# Patient Record
Sex: MALE | Race: WHITE | NOT HISPANIC OR LATINO | Employment: OTHER | URBAN - METROPOLITAN AREA
[De-identification: names, ages, dates, MRNs, and addresses within clinical notes are randomized per-mention and may not be internally consistent; named-entity substitution may affect disease eponyms.]

---

## 2017-02-10 RX ORDER — QUINAPRIL 20 MG/1
TABLET ORAL
Qty: 90 TABLET | Refills: 3 | Status: SHIPPED | OUTPATIENT
Start: 2017-02-10 | End: 2018-01-30 | Stop reason: SDUPTHER

## 2017-02-13 RX ORDER — ROSUVASTATIN CALCIUM 10 MG/1
TABLET, COATED ORAL
Qty: 90 TABLET | Refills: 3 | Status: SHIPPED | OUTPATIENT
Start: 2017-02-13 | End: 2018-01-30 | Stop reason: SDUPTHER

## 2017-02-13 RX ORDER — DILTIAZEM HYDROCHLORIDE 240 MG/1
CAPSULE, EXTENDED RELEASE ORAL
Qty: 90 CAPSULE | Refills: 3 | Status: SHIPPED | OUTPATIENT
Start: 2017-02-13 | End: 2018-01-30 | Stop reason: SDUPTHER

## 2017-04-03 ENCOUNTER — PATIENT MESSAGE (OUTPATIENT)
Dept: FAMILY MEDICINE | Facility: CLINIC | Age: 72
End: 2017-04-03

## 2017-05-11 ENCOUNTER — LAB VISIT (OUTPATIENT)
Dept: LAB | Facility: HOSPITAL | Age: 72
End: 2017-05-11
Attending: FAMILY MEDICINE
Payer: MEDICARE

## 2017-05-11 DIAGNOSIS — E78.5 HYPERLIPIDEMIA, UNSPECIFIED HYPERLIPIDEMIA TYPE: ICD-10-CM

## 2017-05-11 LAB
ALBUMIN SERPL BCP-MCNC: 3.9 G/DL
ALP SERPL-CCNC: 50 U/L
ALT SERPL W/O P-5'-P-CCNC: 24 U/L
ANION GAP SERPL CALC-SCNC: 7 MMOL/L
AST SERPL-CCNC: 26 U/L
BILIRUB SERPL-MCNC: 0.8 MG/DL
BUN SERPL-MCNC: 21 MG/DL
CALCIUM SERPL-MCNC: 9.5 MG/DL
CHLORIDE SERPL-SCNC: 106 MMOL/L
CHOLEST/HDLC SERPL: 3 {RATIO}
CO2 SERPL-SCNC: 26 MMOL/L
CREAT SERPL-MCNC: 1.2 MG/DL
EST. GFR  (AFRICAN AMERICAN): >60 ML/MIN/1.73 M^2
EST. GFR  (NON AFRICAN AMERICAN): 59.6 ML/MIN/1.73 M^2
GLUCOSE SERPL-MCNC: 110 MG/DL
HDL/CHOLESTEROL RATIO: 33.1 %
HDLC SERPL-MCNC: 142 MG/DL
HDLC SERPL-MCNC: 47 MG/DL
LDLC SERPL CALC-MCNC: 77 MG/DL
NONHDLC SERPL-MCNC: 95 MG/DL
POTASSIUM SERPL-SCNC: 4.9 MMOL/L
PROT SERPL-MCNC: 7 G/DL
SODIUM SERPL-SCNC: 139 MMOL/L
TRIGL SERPL-MCNC: 90 MG/DL

## 2017-05-11 PROCEDURE — 80053 COMPREHEN METABOLIC PANEL: CPT

## 2017-05-11 PROCEDURE — 80061 LIPID PANEL: CPT

## 2017-05-11 PROCEDURE — 36415 COLL VENOUS BLD VENIPUNCTURE: CPT | Mod: PO

## 2017-05-18 ENCOUNTER — OFFICE VISIT (OUTPATIENT)
Dept: FAMILY MEDICINE | Facility: CLINIC | Age: 72
End: 2017-05-18
Payer: MEDICARE

## 2017-05-18 VITALS
DIASTOLIC BLOOD PRESSURE: 76 MMHG | BODY MASS INDEX: 27.81 KG/M2 | SYSTOLIC BLOOD PRESSURE: 130 MMHG | HEART RATE: 76 BPM | HEIGHT: 71 IN | TEMPERATURE: 99 F | WEIGHT: 198.63 LBS

## 2017-05-18 DIAGNOSIS — C67.9 MALIGNANT NEOPLASM OF URINARY BLADDER, UNSPECIFIED SITE: ICD-10-CM

## 2017-05-18 DIAGNOSIS — E78.5 HYPERLIPIDEMIA, UNSPECIFIED HYPERLIPIDEMIA TYPE: ICD-10-CM

## 2017-05-18 DIAGNOSIS — I10 ESSENTIAL HYPERTENSION: Primary | ICD-10-CM

## 2017-05-18 PROCEDURE — 99213 OFFICE O/P EST LOW 20 MIN: CPT | Mod: PBBFAC,PO | Performed by: FAMILY MEDICINE

## 2017-05-18 PROCEDURE — 99214 OFFICE O/P EST MOD 30 MIN: CPT | Mod: S$PBB,,, | Performed by: FAMILY MEDICINE

## 2017-05-18 PROCEDURE — 99999 PR PBB SHADOW E&M-EST. PATIENT-LVL III: CPT | Mod: PBBFAC,,, | Performed by: FAMILY MEDICINE

## 2017-05-18 RX ORDER — MIRABEGRON 50 MG/1
TABLET, FILM COATED, EXTENDED RELEASE ORAL
Refills: 6 | COMMUNITY
Start: 2017-05-12 | End: 2017-12-18

## 2017-05-18 NOTE — PROGRESS NOTES
Chief Complaint   Patient presents with    Hypertension     6 month follow up with labs prior    Hyperlipidemia     PI:This is a 71 year-old male presents to f/u on HTN.    HLD - tolerating Crestor 10mg and Fish Oil  HTN - tolerating diltiazem, quinapril.   Bladder/prostate cancer - following with urology, Dr. Cr,  every 3 months. Next appt next week. Taking Myrbetriq  Glaucoma - on Combigan; followed by Dr. Emery  Lumbar DDD with right sciatica - Seeing Dr. Wooten. Taking Neurontin 600mg QHS; getting RFA or KORINA every 6 weeks; getting some intermittent right upper thigh pain with walking.    PAST MEDICAL HISTORY:  Prostate cancer - [place] 6  Bladder carcinoma - followed with Dr. Bowman every 3 months; treated in past with Thiotrpa, BCG  HLD (Dx )  HTN (Dx )  colon polyp  Lumbar DDD    PAST SURGICAL HISTORY:  multiple bladder scrappings (11 total)    FAMILY HISTORY:  Father:  at 50 of CAD  Mother:  at 78 insterstial lung fibrosis, carotid artery disease  Grandpartents unknown    SOCIAL HISTORY:  Tobacco use: quit    Alcohol use: 2 drinks of bourbon daily  Occupation:  for law firm  Marital status: (wife with schizophrenia)  Children: 2  enjoys wood working and fishing  exercises daily    REVIEW OF SYSTEMS:  GENERAL: No fever, chills, fatigability or weight changes.  SKIN/BREASTS: No rashes, sores, itching or changes in color or texture of skin.   HEAD: No headaches or recent head trauma.   EYES: Visual acuity fine. Denies blurriness, tearing, itching, photophobia, diplopia, or visual changes.   EARS: Denies ear pain, discharge, tinnitus or vertigo. Denies hearing loss.   NOSE: No loss of smell, epistaxis, postnasal drip, discharge, obstruction, or sneezing.  MOUTH & THROAT: No hoarseness, change in voice, swallowing difficulty. No excessive gum bleeding.   HEMATOLOGICAL/NODES: Denies swollen glands. No bleeding or bruising.  CHEST: No TOLEDO, cyanosis, wheezing, cough or  "sputum production.  CARDIOVASCULAR: Denies chest pain, dyspnea, orthopnea, or palpitations.  GI/ABDOMEN: Appetite fine. No weight loss. Denies nausea, vomiting, diarrhea, constipation, abdominal pain, hematemesis or blood in stool.  URINARY: No dysuria,hematuria, nocturia, incontinence, flank pain, urgency, or urinary difficulty.  PERIPHERAL VASCULAR: No claudication, cold intolerance or cyanosis.  MUSCULOSKELETAL: No joint stiffness, pain, swelling, or loss of strength. Denies back pain.  NEUROLOGIC: No history of seizures, paralysis, alteration of gait or coordination.  ENDOCRINE: Sexual maturation. Denies weight change, heat/cold intolerance, hair loss or gain, loss of libido, polyuria, polydipsia, polyphagia, pruritus, unexplained flushing, or excessive sweating.   PSYCH: No crying spells. Denies anxiety symptoms.    PHYSICAL EXAM:  /76 (BP Location: Left arm, Patient Position: Sitting, BP Method: Manual)  Pulse 76  Temp 98.5 °F (36.9 °C) (Oral)   Ht 5' 11" (1.803 m)  Wt 90.1 kg (198 lb 10.2 oz)  BMI 27.7 kg/m2  APPEARANCE: Well nourished, well developed, neatly groomed, in no acute distress.   SKIN: Warm, moist and dry. No lesions or rashes.  EYES: Conjunctivae and lids clear. PERRL. EOMI.   NOSE: Mucosa pink. Nares clear.  MOUTH & THROAT: Oral mucosa pink. No tonsillar enlargement. No pharyngeal erythema or exudate.   NECK: Supple with full range of motion. No masses. No thyromegaly. No JVD or bruits.  LYMPH: No cervical, supraclavicular, axillary or inguinal lymph node enlargement.  RESPIRATORY: Easy and unlabored respirations. Lungs clear to auscultation throughout all lung fields. No wheezes or rales.  CARDIOVASCULAR: Regular rate and rhythm. Normal S1, S2. No rubs, murmurs or gallops.   ABDOMEN: Bowel sounds normal. Nondistended and soft. No hepatosplenomegaly, masses, or tenderness.  EXTREMITIES: No edema or cyanosis. Pedal pulses 2+ bilaterally. No varicosities   NEUROLOGIC: Cranial Nerves: " II-XII grossly intact  Gait & Posture: Normal gait and fine motion.  PSYCH: Awake, alert, oriented to person, place, time, and situation. Pleasant and cooperative mood with intact judgment.    Results for orders placed or performed in visit on 05/11/17   Comprehensive metabolic panel   Result Value Ref Range    Sodium 139 136 - 145 mmol/L    Potassium 4.9 3.5 - 5.1 mmol/L    Chloride 106 95 - 110 mmol/L    CO2 26 23 - 29 mmol/L    Glucose 110 70 - 110 mg/dL    BUN, Bld 21 8 - 23 mg/dL    Creatinine 1.2 0.5 - 1.4 mg/dL    Calcium 9.5 8.7 - 10.5 mg/dL    Total Protein 7.0 6.0 - 8.4 g/dL    Albumin 3.9 3.5 - 5.2 g/dL    Total Bilirubin 0.8 0.1 - 1.0 mg/dL    Alkaline Phosphatase 50 (L) 55 - 135 U/L    AST 26 10 - 40 U/L    ALT 24 10 - 44 U/L    Anion Gap 7 (L) 8 - 16 mmol/L    eGFR if African American >60.0 >60 mL/min/1.73 m^2    eGFR if non  59.6 (A) >60 mL/min/1.73 m^2   Lipid panel   Result Value Ref Range    Cholesterol 142 120 - 199 mg/dL    Triglycerides 90 30 - 150 mg/dL    HDL 47 40 - 75 mg/dL    LDL Cholesterol 77.0 63.0 - 159.0 mg/dL    HDL/Chol Ratio 33.1 20.0 - 50.0 %    Total Cholesterol/HDL Ratio 3.0 2.0 - 5.0    Non-HDL Cholesterol 95 mg/dL       ASSESSMENT/PLAN:  Essential hypertension    Hyperlipidemia, unspecified hyperlipidemia type  -     Comprehensive metabolic panel; Future; Expected date: 11/14/17  -     Lipid panel; Future; Expected date: 11/14/17    Malignant neoplasm of urinary bladder, unspecified site  -     CBC auto differential; Future; Expected date: 11/14/17      1. HTN (controlled) - continue diltiazem and Quinapril  2. HLD (controlled) - continue Crestor 10mg daily, Fish Oil 1000mg daily  3. bladder carcinoma/prostate cancer - continue present treatment and f/u with Urology  f/u 6 months with CMP, lipid profile. Patient preference.

## 2017-05-18 NOTE — MR AVS SNAPSHOT
[facility]  [address] [facility] [address]  [facility] [address]  Phone: [phone]  Fax: [phone]                  Lloyd John Jr.   2017 7:20 AM   Office Visit    Description:  Male : 1945   Provider:  Daniel Avery MD   Department:  [facility]           Reason for Visit     Hypertension     Hyperlipidemia           Diagnoses this Visit        Comments    Essential hypertension    -  Primary     Hyperlipidemia, unspecified hyperlipidemia type         Malignant neoplasm of urinary bladder, unspecified site                To Do List           Future Appointments        Provider Department Dept Phone    2017 8:00 AM LAB, [place] [facility] [phone]    2017 8:00 AM Daniel Avery MD [facility] [phone]      Goals (5 Years of Data)     None      Follow-Up and Disposition     Return in about 6 months (around 2017).    Follow-up and Disposition History      [facility]     [facility] Nurse Care Line -  Assistance  Unless otherwise directed by your provider, please contact [facility], our nurse care line that is available for  assistance.     Registered nurses in the [facility] provide: appointment scheduling, clinical advisement, health education, and other advisory services.  Call: [phone] (toll free)               Medications           Message regarding Medications     Verify the changes and/or additions to your medication regime listed below are the same as discussed with your clinician today.  If any of these changes or additions are incorrect, please notify your healthcare provider.        STOP taking these medications     tamsulosin (FLOMAX) 0.4 mg Cp24            Verify that the below list of medications is an accurate representation of the medications you are currently taking.  If none reported, the list may be blank. If incorrect, please contact  "your healthcare provider. Carry this list with you in case of emergency.           Current Medications     ascorbic acid (VITAMIN C) 1000 MG tablet Take 1,000 mg by mouth once daily.      aspirin (ECOTRIN) 81 MG EC tablet Take 1 tablet by mouth Daily. Every day    COMBIGAN 0.2-0.5 % Drop     DILT- mg CDCR TAKE 1 CAPSULE(240 MG) BY MOUTH EVERY DAY    gabapentin (NEURONTIN) 600 MG tablet     LUMIGAN 0.01 % Drop     MULTIVITAMIN W-MINERALS/LUTEIN (CENTRUM SILVER ORAL) Take by mouth.      MYRBETRIQ 50 mg Tb24 TK 1 T PO QD    quinapril (ACCUPRIL) 20 MG tablet TAKE 1 TABLET(20 MG) BY MOUTH EVERY EVENING    rosuvastatin (CRESTOR) 10 MG tablet Take one tablet by mouth daily    fluticasone (FLONASE) 50 mcg/actuation nasal spray     NASONEX 50 mcg/actuation nasal spray            Clinical Reference Information           Your Vitals Were     BP Pulse Temp    130/76 (BP Location: Left arm, Patient Position: Sitting, BP Method: Manual) 76 98.5 °F (36.9 °C) (Oral)    Height Weight BMI    5' 11" (1.803 m) 90.1 kg (198 lb 10.2 oz) 27.7 kg/m2      Blood Pressure          Most Recent Value    BP  130/76      Allergies as of 5/18/2017     Bactrim [Sulfamethoxazole-trimethoprim]      Immunizations Administered on Date of Encounter - 5/18/2017     None      Orders Placed During Today's Visit     Future Labs/Procedures Expected by Expires    CBC auto differential  11/14/2017 7/17/2018    Comprehensive metabolic panel  11/14/2017 7/17/2018    Lipid panel  11/14/2017 5/18/2018      Language Assistance Services     ATTENTION: Language assistance services are available, free of charge. Please call [phone].      ATENCIÓN: Si habla español, tiene a gan disposición servicios gratuitos de asistencia lingüística. Llame al [phone].     CHÚ Ý: N?u b?n nói Ti?ng Vi?t, có các d?ch v? h? tr? ngôn ng? mi?n phí dành cho b?n. G?i s? [phone].         [facility] complies with applicable Federal civil rights laws " and does not discriminate on the basis of race, color, national origin, age, disability, or sex.

## 2017-11-14 ENCOUNTER — LAB VISIT (OUTPATIENT)
Dept: LAB | Facility: HOSPITAL | Age: 72
End: 2017-11-14
Attending: FAMILY MEDICINE
Payer: MEDICARE

## 2017-11-14 DIAGNOSIS — E78.5 HYPERLIPIDEMIA, UNSPECIFIED HYPERLIPIDEMIA TYPE: ICD-10-CM

## 2017-11-14 DIAGNOSIS — C67.9 MALIGNANT NEOPLASM OF URINARY BLADDER, UNSPECIFIED SITE: ICD-10-CM

## 2017-11-14 LAB
ALBUMIN SERPL BCP-MCNC: 3.9 G/DL
ALP SERPL-CCNC: 54 U/L
ALT SERPL W/O P-5'-P-CCNC: 33 U/L
ANION GAP SERPL CALC-SCNC: 6 MMOL/L
AST SERPL-CCNC: 37 U/L
BASOPHILS # BLD AUTO: 0.05 K/UL
BASOPHILS NFR BLD: 1.2 %
BILIRUB SERPL-MCNC: 1.1 MG/DL
BUN SERPL-MCNC: 18 MG/DL
CALCIUM SERPL-MCNC: 9.4 MG/DL
CHLORIDE SERPL-SCNC: 105 MMOL/L
CHOLEST SERPL-MCNC: 150 MG/DL
CHOLEST/HDLC SERPL: 2.8 {RATIO}
CO2 SERPL-SCNC: 31 MMOL/L
CREAT SERPL-MCNC: 1 MG/DL
DIFFERENTIAL METHOD: ABNORMAL
EOSINOPHIL # BLD AUTO: 0.1 K/UL
EOSINOPHIL NFR BLD: 1.5 %
ERYTHROCYTE [DISTWIDTH] IN BLOOD BY AUTOMATED COUNT: 12 %
EST. GFR  (AFRICAN AMERICAN): >60 ML/MIN/1.73 M^2
EST. GFR  (NON AFRICAN AMERICAN): >60 ML/MIN/1.73 M^2
GLUCOSE SERPL-MCNC: 95 MG/DL
HCT VFR BLD AUTO: 42.8 %
HDLC SERPL-MCNC: 54 MG/DL
HDLC SERPL: 36 %
HGB BLD-MCNC: 15.2 G/DL
IMM GRANULOCYTES # BLD AUTO: 0 K/UL
IMM GRANULOCYTES NFR BLD AUTO: 0 %
LDLC SERPL CALC-MCNC: 73.6 MG/DL
LYMPHOCYTES # BLD AUTO: 1.2 K/UL
LYMPHOCYTES NFR BLD: 29.7 %
MCH RBC QN AUTO: 34 PG
MCHC RBC AUTO-ENTMCNC: 35.5 G/DL
MCV RBC AUTO: 96 FL
MONOCYTES # BLD AUTO: 0.7 K/UL
MONOCYTES NFR BLD: 16.3 %
NEUTROPHILS # BLD AUTO: 2.1 K/UL
NEUTROPHILS NFR BLD: 51.3 %
NONHDLC SERPL-MCNC: 96 MG/DL
NRBC BLD-RTO: 0 /100 WBC
PLATELET # BLD AUTO: 232 K/UL
PMV BLD AUTO: 11.3 FL
POTASSIUM SERPL-SCNC: 4.4 MMOL/L
PROT SERPL-MCNC: 6.8 G/DL
RBC # BLD AUTO: 4.47 M/UL
SODIUM SERPL-SCNC: 142 MMOL/L
TRIGL SERPL-MCNC: 112 MG/DL
WBC # BLD AUTO: 4.11 K/UL

## 2017-11-14 PROCEDURE — 36415 COLL VENOUS BLD VENIPUNCTURE: CPT | Mod: PO

## 2017-11-14 PROCEDURE — 80061 LIPID PANEL: CPT

## 2017-11-14 PROCEDURE — 85025 COMPLETE CBC W/AUTO DIFF WBC: CPT

## 2017-11-14 PROCEDURE — 80053 COMPREHEN METABOLIC PANEL: CPT

## 2017-11-17 ENCOUNTER — OFFICE VISIT (OUTPATIENT)
Dept: FAMILY MEDICINE | Facility: CLINIC | Age: 72
End: 2017-11-17
Payer: MEDICARE

## 2017-11-17 VITALS
HEIGHT: 71 IN | OXYGEN SATURATION: 93 % | SYSTOLIC BLOOD PRESSURE: 122 MMHG | WEIGHT: 199.94 LBS | DIASTOLIC BLOOD PRESSURE: 70 MMHG | BODY MASS INDEX: 27.99 KG/M2 | HEART RATE: 75 BPM

## 2017-11-17 DIAGNOSIS — I10 ESSENTIAL HYPERTENSION: Primary | ICD-10-CM

## 2017-11-17 DIAGNOSIS — E78.5 HYPERLIPIDEMIA, UNSPECIFIED HYPERLIPIDEMIA TYPE: ICD-10-CM

## 2017-11-17 PROCEDURE — 99213 OFFICE O/P EST LOW 20 MIN: CPT | Mod: S$PBB,,, | Performed by: FAMILY MEDICINE

## 2017-11-17 PROCEDURE — 99213 OFFICE O/P EST LOW 20 MIN: CPT | Mod: PBBFAC,PO | Performed by: FAMILY MEDICINE

## 2017-11-17 PROCEDURE — 99999 PR PBB SHADOW E&M-EST. PATIENT-LVL III: CPT | Mod: PBBFAC,,, | Performed by: FAMILY MEDICINE

## 2017-11-17 NOTE — PROGRESS NOTES
Chief Complaint   Patient presents with    Hyperlipidemia     6 mos f/u    Hypertension     PI:This is a 72 year-old male presents to f/u on HTN.    HLD - tolerating Crestor 10mg and Fish Oil  HTN - tolerating diltiazem, quinapril.   Bladder/prostate cancer ([place] 7) - following with urology, Dr. Cr,  every 3 months. Taking Myrbetriq. Planning for robotic prostatectomy.  Glaucoma - on Combigan; followed by Dr. Emery  Lumbar DDD with right sciatica - Seeing Dr. Wooten. Taking Neurontin 600mg QHS; s/p RFA and KORINA; getting some intermittent right upper thigh pain with walking.    PAST MEDICAL HISTORY:  Prostate cancer - [place] 6  Bladder carcinoma - followed with Dr. Bowman every 3 months; treated in past with Thiotrpa, BCG  HLD (Dx )  HTN (Dx )  colon polyp  Lumbar DDD    PAST SURGICAL HISTORY:  multiple bladder scrappings (11 total)    FAMILY HISTORY:  Father:  at 50 of CAD  Mother:  at 78 insterstial lung fibrosis, carotid artery disease  Grandpartents unknown    SOCIAL HISTORY:  Tobacco use: quit    Alcohol use: 2 drinks of bourbon daily  Occupation:  for law firm  Marital status: (wife with schizophrenia)  Children: 2  enjoys wood working and fishing  exercises daily    REVIEW OF SYSTEMS:  GENERAL: No fever, chills, fatigability or weight changes.  SKIN/BREASTS: No rashes, sores, itching or changes in color or texture of skin.   HEAD: No headaches or recent head trauma.   EYES: Visual acuity fine. Denies blurriness, tearing, itching, photophobia, diplopia, or visual changes.   EARS: Denies ear pain, discharge, tinnitus or vertigo. Denies hearing loss.   NOSE: No loss of smell, epistaxis, postnasal drip, discharge, obstruction, or sneezing.  MOUTH & THROAT: No hoarseness, change in voice, swallowing difficulty. No excessive gum bleeding.   HEMATOLOGICAL/NODES: Denies swollen glands. No bleeding or bruising.  CHEST: No TOLEDO, cyanosis, wheezing, cough or sputum  "production.  CARDIOVASCULAR: Denies chest pain, dyspnea, orthopnea, or palpitations.  GI/ABDOMEN: Appetite fine. No weight loss. Denies nausea, vomiting, diarrhea, constipation, abdominal pain, hematemesis or blood in stool.  URINARY: No dysuria,hematuria, nocturia, incontinence, flank pain, urgency, or urinary difficulty.  PERIPHERAL VASCULAR: No claudication, cold intolerance or cyanosis.  MUSCULOSKELETAL: No joint stiffness, pain, swelling, or loss of strength. Denies back pain.  NEUROLOGIC: No history of seizures, paralysis, alteration of gait or coordination.  ENDOCRINE: Sexual maturation. Denies weight change, heat/cold intolerance, hair loss or gain, loss of libido, polyuria, polydipsia, polyphagia, pruritus, unexplained flushing, or excessive sweating.   PSYCH: No crying spells. Denies anxiety symptoms.    Answers for HPI/ROS submitted by the patient on 11/15/2017   activity change: Yes  unexpected weight change: No  neck pain: No  hearing loss: No  rhinorrhea: No  trouble swallowing: No  eye discharge: No  visual disturbance: No  chest tightness: No  wheezing: No  chest pain: No  palpitations: No  blood in stool: No  constipation: No  vomiting: No  diarrhea: No  polydipsia: No  polyuria: Yes  difficulty urinating: No  urgency: Yes  hematuria: No  joint swelling: No  arthralgias: Yes  headaches: Yes  weakness: No  confusion: No  dysphoric mood: No      PHYSICAL EXAM:  /70   Pulse 75   Ht 5' 11" (1.803 m)   Wt 90.7 kg (199 lb 15.3 oz)   SpO2 (!) 93%   BMI 27.89 kg/m²   APPEARANCE: Well nourished, well developed, neatly groomed, in no acute distress.   SKIN: Warm, moist and dry. No lesions or rashes.  EYES: Conjunctivae and lids clear. PERRL. EOMI.   NOSE: Mucosa pink. Nares clear.  MOUTH & THROAT: Oral mucosa pink. No tonsillar enlargement. No pharyngeal erythema or exudate.   NECK: Supple with full range of motion. No masses. No thyromegaly. No JVD or bruits.  LYMPH: No cervical, supraclavicular, " axillary or inguinal lymph node enlargement.  RESPIRATORY: Easy and unlabored respirations. Lungs clear to auscultation throughout all lung fields. No wheezes or rales.  CARDIOVASCULAR: Regular rate and rhythm. Normal S1, S2. No rubs, murmurs or gallops.   ABDOMEN: Bowel sounds normal. Nondistended and soft. No hepatosplenomegaly, masses, or tenderness.  EXTREMITIES: No edema or cyanosis. Pedal pulses 2+ bilaterally. No varicosities   NEUROLOGIC: Cranial Nerves: II-XII grossly intact  Gait & Posture: Normal gait and fine motion.  PSYCH: Awake, alert, oriented to person, place, time, and situation. Pleasant and cooperative mood with intact judgment.    Results for orders placed or performed in visit on 11/14/17   Comprehensive metabolic panel   Result Value Ref Range    Sodium 142 136 - 145 mmol/L    Potassium 4.4 3.5 - 5.1 mmol/L    Chloride 105 95 - 110 mmol/L    CO2 31 (H) 23 - 29 mmol/L    Glucose 95 70 - 110 mg/dL    BUN, Bld 18 8 - 23 mg/dL    Creatinine 1.0 0.5 - 1.4 mg/dL    Calcium 9.4 8.7 - 10.5 mg/dL    Total Protein 6.8 6.0 - 8.4 g/dL    Albumin 3.9 3.5 - 5.2 g/dL    Total Bilirubin 1.1 (H) 0.1 - 1.0 mg/dL    Alkaline Phosphatase 54 (L) 55 - 135 U/L    AST 37 10 - 40 U/L    ALT 33 10 - 44 U/L    Anion Gap 6 (L) 8 - 16 mmol/L    eGFR if African American >60.0 >60 mL/min/1.73 m^2    eGFR if non African American >60.0 >60 mL/min/1.73 m^2   Lipid panel   Result Value Ref Range    Cholesterol 150 120 - 199 mg/dL    Triglycerides 112 30 - 150 mg/dL    HDL 54 40 - 75 mg/dL    LDL Cholesterol 73.6 63.0 - 159.0 mg/dL    HDL/Chol Ratio 36.0 20.0 - 50.0 %    Total Cholesterol/HDL Ratio 2.8 2.0 - 5.0    Non-HDL Cholesterol 96 mg/dL   CBC auto differential   Result Value Ref Range    WBC 4.11 3.90 - 12.70 K/uL    RBC 4.47 (L) 4.60 - 6.20 M/uL    Hemoglobin 15.2 14.0 - 18.0 g/dL    Hematocrit 42.8 40.0 - 54.0 %    MCV 96 82 - 98 fL    MCH 34.0 (H) 27.0 - 31.0 pg    MCHC 35.5 32.0 - 36.0 g/dL    RDW 12.0 11.5 - 14.5 %     Platelets 232 150 - 350 K/uL    MPV 11.3 9.2 - 12.9 fL    Immature Granulocytes 0.0 0.0 - 0.5 %    Gran # 2.1 1.8 - 7.7 K/uL    Immature Grans (Abs) 0.00 0.00 - 0.04 K/uL    Lymph # 1.2 1.0 - 4.8 K/uL    Mono # 0.7 0.3 - 1.0 K/uL    Eos # 0.1 0.0 - 0.5 K/uL    Baso # 0.05 0.00 - 0.20 K/uL    nRBC 0 0 /100 WBC    Gran% 51.3 38.0 - 73.0 %    Lymph% 29.7 18.0 - 48.0 %    Mono% 16.3 (H) 4.0 - 15.0 %    Eosinophil% 1.5 0.0 - 8.0 %    Basophil% 1.2 0.0 - 1.9 %    Differential Method Automated        ASSESSMENT/PLAN:  Essential hypertension    Hyperlipidemia, unspecified hyperlipidemia type  -     Comprehensive metabolic panel; Future; Expected date: 05/16/2018  -     Lipid panel; Future; Expected date: 05/16/2018      1. HTN (controlled) - continue diltiazem and Quinapril  2. HLD (controlled) - continue Crestor 10mg daily, Fish Oil 1000mg daily  3. bladder carcinoma/prostate cancer - continue present treatment and f/u with Urology  f/u 6 months with CMP, lipid profile. Patient preference.

## 2017-12-11 ENCOUNTER — PATIENT MESSAGE (OUTPATIENT)
Dept: FAMILY MEDICINE | Facility: CLINIC | Age: 72
End: 2017-12-11

## 2017-12-18 ENCOUNTER — OFFICE VISIT (OUTPATIENT)
Dept: FAMILY MEDICINE | Facility: CLINIC | Age: 72
End: 2017-12-18
Payer: MEDICARE

## 2017-12-18 VITALS
SYSTOLIC BLOOD PRESSURE: 146 MMHG | HEIGHT: 71 IN | TEMPERATURE: 98 F | DIASTOLIC BLOOD PRESSURE: 90 MMHG | WEIGHT: 201.94 LBS | OXYGEN SATURATION: 96 % | HEART RATE: 100 BPM | BODY MASS INDEX: 28.27 KG/M2

## 2017-12-18 DIAGNOSIS — R10.9 LEFT FLANK PAIN: ICD-10-CM

## 2017-12-18 DIAGNOSIS — M16.10 HIP ARTHRITIS: Primary | ICD-10-CM

## 2017-12-18 PROCEDURE — 99999 PR PBB SHADOW E&M-EST. PATIENT-LVL IV: CPT | Mod: PBBFAC,,, | Performed by: FAMILY MEDICINE

## 2017-12-18 PROCEDURE — 99213 OFFICE O/P EST LOW 20 MIN: CPT | Mod: S$PBB,,, | Performed by: FAMILY MEDICINE

## 2017-12-18 PROCEDURE — 99214 OFFICE O/P EST MOD 30 MIN: CPT | Mod: PBBFAC,PO | Performed by: FAMILY MEDICINE

## 2017-12-18 RX ORDER — NAPROXEN 500 MG/1
500 TABLET ORAL 2 TIMES DAILY WITH MEALS
Qty: 14 TABLET | Refills: 0 | Status: SHIPPED | OUTPATIENT
Start: 2017-12-18 | End: 2019-12-11

## 2017-12-18 NOTE — PROGRESS NOTES
Subjective:       Patient ID: Lloyd John Jr. is a 72 y.o. male.    Chief Complaint: Pain (left side)    C/o recurrent left anterior hip pain for the past month.  7/10, lasts seconds, worse with lifting and pulling,   Pain is sharp.  It is worse with prolonged seating.  No pain with lying.  It seems to have started after moving a ladder.  Pain is unchanged.  Taking 2 Aleve as needed.        Past Medical History:   Diagnosis Date    Bladder cancer     Followed by Dr. Craig in [place]    Colon polyp 2006    Bunny Payton / [facility]    Glaucoma (increased eye pressure)     HLD (hyperlipidemia)     HTN (hypertension)     Prostate cancer        Past Surgical History:   Procedure Laterality Date    bladder scrapping      COLONOSCOPY  2006  Bill Payton/    Polyps    COLONOSCOPY  4/5/2012  [facility]    Diverticulosis and spasms.  No polyps.    Urolift implant  03/08/2017       Review of patient's allergies indicates:   Allergen Reactions    Bactrim [sulfamethoxazole-trimethoprim] Hives and Rash       Social History     Social History    Marital status:      Spouse name: N/A    Number of children: 2    Years of education: N/A     Occupational History     for law firm      Social History Main Topics    Smoking status: Former Smoker     Quit date: 1/1/1997    Smokeless tobacco: Never Used    Alcohol use 1.0 oz/week     2 drink(s) per week      Comment: weekends    Drug use: No    Sexual activity: Not on file     Other Topics Concern    Not on file     Social History Narrative    No narrative on file       Current Outpatient Prescriptions on File Prior to Visit   Medication Sig Dispense Refill    ascorbic acid (VITAMIN C) 1000 MG tablet Take 1,000 mg by mouth once daily.        aspirin (ECOTRIN) 81 MG EC tablet Take 1 tablet by mouth Daily. Every day      COMBIGAN 0.2-0.5 % Drop       DILT- mg CDCR TAKE 1 CAPSULE(240 MG) BY MOUTH EVERY DAY 90 capsule 3    fluticasone (FLONASE)  "50 mcg/actuation nasal spray   4    LUMIGAN 0.01 % Drop       MULTIVITAMIN W-MINERALS/LUTEIN (CENTRUM SILVER ORAL) Take by mouth.        NASONEX 50 mcg/actuation nasal spray   3    quinapril (ACCUPRIL) 20 MG tablet TAKE 1 TABLET(20 MG) BY MOUTH EVERY EVENING 90 tablet 3    rosuvastatin (CRESTOR) 10 MG tablet Take one tablet by mouth daily 90 tablet 3     No current facility-administered medications on file prior to visit.        Family History   Problem Relation Age of Onset    Coronary artery disease Father        Review of Systems   Constitutional: Negative for appetite change, chills, fever and unexpected weight change.   HENT: Negative for sore throat and trouble swallowing.    Eyes: Negative for pain and visual disturbance.   Respiratory: Negative for cough, chest tightness, shortness of breath and wheezing.    Cardiovascular: Negative for chest pain, palpitations and leg swelling.   Gastrointestinal: Negative for abdominal pain, blood in stool, constipation, diarrhea and nausea.   Genitourinary: Negative for difficulty urinating, dysuria and hematuria.   Musculoskeletal: Positive for arthralgias. Negative for gait problem and neck pain.   Skin: Negative for rash and wound.   Neurological: Negative for dizziness, weakness, light-headedness and headaches.   Hematological: Negative for adenopathy.   Psychiatric/Behavioral: Negative for dysphoric mood.       Objective:      BP (!) 146/90 (BP Location: Left arm, Patient Position: Sitting, BP Method: Medium (Manual))   Pulse 100   Temp 98.2 °F (36.8 °C) (Oral)   Ht 5' 11" (1.803 m)   Wt 91.6 kg (201 lb 15.1 oz)   SpO2 96%   BMI 28.17 kg/m²   Physical Exam   Constitutional: He is oriented to person, place, and time. He appears well-developed and well-nourished. He is active. No distress.   HENT:   Head: Normocephalic and atraumatic.   Right Ear: External ear normal.   Left Ear: External ear normal.   Mouth/Throat: Uvula is midline, oropharynx is clear and " moist and mucous membranes are normal. No oropharyngeal exudate.   Eyes: Conjunctivae, EOM and lids are normal. Pupils are equal, round, and reactive to light.   Neck: Normal range of motion, full passive range of motion without pain and phonation normal. Neck supple. No thyroid mass and no thyromegaly present.   Cardiovascular: Normal rate, regular rhythm, normal heart sounds and intact distal pulses.  Exam reveals no gallop and no friction rub.    No murmur heard.  Pulmonary/Chest: Effort normal and breath sounds normal. No respiratory distress. He has no wheezes. He has no rales.   Musculoskeletal:        Left hip: He exhibits decreased range of motion and crepitus. He exhibits no tenderness and no bony tenderness.   Lymphadenopathy:     He has no cervical adenopathy.   Neurological: He is alert and oriented to person, place, and time. No cranial nerve deficit. Coordination normal.   Skin: Skin is warm and dry.   Psychiatric: He has a normal mood and affect. His speech is normal and behavior is normal. Judgment and thought content normal.       Assessment:       1. Hip arthritis    2. Left flank pain        Plan:       Hip arthritis  -     naproxen (NAPROSYN) 500 MG tablet; Take 1 tablet (500 mg total) by mouth 2 (two) times daily with meals.  Dispense: 14 tablet; Refill: 0    Left flank pain  -     naproxen (NAPROSYN) 500 MG tablet; Take 1 tablet (500 mg total) by mouth 2 (two) times daily with meals.  Dispense: 14 tablet; Refill: 0      basic hip stretching and relative rest  F/u PRN

## 2018-01-30 RX ORDER — DILTIAZEM HYDROCHLORIDE 240 MG/1
CAPSULE, EXTENDED RELEASE ORAL
Qty: 90 CAPSULE | Refills: 3 | Status: SHIPPED | OUTPATIENT
Start: 2018-01-30 | End: 2018-11-26 | Stop reason: SDUPTHER

## 2018-01-30 RX ORDER — ROSUVASTATIN CALCIUM 10 MG/1
TABLET, COATED ORAL
Qty: 90 TABLET | Refills: 3 | Status: SHIPPED | OUTPATIENT
Start: 2018-01-30 | End: 2018-11-26 | Stop reason: SDUPTHER

## 2018-01-30 RX ORDER — QUINAPRIL 20 MG/1
TABLET ORAL
Qty: 90 TABLET | Refills: 3 | Status: SHIPPED | OUTPATIENT
Start: 2018-01-30 | End: 2018-07-03 | Stop reason: DRUGHIGH

## 2018-04-10 ENCOUNTER — PATIENT MESSAGE (OUTPATIENT)
Dept: GASTROENTEROLOGY | Facility: CLINIC | Age: 73
End: 2018-04-10

## 2018-05-11 ENCOUNTER — LAB VISIT (OUTPATIENT)
Dept: LAB | Facility: HOSPITAL | Age: 73
End: 2018-05-11
Attending: FAMILY MEDICINE
Payer: MEDICARE

## 2018-05-11 DIAGNOSIS — E78.5 HYPERLIPIDEMIA, UNSPECIFIED HYPERLIPIDEMIA TYPE: ICD-10-CM

## 2018-05-11 LAB
ALBUMIN SERPL BCP-MCNC: 4 G/DL
ALP SERPL-CCNC: 60 U/L
ALT SERPL W/O P-5'-P-CCNC: 39 U/L
ANION GAP SERPL CALC-SCNC: 6 MMOL/L
AST SERPL-CCNC: 34 U/L
BILIRUB SERPL-MCNC: 1.1 MG/DL
BUN SERPL-MCNC: 20 MG/DL
CALCIUM SERPL-MCNC: 9.8 MG/DL
CHLORIDE SERPL-SCNC: 105 MMOL/L
CHOLEST SERPL-MCNC: 138 MG/DL
CHOLEST/HDLC SERPL: 3 {RATIO}
CO2 SERPL-SCNC: 30 MMOL/L
CREAT SERPL-MCNC: 1.2 MG/DL
EST. GFR  (AFRICAN AMERICAN): >60 ML/MIN/1.73 M^2
EST. GFR  (NON AFRICAN AMERICAN): 59.2 ML/MIN/1.73 M^2
GLUCOSE SERPL-MCNC: 98 MG/DL
HDLC SERPL-MCNC: 46 MG/DL
HDLC SERPL: 33.3 %
LDLC SERPL CALC-MCNC: 70.2 MG/DL
NONHDLC SERPL-MCNC: 92 MG/DL
POTASSIUM SERPL-SCNC: 4.3 MMOL/L
PROT SERPL-MCNC: 7 G/DL
SODIUM SERPL-SCNC: 141 MMOL/L
TRIGL SERPL-MCNC: 109 MG/DL

## 2018-05-11 PROCEDURE — 80053 COMPREHEN METABOLIC PANEL: CPT

## 2018-05-11 PROCEDURE — 80061 LIPID PANEL: CPT

## 2018-05-11 PROCEDURE — 36415 COLL VENOUS BLD VENIPUNCTURE: CPT | Mod: PO

## 2018-05-17 ENCOUNTER — OFFICE VISIT (OUTPATIENT)
Dept: FAMILY MEDICINE | Facility: CLINIC | Age: 73
End: 2018-05-17
Payer: MEDICARE

## 2018-05-17 VITALS
HEIGHT: 71 IN | HEART RATE: 59 BPM | RESPIRATION RATE: 16 BRPM | OXYGEN SATURATION: 97 % | SYSTOLIC BLOOD PRESSURE: 120 MMHG | BODY MASS INDEX: 27.53 KG/M2 | DIASTOLIC BLOOD PRESSURE: 86 MMHG | WEIGHT: 196.63 LBS | TEMPERATURE: 98 F

## 2018-05-17 DIAGNOSIS — I10 ESSENTIAL HYPERTENSION: Primary | ICD-10-CM

## 2018-05-17 DIAGNOSIS — C61 PROSTATE CANCER: ICD-10-CM

## 2018-05-17 DIAGNOSIS — G47.00 INSOMNIA, UNSPECIFIED TYPE: ICD-10-CM

## 2018-05-17 DIAGNOSIS — C67.9 MALIGNANT NEOPLASM OF URINARY BLADDER, UNSPECIFIED SITE: ICD-10-CM

## 2018-05-17 DIAGNOSIS — E78.5 HYPERLIPIDEMIA, UNSPECIFIED HYPERLIPIDEMIA TYPE: ICD-10-CM

## 2018-05-17 PROCEDURE — 99214 OFFICE O/P EST MOD 30 MIN: CPT | Mod: S$PBB,,, | Performed by: FAMILY MEDICINE

## 2018-05-17 PROCEDURE — 99999 PR PBB SHADOW E&M-EST. PATIENT-LVL III: CPT | Mod: PBBFAC,,, | Performed by: FAMILY MEDICINE

## 2018-05-17 PROCEDURE — 99213 OFFICE O/P EST LOW 20 MIN: CPT | Mod: PBBFAC,PO | Performed by: FAMILY MEDICINE

## 2018-05-17 RX ORDER — ALPRAZOLAM 0.5 MG/1
0.5 TABLET ORAL NIGHTLY PRN
Qty: 30 TABLET | Refills: 0 | Status: SHIPPED | OUTPATIENT
Start: 2018-05-17 | End: 2019-12-11

## 2018-05-17 RX ORDER — MIRABEGRON 50 MG/1
TABLET, FILM COATED, EXTENDED RELEASE ORAL
Refills: 3 | COMMUNITY
Start: 2018-05-10 | End: 2019-12-11

## 2018-05-17 NOTE — PROGRESS NOTES
Chief Complaint   Patient presents with    Hypertension     follow up     Insomnia     states he is getting little sleep caring for his spouse     PI:This is a 72 year-old male presents to f/u on HTN.    HLD - tolerating Crestor 10mg and Fish Oil  HTN - tolerating diltiazem, quinapril.   Bladder/prostate cancer ([place] [facility]) - following with urology, Dr. Cr,  every 3 months. Taking Myrbetriq. Still considering for robotic prostatectomy. PSA has been trending down.  Glaucoma - on Combigan; followed by Dr. Emery  Lumbar DDD with right sciatica - Seeing Dr. Wooten. Taking Neurontin 600mg QHS; s/p RFA and KORINA; getting some intermittent right upper thigh pain with walking.  Some trouble falling to sleep for the last 3 weeks. He has tried melatonin.    PAST MEDICAL HISTORY:  Prostate cancer - [place] [facility]  Bladder carcinoma - followed with Dr. Bowman every 3 months; treated in past with Thiotrpa, BCG  HLD (Dx )  HTN (Dx )  colon polyp  Lumbar DDD    PAST SURGICAL HISTORY:  multiple bladder scrappings (11 total)    FAMILY HISTORY:  Father:  at 50 of CAD  Mother:  at 78 insterstial lung fibrosis, carotid artery disease  Grandpartents unknown    SOCIAL HISTORY:  Tobacco use: quit    Alcohol use: 2 drinks of bourbon daily  Occupation:  for [employer]  Marital status: (wife with schizophrenia)  Children: 2  enjoys wood working and fishing  exercises daily    REVIEW OF SYSTEMS:  GENERAL: No fever, chills, fatigability or weight changes.  SKIN/BREASTS: No rashes, sores, itching or changes in color or texture of skin.   HEAD: No headaches or recent head trauma.   EYES: Visual acuity fine. Denies blurriness, tearing, itching, photophobia, diplopia, or visual changes.   EARS: Denies ear pain, discharge, tinnitus or vertigo. Denies hearing loss.   NOSE: No loss of smell, epistaxis, postnasal drip, discharge, obstruction, or sneezing.  MOUTH & THROAT: No hoarseness, change in voice, swallowing  "difficulty. No excessive gum bleeding.   HEMATOLOGICAL/NODES: Denies swollen glands. No bleeding or bruising.  CHEST: No TOLEDO, cyanosis, wheezing, cough or sputum production.  CARDIOVASCULAR: Denies chest pain, dyspnea, orthopnea, or palpitations.  GI/ABDOMEN: Appetite fine. No weight loss. Denies nausea, vomiting, diarrhea, constipation, abdominal pain, hematemesis or blood in stool.  URINARY: No dysuria,hematuria, nocturia, incontinence, flank pain, urgency, or urinary difficulty.  PERIPHERAL VASCULAR: No claudication, cold intolerance or cyanosis.  MUSCULOSKELETAL: No joint stiffness, pain, swelling, or loss of strength. Denies back pain.  NEUROLOGIC: No history of seizures, paralysis, alteration of gait or coordination.  ENDOCRINE: Sexual maturation. Denies weight change, heat/cold intolerance, hair loss or gain, loss of libido, polyuria, polydipsia, polyphagia, pruritus, unexplained flushing, or excessive sweating.   PSYCH: No crying spells. Denies anxiety symptoms.      PHYSICAL EXAM:  /86 (BP Location: Left arm, Patient Position: Sitting, BP Method: Medium (Manual))   Pulse (!) 59   Temp 97.7 °F (36.5 °C) (Oral)   Resp 16   Ht 5' 11" (1.803 m)   Wt 89.2 kg (196 lb 10.4 oz)   SpO2 97%   BMI 27.43 kg/m²   APPEARANCE: Well nourished, well developed, neatly groomed, in no acute distress.   SKIN: Warm, moist and dry. No lesions or rashes.  EYES: Conjunctivae and lids clear. PERRL. EOMI.   NOSE: Mucosa pink. Nares clear.  MOUTH & THROAT: Oral mucosa pink. No tonsillar enlargement. No pharyngeal erythema or exudate.   NECK: Supple with full range of motion. No masses. No thyromegaly. No JVD or bruits.  LYMPH: No cervical, supraclavicular, axillary or inguinal lymph node enlargement.  RESPIRATORY: Easy and unlabored respirations. Lungs clear to auscultation throughout all lung fields. No wheezes or rales.  CARDIOVASCULAR: Regular rate and rhythm. Normal S1, S2. No rubs, murmurs or gallops.   ABDOMEN: " Bowel sounds normal. Nondistended and soft. No hepatosplenomegaly, masses, or tenderness.  EXTREMITIES: No edema or cyanosis. Pedal pulses 2+ bilaterally. No varicosities   NEUROLOGIC: Cranial Nerves: II-XII grossly intact  Gait & Posture: Normal gait and fine motion.  PSYCH: Awake, alert, oriented to person, place, time, and situation. Pleasant and cooperative mood with intact judgment.    Results for orders placed or performed in visit on 05/11/18   Comprehensive metabolic panel   Result Value Ref Range    Sodium 141 136 - 145 mmol/L    Potassium 4.3 3.5 - 5.1 mmol/L    Chloride 105 95 - 110 mmol/L    CO2 30 (H) 23 - 29 mmol/L    Glucose 98 70 - 110 mg/dL    BUN, Bld 20 8 - 23 mg/dL    Creatinine 1.2 0.5 - 1.4 mg/dL    Calcium 9.8 8.7 - 10.5 mg/dL    Total Protein 7.0 6.0 - 8.4 g/dL    Albumin 4.0 3.5 - 5.2 g/dL    Total Bilirubin 1.1 (H) 0.1 - 1.0 mg/dL    Alkaline Phosphatase 60 55 - 135 U/L    AST 34 10 - 40 U/L    ALT 39 10 - 44 U/L    Anion Gap 6 (L) 8 - 16 mmol/L    eGFR if African American >60.0 >60 mL/min/1.73 m^2    eGFR if non  59.2 (A) >60 mL/min/1.73 m^2   Lipid panel   Result Value Ref Range    Cholesterol 138 120 - 199 mg/dL    Triglycerides 109 30 - 150 mg/dL    HDL 46 40 - 75 mg/dL    LDL Cholesterol 70.2 63.0 - 159.0 mg/dL    HDL/Chol Ratio 33.3 20.0 - 50.0 %    Total Cholesterol/HDL Ratio 3.0 2.0 - 5.0    Non-HDL Cholesterol 92 mg/dL       ASSESSMENT/PLAN:  Essential hypertension    Hyperlipidemia, unspecified hyperlipidemia type  -     Lipid panel; Future; Expected date: 11/13/2018  -     Comprehensive metabolic panel; Future; Expected date: 11/13/2018    Malignant neoplasm of urinary bladder, unspecified site    Prostate cancer    Insomnia, unspecified type  -     ALPRAZolam (XANAX) 0.5 MG tablet; Take 1 tablet (0.5 mg total) by mouth nightly as needed for Anxiety.  Dispense: 30 tablet; Refill: 0    trial of xanax for insomnia. This worked well for him in the last.  1. HTN  (controlled) - continue diltiazem and Quinapril  2. HLD (controlled) - continue Crestor 10mg daily, Fish Oil 1000mg daily  3. bladder carcinoma/prostate cancer - continue present treatment and f/u with Urology  f/u 6 months with CMP, lipid profile. Patient preference.

## 2018-07-01 ENCOUNTER — PATIENT MESSAGE (OUTPATIENT)
Dept: FAMILY MEDICINE | Facility: CLINIC | Age: 73
End: 2018-07-01

## 2018-07-02 ENCOUNTER — TELEPHONE (OUTPATIENT)
Dept: FAMILY MEDICINE | Facility: CLINIC | Age: 73
End: 2018-07-02

## 2018-07-02 NOTE — TELEPHONE ENCOUNTER
----- Message from Jada Trejo sent at 7/2/2018  8:31 AM CDT -----  Contact: Patient  Lloyd, patient [phone] calling to speak with nurse in office before scheduling an appointment. Placed call to POD, no answer. Please advise. Thanks.

## 2018-07-03 ENCOUNTER — OFFICE VISIT (OUTPATIENT)
Dept: FAMILY MEDICINE | Facility: CLINIC | Age: 73
End: 2018-07-03
Payer: MEDICARE

## 2018-07-03 VITALS
RESPIRATION RATE: 18 BRPM | HEIGHT: 71 IN | WEIGHT: 199.75 LBS | BODY MASS INDEX: 27.97 KG/M2 | DIASTOLIC BLOOD PRESSURE: 110 MMHG | HEART RATE: 86 BPM | TEMPERATURE: 98 F | OXYGEN SATURATION: 98 % | SYSTOLIC BLOOD PRESSURE: 160 MMHG

## 2018-07-03 DIAGNOSIS — E78.5 HYPERLIPIDEMIA, UNSPECIFIED HYPERLIPIDEMIA TYPE: ICD-10-CM

## 2018-07-03 DIAGNOSIS — C67.9 MALIGNANT NEOPLASM OF URINARY BLADDER, UNSPECIFIED SITE: ICD-10-CM

## 2018-07-03 DIAGNOSIS — C61 PROSTATE CANCER: ICD-10-CM

## 2018-07-03 DIAGNOSIS — I10 HYPERTENSION, UNSPECIFIED TYPE: Primary | ICD-10-CM

## 2018-07-03 PROCEDURE — 93010 ELECTROCARDIOGRAM REPORT: CPT | Mod: S$PBB,,, | Performed by: INTERNAL MEDICINE

## 2018-07-03 PROCEDURE — 99214 OFFICE O/P EST MOD 30 MIN: CPT | Mod: PBBFAC,25,PO | Performed by: NURSE PRACTITIONER

## 2018-07-03 PROCEDURE — 99999 PR PBB SHADOW E&M-EST. PATIENT-LVL IV: CPT | Mod: PBBFAC,,, | Performed by: NURSE PRACTITIONER

## 2018-07-03 PROCEDURE — 99214 OFFICE O/P EST MOD 30 MIN: CPT | Mod: S$PBB,,, | Performed by: NURSE PRACTITIONER

## 2018-07-03 PROCEDURE — 93005 ELECTROCARDIOGRAM TRACING: CPT | Mod: PBBFAC,PO | Performed by: INTERNAL MEDICINE

## 2018-07-03 RX ORDER — QUINAPRIL 40 MG/1
40 TABLET ORAL NIGHTLY
Qty: 90 TABLET | Refills: 3 | Status: SHIPPED | OUTPATIENT
Start: 2018-07-03 | End: 2018-12-20 | Stop reason: DRUGHIGH

## 2018-07-03 NOTE — PROGRESS NOTES
Subjective:       Patient ID: Lloyd John Jr. is a 72 y.o. male.    Chief Complaint: Hypertension    Here today with increased swelling to feet recently - noticed this 1 week ago  - He has been caring for his wife - she recently fell at home and fractured her neck and is in rehab  At [facility] so he has been worried but increased BP recently - increased headache and tired but feels that this might be related to his wife being ill.   Some sinus congestion - but only taking claritin OTC that Dr Avery stated he could take   Recent BP also increased since 6/30/18 - Bp 163/101, then after meds 133/81  7/01 - BP in am 157/102, then meds - 142/89, 148/91 and in afternoon 161/95  And 146/91         Hypertension   This is a recurrent problem. The current episode started more than 1 year ago. The problem has been gradually worsening since onset. The problem is resistant. Associated symptoms include anxiety, headaches and peripheral edema. Pertinent negatives include no blurred vision, chest pain, malaise/fatigue, neck pain, orthopnea, palpitations, PND, shortness of breath or sweats. There are no associated agents to hypertension. Risk factors for coronary artery disease include dyslipidemia, family history and stress. Past treatments include ACE inhibitors and calcium channel blockers. The current treatment provides significant improvement. There are no compliance problems.      Vitals:    07/03/18 0857   BP: (!) 160/110   Pulse: 86   Resp: 18   Temp: 98.2 °F (36.8 °C)     Review of Systems   Constitutional: Negative.  Negative for fatigue, fever and malaise/fatigue.   HENT: Negative.    Eyes: Negative.  Negative for blurred vision.   Respiratory: Negative.  Negative for cough, choking, chest tightness, shortness of breath and stridor.    Cardiovascular: Negative.  Negative for chest pain, palpitations, orthopnea and PND.   Gastrointestinal: Negative.  Negative for abdominal pain, diarrhea and nausea.   Endocrine:  Negative.    Genitourinary: Negative.  Negative for dysuria and hematuria.   Musculoskeletal: Negative.  Negative for neck pain.   Skin: Negative for color change and rash.   Allergic/Immunologic: Negative.    Neurological: Positive for headaches. Negative for numbness.   Hematological: Negative.        Past Medical History:   Diagnosis Date    Bladder cancer     Followed by Dr. Craig in [place]    Colon polyp 2006    [facility]    Glaucoma (increased eye pressure)     HLD (hyperlipidemia)     HTN (hypertension)     Prostate cancer      Objective:      Physical Exam   Constitutional: He is oriented to person, place, and time. He appears well-developed and well-nourished.   HENT:   Head: Normocephalic and atraumatic.   Mouth/Throat: Oropharynx is clear and moist.   Eyes: Conjunctivae and EOM are normal. Pupils are equal, round, and reactive to light.   Neck: Normal range of motion. Neck supple.   Cardiovascular: Normal rate, regular rhythm, normal heart sounds and intact distal pulses.  Exam reveals no friction rub.    No murmur heard.  Pulmonary/Chest: Effort normal and breath sounds normal. No respiratory distress. He has no wheezes. He has no rales.   Abdominal: Soft. Bowel sounds are normal.   Musculoskeletal: Normal range of motion.   Neurological: He is alert and oriented to person, place, and time.   Skin: Skin is warm and dry.   Psychiatric: He has a normal mood and affect. His behavior is normal. Judgment and thought content normal.   Nursing note and vitals reviewed.      Assessment:       1. Hypertension, unspecified type    2. Hyperlipidemia, unspecified hyperlipidemia type    3. Prostate cancer    4. Malignant neoplasm of urinary bladder, unspecified site        Plan:       Hypertension, unspecified type  -     IN OFFICE EKG 12-LEAD (to Muse)  quinapril (ACCUPRIL) 40 MG tablet; Take 1 tablet (40 mg total) by mouth every evening.  Dispense: 90 tablet; Refill: 3      Hyperlipidemia,  unspecified hyperlipidemia type  Stable on meds     Prostate cancer  Malignant neoplasm of urinary bladder, unspecified site   stable - followed              Feel that need for increased Blood pressure medication   Reviewed previous BP chart     Fu in 2 week with BP check   Call with any changes in BP

## 2018-07-05 ENCOUNTER — TELEPHONE (OUTPATIENT)
Dept: FAMILY MEDICINE | Facility: CLINIC | Age: 73
End: 2018-07-05

## 2018-07-05 NOTE — TELEPHONE ENCOUNTER
Spoke to patient today about diarrhea - this has gotten better - he ate sushi yesterday.   He has been under care of Dr Wooten and having injection July 27th   He Has had pain lately in his shoulder and coming from neck.   He has tried gabapentin in the past - he takes gabapentin 600mg, QHS- he has not been taking this med   Instructed to take 600 mg at night and 300 mg in PM   Take xanax 0.5 mg  At night as needed   He will call back with BP readings.

## 2018-07-05 NOTE — TELEPHONE ENCOUNTER
Spoke with patient and he stated that you wanted him to let you know if he had any issues with increasing accupril to 40mg. States that he has diarrhea last night. 3 soft bowel movements and 1 this am. Not sure if it is the medicine or his nerves.

## 2018-07-05 NOTE — TELEPHONE ENCOUNTER
Call and ask what his BP is running   How many episodes of diarrhea has he had?   Is he able to eat and drink?

## 2018-07-05 NOTE — TELEPHONE ENCOUNTER
Patient states that his pressure has been 141/87 at 4 am, 173/107 on 9 am, and at noon 180/115. Started this on Tuesday. Take medication at 4/5 pm. States that he is gassy. No episode of diarrhea. Able to eat and drink.

## 2018-07-05 NOTE — TELEPHONE ENCOUNTER
----- Message from Jess Stark sent at 7/5/2018  9:49 AM CDT -----  Having a bad reaction to a double dose of ACCUPRIL  40 mgs  Started this morning ... Call [phone] to advise

## 2018-07-16 ENCOUNTER — CLINICAL SUPPORT (OUTPATIENT)
Dept: FAMILY MEDICINE | Facility: CLINIC | Age: 73
End: 2018-07-16
Payer: MEDICARE

## 2018-07-16 VITALS — SYSTOLIC BLOOD PRESSURE: 140 MMHG | DIASTOLIC BLOOD PRESSURE: 82 MMHG

## 2018-07-16 DIAGNOSIS — I10 HYPERTENSION, UNSPECIFIED TYPE: Primary | ICD-10-CM

## 2018-07-16 PROCEDURE — 99999 PR PBB SHADOW E&M-EST. PATIENT-LVL I: CPT | Mod: PBBFAC,,,

## 2018-07-16 PROCEDURE — 99211 OFF/OP EST MAY X REQ PHY/QHP: CPT | Mod: PBBFAC,PO

## 2018-07-16 NOTE — PROGRESS NOTES
Lloyd John Jr. 72 y.o. male is here today for Blood Pressure check.   History of HTN yes.    Review of patient's allergies indicates:   Allergen Reactions    Bactrim [sulfamethoxazole-trimethoprim] Hives and Rash     Creatinine   Date Value Ref Range Status   05/11/2018 1.2 0.5 - 1.4 mg/dL Final     Sodium   Date Value Ref Range Status   05/11/2018 141 136 - 145 mmol/L Final     Potassium   Date Value Ref Range Status   05/11/2018 4.3 3.5 - 5.1 mmol/L Final   ]  Patient verifies taking blood pressure medications on a regular basis at the same time of the day.     Current Outpatient Prescriptions:     ALPRAZolam (XANAX) 0.5 MG tablet, Take 1 tablet (0.5 mg total) by mouth nightly as needed for Anxiety., Disp: 30 tablet, Rfl: 0    ascorbic acid (VITAMIN C) 1000 MG tablet, Take 1,000 mg by mouth once daily.  , Disp: , Rfl:     aspirin (ECOTRIN) 81 MG EC tablet, Take 1 tablet by mouth Daily. Every day, Disp: , Rfl:     COMBIGAN 0.2-0.5 % Drop, , Disp: , Rfl:     DILT- mg CDCR, TAKE 1 CAPSULE(240 MG) BY MOUTH EVERY DAY, Disp: 90 capsule, Rfl: 3    fluticasone (FLONASE) 50 mcg/actuation nasal spray, , Disp: , Rfl: 4    LUMIGAN 0.01 % Drop, , Disp: , Rfl:     MULTIVITAMIN W-MINERALS/LUTEIN (CENTRUM SILVER ORAL), Take by mouth.  , Disp: , Rfl:     MYRBETRIQ 50 mg Tb24, , Disp: , Rfl: 3    naproxen (NAPROSYN) 500 MG tablet, Take 1 tablet (500 mg total) by mouth 2 (two) times daily with meals., Disp: 14 tablet, Rfl: 0    NASONEX 50 mcg/actuation nasal spray, , Disp: , Rfl: 3    quinapril (ACCUPRIL) 40 MG tablet, Take 1 tablet (40 mg total) by mouth every evening., Disp: 90 tablet, Rfl: 3    rosuvastatin (CRESTOR) 10 MG tablet, TAKE 1 TABLET BY MOUTH DAILY, Disp: 90 tablet, Rfl: 3  Does patient have record of home blood pressure readings yes. Readings have been averaging 140/100.   Last dose of blood pressure medication was taken at 7/16 a.m..  Patient is asymptomatic.   Complains of no complaints.       ,   .    Blood pressure reading after 15 minutes was 138/82  Pulse   Dr. Avery notified.

## 2018-08-22 ENCOUNTER — PATIENT MESSAGE (OUTPATIENT)
Dept: ADMINISTRATIVE | Facility: OTHER | Age: 73
End: 2018-08-22

## 2018-08-29 ENCOUNTER — PATIENT OUTREACH (OUTPATIENT)
Dept: OTHER | Facility: OTHER | Age: 73
End: 2018-08-29

## 2018-08-29 NOTE — LETTER
Maribel Corona PharmD  [address] [place] [address]     Dear Lloyd John Jr.,    Welcome to the [facility]!           My name is Maribel Corona PharmD and I am your dedicated Digital Medicine clinician.  As an expert in medication management, I will help ensure that the medications you are taking continue to provide you with the intended benefits.        I am Purnima Dang and I will be your health  for the duration of the program.  My  job is to help you identify lifestyle changes to improve your blood pressure control.  We will talk about nutrition, exercise, and other ways that you may be able to adjust your current habits to better your health. Together, we will work to improve your overall health and encourage you to meet your goals for a healthier lifestyle.    What we expect from YOU:    You will need to take blood pressure readings multiple times a week and no less than one reading per week.   It is important that you take your measurements at different times during the day, when possible.     What you should expect from your Digital Medicine Care Team:   We will provide you with education about high blood pressure, including lifestyle changes that could help you to control your blood pressure.   We will review your weekly readings and provide you with monthly blood pressure progress reports after you have been in the program for more than 30 days.   We will send monthly progress reports on your blood pressure control to your physician so they can follow along with your progress as well.    You will be able to reach me by phone at [phone] or through your [facility] account by clicking my name under Care Team on the right side of the home screen.    I look forward to working with you to achieve your blood pressure goals!  Sincerely,    Maribel Corona PharmD  Your personal clinician    Please visit  [email or web address] to learn more about high blood pressure and what you can do lower your blood pressure.                                                                                           Lloyd John Jr.  [address] [place] [address]

## 2018-08-29 NOTE — PROGRESS NOTES
Last 5 Patient Entered Readings                                      Current 30 Day Average: 186/103     Recent Readings 8/25/2018 8/24/2018 8/23/2018    SBP (mmHg) 242 157 159    DBP (mmHg) 112 98 98    Pulse 60 64 73          Digital Medicine: Health  Introduction Call     Left voicemail and requested call back in order to complete digital medicine health  introduction call. First attempt

## 2018-09-05 NOTE — PROGRESS NOTES
"Last 5 Patient Entered Readings                                      Current 30 Day Average: 186/103     Recent Readings 8/25/2018 8/24/2018 8/23/2018    SBP (mmHg) 242 157 159    DBP (mmHg) 112 98 98    Pulse 60 64 73        Patient states that he stopped using his digital cuff after he got an extremely elevated reading. Patient jokes that it "basically pronounced me dead". After this, patient chose to revert back to his home blood pressure cuff.     Reviewed BP measuring technique with patient, and asked him to try again with the digital cuff. Discussed that other readings on 8/24 and 8/23 seemed to be accurate compared to most recent office visit (160/110), and that something might have just gone wrong with his reading on 8/25. Patient agrees to try again later this afternoon.     WCB on Friday 9/7 to complete enrollment call. Patient unable to talk at this time- states that he is on his way out the door, and wanted to just return my call quickly.     "

## 2018-09-05 NOTE — PROGRESS NOTES
Last 5 Patient Entered Readings                                      Current 30 Day Average: 186/103     Recent Readings 8/25/2018 8/24/2018 8/23/2018    SBP (mmHg) 242 157 159    DBP (mmHg) 112 98 98    Pulse 60 64 73          Digital Medicine: Health  Introduction Call     Left voicemail and requested call back in order to complete digital medicine health  introduction call. Second attempt. [place] message sent

## 2018-09-10 NOTE — PROGRESS NOTES
"Last 5 Patient Entered Readings                                      Current 30 Day Average: 186/103     Recent Readings 8/25/2018 8/24/2018 8/23/2018    SBP (mmHg) 242 157 159    DBP (mmHg) 112 98 98    Pulse 60 64 73        Patient reports that he will visit the [facility] on Wednesday to have his cuff looked at. Patient received error about "pneumatic leak"    Will follow up in 1 week once patient is able to take readings again. Patient knows to call me if he has any questions    "

## 2018-09-17 ENCOUNTER — PATIENT MESSAGE (OUTPATIENT)
Dept: FAMILY MEDICINE | Facility: CLINIC | Age: 73
End: 2018-09-17

## 2018-09-18 NOTE — PROGRESS NOTES
Last 5 Patient Entered Readings                                      Current 30 Day Average: 154/88     Recent Readings 9/18/2018 9/17/2018 9/16/2018 9/15/2018 9/14/2018    SBP (mmHg) 145 157 109 136 142    DBP (mmHg) 83 86 69 78 85    Pulse 61 69 77 68 64        Digital Medicine: Health  Introduction    Introduced Mr. Lloyd John Jr. to Digital Medicine. Discussed health  role and recommended lifestyle modifications.    Lifestyle Assessment:  Current Dietary Habits(i.e. low sodium, food labels, dining out):    Exercise:    Alcohol/Tobacco:     Medication Adherence: has been compliant with the medicaiton regimen     Other goals: Would like to be more active, but takes care of his wife who has parkinson's. Reports that this takes up a lot of his time. Patient has an exercise bike at home, and would like to resume this, along with light weights. Discussed setting SMG- will exercise 2-3 days per week for 30 minutes.     Patient recently celebrated a birthday. Discussed that all the grandkids celebrated on Friday.     Reviewed AHA/AACE recommendations:  Limit sodium intake to <2000mg/day  Recommended CHO intake, 45-65% of daily caloric intake  Perform 150 minutes of physical activity per week    Reviewed the importance of self-monitoring, medication adherence, and that the health  can be used as a resource for lifestyle modifications to help reduce or maintain a healthy lifestyle.  Reviewed that the Digital Medicine team is not available for emergencies and instructed the patient to call 911 or [facility] ([phone] or [phone]) if one arises.

## 2018-09-20 ENCOUNTER — PATIENT OUTREACH (OUTPATIENT)
Dept: OTHER | Facility: OTHER | Age: 73
End: 2018-09-20

## 2018-09-20 DIAGNOSIS — I10 ESSENTIAL HYPERTENSION: Primary | ICD-10-CM

## 2018-09-20 RX ORDER — CHLORTHALIDONE 25 MG/1
25 TABLET ORAL DAILY
Qty: 30 TABLET | Refills: 3 | Status: SHIPPED | OUTPATIENT
Start: 2018-09-20 | End: 2018-11-26 | Stop reason: SDUPTHER

## 2018-09-20 NOTE — PROGRESS NOTES
Last 5 Patient Entered Readings                                      Current 30 Day Average: 152/88     Recent Readings 9/20/2018 9/19/2018 9/18/2018 9/17/2018 9/16/2018    SBP (mmHg) 145 147 145 157 109    DBP (mmHg) 94 85 83 86 69    Pulse 67 72 61 69 77        Mr. Lloyd John Jr. is a 73 y.o. male who is newly enrolled in the [employer] [facility].     The following information was reviewed/updated:  [facility] pharmacy   [facility] [address] [place] [address] [facility] [address] [address]  Phone: [phone] Fax: [phone]    Review of patient's allergies indicates:   Allergen Reactions    Bactrim [sulfamethoxazole-trimethoprim] Hives and Rash     Current Outpatient Medications on File Prior to Visit   Medication Sig Dispense Refill    ALPRAZolam (XANAX) 0.5 MG tablet Take 1 tablet (0.5 mg total) by mouth nightly as needed for Anxiety. 30 tablet 0    ascorbic acid (VITAMIN C) 1000 MG tablet Take 1,000 mg by mouth once daily.        COMBIGAN 0.2-0.5 % Drop       DILT- mg CDCR TAKE 1 CAPSULE(240 MG) BY MOUTH EVERY DAY 90 capsule 3    fluticasone (FLONASE) 50 mcg/actuation nasal spray   4    LUMIGAN 0.01 % Drop       MULTIVITAMIN W-MINERALS/LUTEIN (CENTRUM SILVER ORAL) Take by mouth.        MYRBETRIQ 50 mg Tb24   3    naproxen (NAPROSYN) 500 MG tablet Take 1 tablet (500 mg total) by mouth 2 (two) times daily with meals. 14 tablet 0    quinapril (ACCUPRIL) 40 MG tablet Take 1 tablet (40 mg total) by mouth every evening. 90 tablet 3    rosuvastatin (CRESTOR) 10 MG tablet TAKE 1 TABLET BY MOUTH DAILY 90 tablet 3    [DISCONTINUED] aspirin (ECOTRIN) 81 MG EC tablet Take 1 tablet by mouth Daily. Every day      [DISCONTINUED] NASONEX 50 mcg/actuation nasal spray   3     No current facility-administered medications on file prior to visit.      IF SHAHAB screen positive    SHAHAB screening results for this patient suggest a  high likelihood of sleep apnea, which can contribute to hypertension and hyperglycemia. Patient has not been previously diagnosed with sleep apnea and is not interested in referral at this time. He stated he would like to keep it on the background for now and will recommend this    HYPERTENSION    Explained that we expect patient to obtain several blood pressures per week at random times of day.   Our goal is to get  BP to consistently below 130/80mmHg and make the process convenient so patient can avoid extra trips to the office. Getting your blood pressure below 130/80mmHg (definition of control) will reduce your risk for heart attack, kidney failure, stroke and death (as well as kidney failure, eye disease, & dementia).     Patient is not meeting the goal already.   When asked what the patient thinks is causing BP to be elevated, he states: he takes care of his ailing wife who has Parkinson's and he has bladder and prostate cancer. The stress of this is attributing to his BP.    Discussed appropriate BP measuring technique:  Before taking your blood pressure  Find a quiet place. You will need to listen for your heartbeat.  Roll up the sleeve on your left arm or remove any tight-sleeved clothing, if needed. (It's best to take your blood pressure from your left arm if you are right-handed.You can use the other arm if you have been told by your health care provider to do so.)  Rest in a chair next to a table for 5 to 10 minutes. (Your left arm should rest comfortably at heart level.)  Sit up straight with your back against the chair, legs uncrossed and on the ground.  Rest your forearm on the table with the palm of your hand facing up.  You should not talk, read the newspaper, or watch television during this process.    Last 5 Patient Entered Readings                                      Current 30 Day Average: 152/88     Recent Readings 9/20/2018 9/19/2018 9/18/2018 9/17/2018 9/16/2018    SBP (mmHg) 145 147 145 157  109    DBP (mmHg) 94 85 83 86 69    Pulse 67 72 61 69 77      Patient and I agreed that he will continue to monitor blood pressure, blood glucose, and sodium intake and continue to remain adherent to medications.     Patient will begin chlorthalidone 25mg daily with a BMP in two weeks. He does not want to plan the BMP today since he is waiting on the results of his prostate biopsy before moving forward. He anticipates he will require surgery to remove his prostate. I will plan to follow-up with the patient in 2 weeks.    Emailed patient link to [email or web address] and my contact information in case he has any questions.

## 2018-10-05 ENCOUNTER — PATIENT OUTREACH (OUTPATIENT)
Dept: OTHER | Facility: OTHER | Age: 73
End: 2018-10-05

## 2018-10-05 NOTE — PROGRESS NOTES
Last 5 Patient Entered Readings                                      Current 30 Day Average: 128/78     Recent Readings 10/5/2018 10/4/2018 10/3/2018 10/2/2018 10/1/2018    SBP (mmHg) 114 113 109 114 120    DBP (mmHg) 78 71 73 73 73    Pulse 61 63 77 72 71      Patient's BP average is controlled based on 2017 ACC/AHA HTN guidelines of goal BP <130/80.      Patient denies s/s of hypotension (lightheadedness, dizziness, nausea, fatigue) associated with low readings. Instructed patient to inform me if this occurs, patient confirms understanding.      Patient denies s/s of hypertension (SOB, CP, severe headaches, changes in vision) associated with high readings. Instructed patient to go to the ED if BP > 180/110 and accompanied by hypertensive s/s, patient confirms understanding.     Patient is tolerating chlorthalidone. His prostate biopsy will no longer occur. He received encouraging news that the cancer isn't as aggressive as expected. He will undergo a BMP 10/9 therefore I will follow-up 10/10.    Patient's health , Purnima Dang, will be following up every 3-4 weeks. I will continue to monitor regularly and will follow up in 2-4 weeks, sooner if BP begins to trend upward or downward.    Patient has my contact information and knows to call with any concerns or clinical changes.     Current HTN regimen:  Hypertension Medications             chlorthalidone (HYGROTEN) 25 MG Tab Take 1 tablet (25 mg total) by mouth once daily.    DILT- mg CDCR TAKE 1 CAPSULE(240 MG) BY MOUTH EVERY DAY    quinapril (ACCUPRIL) 40 MG tablet Take 1 tablet (40 mg total) by mouth every evening.

## 2018-10-09 ENCOUNTER — LAB VISIT (OUTPATIENT)
Dept: LAB | Facility: HOSPITAL | Age: 73
End: 2018-10-09
Attending: FAMILY MEDICINE
Payer: MEDICARE

## 2018-10-09 DIAGNOSIS — I10 ESSENTIAL HYPERTENSION: ICD-10-CM

## 2018-10-09 LAB
ANION GAP SERPL CALC-SCNC: 9 MMOL/L
BUN SERPL-MCNC: 25 MG/DL
CALCIUM SERPL-MCNC: 9.8 MG/DL
CHLORIDE SERPL-SCNC: 102 MMOL/L
CO2 SERPL-SCNC: 26 MMOL/L
CREAT SERPL-MCNC: 1.2 MG/DL
EST. GFR  (AFRICAN AMERICAN): >60 ML/MIN/1.73 M^2
EST. GFR  (NON AFRICAN AMERICAN): 58.8 ML/MIN/1.73 M^2
GLUCOSE SERPL-MCNC: 116 MG/DL
POTASSIUM SERPL-SCNC: 4 MMOL/L
SODIUM SERPL-SCNC: 137 MMOL/L

## 2018-10-09 PROCEDURE — 80048 BASIC METABOLIC PNL TOTAL CA: CPT

## 2018-10-09 PROCEDURE — 36415 COLL VENOUS BLD VENIPUNCTURE: CPT | Mod: PO

## 2018-10-24 ENCOUNTER — PATIENT OUTREACH (OUTPATIENT)
Dept: OTHER | Facility: OTHER | Age: 73
End: 2018-10-24

## 2018-10-24 NOTE — PROGRESS NOTES
Last 5 Patient Entered Readings                                      Current 30 Day Average: 119/75     Recent Readings 10/23/2018 10/22/2018 10/21/2018 10/20/2018 10/19/2018    SBP (mmHg) 123 140 125 108 107    DBP (mmHg) 76 89 80 70 67    Pulse 69 75 66 75 69          Digital Medicine: Health  Follow Up    Left voicemail to follow up with Mr. Lloyd John Jr..  Current BP average 119/75 mmHg is at goal, 130/80

## 2018-10-25 NOTE — PROGRESS NOTES
Last 5 Patient Entered Readings                                      Current 30 Day Average: 120/76     Recent Readings 10/25/2018 10/23/2018 10/22/2018 10/21/2018 10/20/2018    SBP (mmHg) 138 123 140 125 108    DBP (mmHg) 89 76 89 80 70    Pulse 68 69 75 66 75      Chart reviewed and no BP medications require adjustments at this time.

## 2018-10-26 NOTE — PROGRESS NOTES
"Last 5 Patient Entered Readings                                      Current 30 Day Average: 120/75     Recent Readings 10/26/2018 10/25/2018 10/23/2018 10/22/2018 10/21/2018    SBP (mmHg) 115 138 123 140 125    DBP (mmHg) 72 89 76 89 80    Pulse 66 68 69 75 66        Digital Medicine: Health  Follow Up    Lifestyle Modifications:    1.Dietary Modifications (Sodium intake <2,000mg/day, food labels, dining out):     2.Physical Activity:    Patient states that he has not been keeping up with his PA recently. Discusses that he has trigger finger, in which his fingers "lock up" and has been unable to lift weights recently. Had surgery and is recovering now. Hopes to resume weight training once healed.     3.Medication Therapy: Patient has been compliant with the medication regimen.    4.Patient has the following medication side effects/concerns:   (Frequency/Alleviating factors/Precipitating factors, etc.)     Follow up with Mr. Lloyd John Jr. completed. No further questions or concerns. Will continue to follow up to achieve health goals.    Patient cares for his wife with parkinsons. Is not sure what is causing fluctuations from day to day. Believes that stress could be contributing.     "

## 2018-11-19 ENCOUNTER — LAB VISIT (OUTPATIENT)
Dept: LAB | Facility: HOSPITAL | Age: 73
End: 2018-11-19
Attending: FAMILY MEDICINE
Payer: MEDICARE

## 2018-11-19 DIAGNOSIS — E78.5 HYPERLIPIDEMIA, UNSPECIFIED HYPERLIPIDEMIA TYPE: ICD-10-CM

## 2018-11-19 LAB
ALBUMIN SERPL BCP-MCNC: 4.2 G/DL
ALP SERPL-CCNC: 47 U/L
ALT SERPL W/O P-5'-P-CCNC: 31 U/L
ANION GAP SERPL CALC-SCNC: 8 MMOL/L
AST SERPL-CCNC: 29 U/L
BILIRUB SERPL-MCNC: 0.9 MG/DL
BUN SERPL-MCNC: 32 MG/DL
CALCIUM SERPL-MCNC: 9.8 MG/DL
CHLORIDE SERPL-SCNC: 105 MMOL/L
CHOLEST SERPL-MCNC: 164 MG/DL
CHOLEST/HDLC SERPL: 3.3 {RATIO}
CO2 SERPL-SCNC: 30 MMOL/L
CREAT SERPL-MCNC: 1.3 MG/DL
EST. GFR  (AFRICAN AMERICAN): >60 ML/MIN/1.73 M^2
EST. GFR  (NON AFRICAN AMERICAN): 53.4 ML/MIN/1.73 M^2
GLUCOSE SERPL-MCNC: 114 MG/DL
HDLC SERPL-MCNC: 50 MG/DL
HDLC SERPL: 30.5 %
LDLC SERPL CALC-MCNC: 87.4 MG/DL
NONHDLC SERPL-MCNC: 114 MG/DL
POTASSIUM SERPL-SCNC: 4.3 MMOL/L
PROT SERPL-MCNC: 7.3 G/DL
SODIUM SERPL-SCNC: 143 MMOL/L
TRIGL SERPL-MCNC: 133 MG/DL

## 2018-11-19 PROCEDURE — 80053 COMPREHEN METABOLIC PANEL: CPT

## 2018-11-19 PROCEDURE — 36415 COLL VENOUS BLD VENIPUNCTURE: CPT | Mod: PO

## 2018-11-19 PROCEDURE — 80061 LIPID PANEL: CPT

## 2018-11-26 ENCOUNTER — OFFICE VISIT (OUTPATIENT)
Dept: FAMILY MEDICINE | Facility: CLINIC | Age: 73
End: 2018-11-26
Payer: MEDICARE

## 2018-11-26 VITALS
HEIGHT: 71 IN | OXYGEN SATURATION: 94 % | SYSTOLIC BLOOD PRESSURE: 108 MMHG | DIASTOLIC BLOOD PRESSURE: 70 MMHG | BODY MASS INDEX: 28.77 KG/M2 | RESPIRATION RATE: 18 BRPM | WEIGHT: 205.5 LBS | HEART RATE: 77 BPM

## 2018-11-26 DIAGNOSIS — E78.5 HYPERLIPIDEMIA, UNSPECIFIED HYPERLIPIDEMIA TYPE: Primary | ICD-10-CM

## 2018-11-26 DIAGNOSIS — I10 ESSENTIAL HYPERTENSION: ICD-10-CM

## 2018-11-26 DIAGNOSIS — C67.9 MALIGNANT NEOPLASM OF URINARY BLADDER, UNSPECIFIED SITE: ICD-10-CM

## 2018-11-26 DIAGNOSIS — C61 PROSTATE CANCER: ICD-10-CM

## 2018-11-26 PROCEDURE — 99999 PR PBB SHADOW E&M-EST. PATIENT-LVL III: CPT | Mod: PBBFAC,,, | Performed by: FAMILY MEDICINE

## 2018-11-26 PROCEDURE — 99214 OFFICE O/P EST MOD 30 MIN: CPT | Mod: S$PBB,,, | Performed by: FAMILY MEDICINE

## 2018-11-26 PROCEDURE — 99213 OFFICE O/P EST LOW 20 MIN: CPT | Mod: PBBFAC,PO | Performed by: FAMILY MEDICINE

## 2018-11-26 RX ORDER — ROSUVASTATIN CALCIUM 10 MG/1
10 TABLET, COATED ORAL DAILY
Qty: 90 TABLET | Refills: 3 | Status: SHIPPED | OUTPATIENT
Start: 2018-11-26 | End: 2019-12-14 | Stop reason: SDUPTHER

## 2018-11-26 RX ORDER — CHLORTHALIDONE 25 MG/1
25 TABLET ORAL DAILY
Qty: 90 TABLET | Refills: 3 | Status: SHIPPED | OUTPATIENT
Start: 2018-11-26 | End: 2019-10-16

## 2018-11-26 RX ORDER — DILTIAZEM HYDROCHLORIDE 240 MG/1
CAPSULE, EXTENDED RELEASE ORAL
Qty: 90 CAPSULE | Refills: 3 | Status: SHIPPED | OUTPATIENT
Start: 2018-11-26 | End: 2019-10-16 | Stop reason: ALTCHOICE

## 2018-11-26 NOTE — PROGRESS NOTES
Chief Complaint   Patient presents with    Follow-up     PI:This is a 73 year-old male presents to f/u on HTN.    HLD - tolerating Crestor 10mg and Fish Oil  HTN - tolerating diltiazem, quinapril chlorthalidone; BP well-controlled   Bladder/prostate cancer ([place] [facility]) - following with urology, Dr. Cr,  every 3 months. Taking Myrbetriq. Still considering for robotic prostatectomy. PSA has been trending down.  Glaucoma - on Combigan and Lumigan; followed by Dr. Emery  Lumbar DDD with right sciatica - stable; Seeing Dr. Wooten. Taking Neurontin 600mg QHS  Insomnia - using xanax only as needed    PAST MEDICAL HISTORY:  Prostate cancer - [place] 7; following with watchful waiting  Bladder carcinoma - followed with Dr. Bowman every 3 months; treated in past with Thiotrpa, BCG  HLD (Dx )  HTN (Dx )  colon polyp  Lumbar DDD    PAST SURGICAL HISTORY:  multiple bladder scrappings (11 total)    FAMILY HISTORY:  Father:  at 50 of CAD  Mother:  at 78 insterstial lung fibrosis, carotid artery disease  Grandpartents unknown    SOCIAL HISTORY:  Tobacco use: quit    Alcohol use: 2 drinks of bourbon daily  Occupation:  for [employer]  Marital status: (wife with schizophrenia)  Children: 2  enjoys wood working and fishing  exercises daily    REVIEW OF SYSTEMS:  GENERAL: No fever, chills, fatigability or weight changes.  SKIN/BREASTS: No rashes, sores, itching or changes in color or texture of skin.   HEAD: No headaches or recent head trauma.   EYES: Visual acuity fine. Denies blurriness, tearing, itching, photophobia, diplopia, or visual changes.   EARS: Denies ear pain, discharge, tinnitus or vertigo. Denies hearing loss.   NOSE: No loss of smell, epistaxis, postnasal drip, discharge, obstruction, or sneezing.  MOUTH & THROAT: No hoarseness, change in voice, swallowing difficulty. No excessive gum bleeding.   HEMATOLOGICAL/NODES: Denies swollen glands. No bleeding or bruising.  CHEST: No  "TOLEDO, cyanosis, wheezing, cough or sputum production.  CARDIOVASCULAR: Denies chest pain, dyspnea, orthopnea, or palpitations.  GI/ABDOMEN: Appetite fine. No weight loss. Denies nausea, vomiting, diarrhea, constipation, abdominal pain, hematemesis or blood in stool.  URINARY: No dysuria,hematuria, nocturia, incontinence, flank pain, urgency, or urinary difficulty.  PERIPHERAL VASCULAR: No claudication, cold intolerance or cyanosis.  MUSCULOSKELETAL: No joint stiffness, pain, swelling, or loss of strength. Denies back pain.  NEUROLOGIC: No history of seizures, paralysis, alteration of gait or coordination.  ENDOCRINE: Sexual maturation. Denies weight change, heat/cold intolerance, hair loss or gain, loss of libido, polyuria, polydipsia, polyphagia, pruritus, unexplained flushing, or excessive sweating.   PSYCH: No crying spells. Denies anxiety symptoms.      PHYSICAL EXAM:  /70   Pulse 77   Resp 18   Ht 5' 11" (1.803 m)   Wt 93.2 kg (205 lb 7.5 oz)   SpO2 (!) 94%   BMI 28.66 kg/m²   APPEARANCE: Well nourished, well developed, neatly groomed, in no acute distress.   SKIN: Warm, moist and dry. No lesions or rashes.  EYES: Conjunctivae and lids clear. PERRL. EOMI.   NOSE: Mucosa pink. Nares clear.  MOUTH & THROAT: Oral mucosa pink. No tonsillar enlargement. No pharyngeal erythema or exudate.   NECK: Supple with full range of motion. No masses. No thyromegaly. No JVD or bruits.  LYMPH: No cervical, supraclavicular, axillary or inguinal lymph node enlargement.  RESPIRATORY: Easy and unlabored respirations. Lungs clear to auscultation throughout all lung fields. No wheezes or rales.  CARDIOVASCULAR: Regular rate and rhythm. Normal S1, S2. No rubs, murmurs or gallops.   ABDOMEN: Bowel sounds normal. Nondistended and soft. No hepatosplenomegaly, masses, or tenderness.  EXTREMITIES: No edema or cyanosis. Pedal pulses 2+ bilaterally. No varicosities   NEUROLOGIC: Cranial Nerves: II-XII grossly intact  Gait & " Posture: Normal gait and fine motion.  PSYCH: Awake, alert, oriented to person, place, time, and situation. Pleasant and cooperative mood with intact judgment.    Results for orders placed or performed in visit on 11/19/18   Lipid panel   Result Value Ref Range    Cholesterol 164 120 - 199 mg/dL    Triglycerides 133 30 - 150 mg/dL    HDL 50 40 - 75 mg/dL    LDL Cholesterol 87.4 63.0 - 159.0 mg/dL    HDL/Chol Ratio 30.5 20.0 - 50.0 %    Total Cholesterol/HDL Ratio 3.3 2.0 - 5.0    Non-HDL Cholesterol 114 mg/dL   Comprehensive metabolic panel   Result Value Ref Range    Sodium 143 136 - 145 mmol/L    Potassium 4.3 3.5 - 5.1 mmol/L    Chloride 105 95 - 110 mmol/L    CO2 30 (H) 23 - 29 mmol/L    Glucose 114 (H) 70 - 110 mg/dL    BUN, Bld 32 (H) 8 - 23 mg/dL    Creatinine 1.3 0.5 - 1.4 mg/dL    Calcium 9.8 8.7 - 10.5 mg/dL    Total Protein 7.3 6.0 - 8.4 g/dL    Albumin 4.2 3.5 - 5.2 g/dL    Total Bilirubin 0.9 0.1 - 1.0 mg/dL    Alkaline Phosphatase 47 (L) 55 - 135 U/L    AST 29 10 - 40 U/L    ALT 31 10 - 44 U/L    Anion Gap 8 8 - 16 mmol/L    eGFR if African American >60.0 >60 mL/min/1.73 m^2    eGFR if non  53.4 (A) >60 mL/min/1.73 m^2       ASSESSMENT/PLAN:  Hyperlipidemia, unspecified hyperlipidemia type  -     rosuvastatin (CRESTOR) 10 MG tablet; Take 1 tablet (10 mg total) by mouth once daily.  Dispense: 90 tablet; Refill: 3  -     Comprehensive metabolic panel; Future; Expected date: 05/25/2019  -     Lipid panel; Future; Expected date: 05/25/2019    Essential hypertension  -     diltiaZEM (DILT-XR) 240 MG CDCR; TAKE 1 CAPSULE(240 MG) BY MOUTH EVERY DAY  Dispense: 90 capsule; Refill: 3  -     chlorthalidone (HYGROTEN) 25 MG Tab; Take 1 tablet (25 mg total) by mouth once daily.  Dispense: 90 tablet; Refill: 3    Prostate cancer    Malignant neoplasm of urinary bladder, unspecified site        continue xanax PRN for insomnia.  1. HTN (controlled) - continue diltiazem, chlorthalidone and Quinapril  2.  HLD (controlled) - continue Crestor 10mg daily, Fish Oil 1000mg daily  3. bladder carcinoma/prostate cancer - continue present treatment and f/u with Urology  f/u 6 months with CMP, lipid profile. Patient preference.      Answers for HPI/ROS submitted by the patient on 11/24/2018   activity change: Yes  unexpected weight change: No  neck pain: No  hearing loss: No  rhinorrhea: No  trouble swallowing: No  eye discharge: No  visual disturbance: No  chest tightness: No  wheezing: No  chest pain: No  palpitations: No  blood in stool: No  constipation: No  vomiting: No  diarrhea: No  polydipsia: No  polyuria: No  difficulty urinating: No  urgency: No  hematuria: No  joint swelling: No  arthralgias: Yes  headaches: Yes  confusion: No  dysphoric mood: No

## 2018-11-28 ENCOUNTER — PATIENT OUTREACH (OUTPATIENT)
Dept: OTHER | Facility: OTHER | Age: 73
End: 2018-11-28

## 2018-12-20 ENCOUNTER — PATIENT OUTREACH (OUTPATIENT)
Dept: OTHER | Facility: OTHER | Age: 73
End: 2018-12-20

## 2018-12-20 DIAGNOSIS — I10 ESSENTIAL HYPERTENSION: Primary | ICD-10-CM

## 2018-12-20 RX ORDER — OLMESARTAN MEDOXOMIL 40 MG/1
40 TABLET ORAL DAILY
Qty: 90 TABLET | Refills: 1 | Status: SHIPPED | OUTPATIENT
Start: 2018-12-20 | End: 2019-06-12 | Stop reason: SDUPTHER

## 2018-12-20 NOTE — PROGRESS NOTES
Last 5 Patient Entered Readings                                      Current 30 Day Average: 120/74     Recent Readings 12/19/2018 12/17/2018 12/16/2018 12/15/2018 12/14/2018    SBP (mmHg) 129 137 117 116 144    DBP (mmHg) 74 81 76 73 74    Pulse 73 68 63 61 69        Patient's BP average is controlled based on 2017 ACC/AHA HTN guidelines of goal BP <130/80.      Patient denies s/s of hypotension (lightheadedness, dizziness, nausea, fatigue) associated with low readings. Instructed patient to inform me if this occurs, patient confirms understanding.      Patient denies s/s of hypertension (SOB, CP, severe headaches, changes in vision) associated with high readings. Instructed patient to go to the ED if BP > 180/110 and accompanied by hypertensive s/s, patient confirms understanding.     Patient will change quinapril to olmesartan 40mg daily.    Patient's health , Purnima Dang, will be following up every 3-4 weeks. I will continue to monitor regularly and will follow up in 2-4 weeks, sooner if BP begins to trend upward or downward.    Patient has my contact information and knows to call with any concerns or clinical changes.     Current HTN regimen:  Hypertension Medications             chlorthalidone (HYGROTEN) 25 MG Tab Take 1 tablet (25 mg total) by mouth once daily.    diltiaZEM (DILT-XR) 240 MG CDCR TAKE 1 CAPSULE(240 MG) BY MOUTH EVERY DAY    olmesartan (BENICAR) 40 MG tablet Take 1 tablet (40 mg total) by mouth once daily.

## 2019-01-03 ENCOUNTER — PATIENT OUTREACH (OUTPATIENT)
Dept: OTHER | Facility: OTHER | Age: 74
End: 2019-01-03

## 2019-01-03 NOTE — PROGRESS NOTES
Last 5 Patient Entered Readings                                      Current 30 Day Average: 124/76     Recent Readings 1/3/2019 1/2/2019 1/1/2019 12/31/2018 12/30/2018    SBP (mmHg) 134 133 121 142 104    DBP (mmHg) 86 86 76 85 66    Pulse 70 76 63 72 72        Patient's BP average is controlled based on 2017 ACC/AHA HTN guidelines of goal BP <130/80.      Patient denies s/s of hypotension (lightheadedness, dizziness, nausea, fatigue) associated with low readings. Instructed patient to inform me if this occurs, patient confirms understanding.      Patient denies s/s of hypertension (SOB, CP, severe headaches, changes in vision) associated with high readings. Instructed patient to go to the ED if BP > 180/110 and accompanied by hypertensive s/s, patient confirms understanding.     Patient's health , Purnima Dang, will be following up every 3-4 weeks. I will continue to monitor regularly and will follow up in 3-4 months, sooner if BP begins to trend upward or downward.    Patient has my contact information and knows to call with any concerns or clinical changes.     Current HTN regimen:  Hypertension Medications             chlorthalidone (HYGROTEN) 25 MG Tab Take 1 tablet (25 mg total) by mouth once daily.    diltiaZEM (DILT-XR) 240 MG CDCR TAKE 1 CAPSULE(240 MG) BY MOUTH EVERY DAY    olmesartan (BENICAR) 40 MG tablet Take 1 tablet (40 mg total) by mouth once daily.

## 2019-01-31 NOTE — PROGRESS NOTES
Last 5 Patient Entered Readings                                      Current 30 Day Average: 122/76     Recent Readings 1/31/2019 1/29/2019 1/28/2019 1/27/2019 1/26/2019    SBP (mmHg) 113 123 117 111 114    DBP (mmHg) 77 71 75 72 74    Pulse 61 72 67 64 74      BP remains at goal.

## 2019-03-12 ENCOUNTER — PATIENT OUTREACH (OUTPATIENT)
Dept: OTHER | Facility: OTHER | Age: 74
End: 2019-03-12

## 2019-03-12 NOTE — PROGRESS NOTES
Last 5 Patient Entered Readings                                      Current 30 Day Average: 127/78     Recent Readings 3/12/2019 3/11/2019 3/8/2019 3/6/2019 3/5/2019    SBP (mmHg) 128 155 132 151 128    DBP (mmHg) 80 88 87 85 81    Pulse 67 79 69 68 76        3/12- Left voicemail for follow up

## 2019-03-12 NOTE — PROGRESS NOTES
"Last 5 Patient Entered Readings                                      Current 30 Day Average: 127/78     Recent Readings 3/12/2019 3/11/2019 3/8/2019 3/6/2019 3/5/2019    SBP (mmHg) 128 155 132 151 128    DBP (mmHg) 80 88 87 85 81    Pulse 67 79 69 68 76        Asked patient about higher readings in the 150's- patient believes these were due to stress. Patent describes that he still works part time, and cares for his wife with parkinsons/dementia. Reminded patient to only take readings at times that he is well relaxed    Digital Medicine: Health  Follow Up    Lifestyle Modifications:    1.Dietary Modifications (Sodium intake <2,000mg/day, food labels, dining out):     2.Physical Activity:    Patient has not been able to exercise recently with "everything that has been going on". Encouraged patient to do what he can when he can.     3.Medication Therapy: Patient has been compliant with the medication regimen.    4.Patient has the following medication side effects/concerns:   (Frequency/Alleviating factors/Precipitating factors, etc.)     Follow up with Mr. Lloyd John Jr. completed. No further questions or concerns. Will continue to follow up to achieve health goals.          "

## 2019-03-25 ENCOUNTER — TELEPHONE (OUTPATIENT)
Dept: FAMILY MEDICINE | Facility: CLINIC | Age: 74
End: 2019-03-25

## 2019-03-25 ENCOUNTER — TELEPHONE (OUTPATIENT)
Dept: GASTROENTEROLOGY | Facility: CLINIC | Age: 74
End: 2019-03-25

## 2019-03-25 ENCOUNTER — PATIENT MESSAGE (OUTPATIENT)
Dept: ENDOSCOPY | Facility: HOSPITAL | Age: 74
End: 2019-03-25

## 2019-03-25 NOTE — TELEPHONE ENCOUNTER
----- Message from Jess Stark sent at 3/25/2019 10:20 AM CDT -----  Pt received a reminder letter that he is due for colonoscopy in April / call pt at [phone] (home)

## 2019-03-25 NOTE — TELEPHONE ENCOUNTER
----- Message from Suhail Mcgraw sent at 3/25/2019 10:44 AM CDT -----  Type:  Sooner Apoointment Request    Caller is requesting a sooner appointment.  Caller declined first available appointment listed below.  Caller will not accept being placed on the waitlist and is requesting a message be sent to doctor.    Name of Caller:  Self   When is the first available appointment?  NA   Symptoms:  NA   Best Call Back Number:  [phone]  Additional Information:  Patient requesting to schedule colonoscopy

## 2019-04-23 ENCOUNTER — PATIENT OUTREACH (OUTPATIENT)
Dept: OTHER | Facility: OTHER | Age: 74
End: 2019-04-23

## 2019-04-23 NOTE — PROGRESS NOTES
Last 5 Patient Entered Readings                                      Current 30 Day Average: 128/80     Recent Readings 4/23/2019 4/22/2019 4/18/2019 4/16/2019 4/15/2019    SBP (mmHg) 121 138 152 125 111    DBP (mmHg) 76 88 82 79 72    Pulse 61 67 60 66 68        Left voicemail for follow up

## 2019-05-14 ENCOUNTER — TELEPHONE (OUTPATIENT)
Dept: GASTROENTEROLOGY | Facility: CLINIC | Age: 74
End: 2019-05-14

## 2019-05-14 NOTE — TELEPHONE ENCOUNTER
----- Message from Jai Don sent at 5/14/2019  9:19 AM CDT -----  Contact: Patient  Type: Reschedule Request    Patient would like to reschedule an appointment.    Who Called: Patient  Original Appointment Scheduled: 7/15  Preference for Appointment Day/Time: none  Best Call Back Number: [phone]  Additional Information: colonscopy

## 2019-05-14 NOTE — PROGRESS NOTES
Last 5 Patient Entered Readings                                      Current 30 Day Average: 128/79     Recent Readings 5/14/2019 5/13/2019 5/10/2019 5/7/2019 4/26/2019    SBP (mmHg) 148 127 138 117 122    DBP (mmHg) 96 77 76 74 75    Pulse 79 65 70 66 66        Left voicemail for follow up

## 2019-05-16 ENCOUNTER — PATIENT OUTREACH (OUTPATIENT)
Dept: ADMINISTRATIVE | Facility: HOSPITAL | Age: 74
End: 2019-05-16

## 2019-05-16 NOTE — PROGRESS NOTES
Health Maintenance Due   Topic Date Due    Shingles Vaccine (2 of 3) 04/07/2016    Colonoscopy  04/05/2019     Chart review complete/scrubbed 05/16/2019  Future Appointments   Date Time Provider Department Center   5/22/2019  8:00 AM [facility]   5/28/2019  9:00 AM Daniel Avery MD [facility]

## 2019-05-22 ENCOUNTER — LAB VISIT (OUTPATIENT)
Dept: LAB | Facility: HOSPITAL | Age: 74
End: 2019-05-22
Attending: FAMILY MEDICINE
Payer: MEDICARE

## 2019-05-22 ENCOUNTER — PATIENT MESSAGE (OUTPATIENT)
Dept: ADMINISTRATIVE | Facility: OTHER | Age: 74
End: 2019-05-22

## 2019-05-22 DIAGNOSIS — E78.5 HYPERLIPIDEMIA, UNSPECIFIED HYPERLIPIDEMIA TYPE: ICD-10-CM

## 2019-05-22 LAB
ALBUMIN SERPL BCP-MCNC: 4.2 G/DL (ref 3.5–5.2)
ALP SERPL-CCNC: 52 U/L (ref 55–135)
ALT SERPL W/O P-5'-P-CCNC: 41 U/L (ref 10–44)
ANION GAP SERPL CALC-SCNC: 7 MMOL/L (ref 8–16)
AST SERPL-CCNC: 44 U/L (ref 10–40)
BILIRUB SERPL-MCNC: 1.2 MG/DL (ref 0.1–1)
BUN SERPL-MCNC: 28 MG/DL (ref 8–23)
CALCIUM SERPL-MCNC: 10.3 MG/DL (ref 8.7–10.5)
CHLORIDE SERPL-SCNC: 102 MMOL/L (ref 95–110)
CHOLEST SERPL-MCNC: 186 MG/DL (ref 120–199)
CHOLEST/HDLC SERPL: 3.9 {RATIO} (ref 2–5)
CO2 SERPL-SCNC: 29 MMOL/L (ref 23–29)
CREAT SERPL-MCNC: 1.3 MG/DL (ref 0.5–1.4)
EST. GFR  (AFRICAN AMERICAN): >60 ML/MIN/1.73 M^2
EST. GFR  (NON AFRICAN AMERICAN): 53.4 ML/MIN/1.73 M^2
GLUCOSE SERPL-MCNC: 119 MG/DL (ref 70–110)
HDLC SERPL-MCNC: 48 MG/DL (ref 40–75)
HDLC SERPL: 25.8 % (ref 20–50)
LDLC SERPL CALC-MCNC: 101 MG/DL (ref 63–159)
NONHDLC SERPL-MCNC: 138 MG/DL
POTASSIUM SERPL-SCNC: 4.3 MMOL/L (ref 3.5–5.1)
PROT SERPL-MCNC: 7.2 G/DL (ref 6–8.4)
SODIUM SERPL-SCNC: 138 MMOL/L (ref 136–145)
TRIGL SERPL-MCNC: 185 MG/DL (ref 30–150)

## 2019-05-22 PROCEDURE — 80053 COMPREHEN METABOLIC PANEL: CPT

## 2019-05-22 PROCEDURE — 36415 COLL VENOUS BLD VENIPUNCTURE: CPT | Mod: PO

## 2019-05-22 PROCEDURE — 80061 LIPID PANEL: CPT

## 2019-05-24 ENCOUNTER — PATIENT OUTREACH (OUTPATIENT)
Dept: OTHER | Facility: OTHER | Age: 74
End: 2019-05-24

## 2019-05-24 NOTE — PROGRESS NOTES
Last 5 Patient Entered Readings                                      Current 30 Day Average: 130/80     Recent Readings 5/14/2019 5/13/2019 5/10/2019 5/7/2019 4/26/2019    SBP (mmHg) 148 127 138 117 122    DBP (mmHg) 96 77 76 74 75    Pulse 79 65 70 66 66        10:51 AM: LMFCB to increase the frequency of his BP readings.    10:56 AM:  HPI:  Called patient to follow up on his lack of readings.   Patient endorses adherence to medication regimen.   Patient denies hypotensive s/sx (lightheadedness, dizziness, nausea, fatigue); patient denies hypertensive s/sx (SOB, CP, severe headaches, changes in vision). Instructed patient to seek medical care if BP > 180/110 and is accompanied by hypertensive s/sx associated, patient confirms understanding.     Assessment:  Reviewed recent readings. Per 2017 ACC/ AHA HTN guidelines (goal of BP < 130/80), current 30-day average needs to be addressed more thoroughly today.     Plan:  Continue current medication regimen.   Patient asked to be placed on hiatus for at least three weeks to take care of his wife with dementia and Parkinson's.  I will continue to monitor regularly and will follow-up in 3-4 weeks, sooner if blood pressure begins to trend upward or downward.     Current medication regimen:  Hypertension Medications             chlorthalidone (HYGROTEN) 25 MG Tab Take 1 tablet (25 mg total) by mouth once daily.    diltiaZEM (DILT-XR) 240 MG CDCR TAKE 1 CAPSULE(240 MG) BY MOUTH EVERY DAY    olmesartan (BENICAR) 40 MG tablet Take 1 tablet (40 mg total) by mouth once daily.          Patient denies having questions or concerns. Patient has my contact information and knows to call with any concerns or clinical changes.

## 2019-05-28 ENCOUNTER — OFFICE VISIT (OUTPATIENT)
Dept: FAMILY MEDICINE | Facility: CLINIC | Age: 74
End: 2019-05-28
Payer: MEDICARE

## 2019-05-28 VITALS
DIASTOLIC BLOOD PRESSURE: 70 MMHG | HEIGHT: 71 IN | RESPIRATION RATE: 18 BRPM | WEIGHT: 200.38 LBS | OXYGEN SATURATION: 95 % | BODY MASS INDEX: 28.05 KG/M2 | HEART RATE: 70 BPM | SYSTOLIC BLOOD PRESSURE: 108 MMHG

## 2019-05-28 DIAGNOSIS — I10 ESSENTIAL HYPERTENSION: Primary | ICD-10-CM

## 2019-05-28 DIAGNOSIS — C67.9 MALIGNANT NEOPLASM OF URINARY BLADDER, UNSPECIFIED SITE: ICD-10-CM

## 2019-05-28 DIAGNOSIS — E78.5 HYPERLIPIDEMIA, UNSPECIFIED HYPERLIPIDEMIA TYPE: ICD-10-CM

## 2019-05-28 DIAGNOSIS — C61 PROSTATE CANCER: ICD-10-CM

## 2019-05-28 PROCEDURE — 99214 OFFICE O/P EST MOD 30 MIN: CPT | Mod: S$PBB,,, | Performed by: FAMILY MEDICINE

## 2019-05-28 PROCEDURE — 99214 PR OFFICE/OUTPT VISIT, EST, LEVL IV, 30-39 MIN: ICD-10-PCS | Mod: S$PBB,,, | Performed by: FAMILY MEDICINE

## 2019-05-28 PROCEDURE — 99999 PR PBB SHADOW E&M-EST. PATIENT-LVL IV: ICD-10-PCS | Mod: PBBFAC,,, | Performed by: FAMILY MEDICINE

## 2019-05-28 PROCEDURE — 99999 PR PBB SHADOW E&M-EST. PATIENT-LVL IV: CPT | Mod: PBBFAC,,, | Performed by: FAMILY MEDICINE

## 2019-05-28 PROCEDURE — 99214 OFFICE O/P EST MOD 30 MIN: CPT | Mod: PBBFAC,PO | Performed by: FAMILY MEDICINE

## 2019-05-28 NOTE — PROGRESS NOTES
Chief Complaint   Patient presents with    Hyperlipidemia    Hypertension     PI:This is a 73 year-old male presents to f/u on HTN.    HLD - tolerating Crestor 10mg and Fish Oil  HTN - tolerating diltiazem, benicar, chlorthalidone; BP well-controlled   Bladder/prostate cancer ([place] 7) - following with urology, Dr. Cr,  every 3 months. Taking Myrbetriq. Still considering for robotic prostatectomy. PSA has been trending up.  Glaucoma - on Combigan and Lumigan; followed by Dr. Emery  Lumbar DDD with right sciatica - stable; Seeing Dr. Wooten. Taking Neurontin 600mg QHS  Insomnia - using xanax only as needed  On Shingrix waiting list    PAST MEDICAL HISTORY:  Prostate cancer - [place] 7; following with watchful waiting  Bladder carcinoma - followed with Dr. Bowman every 3 months; treated in past with Thiotrpa, BCG  HLD (Dx )  HTN (Dx )  colon polyp  Lumbar DDD    PAST SURGICAL HISTORY:  multiple bladder scrappings (11 total)    FAMILY HISTORY:  Father:  at 50 of CAD  Mother:  at 78 insterstial lung fibrosis, carotid artery disease  Grandpartents unknown    SOCIAL HISTORY:  Tobacco use: quit    Alcohol use: 2 drinks of bourbon daily  Occupation:  for [employer]  Marital status: (wife with schizophrenia)  Children: 2  enjoys wood working and fishing  exercises daily    REVIEW OF SYSTEMS:  GENERAL: No fever, chills, fatigability or weight changes.  SKIN/BREASTS: No rashes, sores, itching or changes in color or texture of skin.   HEAD: No headaches or recent head trauma.   EYES: Visual acuity fine. Denies blurriness, tearing, itching, photophobia, diplopia, or visual changes.   EARS: Denies ear pain, discharge, tinnitus or vertigo. Denies hearing loss.   NOSE: No loss of smell, epistaxis, postnasal drip, discharge, obstruction, or sneezing.  MOUTH & THROAT: No hoarseness, change in voice, swallowing difficulty. No excessive gum bleeding.   HEMATOLOGICAL/NODES: Denies swollen  "glands. No bleeding or bruising.  CHEST: No TOLEDO, cyanosis, wheezing, cough or sputum production.  CARDIOVASCULAR: Denies chest pain, dyspnea, orthopnea, or palpitations.  GI/ABDOMEN: Appetite fine. No weight loss. Denies nausea, vomiting, diarrhea, constipation, abdominal pain, hematemesis or blood in stool.  URINARY: No dysuria,hematuria, nocturia, incontinence, flank pain, urgency, or urinary difficulty.  PERIPHERAL VASCULAR: No claudication, cold intolerance or cyanosis.  MUSCULOSKELETAL: No joint stiffness, pain, swelling, or loss of strength. Denies back pain.  NEUROLOGIC: No history of seizures, paralysis, alteration of gait or coordination.  ENDOCRINE: Sexual maturation. Denies weight change, heat/cold intolerance, hair loss or gain, loss of libido, polyuria, polydipsia, polyphagia, pruritus, unexplained flushing, or excessive sweating.   PSYCH: No crying spells. Denies anxiety symptoms.    Answers for HPI/ROS submitted by the patient on 5/22/2019   activity change: No  unexpected weight change: No  neck pain: No  hearing loss: No  rhinorrhea: No  trouble swallowing: No  eye discharge: No  visual disturbance: No  chest tightness: No  wheezing: No  chest pain: No  palpitations: No  blood in stool: No  constipation: No  vomiting: No  diarrhea: No  polydipsia: No  polyuria: No  difficulty urinating: No  urgency: No  hematuria: No  joint swelling: No  arthralgias: Yes  headaches: No  weakness: No  confusion: No  dysphoric mood: No      PHYSICAL EXAM:  /70   Pulse 70   Resp 18   Ht 5' 11" (1.803 m)   Wt 90.9 kg (200 lb 6.4 oz)   SpO2 95%   BMI 27.95 kg/m²   APPEARANCE: Well nourished, well developed, neatly groomed, in no acute distress.   SKIN: Warm, moist and dry. No lesions or rashes.  EYES: Conjunctivae and lids clear. PERRL. EOMI.   NOSE: Mucosa pink. Nares clear.  MOUTH & THROAT: Oral mucosa pink. No tonsillar enlargement. No pharyngeal erythema or exudate.   NECK: Supple with full range of " motion. No masses. No thyromegaly. No JVD or bruits.  LYMPH: No cervical, supraclavicular, axillary or inguinal lymph node enlargement.  RESPIRATORY: Easy and unlabored respirations. Lungs clear to auscultation throughout all lung fields. No wheezes or rales.  CARDIOVASCULAR: Regular rate and rhythm. Normal S1, S2. No rubs, murmurs or gallops.   ABDOMEN: Bowel sounds normal. Nondistended and soft. No hepatosplenomegaly, masses, or tenderness.  EXTREMITIES: No edema or cyanosis. Pedal pulses 2+ bilaterally. No varicosities   NEUROLOGIC: Cranial Nerves: II-XII grossly intact  Gait & Posture: Normal gait and fine motion.  PSYCH: Awake, alert, oriented to person, place, time, and situation. Pleasant and cooperative mood with intact judgment.    Results for orders placed or performed in visit on 05/22/19   Comprehensive metabolic panel   Result Value Ref Range    Sodium 138 136 - 145 mmol/L    Potassium 4.3 3.5 - 5.1 mmol/L    Chloride 102 95 - 110 mmol/L    CO2 29 23 - 29 mmol/L    Glucose 119 (H) 70 - 110 mg/dL    BUN, Bld 28 (H) 8 - 23 mg/dL    Creatinine 1.3 0.5 - 1.4 mg/dL    Calcium 10.3 8.7 - 10.5 mg/dL    Total Protein 7.2 6.0 - 8.4 g/dL    Albumin 4.2 3.5 - 5.2 g/dL    Total Bilirubin 1.2 (H) 0.1 - 1.0 mg/dL    Alkaline Phosphatase 52 (L) 55 - 135 U/L    AST 44 (H) 10 - 40 U/L    ALT 41 10 - 44 U/L    Anion Gap 7 (L) 8 - 16 mmol/L    eGFR if African American >60.0 >60 mL/min/1.73 m^2    eGFR if non  53.4 (A) >60 mL/min/1.73 m^2   Lipid panel   Result Value Ref Range    Cholesterol 186 120 - 199 mg/dL    Triglycerides 185 (H) 30 - 150 mg/dL    HDL 48 40 - 75 mg/dL    LDL Cholesterol 101.0 63.0 - 159.0 mg/dL    Hdl/Cholesterol Ratio 25.8 20.0 - 50.0 %    Total Cholesterol/HDL Ratio 3.9 2.0 - 5.0    Non-HDL Cholesterol 138 mg/dL       ASSESSMENT/PLAN:  Essential hypertension    Hyperlipidemia, unspecified hyperlipidemia type  -     Comprehensive metabolic panel; Future; Expected date: 11/24/2019  -      Lipid panel; Future; Expected date: 11/24/2019    Malignant neoplasm of urinary bladder, unspecified site    Prostate cancer     overall doing   continue xanax PRN for insomnia.  1. HTN (controlled) - continue diltiazem, chlorthalidone and Benicar  2. HLD (controlled) - continue Crestor 10mg daily, Fish Oil 1000mg daily  3. bladder carcinoma/prostate cancer - continue present treatment and f/u with Urology  f/u 6 months with CMP, lipid profile. Patient preference.

## 2019-06-12 DIAGNOSIS — I10 ESSENTIAL HYPERTENSION: ICD-10-CM

## 2019-06-12 RX ORDER — OLMESARTAN MEDOXOMIL 40 MG/1
TABLET ORAL
Qty: 90 TABLET | Refills: 3 | Status: SHIPPED | OUTPATIENT
Start: 2019-06-12 | End: 2020-01-07 | Stop reason: SDUPTHER

## 2019-06-17 NOTE — PROGRESS NOTES
Last 5 Patient Entered Readings                                      Current 30 Day Average:      Recent Readings 5/14/2019 5/13/2019 5/10/2019 5/7/2019 4/26/2019    SBP (mmHg) 148 127 138 117 122    DBP (mmHg) 96 77 76 74 75    Pulse 79 65 70 66 66        Left voicemail for follow up, requested more readings (previously on hiatus)

## 2019-07-03 NOTE — PROGRESS NOTES
Last 5 Patient Entered Readings                                      Current 30 Day Average:      Recent Readings 5/14/2019 5/13/2019 5/10/2019 5/7/2019 4/26/2019    SBP (mmHg) 148 127 138 117 122    DBP (mmHg) 96 77 76 74 75    Pulse 79 65 70 66 66        Left voicemail for follow up, requested more readings

## 2019-07-03 NOTE — PROGRESS NOTES
"Last 5 Patient Entered Readings                                      Current 30 Day Average:      Recent Readings 5/14/2019 5/13/2019 5/10/2019 5/7/2019 4/26/2019    SBP (mmHg) 148 127 138 117 122    DBP (mmHg) 96 77 76 74 75    Pulse 79 65 70 66 66        Patient reports that he just had an injection in his back yesterday. He was helping his wife in the bath, and felt "a twinge" in his back, and then reports that the following day he was unable to stand or walk from the pain. Patient is currently bedridden, and has help from his daughter to care for both him and his wife, who has parkinsons and dementia.     Patient requests hiatus for 6 weeks while he recovers, and takes care of his wife.     "

## 2019-07-12 ENCOUNTER — TELEPHONE (OUTPATIENT)
Dept: GASTROENTEROLOGY | Facility: CLINIC | Age: 74
End: 2019-07-12

## 2019-07-12 NOTE — TELEPHONE ENCOUNTER
----- Message from Ruchi Roque sent at 7/12/2019 10:21 AM CDT -----  Type: Needs to cancel colonoscopy    Who Called:  Patient  Best Call Back Number: [phone]  Additional Information: Patient requesting to cancel colonoscopy on August 2nd due to having surgery/will call back to reschedule.

## 2019-08-15 ENCOUNTER — PATIENT OUTREACH (OUTPATIENT)
Dept: OTHER | Facility: OTHER | Age: 74
End: 2019-08-15

## 2019-08-15 NOTE — PROGRESS NOTES
Last 5 Patient Entered Readings                                      Current 30 Day Average:      Recent Readings 5/14/2019 5/13/2019 5/10/2019 5/7/2019 4/26/2019    SBP (mmHg) 148 127 138 117 122    DBP (mmHg) 96 77 76 74 75    Pulse 79 65 70 66 66        Digital Medicine: Health  Follow Up    Patient had back surgery last week, and states that he still has about 2 more weeks of recovery. He states that he had parts of a disc removed that was compressing a nerve.     Patient agrees to resume with BP readings. He describes that he has been very bored around the house, and is unaccustomed to down time. Patient is able to walk, however, in small amounts, but cannot life anything that weighs more than 3 lbs.     WCB in 3 weeks to review BP readings

## 2019-08-27 ENCOUNTER — PATIENT OUTREACH (OUTPATIENT)
Dept: OTHER | Facility: OTHER | Age: 74
End: 2019-08-27

## 2019-08-27 NOTE — PROGRESS NOTES
Last 5 Patient Entered Readings                                      Current 30 Day Average:      Recent Readings 5/14/2019 5/13/2019 5/10/2019 5/7/2019 4/26/2019    SBP (mmHg) 148 127 138 117 122    DBP (mmHg) 96 77 76 74 75    Pulse 79 65 70 66 66        Patient calls to inform me that he is unable to locate his BP monitor. Patient agrees to continue to look for it, and knows to replace it at the [facility] if he can't find it. WCB in 2-3 weeks to review readings

## 2019-09-10 ENCOUNTER — PATIENT MESSAGE (OUTPATIENT)
Dept: ADMINISTRATIVE | Facility: OTHER | Age: 74
End: 2019-09-10

## 2019-09-17 ENCOUNTER — PATIENT OUTREACH (OUTPATIENT)
Dept: OTHER | Facility: OTHER | Age: 74
End: 2019-09-17

## 2019-10-01 NOTE — PROGRESS NOTES
Digital Medicine: Health  Follow-Up        Follow Up  Follow-up reason(s): reading review and goal follow-up      Readings are trending down Patient occasionally feels fatigue, and is unsure if this is due to BP or the demands of caring for his wife with parkinsons. Patient agrees to continue to monitor. Routed.           Physical Activity:   When asked if exercising, patient responded: no    He identified the following barriers to physical activity: pain/injury/recent surgery    Patient has been inactive due to recent back surgery, but intends to begin walking 3 miles. Patient hopes to eventually get up to daily walking.       [facility]    INTERVENTION(S)  recommend physical activity, encouragement/support and goal setting    PLAN  patient amenable to changes      There are no preventive care reminders to display for this patient.    Last 5 Patient Entered Readings                                      Current 30 Day Average: 111/71     Recent Readings 10/1/2019 9/30/2019 9/29/2019 9/28/2019 9/27/2019    SBP (mmHg) 100 103 92 120 123    DBP (mmHg) 65 67 57 75 81    Pulse 59 59 59 59 60

## 2019-10-02 ENCOUNTER — PATIENT OUTREACH (OUTPATIENT)
Dept: OTHER | Facility: OTHER | Age: 74
End: 2019-10-02

## 2019-10-02 NOTE — PROGRESS NOTES
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    The history is provided by the patient.     Follow Up  Follow-up reason(s): reading review and medication change      Readings are trending down Patient reports fatigue but denies other hypotension symptoms.     Patient has lost 25 pounds in the last 4-5 weeks.                  Medication Adherence:   He misses doses: never        INTERVENTION(S)  reviewed appropriate dose schedule and recommended med change    PLAN  patient verbalizes understanding and patient amenable to changes    Patient will stop diltiazem over fatigue. He will continue chlorthalidone and olmesartan at this time.      There are no preventive care reminders to display for this patient.    Last 5 Patient Entered Readings                                      Current 30 Day Average: 109/70     Recent Readings 10/2/2019 10/1/2019 9/30/2019 9/29/2019 9/28/2019    SBP (mmHg) 98 100 103 92 120    DBP (mmHg) 62 65 67 57 75    Pulse 63 59 59 59 59             Hypertension Medications             chlorthalidone (HYGROTEN) 25 MG Tab Take 1 tablet (25 mg total) by mouth once daily.    diltiaZEM (DILT-XR) 240 MG CDCR TAKE 1 CAPSULE(240 MG) BY MOUTH EVERY DAY    olmesartan (BENICAR) 40 MG tablet TAKE 1 TABLET BY MOUTH ONCE DAILY

## 2019-10-16 ENCOUNTER — PATIENT OUTREACH (OUTPATIENT)
Dept: OTHER | Facility: OTHER | Age: 74
End: 2019-10-16

## 2019-10-16 DIAGNOSIS — I10 ESSENTIAL HYPERTENSION: ICD-10-CM

## 2019-10-16 RX ORDER — CHLORTHALIDONE 25 MG/1
TABLET ORAL
Qty: 90 TABLET | Refills: 3
Start: 2019-10-16 | End: 2019-11-01 | Stop reason: SDUPTHER

## 2019-10-16 NOTE — PROGRESS NOTES
Digital Medicine: Clinician Follow-Up    The history is provided by the patient.     Follow Up  Follow-up reason(s): reading review, medication change and medication change follow-up      Readings are trending down   Medication Change: stop therapy    Is patient tolerating med change?:  YesPatient's BP is dropping despite stopping diltiazem.     He denies hypotension symptoms.                  Medication Adherence:   He misses doses: never        INTERVENTION(S)  reviewed appropriate dose schedule and recommended med change    PLAN  patient verbalizes understanding and patient amenable to changes    Patient will reduce chlorthalidone from 25mg daily to 12.5mg daily. He will maintain his current olmesartan dose.    He will contact me with any hypotension symptoms prior to my next outreach.      There are no preventive care reminders to display for this patient.    Last 5 Patient Entered Readings                                      Current 30 Day Average: 110/71     Recent Readings 10/16/2019 10/15/2019 10/14/2019 10/13/2019 10/12/2019    SBP (mmHg) 99 97 113 116 102    DBP (mmHg) 68 70 72 78 67    Pulse 73 66 76 72 70             Hypertension Medications             chlorthalidone (HYGROTEN) 25 MG Tab Take 1 tablet (25 mg total) by mouth once daily.    olmesartan (BENICAR) 40 MG tablet TAKE 1 TABLET BY MOUTH ONCE DAILY

## 2019-10-29 DIAGNOSIS — I10 ESSENTIAL HYPERTENSION: ICD-10-CM

## 2019-10-29 NOTE — PROGRESS NOTES
Refill Authorization Note     is requesting a refill authorization.    Brief assessment and rationale for refill: DEFER: recent dose adjustment  Name and strength of medication: chlorthalidone 25 mg tab  Medication-related problems identified: Dose adjustment    Medication Therapy Plan: HTN LCO by Loki Cooper (Digital [facility]) - chlorthalidone reduced to 12.5 mg and diltiazem discontinued; BP low at digital med 10/16/19; FLOS(11/19); FOVS(12/19); Recent dose adjustment - will pend last commented instructions; defer to you                    How patient will take medication: t1t po qd          Comments:   Blood Pressure Readings: <139/89mmHg  BP Readings from Last 3 Encounters:   05/28/19 108/70   11/26/18 108/70   07/16/18 (!) 140/82        Last Creatinine I Potassium I Sodium   Lab Results   Component Value Date    CREATININE 1.3 05/22/2019    CREATININE 1.3 11/19/2018    CREATININE 1.2 10/09/2018       Lab Results   Component Value Date    K 4.3 05/22/2019    K 4.3 11/19/2018    K 4.0 10/09/2018    Lab Results   Component Value Date     05/22/2019     11/19/2018     10/09/2018        Digital Hypertension Data: values will display if enrolled  Last 5 Patient Entered Readings                                      Current 30 Day Average: 112/72     Recent Readings 10/29/2019 10/28/2019 10/27/2019 10/26/2019 10/25/2019    SBP (mmHg) 122 130 130 117 121    DBP (mmHg) 78 78 85 71 77    Pulse 75 64 77 68 75        Appointments past 12m or future 3m    Date Provider   Last Visit   5/28/2019 Daniel Avery MD   Next Visit   12/11/2019 Daniel Avery MD

## 2019-11-01 ENCOUNTER — PATIENT OUTREACH (OUTPATIENT)
Dept: OTHER | Facility: OTHER | Age: 74
End: 2019-11-01

## 2019-11-01 RX ORDER — CHLORTHALIDONE 25 MG/1
TABLET ORAL
Qty: 45 TABLET | Refills: 0 | Status: SHIPPED | OUTPATIENT
Start: 2019-11-01 | End: 2020-01-07 | Stop reason: SDUPTHER

## 2019-11-01 NOTE — PROGRESS NOTES
Digital Medicine: Clinician Follow-Up    Called patient since reducing chlorthalidone to 12.5mg daily.    The history is provided by the patient.     Follow Up  Follow-up reason(s): medication change follow-up      Medication Change: dose decrease    Is patient tolerating med change?:  YesPatient is tolerating this change.      INTERVENTION(S)  reviewed appropriate dose schedule    PLAN  patient verbalizes understanding    Patient will continue his current HTN medications at this time.      There are no preventive care reminders to display for this patient.    Last 5 Patient Entered Readings                                      Current 30 Day Average: 114/74     Recent Readings 11/1/2019 10/31/2019 10/30/2019 10/29/2019 10/28/2019    SBP (mmHg) 116 118 112 122 130    DBP (mmHg) 79 84 65 78 78    Pulse 67 78 83 75 64             Hypertension Medications             chlorthalidone (HYGROTEN) 25 MG Tab Take 12.5mg daily    chlorthalidone (HYGROTEN) 25 MG Tab Take one half-tablet (12.5 mg) by mouth every day.    olmesartan (BENICAR) 40 MG tablet TAKE 1 TABLET BY MOUTH ONCE DAILY                             Medication Adherence Screening   He misses doses: never

## 2019-11-01 NOTE — TELEPHONE ENCOUNTER
Please see the following assessment. This refill request still requires some action on your part. Chlortalidone dose adjusted to 12.5 mg daily by Loki PharmD on 10/16/19. Last RX ordered as no print. Pended 90 day supply. Defer to you. Thank you.

## 2019-11-12 ENCOUNTER — PATIENT OUTREACH (OUTPATIENT)
Dept: OTHER | Facility: OTHER | Age: 74
End: 2019-11-12

## 2019-11-12 NOTE — PROGRESS NOTES
Digital Medicine: Health  Follow-Up        Follow Up  Follow-up reason(s): reading review and goal follow-up      Readings are trending up Patient has follow up appt on Thursday regarding his prostate cancer. He is unsure what will be the best next step given his wife's parkinsons, which is very progressed. Encouragement provided.       INTERVENTION(S)  encouragement/support    PLAN  continue monitoring      There are no preventive care reminders to display for this patient.    Last 5 Patient Entered Readings                                      Current 30 Day Average: 119/76     Recent Readings 11/12/2019 11/11/2019 11/10/2019 11/9/2019 11/8/2019    SBP (mmHg) 123 125 137 129 131    DBP (mmHg) 81 81 88 85 80    Pulse 70 76 79 74 70                      Diet Screening   No change to diet.      Physical Activity Screening   When asked if exercising, patient responded: yes5 day(s) a week.      Patient participates in the following activities: walking and biking    Patient has resumed with walking almost every day since his back surgery this fall, and further notes that he recently bought a bike.       SDOH

## 2019-11-22 ENCOUNTER — LAB VISIT (OUTPATIENT)
Dept: LAB | Facility: HOSPITAL | Age: 74
End: 2019-11-22
Attending: FAMILY MEDICINE
Payer: MEDICARE

## 2019-11-22 DIAGNOSIS — E78.5 HYPERLIPIDEMIA, UNSPECIFIED HYPERLIPIDEMIA TYPE: ICD-10-CM

## 2019-11-22 LAB
ALBUMIN SERPL BCP-MCNC: 3.8 G/DL (ref 3.5–5.2)
ALP SERPL-CCNC: 47 U/L (ref 55–135)
ALT SERPL W/O P-5'-P-CCNC: 25 U/L (ref 10–44)
ANION GAP SERPL CALC-SCNC: 7 MMOL/L (ref 8–16)
AST SERPL-CCNC: 32 U/L (ref 10–40)
BILIRUB SERPL-MCNC: 1.4 MG/DL (ref 0.1–1)
BUN SERPL-MCNC: 20 MG/DL (ref 8–23)
CALCIUM SERPL-MCNC: 9.5 MG/DL (ref 8.7–10.5)
CHLORIDE SERPL-SCNC: 104 MMOL/L (ref 95–110)
CHOLEST SERPL-MCNC: 158 MG/DL (ref 120–199)
CHOLEST/HDLC SERPL: 3.7 {RATIO} (ref 2–5)
CO2 SERPL-SCNC: 28 MMOL/L (ref 23–29)
CREAT SERPL-MCNC: 1.2 MG/DL (ref 0.5–1.4)
EST. GFR  (AFRICAN AMERICAN): >60 ML/MIN/1.73 M^2
EST. GFR  (NON AFRICAN AMERICAN): 58.4 ML/MIN/1.73 M^2
GLUCOSE SERPL-MCNC: 102 MG/DL (ref 70–110)
HDLC SERPL-MCNC: 43 MG/DL (ref 40–75)
HDLC SERPL: 27.2 % (ref 20–50)
LDLC SERPL CALC-MCNC: 75 MG/DL (ref 63–159)
NONHDLC SERPL-MCNC: 115 MG/DL
POTASSIUM SERPL-SCNC: 4 MMOL/L (ref 3.5–5.1)
PROT SERPL-MCNC: 6.5 G/DL (ref 6–8.4)
SODIUM SERPL-SCNC: 139 MMOL/L (ref 136–145)
TRIGL SERPL-MCNC: 200 MG/DL (ref 30–150)

## 2019-11-22 PROCEDURE — 36415 COLL VENOUS BLD VENIPUNCTURE: CPT | Mod: PO

## 2019-11-22 PROCEDURE — 80061 LIPID PANEL: CPT

## 2019-11-22 PROCEDURE — 80053 COMPREHEN METABOLIC PANEL: CPT

## 2019-12-05 ENCOUNTER — PATIENT OUTREACH (OUTPATIENT)
Dept: OTHER | Facility: OTHER | Age: 74
End: 2019-12-05

## 2019-12-11 ENCOUNTER — OFFICE VISIT (OUTPATIENT)
Dept: FAMILY MEDICINE | Facility: CLINIC | Age: 74
End: 2019-12-11
Payer: MEDICARE

## 2019-12-11 VITALS
TEMPERATURE: 98 F | WEIGHT: 200.38 LBS | BODY MASS INDEX: 28.05 KG/M2 | SYSTOLIC BLOOD PRESSURE: 120 MMHG | HEIGHT: 71 IN | HEART RATE: 94 BPM | DIASTOLIC BLOOD PRESSURE: 82 MMHG | OXYGEN SATURATION: 96 %

## 2019-12-11 DIAGNOSIS — C61 PROSTATE CANCER: ICD-10-CM

## 2019-12-11 DIAGNOSIS — E78.5 HYPERLIPIDEMIA, UNSPECIFIED HYPERLIPIDEMIA TYPE: ICD-10-CM

## 2019-12-11 DIAGNOSIS — C67.9 MALIGNANT NEOPLASM OF URINARY BLADDER, UNSPECIFIED SITE: ICD-10-CM

## 2019-12-11 DIAGNOSIS — I10 ESSENTIAL HYPERTENSION: Primary | ICD-10-CM

## 2019-12-11 PROCEDURE — 99214 PR OFFICE/OUTPT VISIT, EST, LEVL IV, 30-39 MIN: ICD-10-PCS | Mod: S$PBB,,, | Performed by: FAMILY MEDICINE

## 2019-12-11 PROCEDURE — 99214 OFFICE O/P EST MOD 30 MIN: CPT | Mod: S$PBB,,, | Performed by: FAMILY MEDICINE

## 2019-12-11 PROCEDURE — 99213 OFFICE O/P EST LOW 20 MIN: CPT | Mod: PBBFAC,PO | Performed by: FAMILY MEDICINE

## 2019-12-11 PROCEDURE — 1126F PR PAIN SEVERITY QUANTIFIED, NO PAIN PRESENT: ICD-10-PCS | Mod: ,,, | Performed by: FAMILY MEDICINE

## 2019-12-11 PROCEDURE — 99999 PR PBB SHADOW E&M-EST. PATIENT-LVL III: CPT | Mod: PBBFAC,,, | Performed by: FAMILY MEDICINE

## 2019-12-11 PROCEDURE — 99999 PR PBB SHADOW E&M-EST. PATIENT-LVL III: ICD-10-PCS | Mod: PBBFAC,,, | Performed by: FAMILY MEDICINE

## 2019-12-11 PROCEDURE — 1159F MED LIST DOCD IN RCRD: CPT | Mod: ,,, | Performed by: FAMILY MEDICINE

## 2019-12-11 PROCEDURE — 1126F AMNT PAIN NOTED NONE PRSNT: CPT | Mod: ,,, | Performed by: FAMILY MEDICINE

## 2019-12-11 PROCEDURE — 1159F PR MEDICATION LIST DOCUMENTED IN MEDICAL RECORD: ICD-10-PCS | Mod: ,,, | Performed by: FAMILY MEDICINE

## 2019-12-11 RX ORDER — IBUPROFEN 100 MG/5ML
1000 SUSPENSION, ORAL (FINAL DOSE FORM) ORAL
COMMUNITY
Start: 2013-08-06 | End: 2019-12-11

## 2019-12-11 RX ORDER — GABAPENTIN 600 MG/1
TABLET ORAL
Refills: 3 | COMMUNITY
Start: 2019-11-21 | End: 2021-12-20

## 2019-12-11 NOTE — PROGRESS NOTES
Chief Complaint   Patient presents with    Follow-up     6 month     PI:This is a 74 year-old male presents to f/u on HTN.    HLD - tolerating Crestor 10mg and Fish Oil  HTN - tolerating diltiazem, benicar, chlorthalidone; BP well-controlled   Bladder/prostate cancer ([place] 7) - following with urology, Dr. Cr,  every 6 months. Considering robotic prostatectomy, but now just doing watchful waiting. PSA has been stable around 13.  Glaucoma - on Combigan and Lumigan; followed by Dr. Emery  Lumbar DDD with right sciatica - resolved with back surgery in 2019  Insomnia - using xanax only as needed  On Shingrix waiting list    PAST MEDICAL HISTORY:  Prostate cancer - [place] 7; following with watchful waiting  Bladder carcinoma - followed with Dr. Bowman every 3 months; treated in past with Thiotrpa, BCG  HLD (Dx )  HTN (Dx )  colon polyp  Lumbar DDD    PAST SURGICAL HISTORY:  multiple bladder scrappings (11 total)    FAMILY HISTORY:  Father:  at 50 of CAD  Mother:  at 78 insterstial lung fibrosis, carotid artery disease  Grandpartents unknown    SOCIAL HISTORY:  Tobacco use: quit    Alcohol use: 2 drinks of bourbon daily  Occupation:  for [employer]  Marital status: (wife with schizophrenia)  Children: 2  enjoys wood working and fishing  exercises daily    REVIEW OF SYSTEMS:  GENERAL: No fever, chills, fatigability or weight changes.  SKIN/BREASTS: No rashes, sores, itching or changes in color or texture of skin.   HEAD: No headaches or recent head trauma.   EYES: Visual acuity fine. Denies blurriness, tearing, itching, photophobia, diplopia, or visual changes.   EARS: Denies ear pain, discharge, tinnitus or vertigo. Denies hearing loss.   NOSE: No loss of smell, epistaxis, postnasal drip, discharge, obstruction, or sneezing.  MOUTH & THROAT: No hoarseness, change in voice, swallowing difficulty. No excessive gum bleeding.   HEMATOLOGICAL/NODES: Denies swollen glands. No  "bleeding or bruising.  CHEST: No TOLEDO, cyanosis, wheezing, cough or sputum production.  CARDIOVASCULAR: Denies chest pain, dyspnea, orthopnea, or palpitations.  GI/ABDOMEN: Appetite fine. No weight loss. Denies nausea, vomiting, diarrhea, constipation, abdominal pain, hematemesis or blood in stool.  URINARY: No dysuria,hematuria, nocturia, incontinence, flank pain, urgency, or urinary difficulty.  PERIPHERAL VASCULAR: No claudication, cold intolerance or cyanosis.  MUSCULOSKELETAL: No joint stiffness, pain, swelling, or loss of strength. Denies back pain.  NEUROLOGIC: No history of seizures, paralysis, alteration of gait or coordination.  ENDOCRINE: Sexual maturation. Denies weight change, heat/cold intolerance, hair loss or gain, loss of libido, polyuria, polydipsia, polyphagia, pruritus, unexplained flushing, or excessive sweating.   PSYCH: No crying spells. Denies anxiety symptoms.    Answers for HPI/ROS submitted by the patient on 12/4/2019   activity change: No  unexpected weight change: No  neck pain: No  hearing loss: No  rhinorrhea: No  trouble swallowing: No  eye discharge: No  visual disturbance: No  chest tightness: No  wheezing: No  chest pain: No  palpitations: No  blood in stool: No  constipation: No  vomiting: No  diarrhea: No  polydipsia: No  polyuria: No  difficulty urinating: No  urgency: No  hematuria: No  joint swelling: No  arthralgias: No  headaches: No  weakness: No  confusion: No  dysphoric mood: No          PHYSICAL EXAM:  /82 (BP Location: Right arm, Patient Position: Sitting)   Pulse 94   Temp 98 °F (36.7 °C) (Oral)   Ht 5' 11" (1.803 m)   Wt 90.9 kg (200 lb 6.4 oz)   SpO2 96%   BMI 27.95 kg/m²   APPEARANCE: Well nourished, well developed, neatly groomed, in no acute distress.   SKIN: Warm, moist and dry. No lesions or rashes.  EYES: Conjunctivae and lids clear. PERRL. EOMI.   NOSE: Mucosa pink. Nares clear.  MOUTH & THROAT: Oral mucosa pink. No tonsillar enlargement. No " pharyngeal erythema or exudate.   NECK: Supple with full range of motion. No masses. No thyromegaly. No JVD or bruits.  LYMPH: No cervical, supraclavicular, axillary or inguinal lymph node enlargement.  RESPIRATORY: Easy and unlabored respirations. Lungs clear to auscultation throughout all lung fields. No wheezes or rales.  CARDIOVASCULAR: Regular rate and rhythm. Normal S1, S2. No rubs, murmurs or gallops.   ABDOMEN: Bowel sounds normal. Nondistended and soft. No hepatosplenomegaly, masses, or tenderness.  EXTREMITIES: No edema or cyanosis. Pedal pulses 2+ bilaterally. No varicosities   NEUROLOGIC: Cranial Nerves: II-XII grossly intact  Gait & Posture: Normal gait and fine motion.  PSYCH: Awake, alert, oriented to person, place, time, and situation. Pleasant and cooperative mood with intact judgment.    Results for orders placed or performed in visit on 11/22/19   Comprehensive metabolic panel   Result Value Ref Range    Sodium 139 136 - 145 mmol/L    Potassium 4.0 3.5 - 5.1 mmol/L    Chloride 104 95 - 110 mmol/L    CO2 28 23 - 29 mmol/L    Glucose 102 70 - 110 mg/dL    BUN, Bld 20 8 - 23 mg/dL    Creatinine 1.2 0.5 - 1.4 mg/dL    Calcium 9.5 8.7 - 10.5 mg/dL    Total Protein 6.5 6.0 - 8.4 g/dL    Albumin 3.8 3.5 - 5.2 g/dL    Total Bilirubin 1.4 (H) 0.1 - 1.0 mg/dL    Alkaline Phosphatase 47 (L) 55 - 135 U/L    AST 32 10 - 40 U/L    ALT 25 10 - 44 U/L    Anion Gap 7 (L) 8 - 16 mmol/L    eGFR if African American >60.0 >60 mL/min/1.73 m^2    eGFR if non  58.4 (A) >60 mL/min/1.73 m^2   Lipid panel   Result Value Ref Range    Cholesterol 158 120 - 199 mg/dL    Triglycerides 200 (H) 30 - 150 mg/dL    HDL 43 40 - 75 mg/dL    LDL Cholesterol 75.0 63.0 - 159.0 mg/dL    Hdl/Cholesterol Ratio 27.2 20.0 - 50.0 %    Total Cholesterol/HDL Ratio 3.7 2.0 - 5.0    Non-HDL Cholesterol 115 mg/dL       ASSESSMENT/PLAN:  Essential hypertension    Hyperlipidemia, unspecified hyperlipidemia type  -     Comprehensive  metabolic panel; Future; Expected date: 06/08/2020  -     Lipid panel; Future; Expected date: 06/08/2020    Malignant neoplasm of urinary bladder, unspecified site    Prostate cancer     overall doing   continue xanax PRN for insomnia.  1. HTN (controlled) - continue diltiazem, chlorthalidone and Benicar  2. HLD (controlled) - continue Crestor 10mg daily, Fish Oil 1000mg daily  3. bladder carcinoma/prostate cancer - continue present treatment and f/u with Urology  Consider shingrix at pharmacy  f/u 6 months with CMP, lipid profile. Patient preference.

## 2019-12-12 NOTE — PROGRESS NOTES
Digital Medicine: Clinician Follow-Up    Called patient to discuss his upward trend in blood pressure.     The history is provided by the patient.     Follow Up  Follow-up reason(s): reading review      Readings are trending up due to inaccurate technique.  Patient takes blood pressure readings in the morning, in which he describes is pretty hectic. Patient states that he does not sit and rest for 5 minutes prior to the reading and drinks caffeine.     Patient has charged his cuff and denies any missed doses.    Patient had a primary care appointment yesterday 12/11 and his blood pressure was at goal at that time.       INTERVENTION(S)  reviewed appropriate dose schedule, reviewed monitoring technique and encouragement/support    PLAN  patient verbalizes understanding    Patient will work on proper blood pressure technique and relaxation technique to get in a calm and rested state prior to blood pressure readings.     Patient will continue his current hypertension medications.       There are no preventive care reminders to display for this patient.    Last 5 Patient Entered Readings                                      Current 30 Day Average: 130/87     Recent Readings 12/12/2019 12/10/2019 12/10/2019 12/9/2019 12/9/2019    SBP (mmHg) 133 141 148 140 147    DBP (mmHg) 87 85 98 96 99    Pulse 76 68 84 104 110             Hypertension Medications             chlorthalidone (HYGROTEN) 25 MG Tab Take one half-tablet (12.5 mg) by mouth every day.    olmesartan (BENICAR) 40 MG tablet TAKE 1 TABLET BY MOUTH ONCE DAILY                             Medication Adherence Screening   He misses doses: never

## 2019-12-14 DIAGNOSIS — E78.5 HYPERLIPIDEMIA, UNSPECIFIED HYPERLIPIDEMIA TYPE: ICD-10-CM

## 2019-12-15 RX ORDER — ROSUVASTATIN CALCIUM 10 MG/1
TABLET, COATED ORAL
Qty: 90 TABLET | Refills: 3 | Status: SHIPPED | OUTPATIENT
Start: 2019-12-15 | End: 2020-01-09 | Stop reason: SDUPTHER

## 2019-12-30 DIAGNOSIS — I10 ESSENTIAL HYPERTENSION: ICD-10-CM

## 2019-12-30 RX ORDER — DILTIAZEM HYDROCHLORIDE 240 MG/1
CAPSULE, EXTENDED RELEASE ORAL
Qty: 90 CAPSULE | Refills: 3 | Status: SHIPPED | OUTPATIENT
Start: 2019-12-30 | End: 2020-01-02 | Stop reason: ALTCHOICE

## 2020-01-02 ENCOUNTER — PATIENT OUTREACH (OUTPATIENT)
Dept: OTHER | Facility: OTHER | Age: 75
End: 2020-01-02

## 2020-01-02 DIAGNOSIS — I10 ESSENTIAL HYPERTENSION: Primary | ICD-10-CM

## 2020-01-02 RX ORDER — AMLODIPINE BESYLATE 5 MG/1
5 TABLET ORAL DAILY
Qty: 30 TABLET | Refills: 5 | Status: SHIPPED | OUTPATIENT
Start: 2020-01-02 | End: 2020-01-09 | Stop reason: SDUPTHER

## 2020-01-02 NOTE — PROGRESS NOTES
Digital Medicine: Clinician Follow-Up    Called patient to discuss his high blood pressure and assess his blood pressure technique.    The history is provided by the patient.     Follow Up  Follow-up reason(s): reading review and medication change      Readings are trending up Patient's blood pressure is increasing despite adjusting his blood pressure technique.     Patient is doing well with his blood pressure technique.       INTERVENTION(S)  reviewed appropriate dose schedule, reviewed monitoring technique and recommended med change    PLAN  patient verbalizes understanding and patient amenable to changes    Patient will start amlodipine 5mg daily. Patient will maintain his current dose of chlorthalidone and olmesartan.    Patient will continue to focus on the proper blood pressure technique when taking his blood pressure.      There are no preventive care reminders to display for this patient.    Last 5 Patient Entered Readings                                      Current 30 Day Average: 143/94     Recent Readings 12/27/2019 12/27/2019 12/26/2019 12/26/2019 12/24/2019    SBP (mmHg) 153 156 144 153 147    DBP (mmHg) 97 103 91 100 94    Pulse 90 101 75 90 79             Hypertension Medications             chlorthalidone (HYGROTEN) 25 MG Tab Take one half-tablet (12.5 mg) by mouth every day.    olmesartan (BENICAR) 40 MG tablet TAKE 1 TABLET BY MOUTH ONCE DAILY                             Medication Adherence Screening   He misses doses: never

## 2020-01-07 ENCOUNTER — PATIENT OUTREACH (OUTPATIENT)
Dept: OTHER | Facility: OTHER | Age: 75
End: 2020-01-07

## 2020-01-07 DIAGNOSIS — I10 ESSENTIAL HYPERTENSION: ICD-10-CM

## 2020-01-08 RX ORDER — OLMESARTAN MEDOXOMIL 40 MG/1
40 TABLET ORAL DAILY
Qty: 90 TABLET | Refills: 3 | Status: SHIPPED | OUTPATIENT
Start: 2020-01-08 | End: 2020-07-27

## 2020-01-08 RX ORDER — CHLORTHALIDONE 25 MG/1
TABLET ORAL
Qty: 45 TABLET | Refills: 1 | Status: SHIPPED | OUTPATIENT
Start: 2020-01-08 | End: 2020-06-11

## 2020-01-08 NOTE — PROGRESS NOTES
Refill Authorization Note     is requesting a refill authorization.    Brief assessment and rationale for refill: APPROVE: prr                Medication reconciliation completed: No                         Comments:   Requested Prescriptions   Pending Prescriptions Disp Refills    chlorthalidone (HYGROTEN) 25 MG Tab 45 tablet 1     Sig: Take one half-tablet (12.5 mg) by mouth every day.       Cardiovascular: Diuretics - Thiazide Passed - 1/7/2020  3:46 PM        Passed - Patient is at least 18 years old        Passed - Last BP in normal range within 360 days     BP Readings from Last 3 Encounters:   12/11/19 120/82   05/28/19 108/70   11/26/18 108/70              Passed - Office visit in past 12 months or future 90 days     Recent Outpatient Visits            4 weeks ago Essential hypertension    [facility] Daniel Avery MD    7 months ago Essential hypertension    [facility] Daniel Avery MD    1 year ago Hyperlipidemia, unspecified hyperlipidemia type    [facility] Daniel Avery MD    1 year ago Hypertension, unspecified type    [facility] Binta Russo, NP    1 year ago Essential hypertension    [facility] Daniel Avery MD          Future Appointments              In 4 months LAB, [place] [facility] [place]    In 5 months Daniel Avery MD [facility]                Passed - Ca in normal range and within 360 days     Calcium   Date Value Ref Range Status   11/22/2019 9.5 8.7 - 10.5 mg/dL Final   05/22/2019 10.3 8.7 - 10.5 mg/dL Final   11/19/2018 9.8 8.7 - 10.5 mg/dL Final              Passed - Cr is 1.4 or below and within 180 days     Creatinine   Date Value Ref Range Status   11/22/2019 1.2 0.5 - 1.4 mg/dL Final   05/22/2019 1.3 0.5 - 1.4 mg/dL Final   11/19/2018 1.3 0.5 - 1.4 mg/dL Final              Passed - K in normal range  and within 180 days     Potassium   Date Value Ref Range Status   11/22/2019 4.0 3.5 - 5.1 mmol/L Final   05/22/2019 4.3 3.5 - 5.1 mmol/L Final   11/19/2018 4.3 3.5 - 5.1 mmol/L Final              Passed - Na in normal range and within 180 days     Sodium   Date Value Ref Range Status   11/22/2019 139 136 - 145 mmol/L Final   05/22/2019 138 136 - 145 mmol/L Final   11/19/2018 143 136 - 145 mmol/L Final              Passed - eGFR within 180 days     eGFR if non    Date Value Ref Range Status   11/22/2019 58.4 (A) >60 mL/min/1.73 m^2 Final     Comment:     Calculation used to obtain the estimated glomerular filtration  rate (eGFR) is the CKD-EPI equation.      05/22/2019 53.4 (A) >60 mL/min/1.73 m^2 Final     Comment:     Calculation used to obtain the estimated glomerular filtration  rate (eGFR) is the CKD-EPI equation.      11/19/2018 53.4 (A) >60 mL/min/1.73 m^2 Final     Comment:     Calculation used to obtain the estimated glomerular filtration  rate (eGFR) is the CKD-EPI equation.        eGFR if    Date Value Ref Range Status   11/22/2019 >60.0 >60 mL/min/1.73 m^2 Final   05/22/2019 >60.0 >60 mL/min/1.73 m^2 Final   11/19/2018 >60.0 >60 mL/min/1.73 m^2 Final             olmesartan (BENICAR) 40 MG tablet 90 tablet 3     Sig: Take 1 tablet (40 mg total) by mouth once daily.       Cardiovascular:  Angiotensin Receptor Blockers Passed - 1/7/2020  3:46 PM        Passed - Patient is at least 18 years old        Passed - Last BP in normal range within 360 days     BP Readings from Last 3 Encounters:   12/11/19 120/82   05/28/19 108/70   11/26/18 108/70              Passed - Office visit in past 12 months or future 90 days     Recent Outpatient Visits            4 weeks ago Essential hypertension    [facility] Daniel Avery MD    7 months ago Essential hypertension    [facility] Daniel Avery MD    1 year ago Hyperlipidemia, unspecified  hyperlipidemia type    [facility] Daniel Avery MD    1 year ago Hypertension, unspecified type    [facility] Binta Russo NP    1 year ago Essential hypertension    [facility] Daniel Avery MD          Future Appointments              In 4 months [facility], [place] [facility], [place]    In 5 months Daniel Avery MD [facility]                Passed - Cr is 1.4 or below and within 360 days     Creatinine   Date Value Ref Range Status   11/22/2019 1.2 0.5 - 1.4 mg/dL Final   05/22/2019 1.3 0.5 - 1.4 mg/dL Final   11/19/2018 1.3 0.5 - 1.4 mg/dL Final              Passed - K in normal range and within 360 days     Potassium   Date Value Ref Range Status   11/22/2019 4.0 3.5 - 5.1 mmol/L Final   05/22/2019 4.3 3.5 - 5.1 mmol/L Final   11/19/2018 4.3 3.5 - 5.1 mmol/L Final              Passed - eGFR within 360 days     eGFR if non    Date Value Ref Range Status   11/22/2019 58.4 (A) >60 mL/min/1.73 m^2 Final     Comment:     Calculation used to obtain the estimated glomerular filtration  rate (eGFR) is the CKD-EPI equation.      05/22/2019 53.4 (A) >60 mL/min/1.73 m^2 Final     Comment:     Calculation used to obtain the estimated glomerular filtration  rate (eGFR) is the CKD-EPI equation.      11/19/2018 53.4 (A) >60 mL/min/1.73 m^2 Final     Comment:     Calculation used to obtain the estimated glomerular filtration  rate (eGFR) is the CKD-EPI equation.        eGFR if    Date Value Ref Range Status   11/22/2019 >60.0 >60 mL/min/1.73 m^2 Final   05/22/2019 >60.0 >60 mL/min/1.73 m^2 Final   11/19/2018 >60.0 >60 mL/min/1.73 m^2 Final

## 2020-01-09 DIAGNOSIS — I10 ESSENTIAL HYPERTENSION: ICD-10-CM

## 2020-01-09 DIAGNOSIS — E78.5 HYPERLIPIDEMIA, UNSPECIFIED HYPERLIPIDEMIA TYPE: ICD-10-CM

## 2020-01-09 NOTE — PROGRESS NOTES
Digital Medicine: Health  Follow-Up    Patient reports that his wife's health is declining. He describes his stress related to switching insurances to [employer] for himself and his wife.         Follow Up  Follow-up reason(s): reading review          INTERVENTION(S)  encouragement/support    PLAN  continue monitoring      There are no preventive care reminders to display for this patient.    Last 5 Patient Entered Readings                                      Current 30 Day Average: 144/95     Recent Readings 1/6/2020 1/3/2020 1/3/2020 12/27/2019 12/27/2019    SBP (mmHg) 129 157 165 153 156    DBP (mmHg) 85 95 101 97 103    Pulse 95 79 87 90 101               Medication Adherence Screening       Patient feels that the medication changes are helping. Will continue to monitor. Patient describes disappointment that he had to add more medication, but I encouraged that it can be temporary with lifestyle modifications. Patient believes his stress has been increase related to caregiver burden.         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  They live together.     
no

## 2020-01-10 RX ORDER — ROSUVASTATIN CALCIUM 10 MG/1
10 TABLET, COATED ORAL DAILY
Qty: 90 TABLET | Refills: 3 | Status: SHIPPED | OUTPATIENT
Start: 2020-01-10 | End: 2021-03-18

## 2020-01-10 RX ORDER — AMLODIPINE BESYLATE 5 MG/1
5 TABLET ORAL DAILY
Qty: 90 TABLET | Refills: 3 | Status: SHIPPED | OUTPATIENT
Start: 2020-01-10 | End: 2021-02-11

## 2020-01-10 NOTE — PROGRESS NOTES
Refill Authorization Note     is requesting a refill authorization.    Brief assessment and rationale for refill: APPROVE: Mail order requesting          Medication Therapy Plan: approve 12 more on each    Medication reconciliation completed: No                         Comments:   Requested Prescriptions   Pending Prescriptions Disp Refills    amLODIPine (NORVASC) 5 MG tablet 90 tablet 3     Sig: Take 1 tablet (5 mg total) by mouth once daily.       Cardiovascular:  Calcium Channel Blockers Passed - 1/9/2020  1:39 PM        Passed - Patient is at least 18 years old        Passed - Last BP in normal range within 360 days     BP Readings from Last 3 Encounters:   12/11/19 120/82   05/28/19 108/70   11/26/18 108/70              Passed - Office visit in past 12 months or future 90 days     Recent Outpatient Visits            1 month ago Essential hypertension    [facility] Daniel Avery MD    7 months ago Essential hypertension    [facility] Daniel Avery MD    1 year ago Hyperlipidemia, unspecified hyperlipidemia type    [facility] Daniel Avery MD    1 year ago Hypertension, unspecified type    [facility] Binta Russo NP    1 year ago Essential hypertension    [facility] Daniel Avery MD          Future Appointments              In 4 months LAB, [place] [facility], [place]    In 5 months Daniel Avery MD [facility]               rosuvastatin (CRESTOR) 10 MG tablet 90 tablet 3     Sig: Take 1 tablet (10 mg total) by mouth once daily.       Cardiovascular:  Antilipid - Statins Passed - 1/9/2020  1:39 PM        Passed - Patient is at least 18 years old        Passed - Office visit in past 12 months or future 90 days     Recent Outpatient Visits            1 month ago Essential hypertension    [facility] Daniel VELA  MD Bennie    7 months ago Essential hypertension    [facility] Daneil Avery MD    1 year ago Hyperlipidemia, unspecified hyperlipidemia type    [facility] Daniel Avery MD    1 year ago Hypertension, unspecified type    [facility] Binta Russo, NP    1 year ago Essential hypertension    [facility] Daniel Avery MD          Future Appointments              In 4 months LAB, [place] [facility], [place]    In 5 months Daniel Avery MD [facility]                Passed - Lipid Panel completed in last 360 days     Lab Results   Component Value Date    CHOL 158 11/22/2019    HDL 43 11/22/2019    LDLCALC 75.0 11/22/2019    TRIG 200 (H) 11/22/2019             Passed - ALT is 94 or below and within 360 days     ALT   Date Value Ref Range Status   11/22/2019 25 10 - 44 U/L Final   05/22/2019 41 10 - 44 U/L Final   11/19/2018 31 10 - 44 U/L Final              Passed - AST is 54 or below and within 360 days     AST   Date Value Ref Range Status   11/22/2019 32 10 - 40 U/L Final   05/22/2019 44 (H) 10 - 40 U/L Final   11/19/2018 29 10 - 40 U/L Final              Appointments  past 12m or future 3m with PCP    Date Provider   Last Visit   12/11/2019 Daniel Avery MD   Next Visit   6/11/2020 Daniel Avery MD

## 2020-01-16 ENCOUNTER — PATIENT OUTREACH (OUTPATIENT)
Dept: OTHER | Facility: OTHER | Age: 75
End: 2020-01-16

## 2020-01-22 NOTE — PROGRESS NOTES
Digital Medicine: Clinician Follow-Up    Called patient to discuss how he is tolerating amlodipine.     The history is provided by the patient.     Follow Up  Follow-up reason(s): reading review and medication change follow-up      Readings are trending down   Medication Change: new med    Patient started new medication.    Is patient tolerating med change?:  YesPatient is tolerating amlodipine without edema or complications.    Patient's blood pressure is trending down nicely.      INTERVENTION(S)  reviewed appropriate dose schedule and encouragement/support    PLAN  patient verbalizes understanding and continue monitoring    Patient will maintain his current hypertension medication regimen.     Patient aware to contact us with any hypotension symptoms prior to my next outreach.      There are no preventive care reminders to display for this patient.    Last 5 Patient Entered Readings                                      Current 30 Day Average: 136/90     Recent Readings 1/22/2020 1/19/2020 1/17/2020 1/16/2020 1/13/2020    SBP (mmHg) 118 127 121 136 144    DBP (mmHg) 79 75 77 86 87    Pulse 87 75 68 85 83             Hypertension Medications             amLODIPine (NORVASC) 5 MG tablet Take 1 tablet (5 mg total) by mouth once daily.    chlorthalidone (HYGROTEN) 25 MG Tab Take one half-tablet (12.5 mg) by mouth every day.    olmesartan (BENICAR) 40 MG tablet Take 1 tablet (40 mg total) by mouth once daily.                 Screenings

## 2020-01-27 DIAGNOSIS — I10 ESSENTIAL HYPERTENSION: ICD-10-CM

## 2020-01-28 RX ORDER — CHLORTHALIDONE 25 MG/1
TABLET ORAL
Qty: 45 TABLET | Refills: 1 | OUTPATIENT
Start: 2020-01-28

## 2020-01-28 NOTE — PROGRESS NOTES
Called patient to discuss his voicemail and his concerns over his recent insurance coverage change.

## 2020-01-28 NOTE — PROGRESS NOTES
Refill Authorization Note     is requesting a refill authorization.    Brief assessment and rationale for refill: QUICK DC: duplicate          Medication Therapy Plan: patient fills through [facility]                              Comments:   Last Prescribed Info:    Ordering Provider: Daniel Avery MD SAIGE #:  [id number] NPI:  [id number]    Authorizing Provider: Daniel Avery MD SAIGE #:  [id number] NPI:  [id number]    Ordering User:  Chinedu Wiseman, PharmD            Diagnosis Association: Essential hypertension (I10)      Original Order:  chlorthalidone (HYGROTEN) 25 MG Tab [[id number]]      Pharmacy:  [facility] - [address] SAIGE #:  --     Pharmacy Comments:  --          Fill quantity remaining:  -- Fill quantity used:  -- Next fill due: --       Outpatient Medication Detail      Disp Refills Start End    chlorthalidone (HYGROTEN) 25 MG Tab 45 tablet 1 1/8/2020     Sig: Take one half-tablet (12.5 mg) by mouth every day.    Sent to pharmacy as: chlorthalidone (HYGROTEN) 25 MG Tab    Notes to Pharmacy: Please delete all prior scripts with same name and strength including on holds.    E-Prescribing Status: Receipt confirmed by pharmacy (1/8/2020 10:18 AM CST)         Appointments  past 12m or future 3m with PCP    Date Provider   Last Visit   12/11/2019 Daniel Avery MD   Next Visit   6/11/2020 Daniel Avery MD

## 2020-02-19 ENCOUNTER — PATIENT OUTREACH (OUTPATIENT)
Dept: OTHER | Facility: OTHER | Age: 75
End: 2020-02-19

## 2020-02-19 NOTE — PROGRESS NOTES
Digital Medicine: Clinician Follow-Up    Called patient to discuss his higher blood pressure.     The history is provided by the patient.     Follow Up  Follow-up reason(s): reading review      Readings are trending up due to lifestyle change.  Patient hasn't exercised in 2-3 weeks. He was riding his bike daily then stopped over the change in weather. He hasn't gained weight but lost a few pounds.       INTERVENTION(S)  reviewed appropriate dose schedule, recommended physical activity, reviewed monitoring technique and encouragement/support    PLAN  patient verbalizes understanding, patient amenable to changes and continue monitoring    Patient will maintain his current hypertension medication regimen.    Patient will charge his blood pressure cuff to confirm accuracy of his higher readings. If he remains elevated, I will consider increasing amlodipine.       There are no preventive care reminders to display for this patient.    Last 5 Patient Entered Readings                                      Current 30 Day Average: 130/81     Recent Readings 2/18/2020 2/13/2020 2/10/2020 2/6/2020 2/3/2020    SBP (mmHg) 144 119 145 140 147    DBP (mmHg) 90 76 85 85 91    Pulse 87 80 87 80 82             Hypertension Medications             amLODIPine (NORVASC) 5 MG tablet Take 1 tablet (5 mg total) by mouth once daily.    chlorthalidone (HYGROTEN) 25 MG Tab Take one half-tablet (12.5 mg) by mouth every day.    olmesartan (BENICAR) 40 MG tablet Take 1 tablet (40 mg total) by mouth once daily.                 Screenings

## 2020-03-03 ENCOUNTER — PATIENT OUTREACH (OUTPATIENT)
Dept: OTHER | Facility: OTHER | Age: 75
End: 2020-03-03

## 2020-03-11 ENCOUNTER — PATIENT OUTREACH (OUTPATIENT)
Dept: OTHER | Facility: OTHER | Age: 75
End: 2020-03-11

## 2020-03-11 NOTE — PROGRESS NOTES
Digital Medicine: Clinician Follow-Up    Patient returned my call to discuss his varying blood pressure.    The history is provided by the patient.     Follow Up  Follow-up reason(s): reading review    Patient's blood pressure remains elevated despite charging his blood pressure cuff. He denies any reason to cause his higher readings.     Patient feels well and denies hypertension symptoms.     Patient found out his wife's life expectancy is short and he started drinking alcohol again. He is intaking 2-3 highballs daily.      INTERVENTION(S)  reviewed appropriate dose schedule and encouragement/support    PLAN  patient verbalizes understanding and continue monitoring    Patient will maintain his current hypertension medication regimen.     Patient will focus on alcohol reduction to improve his blood pressure over medication therapy.     Patient will contact us with any changes or problems prior to our next outreach.       There are no preventive care reminders to display for this patient.    Last 5 Patient Entered Readings                                      Current 30 Day Average: 134/83     Recent Readings 3/10/2020 3/9/2020 3/8/2020 3/7/2020 3/5/2020    SBP (mmHg) 144 139 122 131 136    DBP (mmHg) 88 87 76 83 87    Pulse 75 90 79 84 88             Hypertension Medications             amLODIPine (NORVASC) 5 MG tablet Take 1 tablet (5 mg total) by mouth once daily.    chlorthalidone (HYGROTEN) 25 MG Tab Take one half-tablet (12.5 mg) by mouth every day.    olmesartan (BENICAR) 40 MG tablet Take 1 tablet (40 mg total) by mouth once daily.                             Medication Adherence Screening   He did not miss a dose this month.

## 2020-04-15 NOTE — PROGRESS NOTES
Digital Medicine: Health  Follow-Up    Discussed patient's concerns about the covid-19 outbreak, and encouraged prevention strategies. Reminded patient that digital medicine team is available for questions or concerns.           Follow Up  Follow-up reason(s): reading review    Patient describes that they just had their first great grandbaby, and he has been unable to hold and meet the baby. He expresses frustration that he can only see pictures.     Patient continues to care for his wife with parkinsons, and describes that she continues to decline.       INTERVENTION(S)  encouragement/support    PLAN  continue monitoring      There are no preventive care reminders to display for this patient.    Last 5 Patient Entered Readings                                      Current 30 Day Average: 126/80     Recent Readings 4/15/2020 4/11/2020 4/8/2020 4/3/2020 4/2/2020    SBP (mmHg) 123 123 123 136 137    DBP (mmHg) 74 72 84 89 90    Pulse 93 88 91 93 99                      Physical Activity Screening   When asked if exercising, patient responded: yes    Patient participates in the following activities: biking and weights      SDOH

## 2020-05-05 ENCOUNTER — PATIENT MESSAGE (OUTPATIENT)
Dept: ADMINISTRATIVE | Facility: HOSPITAL | Age: 75
End: 2020-05-05

## 2020-05-21 ENCOUNTER — PATIENT MESSAGE (OUTPATIENT)
Dept: ADMINISTRATIVE | Facility: OTHER | Age: 75
End: 2020-05-21

## 2020-05-25 ENCOUNTER — PATIENT MESSAGE (OUTPATIENT)
Dept: FAMILY MEDICINE | Facility: CLINIC | Age: 75
End: 2020-05-25

## 2020-05-28 ENCOUNTER — HOSPITAL ENCOUNTER (OUTPATIENT)
Dept: RADIOLOGY | Facility: HOSPITAL | Age: 75
Discharge: HOME | End: 2020-05-28
Attending: PAIN MEDICINE
Payer: MEDICARE

## 2020-05-28 DIAGNOSIS — M54.12 CERVICAL RADICULITIS: ICD-10-CM

## 2020-05-28 PROCEDURE — 72141 MRI NECK SPINE W/O DYE: CPT | Mod: TC,PO

## 2020-05-28 PROCEDURE — 72141 MRI CERVICAL SPINE WITHOUT CONTRAST: ICD-10-PCS | Mod: 26,,, | Performed by: RADIOLOGY

## 2020-05-28 PROCEDURE — 72141 MRI NECK SPINE W/O DYE: CPT | Mod: 26,,, | Performed by: RADIOLOGY

## 2020-06-04 ENCOUNTER — LAB VISIT (OUTPATIENT)
Dept: LAB | Facility: HOSPITAL | Age: 75
End: 2020-06-04
Attending: FAMILY MEDICINE
Payer: MEDICARE

## 2020-06-04 DIAGNOSIS — E78.5 HYPERLIPIDEMIA, UNSPECIFIED HYPERLIPIDEMIA TYPE: ICD-10-CM

## 2020-06-04 LAB
ALBUMIN SERPL BCP-MCNC: 4.2 G/DL (ref 3.5–5.2)
ALP SERPL-CCNC: 51 U/L (ref 55–135)
ALT SERPL W/O P-5'-P-CCNC: 29 U/L (ref 10–44)
ANION GAP SERPL CALC-SCNC: 9 MMOL/L (ref 8–16)
AST SERPL-CCNC: 28 U/L (ref 10–40)
BILIRUB SERPL-MCNC: 0.5 MG/DL (ref 0.1–1)
BUN SERPL-MCNC: 26 MG/DL (ref 8–23)
CALCIUM SERPL-MCNC: 9.9 MG/DL (ref 8.7–10.5)
CHLORIDE SERPL-SCNC: 106 MMOL/L (ref 95–110)
CHOLEST SERPL-MCNC: 138 MG/DL (ref 120–199)
CHOLEST/HDLC SERPL: 3.1 {RATIO} (ref 2–5)
CO2 SERPL-SCNC: 26 MMOL/L (ref 23–29)
CREAT SERPL-MCNC: 1.5 MG/DL (ref 0.5–1.4)
EST. GFR  (AFRICAN AMERICAN): 51.5 ML/MIN/1.73 M^2
EST. GFR  (NON AFRICAN AMERICAN): 44.6 ML/MIN/1.73 M^2
GLUCOSE SERPL-MCNC: 104 MG/DL (ref 70–110)
HDLC SERPL-MCNC: 45 MG/DL (ref 40–75)
HDLC SERPL: 32.6 % (ref 20–50)
LDLC SERPL CALC-MCNC: 72 MG/DL (ref 63–159)
NONHDLC SERPL-MCNC: 93 MG/DL
POTASSIUM SERPL-SCNC: 4.5 MMOL/L (ref 3.5–5.1)
PROT SERPL-MCNC: 7.1 G/DL (ref 6–8.4)
SODIUM SERPL-SCNC: 141 MMOL/L (ref 136–145)
TRIGL SERPL-MCNC: 105 MG/DL (ref 30–150)

## 2020-06-04 PROCEDURE — 80053 COMPREHEN METABOLIC PANEL: CPT

## 2020-06-04 PROCEDURE — 80061 LIPID PANEL: CPT

## 2020-06-04 PROCEDURE — 36415 COLL VENOUS BLD VENIPUNCTURE: CPT | Mod: PO

## 2020-06-10 ENCOUNTER — PATIENT OUTREACH (OUTPATIENT)
Dept: OTHER | Facility: OTHER | Age: 75
End: 2020-06-10

## 2020-06-10 NOTE — PROGRESS NOTES
Digital Medicine: Health  Follow-Up      Follow Up  Follow-up reason(s): reading review      Readings are trending down Patient describes that he developed a neck ache and headache 2 weeks ago. He had a neck injection, which did not help, and he discovered that he has a broken bone in his neck, which is pinching the nerves. He was referred to a neurosurgeon, but went to his own neurosurgeon at [facility]. He is unsure how this injury occurred, and is waiting for further advice on next steps, but he believes he will need surgery. Patient expresses concerns that he has to take care of his wife    Wished patient best of luck, and a speedy recovery.       INTERVENTION(S)  encouragement/support    PLAN  continue monitoring      There are no preventive care reminders to display for this patient.    Last 5 Patient Entered Readings                                      Current 30 Day Average: 117/75     Recent Readings 6/9/2020 5/30/2020 5/26/2020 5/26/2020 5/22/2020    SBP (mmHg) 112 123 96 90 118    DBP (mmHg) 75 80 66 64 77    Pulse 94 77 90 88 71                      Physical Activity Screening       He identified the following barriers to physical activity: pain/injury/recent surgery      SDOH

## 2020-06-11 ENCOUNTER — OFFICE VISIT (OUTPATIENT)
Dept: FAMILY MEDICINE | Facility: CLINIC | Age: 75
End: 2020-06-11
Payer: MEDICARE

## 2020-06-11 DIAGNOSIS — E78.5 HYPERLIPIDEMIA, UNSPECIFIED HYPERLIPIDEMIA TYPE: ICD-10-CM

## 2020-06-11 DIAGNOSIS — I10 ESSENTIAL HYPERTENSION: Primary | ICD-10-CM

## 2020-06-11 DIAGNOSIS — G47.00 INSOMNIA, UNSPECIFIED TYPE: ICD-10-CM

## 2020-06-11 PROCEDURE — 99499 RISK ADDL DX/OHS AUDIT: ICD-10-PCS | Mod: 95,,, | Performed by: FAMILY MEDICINE

## 2020-06-11 PROCEDURE — 99213 OFFICE O/P EST LOW 20 MIN: CPT | Mod: 95,,, | Performed by: FAMILY MEDICINE

## 2020-06-11 PROCEDURE — 99213 PR OFFICE/OUTPT VISIT, EST, LEVL III, 20-29 MIN: ICD-10-PCS | Mod: 95,,, | Performed by: FAMILY MEDICINE

## 2020-06-11 PROCEDURE — 99499 UNLISTED E&M SERVICE: CPT | Mod: 95,,, | Performed by: FAMILY MEDICINE

## 2020-06-11 RX ORDER — TRAZODONE HYDROCHLORIDE 50 MG/1
50 TABLET ORAL NIGHTLY
Qty: 90 TABLET | Refills: 3 | Status: SHIPPED | OUTPATIENT
Start: 2020-06-11 | End: 2021-06-24

## 2020-06-11 NOTE — PROGRESS NOTES
Patient, Lloyd John Jr. (MRN #[id number]), presented with a recent Estimated Glumerular Filtration Rate (EGFR) between 30 and 45 consistent with the definition of chronic kidney disease stage 3 - moderate (ICD10 - N18.3).    eGFR if non    Date Value Ref Range Status   06/04/2020 44.6 (A) >60 mL/min/1.73 m^2 Final     Comment:     Calculation used to obtain the estimated glomerular filtration  rate (eGFR) is the CKD-EPI equation.          The patient's chronic kidney disease stage 3 was monitored, evaluated, addressed and/or treated. This addendum to the medical record is made on 06/11/2020.

## 2020-06-11 NOTE — PROGRESS NOTES
PI:This is a 74 year-old male presents to f/u on HTN.    The patient location is: home  The chief complaint leading to consultation is: HTN    Visit type: audiovisual    Face to Face time with patient: 15 minutes of total time spent on the encounter, which includes face to face time and non-face to face time preparing to see the patient (eg, review of tests), Obtaining and/or reviewing separately obtained history, Documenting clinical information in the electronic or other health record, Independently interpreting results (not separately reported) and communicating results to the patient/family/caregiver, or Care coordination (not separately reported).     Each patient to whom he or she provides medical services by telemedicine is:  (1) informed of the relationship between the physician and patient and the respective role of any other health care provider with respect to management of the patient; and (2) notified that he or she may decline to receive medical services by telemedicine and may withdraw from such care at any time.    HLD - tolerating Crestor 10mg and Fish Oil  HTN - tolerating diltiazem, benicar, chlorthalidone; BP well-controlled at 112/75  Bladder/prostate cancer ([place] 7) - following with urology, Dr. Cr,  every 6 months. Considering robotic prostatectomy, but now just doing watchful waiting. PSA has been stable around 13.  Glaucoma - on Combigan and Lumigan; followed by Dr. Emery  Lumbar DDD with right sciatica - resolved with back surgery in 2019  Insomnia - still with some broken sleep      PAST MEDICAL HISTORY:  Prostate cancer - [place] 7; following with watchful waiting  Bladder carcinoma - followed with Dr. Bowman every 3 months; treated in past with Thiotrpa, BCG  HLD (Dx )  HTN (Dx )  colon polyp  Lumbar DDD    PAST SURGICAL HISTORY:  multiple bladder scrappings (11 total)    FAMILY HISTORY:  Father:  at 50 of CAD  Mother:  at 78 insterstial lung fibrosis,  carotid artery disease  Grandpartents unknown    SOCIAL HISTORY:  Tobacco use: quit 1997   Alcohol use: 2 drinks of bourbon daily  Occupation:  for [employer]  Marital status: (wife with schizophrenia)  Children: 2  enjoys wood working and fishing  exercises daily    REVIEW OF SYSTEMS:  GENERAL: No fever, chills, fatigability or weight changes.  SKIN/BREASTS: No rashes, sores, itching or changes in color or texture of skin.   HEAD: No headaches or recent head trauma.   EYES: Visual acuity fine. Denies blurriness, tearing, itching, photophobia, diplopia, or visual changes.   EARS: Denies ear pain, discharge, tinnitus or vertigo. Denies hearing loss.   NOSE: No loss of smell, epistaxis, postnasal drip, discharge, obstruction, or sneezing.  MOUTH & THROAT: No hoarseness, change in voice, swallowing difficulty. No excessive gum bleeding.   HEMATOLOGICAL/NODES: Denies swollen glands. No bleeding or bruising.  CHEST: No TOLEDO, cyanosis, wheezing, cough or sputum production.  CARDIOVASCULAR: Denies chest pain, dyspnea, orthopnea, or palpitations.  GI/ABDOMEN: Appetite fine. No weight loss. Denies nausea, vomiting, diarrhea, constipation, abdominal pain, hematemesis or blood in stool.  URINARY: No dysuria,hematuria, nocturia, incontinence, flank pain, urgency, or urinary difficulty.  PERIPHERAL VASCULAR: No claudication, cold intolerance or cyanosis.  NEUROLOGIC: No history of seizures, paralysis, alteration of gait or coordination.  ENDOCRINE: Sexual maturation. Denies weight change, heat/cold intolerance, hair loss or gain, loss of libido, polyuria, polydipsia, polyphagia, pruritus, unexplained flushing, or excessive sweating.   PSYCH: No crying spells. Denies anxiety symptoms.    Answers for HPI/ROS submitted by the patient on 6/9/2020   activity change: Yes  unexpected weight change: No  neck pain: Yes  hearing loss: No  rhinorrhea: No  trouble swallowing: No  eye discharge: No  visual disturbance:  No  chest tightness: No  wheezing: No  chest pain: No  palpitations: No  blood in stool: No  constipation: No  vomiting: No  diarrhea: No  polydipsia: No  polyuria: No  difficulty urinating: No  urgency: No  hematuria: No  joint swelling: No  arthralgias: No  headaches: No  weakness: No  confusion: No  dysphoric mood: No      PHYSICAL EXAM:    APPEARANCE: Well nourished, well developed, neatly groomed, in no acute distress.   SKIN: Warm, moist and dry. No lesions or rashes.  EYES: Conjunctivae and lids clear. PERRL. EOMI.   NOSE: Mucosa pink. Nares clear.  MOUTH & THROAT: Oral mucosa pink. No tonsillar enlargement. No pharyngeal erythema or exudate.   NECK: Supple with full range of motion. No masses. No thyromegaly. No JVD or bruits.  LYMPH: No cervical, supraclavicular, axillary or inguinal lymph node enlargement.  RESPIRATORY: Easy and unlabored respirations. Lungs clear to auscultation throughout all lung fields. No wheezes or rales.  CARDIOVASCULAR: Regular rate and rhythm. Normal S1, S2. No rubs, murmurs or gallops.   ABDOMEN: Bowel sounds normal. Nondistended and soft. No hepatosplenomegaly, masses, or tenderness.  EXTREMITIES: No edema or cyanosis. Pedal pulses 2+ bilaterally. No varicosities   NEUROLOGIC: Cranial Nerves: II-XII grossly intact  Gait & Posture: Normal gait and fine motion.  PSYCH: Awake, alert, oriented to person, place, time, and situation. Pleasant and cooperative mood with intact judgment.    Results for orders placed or performed in visit on 06/04/20   Comprehensive metabolic panel   Result Value Ref Range    Sodium 141 136 - 145 mmol/L    Potassium 4.5 3.5 - 5.1 mmol/L    Chloride 106 95 - 110 mmol/L    CO2 26 23 - 29 mmol/L    Glucose 104 70 - 110 mg/dL    BUN, Bld 26 (H) 8 - 23 mg/dL    Creatinine 1.5 (H) 0.5 - 1.4 mg/dL    Calcium 9.9 8.7 - 10.5 mg/dL    Total Protein 7.1 6.0 - 8.4 g/dL    Albumin 4.2 3.5 - 5.2 g/dL    Total Bilirubin 0.5 0.1 - 1.0 mg/dL    Alkaline Phosphatase 51 (L)  55 - 135 U/L    AST 28 10 - 40 U/L    ALT 29 10 - 44 U/L    Anion Gap 9 8 - 16 mmol/L    eGFR if African American 51.5 (A) >60 mL/min/1.73 m^2    eGFR if non  44.6 (A) >60 mL/min/1.73 m^2   Lipid panel   Result Value Ref Range    Cholesterol 138 120 - 199 mg/dL    Triglycerides 105 30 - 150 mg/dL    HDL 45 40 - 75 mg/dL    LDL Cholesterol 72.0 63.0 - 159.0 mg/dL    Hdl/Cholesterol Ratio 32.6 20.0 - 50.0 %    Total Cholesterol/HDL Ratio 3.1 2.0 - 5.0    Non-HDL Cholesterol 93 mg/dL       ASSESSMENT/PLAN:  Essential hypertension    Insomnia, unspecified type  -     traZODone (DESYREL) 50 MG tablet; Take 1 tablet (50 mg total) by mouth every evening.  Dispense: 90 tablet; Refill: 3    Hyperlipidemia, unspecified hyperlipidemia type  -     Comprehensive metabolic panel; Future; Expected date: 12/08/2020  -     Lipid Panel; Future; Expected date: 12/08/2020       overall doing well  Trial of trazadone for sleep  1. HTN (controlled) - continue diltiazem and Benicar; stop chlorthalidone  2. HLD (controlled) - continue Crestor 10mg daily, Fish Oil 1000mg daily  3. bladder carcinoma/prostate cancer - continue present treatment and f/u with Urology    f/u 6 months with CMP, lipid profile. Patient preference.

## 2020-06-19 NOTE — PROGRESS NOTES
Patient seen by Dr. Avery 6/11 who informed him to stop chlorthalidone. I will follow-up two weeks after this change.

## 2020-06-25 NOTE — PROGRESS NOTES
Not enough readings to determine how he is doing with this change. Will follow-up in another 1-2 weeks.

## 2020-07-10 ENCOUNTER — PATIENT OUTREACH (OUTPATIENT)
Dept: OTHER | Facility: OTHER | Age: 75
End: 2020-07-10

## 2020-07-10 NOTE — PROGRESS NOTES
"LMFCB to discuss his upward trend in BP. He was seen by his PCP in June who made the following adjustments "HTN (controlled) - continue diltiazem and Benicar; stop chlorthalidone" His last CMP showed a decline in CKD but dehydration is also noted. Will repeat labs and consider resuming a milder thiazide.     "
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
## 2020-07-25 DIAGNOSIS — I10 ESSENTIAL HYPERTENSION: ICD-10-CM

## 2020-07-25 NOTE — TELEPHONE ENCOUNTER
No new care gaps identified.  Powered by [employer]. Reference number: [id number]. 7/25/2020 5:40:38 AM MARTHAT

## 2020-07-27 NOTE — PROGRESS NOTES
Refill Routing Note   Medication(s) are not appropriate for processing by [facility]:       - Required laboratory values are abnormal        Medication Therapy Plan: SCr elevated; defer to you  Medication reconciliation completed: No      Automatic Epic Generated Protocol Data:        Requested Prescriptions   Pending Prescriptions Disp Refills    olmesartan (BENICAR) 40 MG tablet [Pharmacy Med Name: OLMESARTAN MEDOXOMIL 40MG TABLETS] 90 tablet 3     Sig: TAKE 1 TABLET BY MOUTH ONCE DAILY       Cardiovascular:  Angiotensin Receptor Blockers Failed - 7/25/2020  5:40 AM        Failed - Cr is 1.3 or below and within 360 days     Creatinine   Date Value Ref Range Status   06/04/2020 1.5 (H) 0.5 - 1.4 mg/dL Final   11/22/2019 1.2 0.5 - 1.4 mg/dL Final   05/22/2019 1.3 0.5 - 1.4 mg/dL Final              Passed - Patient is at least 18 years old        Passed - Last BP in normal range within 360 days.     BP Readings from Last 3 Encounters:   12/11/19 120/82   05/28/19 108/70   11/26/18 108/70              Passed - Office visit in past 12 months or future 90 days.     Recent Outpatient Visits            1 month ago Essential hypertension    [facility] Daniel Avery MD    7 months ago Essential hypertension    [facility] Daniel Avery MD    1 year ago Essential hypertension    [facility] Daniel Avery MD    1 year ago Hyperlipidemia, unspecified hyperlipidemia type    [facility] Daniel Avery MD    2 years ago Hypertension, unspecified type    [facility] Binta Russo, NP          Future Appointments              [facility]                Passed - K in normal range and within 360 days     Potassium   Date Value Ref Range Status   06/04/2020 4.5 3.5 - 5.1 mmol/L Final   11/22/2019 4.0 3.5 - 5.1 mmol/L Final   05/22/2019 4.3 3.5 - 5.1  mmol/L Final              Passed - eGFR within 360 days     eGFR if non    Date Value Ref Range Status   06/04/2020 44.6 (A) >60 mL/min/1.73 m^2 Final     Comment:     Calculation used to obtain the estimated glomerular filtration  rate (eGFR) is the CKD-EPI equation.      11/22/2019 58.4 (A) >60 mL/min/1.73 m^2 Final     Comment:     Calculation used to obtain the estimated glomerular filtration  rate (eGFR) is the CKD-EPI equation.      05/22/2019 53.4 (A) >60 mL/min/1.73 m^2 Final     Comment:     Calculation used to obtain the estimated glomerular filtration  rate (eGFR) is the CKD-EPI equation.        eGFR if    Date Value Ref Range Status   06/04/2020 51.5 (A) >60 mL/min/1.73 m^2 Final   11/22/2019 >60.0 >60 mL/min/1.73 m^2 Final   05/22/2019 >60.0 >60 mL/min/1.73 m^2 Final                    Appointments  past 12m or future 3m with PCP    Date Provider   Last Visit   6/11/2020 Dainel Avery MD   Next Visit   12/15/2020 Daniel Avery MD   ED visits in past 90 days: 0     Note composed:1:22 PM 07/27/2020

## 2020-07-27 NOTE — TELEPHONE ENCOUNTER
Please see the Refill Note   and consider in Daniel Avery MD 's absence.      Thank you!  [facility]   Note composed:1:22 PM 07/27/2020

## 2020-07-28 RX ORDER — OLMESARTAN MEDOXOMIL 40 MG/1
TABLET ORAL
Qty: 90 TABLET | Refills: 0 | Status: SHIPPED | OUTPATIENT
Start: 2020-07-28 | End: 2021-04-12

## 2020-09-11 ENCOUNTER — OFFICE VISIT (OUTPATIENT)
Dept: URGENT CARE | Facility: CLINIC | Age: 75
End: 2020-09-11
Payer: MEDICARE

## 2020-09-11 VITALS
SYSTOLIC BLOOD PRESSURE: 132 MMHG | HEART RATE: 103 BPM | WEIGHT: 200 LBS | OXYGEN SATURATION: 96 % | BODY MASS INDEX: 28 KG/M2 | DIASTOLIC BLOOD PRESSURE: 89 MMHG | HEIGHT: 71 IN | TEMPERATURE: 98 F

## 2020-09-11 DIAGNOSIS — U07.1 COVID-19 VIRUS DETECTED: ICD-10-CM

## 2020-09-11 DIAGNOSIS — U07.1 COVID-19 VIRUS DETECTED: Primary | ICD-10-CM

## 2020-09-11 DIAGNOSIS — R19.7 DIARRHEA, UNSPECIFIED TYPE: ICD-10-CM

## 2020-09-11 LAB
CTP QC/QA: YES
SARS-COV-2 RDRP RESP QL NAA+PROBE: POSITIVE

## 2020-09-11 PROCEDURE — 99214 PR OFFICE/OUTPT VISIT, EST, LEVL IV, 30-39 MIN: ICD-10-PCS | Mod: 25,S$GLB,, | Performed by: PHYSICIAN ASSISTANT

## 2020-09-11 PROCEDURE — U0002: ICD-10-PCS | Mod: S$GLB,,, | Performed by: PHYSICIAN ASSISTANT

## 2020-09-11 PROCEDURE — U0002 COVID-19 LAB TEST NON-CDC: HCPCS | Mod: S$GLB,,, | Performed by: PHYSICIAN ASSISTANT

## 2020-09-11 PROCEDURE — 99214 OFFICE O/P EST MOD 30 MIN: CPT | Mod: 25,S$GLB,, | Performed by: PHYSICIAN ASSISTANT

## 2020-09-11 NOTE — PROGRESS NOTES
"Subjective:       Patient ID: Lloyd John Jr. is a 74 y.o. male.    Vitals:  height is 5' 11" (1.803 m) and weight is 90.7 kg (200 lb). His temperature is 97.5 °F (36.4 °C). His blood pressure is 132/89 and his pulse is 103. His oxygen saturation is 96%.     Chief Complaint: Diarrhea    Patient presents to  with c/o one episode of diarrhea that occurred yesterday at noon. Patient states that he cooked eggs that were 3 weeks old and is unsure if this made him sick. Afebrile. Denies nausea, vomiting, hematochezia or melena. Denies URI symptoms, cough, chest pain, or SOB. His daughter and grandson tested positive for covid.     Diarrhea   This is a new problem. The current episode started yesterday. The problem occurs less than 2 times per day. The problem has been resolved. The patient states that diarrhea does not awaken him from sleep. Associated symptoms include headaches. Pertinent negatives include no abdominal pain, coughing, fever, sweats, URI, vomiting or weight loss. Nothing aggravates the symptoms. He has tried nothing for the symptoms. The treatment provided no relief.       Constitution: Negative for fever.   Respiratory: Negative for cough.    Gastrointestinal: Positive for diarrhea. Negative for abdominal pain and vomiting.   Neurological: Positive for headaches.       Objective:      Physical Exam   Constitutional: He is oriented to person, place, and time. He appears well-developed. He is cooperative.  Non-toxic appearance. He does not appear ill. No distress.   HENT:   Head: Normocephalic and atraumatic.   Ears:   Right Ear: Hearing, tympanic membrane, external ear and ear canal normal. Tympanic membrane is not erythematous and not bulging. impacted cerumen  Left Ear: Hearing, tympanic membrane, external ear and ear canal normal. Tympanic membrane is not erythematous and not bulging. impacted cerumen  Nose: Nose normal. No mucosal edema, rhinorrhea, nasal deformity or congestion. No epistaxis. " Right sinus exhibits no maxillary sinus tenderness and no frontal sinus tenderness. Left sinus exhibits no maxillary sinus tenderness and no frontal sinus tenderness.   Mouth/Throat: Uvula is midline, oropharynx is clear and moist and mucous membranes are normal. No trismus in the jaw. Normal dentition. No uvula swelling. No oropharyngeal exudate, posterior oropharyngeal edema, posterior oropharyngeal erythema, tonsillar abscesses or cobblestoning.   Eyes: Conjunctivae and lids are normal. Right eye exhibits no discharge. Left eye exhibits no discharge. No scleral icterus.   Neck: Trachea normal, full passive range of motion without pain and phonation normal. Neck supple. No neck rigidity. No edema and no erythema present.   Cardiovascular: Normal rate, regular rhythm, normal heart sounds and normal pulses. Exam reveals no gallop and no friction rub.   No murmur heard.  Pulmonary/Chest: Effort normal and breath sounds normal. No stridor. No respiratory distress. He has no decreased breath sounds. He has no wheezes. He has no rhonchi. He has no rales.   Abdominal: Soft. Normal appearance and bowel sounds are normal. He exhibits no distension, no abdominal bruit, no pulsatile midline mass and no mass. There is no abdominal tenderness. There is no rebound and no guarding.      Comments: Abdomen is scaphoid without any ecchymosis, erythema or lesions. Abdomen is soft to palpation without any distention or crepitus.  No organomegaly noted. No tenderness to palpation in all 4 quadrants.  No guarding or acute abdomen.  No CVA tenderness bilaterally.     Musculoskeletal: Normal range of motion.         General: No deformity.   Lymphadenopathy:     He has no cervical adenopathy.   Neurological: He is alert and oriented to person, place, and time. He has normal strength. He exhibits normal muscle tone. Coordination normal.   Skin: Skin is warm, dry, intact, not diaphoretic and not pale. Psychiatric: His speech is normal and  behavior is normal. Judgment and thought content normal.   Nursing note and vitals reviewed.        Results for orders placed or performed in visit on 09/11/20   POCT COVID-19 Rapid Screening   Result Value Ref Range    POC Rapid COVID Positive (A) Negative     Acceptable Yes        Assessment:       1. COVID-19 virus detected    2. Diarrhea, unspecified type        Plan:     Discussed to eat a mild diet without fried foods or spicy foods. Drink plenty of fluids to maintain good hydration.    Covid-19 molecular testing done today and is positive at this time. Reviewed results with patient. This is a viral infection and will require no antibiotics at this time for treatment. Discussed to quarantine for 10 days from symptom onset and the last 3 days must be fever free without fever reducing medicines before resuming normal daily activities. Discussed ER precautions with patient and should they develop any chest pain or shortness of breath that does not resolve with rest, they should go to the emergency room.     Rest and fluids are important.  Please take tylenol for help with fever, pain.  Mucinex can help with chest congestion.  Tessalon perrles can be used as directed as needed to help with cough.  Albuterol inhaler can be used as directed needed for wheezing as we have discussed.    If you develop worsening symptoms, especially shortness of breath or difficulty breathing, please go to the ED for further evaluation.    COVID-19 virus detected    Diarrhea, unspecified type  -     POCT COVID-19 Rapid Screening      Patient Instructions   Instructions for Patients with Confirmed or Suspected COVID-19    If you are awaiting your test result, you will either be called or it will be released to the patient portal.  If you have any questions about your test, please visit [email or web address] or call our COVID-19 information line at [phone].      Instructions for non-hospitalized or discharged  patients with confirmed or suspected COVID-19:       Stay home except to get medical care.    Separate yourself from other people and animals in your home.    Call ahead before visiting your doctor.    Wear a face mask.    Cover your coughs and sneezes.    Clean your hands often.    Avoid sharing personal household items.    Clean all high-touch surfaces every day.    Monitor your symptoms. Seek prompt medical attention if your illness is worsening (e.g., difficulty breathing). Before seeking care, call your healthcare provider.    If you have a medical emergency and must call 911, notify the dispatcher that you have or are being evaluated for COVID-19. If possible, put on a face mask before emergency medical services arrive.    Use the following symptom-based strategy to return to normal activity following a suspected or confirmed case of COVID-19. Continue isolation until:   o At least 3 days (72 hours) have passed since recovery defined as resolution of fever without the use of fever-reducing medications and improvement in respiratory symptoms (e.g. cough, shortness of breath), and   o At least 10 days have passed since the first positive test.       As one of the next steps, you will receive a call or text from the [place] Department of Health ([facility]) COVID-19 Tracing Team. See the contact information below so you know not to ignore the health departments call. It is important that you contact them back immediately so they can help.     Contact Tracer Number:  [phone]  Caller ID for most carriers: [place] Dept Health    What is contact tracing?   Contact tracing is a process that helps identify everyone who has been in close contact with an infected person. Contact tracers let those people know they may have been exposed and guide them on next steps. Confidentiality is important for everyone; no one will be told who may have exposed them to the virus.   Your involvement is important. The more  we know about where and how this virus is spreading, the better chance we have at stopping it from spreading further.  What does exposure mean?   Exposure means you have been within 6 feet for more than 15 minutes with a person who has or had COVID-19.  What kind of questions do the contact tracers ask?   A contact tracer will confirm your basic contact information including name, address, phone number, and next of kin, as well as asking about any symptoms you may have had. Theyll also ask you how you think you may have gotten sick, such as places where you may have been exposed to the virus, and people you were with. Those names will never be shared with anyone outside of that call, and will only be used to help trace and stop the spread of the virus.   I have privacy concerns. How will the state use my information?   Your privacy about your health is important. All calls are completed using call centers that use the appropriate health privacy protection measures (HIPAA compliance), meaning that your patient information is safe. No one will ever ask you any questions related to immigration status. Your health comes first.   Do I have to participate?   You do not have to participate, but we strongly encourage you to. Contact tracing can help us catch and control new outbreaks as theyre developing to keep your friends and family safe.   What if I dont hear from anyone?   If you dont receive a call within 24 hours, you can call the number above right away to inquire about your status. That line is open from 8:00 am - 8:00 p.m., 7 days a week.  Contact tracing saves lives! Together, we have the power to beat this virus and keep our loved ones and neighbors safe.       Instructions for household members, intimate partners and caregivers in a non-healthcare setting of a patient with confirmed or suspected COVID-19:         Close contacts should monitor their health and call their healthcare provider right away  if they develop symptoms suggestive of COVID-19 (e.g., fever, cough, shortness of breath).    Stay home except to get medical care. Separate yourself from other people and animals in the home.   Monitor the patients symptoms. If the patient is getting sicker, call his or her healthcare provider. If the patient has a medical emergency and you need to call 911, notify the dispatch personnel that the patient has or is being evaluated for COVID-19.    Wear a facemask when around other people such as sharing a room or vehicle and before entering a healthcare provider's office.   Cover coughs and sneezes with a tissue. Throw used tissues in a lined trash can immediately and wash hands.   Clean hands often with soap and water for at least 20 seconds or with an alcohol-based hand , rubbing hands together until they feel dry. Avoid touching your eyes, nose, and mouth with unwashed hands.   Clean all high-touch; surfaces every day, including counters, tabletops, doorknobs, bathroom fixtures, toilets, phones, keyboards, tablets, bedside tables, etc. Use a household cleaning spray or wipe according to label instructions.   Avoid sharing personal household items such as dishes, drinking glasses, cups, towels, bedding, etc. After these items are used, they should be washed thoroughly with soap and water.   Continue isolation until:   At least 3 days (72 hours) have passed since recovery defined as resolution of fever without the use of fever-reducing medications and improvement in respiratory symptoms (e.g. cough, shortness of breath), and    At least 10 days have passed since the patients first positive test.    https://www.cdc.gov/coronavirus/2019-ncov/your-health/index.htm      Rest and fluids are important.  Please take tylenol for help with fever, pain.  Mucinex can help with chest congestion.  Tessalon perrles can be used as directed as needed to help with cough.  Albuterol inhaler can be used as  directed needed for wheezing as we have discussed.    If you develop worsening symptoms, especially shortness of breath or difficulty breathing, please go to the ED for further evaluation.    Please follow up with your Primary care provider within 2-5 days if your signs and symptoms have not resolved or worsen.     If your condition worsens or fails to improve we recommend that you receive another evaluation at the emergency room immediately or contact your primary medical clinic to discuss your concerns.   You must understand that you have received an Urgent Care treatment only and that you may be released before all of your medical problems are known or treated. You, the patient, will arrange for follow up care as instructed.     RED FLAGS/WARNING SYMPTOMS DISCUSSED WITH PATIENT THAT WOULD WARRANT EMERGENT MEDICAL ATTENTION. PATIENT VERBALIZED UNDERSTANDING.

## 2020-09-11 NOTE — PATIENT INSTRUCTIONS
Instructions for Patients with Confirmed or Suspected COVID-19    If you are awaiting your test result, you will either be called or it will be released to the patient portal.  If you have any questions about your test, please visit [email or web address] or call our COVID-19 information line at [phone].      Instructions for non-hospitalized or discharged patients with confirmed or suspected COVID-19:       Stay home except to get medical care.    Separate yourself from other people and animals in your home.    Call ahead before visiting your doctor.    Wear a face mask.    Cover your coughs and sneezes.    Clean your hands often.    Avoid sharing personal household items.    Clean all high-touch surfaces every day.    Monitor your symptoms. Seek prompt medical attention if your illness is worsening (e.g., difficulty breathing). Before seeking care, call your healthcare provider.    If you have a medical emergency and must call 911, notify the dispatcher that you have or are being evaluated for COVID-19. If possible, put on a face mask before emergency medical services arrive.    Use the following symptom-based strategy to return to normal activity following a suspected or confirmed case of COVID-19. Continue isolation until:   o At least 3 days (72 hours) have passed since recovery defined as resolution of fever without the use of fever-reducing medications and improvement in respiratory symptoms (e.g. cough, shortness of breath), and   o At least 10 days have passed since the first positive test.       As one of the next steps, you will receive a call or text from the [place] Department [facility] Health ([facility]) COVID-19 Tracing Team. See the contact information below so you know not to ignore the health departments call. It is important that you contact them back immediately so they can help.     Contact Tracer Number:  [phone]  Caller ID for most carriers: [place] [facility]    What is  contact tracing?   Contact tracing is a process that helps identify everyone who has been in close contact with an infected person. Contact tracers let those people know they may have been exposed and guide them on next steps. Confidentiality is important for everyone; no one will be told who may have exposed them to the virus.   Your involvement is important. The more we know about where and how this virus is spreading, the better chance we have at stopping it from spreading further.  What does exposure mean?   Exposure means you have been within 6 feet for more than 15 minutes with a person who has or had COVID-19.  What kind of questions do the contact tracers ask?   A contact tracer will confirm your basic contact information including name, address, phone number, and next of kin, as well as asking about any symptoms you may have had. Theyll also ask you how you think you may have gotten sick, such as places where you may have been exposed to the virus, and people you were with. Those names will never be shared with anyone outside of that call, and will only be used to help trace and stop the spread of the virus.   I have privacy concerns. How will the state use my information?   Your privacy about your health is important. All calls are completed using call centers that use the appropriate health privacy protection measures (HIPAA compliance), meaning that your patient information is safe. No one will ever ask you any questions related to immigration status. Your health comes first.   Do I have to participate?   You do not have to participate, but we strongly encourage you to. Contact tracing can help us catch and control new outbreaks as theyre developing to keep your friends and family safe.   What if I dont hear from anyone?   If you dont receive a call within 24 hours, you can call the number above right away to inquire about your status. That line is open from 8:00 am - 8:00 p.m., 7 days a  week.  Contact tracing saves lives! Together, we have the power to beat this virus and keep our loved ones and neighbors safe.       Instructions for household members, intimate partners and caregivers in a non-healthcare setting of a patient with confirmed or suspected COVID-19:         Close contacts should monitor their health and call their healthcare provider right away if they develop symptoms suggestive of COVID-19 (e.g., fever, cough, shortness of breath).    Stay home except to get medical care. Separate yourself from other people and animals in the home.   Monitor the patients symptoms. If the patient is getting sicker, call his or her healthcare provider. If the patient has a medical emergency and you need to call 911, notify the dispatch personnel that the patient has or is being evaluated for COVID-19.    Wear a facemask when around other people such as sharing a room or vehicle and before entering a healthcare provider's office.   Cover coughs and sneezes with a tissue. Throw used tissues in a lined trash can immediately and wash hands.   Clean hands often with soap and water for at least 20 seconds or with an alcohol-based hand , rubbing hands together until they feel dry. Avoid touching your eyes, nose, and mouth with unwashed hands.   Clean all high-touch; surfaces every day, including counters, tabletops, doorknobs, bathroom fixtures, toilets, phones, keyboards, tablets, bedside tables, etc. Use a household cleaning spray or wipe according to label instructions.   Avoid sharing personal household items such as dishes, drinking glasses, cups, towels, bedding, etc. After these items are used, they should be washed thoroughly with soap and water.   Continue isolation until:   At least 3 days (72 hours) have passed since recovery defined as resolution of fever without the use of fever-reducing medications and improvement in respiratory symptoms (e.g. cough, shortness of breath),  and    At least 10 days have passed since the patients first positive test.    https://www.cdc.gov/coronavirus/2019-ncov/your-health/index.htm      Rest and fluids are important.  Please take tylenol for help with fever, pain.  Mucinex can help with chest congestion.  Tessalon perrles can be used as directed as needed to help with cough.  Albuterol inhaler can be used as directed needed for wheezing as we have discussed.    If you develop worsening symptoms, especially shortness of breath or difficulty breathing, please go to the ED for further evaluation.    Please follow up with your Primary care provider within 2-5 days if your signs and symptoms have not resolved or worsen.     If your condition worsens or fails to improve we recommend that you receive another evaluation at the emergency room immediately or contact your primary medical clinic to discuss your concerns.   You must understand that you have received an Urgent Care treatment only and that you may be released before all of your medical problems are known or treated. You, the patient, will arrange for follow up care as instructed.     RED FLAGS/WARNING SYMPTOMS DISCUSSED WITH PATIENT THAT WOULD WARRANT EMERGENT MEDICAL ATTENTION. PATIENT VERBALIZED UNDERSTANDING.

## 2020-09-17 ENCOUNTER — TELEPHONE (OUTPATIENT)
Dept: FAMILY MEDICINE | Facility: CLINIC | Age: 75
End: 2020-09-17

## 2020-09-17 NOTE — TELEPHONE ENCOUNTER
Declined virtual AWV at this time. Pt and his family currently have covid-19 and he prefers to come in for his appt after he is covid-19 cleared. Reminder put into the medicare database to f/u.

## 2020-09-18 ENCOUNTER — PATIENT OUTREACH (OUTPATIENT)
Dept: OTHER | Facility: OTHER | Age: 75
End: 2020-09-18

## 2020-09-18 ENCOUNTER — PATIENT MESSAGE (OUTPATIENT)
Dept: FAMILY MEDICINE | Facility: CLINIC | Age: 75
End: 2020-09-18

## 2020-09-22 ENCOUNTER — PATIENT MESSAGE (OUTPATIENT)
Dept: FAMILY MEDICINE | Facility: CLINIC | Age: 75
End: 2020-09-22

## 2020-11-03 NOTE — PROGRESS NOTES
Patient describes that his machine has not been working well, and this has been giving him anxiety. He is also dealing with stress having moved his wife with parkinsons into an assisted living facility and having limited visits due to covid. Patient prefers to drop out at this time. Encouraged him to save my number if he would like to re-enroll. Notified pharmD and PCP

## 2020-11-04 ENCOUNTER — TELEPHONE (OUTPATIENT)
Dept: FAMILY MEDICINE | Facility: CLINIC | Age: 75
End: 2020-11-04

## 2020-11-04 NOTE — TELEPHONE ENCOUNTER
----- Message from Purnima Dang sent at 11/3/2020 11:02 AM CST -----  Regarding: Digital medicine patient dischrage  Hi Dr. Avery,    Unfortunately, Mr. John no longer wishes to be enrolled in the [facility] Program and has opted out. He will be removed from the program immediately.     Thank you for your support of Digital Medicine! Please contact me if you have any questions or concerns.    Sincerely,  Purnima Dang, MPH  [facility]   [phone]

## 2020-12-02 ENCOUNTER — PATIENT MESSAGE (OUTPATIENT)
Dept: ADMINISTRATIVE | Facility: OTHER | Age: 75
End: 2020-12-02

## 2020-12-04 ENCOUNTER — PATIENT MESSAGE (OUTPATIENT)
Dept: FAMILY MEDICINE | Facility: CLINIC | Age: 75
End: 2020-12-04

## 2020-12-04 DIAGNOSIS — Z01.84 ENCOUNTER FOR ANTIBODY RESPONSE EXAMINATION: ICD-10-CM

## 2020-12-04 DIAGNOSIS — U07.1 COVID-19: Primary | ICD-10-CM

## 2020-12-11 ENCOUNTER — PATIENT MESSAGE (OUTPATIENT)
Dept: OTHER | Facility: OTHER | Age: 75
End: 2020-12-11

## 2020-12-12 ENCOUNTER — LAB VISIT (OUTPATIENT)
Dept: LAB | Facility: HOSPITAL | Age: 75
End: 2020-12-12
Attending: FAMILY MEDICINE
Payer: MEDICARE

## 2020-12-12 DIAGNOSIS — Z01.84 ENCOUNTER FOR ANTIBODY RESPONSE EXAMINATION: ICD-10-CM

## 2020-12-12 DIAGNOSIS — E78.5 HYPERLIPIDEMIA, UNSPECIFIED HYPERLIPIDEMIA TYPE: ICD-10-CM

## 2020-12-12 DIAGNOSIS — U07.1 COVID-19: ICD-10-CM

## 2020-12-12 LAB
ALBUMIN SERPL BCP-MCNC: 4.4 G/DL (ref 3.5–5.2)
ALP SERPL-CCNC: 65 U/L (ref 55–135)
ALT SERPL W/O P-5'-P-CCNC: 20 U/L (ref 10–44)
ANION GAP SERPL CALC-SCNC: 14 MMOL/L (ref 8–16)
AST SERPL-CCNC: 26 U/L (ref 10–40)
BILIRUB SERPL-MCNC: 1.3 MG/DL (ref 0.1–1)
BUN SERPL-MCNC: 15 MG/DL (ref 8–23)
CALCIUM SERPL-MCNC: 9.9 MG/DL (ref 8.7–10.5)
CHLORIDE SERPL-SCNC: 105 MMOL/L (ref 95–110)
CHOLEST SERPL-MCNC: 186 MG/DL (ref 120–199)
CHOLEST/HDLC SERPL: 2.9 {RATIO} (ref 2–5)
CO2 SERPL-SCNC: 25 MMOL/L (ref 23–29)
CREAT SERPL-MCNC: 1.1 MG/DL (ref 0.5–1.4)
EST. GFR  (AFRICAN AMERICAN): >60 ML/MIN/1.73 M^2
EST. GFR  (NON AFRICAN AMERICAN): >60 ML/MIN/1.73 M^2
GLUCOSE SERPL-MCNC: 93 MG/DL (ref 70–110)
HDLC SERPL-MCNC: 65 MG/DL (ref 40–75)
HDLC SERPL: 34.9 % (ref 20–50)
LDLC SERPL CALC-MCNC: 79.8 MG/DL (ref 63–159)
NONHDLC SERPL-MCNC: 121 MG/DL
POTASSIUM SERPL-SCNC: 4.1 MMOL/L (ref 3.5–5.1)
PROT SERPL-MCNC: 7.5 G/DL (ref 6–8.4)
SARS-COV-2 IGG SERPLBLD QL IA.RAPID: POSITIVE
SODIUM SERPL-SCNC: 144 MMOL/L (ref 136–145)
TRIGL SERPL-MCNC: 206 MG/DL (ref 30–150)

## 2020-12-12 PROCEDURE — 80053 COMPREHEN METABOLIC PANEL: CPT | Mod: HCNC

## 2020-12-12 PROCEDURE — 80061 LIPID PANEL: CPT | Mod: HCNC

## 2020-12-12 PROCEDURE — 86769 SARS-COV-2 COVID-19 ANTIBODY: CPT | Mod: HCNC

## 2020-12-12 PROCEDURE — 36415 COLL VENOUS BLD VENIPUNCTURE: CPT | Mod: HCNC,PO

## 2020-12-15 ENCOUNTER — OFFICE VISIT (OUTPATIENT)
Dept: FAMILY MEDICINE | Facility: CLINIC | Age: 75
End: 2020-12-15
Payer: MEDICARE

## 2020-12-15 VITALS
BODY MASS INDEX: 27.06 KG/M2 | HEIGHT: 71 IN | TEMPERATURE: 97 F | DIASTOLIC BLOOD PRESSURE: 88 MMHG | WEIGHT: 193.31 LBS | OXYGEN SATURATION: 98 % | SYSTOLIC BLOOD PRESSURE: 128 MMHG | HEART RATE: 80 BPM

## 2020-12-15 DIAGNOSIS — E78.5 HYPERLIPIDEMIA, UNSPECIFIED HYPERLIPIDEMIA TYPE: ICD-10-CM

## 2020-12-15 DIAGNOSIS — I10 ESSENTIAL HYPERTENSION: Primary | ICD-10-CM

## 2020-12-15 PROCEDURE — 3077F PR MOST RECENT SYSTOLIC BLOOD PRESSURE >= 140 MM HG: ICD-10-PCS | Mod: HCNC,CPTII,S$GLB, | Performed by: FAMILY MEDICINE

## 2020-12-15 PROCEDURE — 1126F AMNT PAIN NOTED NONE PRSNT: CPT | Mod: HCNC,S$GLB,, | Performed by: FAMILY MEDICINE

## 2020-12-15 PROCEDURE — 1101F PR PT FALLS ASSESS DOC 0-1 FALLS W/OUT INJ PAST YR: ICD-10-PCS | Mod: HCNC,CPTII,S$GLB, | Performed by: FAMILY MEDICINE

## 2020-12-15 PROCEDURE — 3077F SYST BP >= 140 MM HG: CPT | Mod: HCNC,CPTII,S$GLB, | Performed by: FAMILY MEDICINE

## 2020-12-15 PROCEDURE — 99213 PR OFFICE/OUTPT VISIT, EST, LEVL III, 20-29 MIN: ICD-10-PCS | Mod: HCNC,S$GLB,, | Performed by: FAMILY MEDICINE

## 2020-12-15 PROCEDURE — 3288F FALL RISK ASSESSMENT DOCD: CPT | Mod: HCNC,CPTII,S$GLB, | Performed by: FAMILY MEDICINE

## 2020-12-15 PROCEDURE — 1159F PR MEDICATION LIST DOCUMENTED IN MEDICAL RECORD: ICD-10-PCS | Mod: HCNC,S$GLB,, | Performed by: FAMILY MEDICINE

## 2020-12-15 PROCEDURE — 99499 RISK ADDL DX/OHS AUDIT: ICD-10-PCS | Mod: S$GLB,,, | Performed by: FAMILY MEDICINE

## 2020-12-15 PROCEDURE — 1159F MED LIST DOCD IN RCRD: CPT | Mod: HCNC,S$GLB,, | Performed by: FAMILY MEDICINE

## 2020-12-15 PROCEDURE — 1101F PT FALLS ASSESS-DOCD LE1/YR: CPT | Mod: HCNC,CPTII,S$GLB, | Performed by: FAMILY MEDICINE

## 2020-12-15 PROCEDURE — 99499 UNLISTED E&M SERVICE: CPT | Mod: S$GLB,,, | Performed by: FAMILY MEDICINE

## 2020-12-15 PROCEDURE — 99999 PR PBB SHADOW E&M-EST. PATIENT-LVL III: CPT | Mod: PBBFAC,HCNC,, | Performed by: FAMILY MEDICINE

## 2020-12-15 PROCEDURE — 99999 PR PBB SHADOW E&M-EST. PATIENT-LVL III: ICD-10-PCS | Mod: PBBFAC,HCNC,, | Performed by: FAMILY MEDICINE

## 2020-12-15 PROCEDURE — 99213 OFFICE O/P EST LOW 20 MIN: CPT | Mod: HCNC,S$GLB,, | Performed by: FAMILY MEDICINE

## 2020-12-15 PROCEDURE — 3288F PR FALLS RISK ASSESSMENT DOCUMENTED: ICD-10-PCS | Mod: HCNC,CPTII,S$GLB, | Performed by: FAMILY MEDICINE

## 2020-12-15 PROCEDURE — 3079F PR MOST RECENT DIASTOLIC BLOOD PRESSURE 80-89 MM HG: ICD-10-PCS | Mod: HCNC,CPTII,S$GLB, | Performed by: FAMILY MEDICINE

## 2020-12-15 PROCEDURE — 3079F DIAST BP 80-89 MM HG: CPT | Mod: HCNC,CPTII,S$GLB, | Performed by: FAMILY MEDICINE

## 2020-12-15 PROCEDURE — 1126F PR PAIN SEVERITY QUANTIFIED, NO PAIN PRESENT: ICD-10-PCS | Mod: HCNC,S$GLB,, | Performed by: FAMILY MEDICINE

## 2020-12-15 NOTE — PROGRESS NOTES
PI:This is a 75 year-old male presents to f/u on HTN.    HLD - tolerating Crestor 10mg and Fish Oil  HTN - tolerating amlodipine, Benicar 40mg; BP well-controlled at 112/75  Bladder/prostate cancer ([facility] 7) - following with urology, Dr. Cr,  every 6 months.   Considering robotic prostatectomy, but now just doing watchful waiting. PSA has been stable around 13.  Glaucoma - on Combigan and Lumigan; followed by Dr. Emery  Lumbar DDD with right sciatica - resolved with back surgery in 2019; taking gabapentin 600mg  Insomnia - taking trazodone 50mg    Recovered from Covid    PAST MEDICAL HISTORY:  Prostate cancer - [facility] 7; following with watchful waiting  Bladder carcinoma - followed with Dr. Bowman every 3 months; treated in past with Thiotrpa, BCG  HLD (Dx )  HTN (Dx )  colon polyp  Lumbar DDD    PAST SURGICAL HISTORY:  multiple bladder scrappings (11 total)    FAMILY HISTORY:  Father:  at 50 of CAD  Mother:  at 78 insterstial lung fibrosis, carotid artery disease  Grandpartents unknown    SOCIAL HISTORY:  Tobacco use: quit    Alcohol use: 2 drinks of bourbon daily  Occupation:  for law firm  Marital status: (wife with schizophrenia)  Children: 2  enjoys wood working and fishing  exercises daily    REVIEW OF SYSTEMS:  GENERAL: No fever, chills, fatigability or weight changes.  SKIN/BREASTS: No rashes, sores, itching or changes in color or texture of skin.   HEAD: No headaches or recent head trauma.   EYES: Visual acuity fine. Denies blurriness, tearing, itching, photophobia, diplopia, or visual changes.   EARS: Denies ear pain, discharge, tinnitus or vertigo. Denies hearing loss.   NOSE: No loss of smell, epistaxis, postnasal drip, discharge, obstruction, or sneezing.  MOUTH & THROAT: No hoarseness, change in voice, swallowing difficulty. No excessive gum bleeding.   HEMATOLOGICAL/NODES: Denies swollen glands. No bleeding or bruising.  CHEST: No TOLEDO, cyanosis,  "wheezing, cough or sputum production.  CARDIOVASCULAR: Denies chest pain, dyspnea, orthopnea, or palpitations.  GI/ABDOMEN: Appetite fine. No weight loss. Denies nausea, vomiting, diarrhea, constipation, abdominal pain, hematemesis or blood in stool.  URINARY: No dysuria,hematuria, nocturia, incontinence, flank pain, urgency, or urinary difficulty.  PERIPHERAL VASCULAR: No claudication, cold intolerance or cyanosis.  NEUROLOGIC: No history of seizures, paralysis, alteration of gait or coordination.  ENDOCRINE: Sexual maturation. Denies weight change, heat/cold intolerance, hair loss or gain, loss of libido, polyuria, polydipsia, polyphagia, pruritus, unexplained flushing, or excessive sweating.   PSYCH: No crying spells. Denies anxiety symptoms.    Answers for HPI/ROS submitted by the patient on 12/8/2020   activity change: No  unexpected weight change: No  neck pain: Yes  hearing loss: No  rhinorrhea: No  trouble swallowing: No  eye discharge: No  visual disturbance: No  chest tightness: No  wheezing: No  chest pain: No  palpitations: No  blood in stool: No  constipation: No  vomiting: No  diarrhea: No  polydipsia: No  polyuria: No  difficulty urinating: No  urgency: No  hematuria: No  joint swelling: No  arthralgias: Yes  headaches: No  weakness: No  confusion: No  dysphoric mood: No        PHYSICAL EXAM:  BP (!) 156/80 (BP Location: Right arm, Patient Position: Sitting)   Pulse 80   Temp 97.4 °F (36.3 °C) (Oral)   Ht 5' 11" (1.803 m)   Wt 87.7 kg (193 lb 5.5 oz)   SpO2 98%   BMI 26.97 kg/m²     APPEARANCE: Well nourished, well developed, neatly groomed, in no acute distress.   SKIN: Warm, moist and dry. No lesions or rashes.  EYES: Conjunctivae and lids clear. PERRL. EOMI.   NOSE: Mucosa pink. Nares clear.  MOUTH & THROAT: Oral mucosa pink. No tonsillar enlargement. No pharyngeal erythema or exudate.   NECK: Supple with full range of motion. No masses. No thyromegaly. No JVD or bruits.  LYMPH: No cervical, " supraclavicular, axillary or inguinal lymph node enlargement.  RESPIRATORY: Easy and unlabored respirations. Lungs clear to auscultation throughout all lung fields. No wheezes or rales.  CARDIOVASCULAR: Regular rate and rhythm. Normal S1, S2. No rubs, murmurs or gallops.   ABDOMEN: Bowel sounds normal. Nondistended and soft. No hepatosplenomegaly, masses, or tenderness.  EXTREMITIES: No edema or cyanosis. Pedal pulses 2+ bilaterally. No varicosities   NEUROLOGIC: Cranial Nerves: II-XII grossly intact  Gait & Posture: Normal gait and fine motion.  PSYCH: Awake, alert, oriented to person, place, time, and situation. Pleasant and cooperative mood with intact judgment.    Results for orders placed or performed in visit on 12/12/20   Comprehensive metabolic panel   Result Value Ref Range    Sodium 144 136 - 145 mmol/L    Potassium 4.1 3.5 - 5.1 mmol/L    Chloride 105 95 - 110 mmol/L    CO2 25 23 - 29 mmol/L    Glucose 93 70 - 110 mg/dL    BUN 15 8 - 23 mg/dL    Creatinine 1.1 0.5 - 1.4 mg/dL    Calcium 9.9 8.7 - 10.5 mg/dL    Total Protein 7.5 6.0 - 8.4 g/dL    Albumin 4.4 3.5 - 5.2 g/dL    Total Bilirubin 1.3 (H) 0.1 - 1.0 mg/dL    Alkaline Phosphatase 65 55 - 135 U/L    AST 26 10 - 40 U/L    ALT 20 10 - 44 U/L    Anion Gap 14 8 - 16 mmol/L    eGFR if African American >60.0 >60 mL/min/1.73 m^2    eGFR if non African American >60.0 >60 mL/min/1.73 m^2   Lipid Panel   Result Value Ref Range    Cholesterol 186 120 - 199 mg/dL    Triglycerides 206 (H) 30 - 150 mg/dL    HDL 65 40 - 75 mg/dL    LDL Cholesterol 79.8 63.0 - 159.0 mg/dL    HDL/Cholesterol Ratio 34.9 20.0 - 50.0 %    Total Cholesterol/HDL Ratio 2.9 2.0 - 5.0    Non-HDL Cholesterol 121 mg/dL   COVID-19 (SARS CoV-2) IgG Antibody   Result Value Ref Range    Antibody SARS CoV-2 Positive (A) Negative       ASSESSMENT/PLAN:  Essential hypertension  -     CBC Auto Differential; Future; Expected date: 06/13/2021    Hyperlipidemia, unspecified hyperlipidemia type  -      Comprehensive Metabolic Panel; Future; Expected date: 06/13/2021  -     Lipid Panel; Future; Expected date: 06/13/2021       overall doing well  1. HTN (controlled) - continue amlodipine 5mg and Benicar 40mg  2. HLD (controlled) - continue Crestor 10mg daily, Fish Oil 1000mg daily  3. bladder carcinoma/prostate cancer - continue present treatment and f/u with Urology    f/u 6 months with CMP, lipid profile, CBC

## 2021-01-10 ENCOUNTER — IMMUNIZATION (OUTPATIENT)
Dept: FAMILY MEDICINE | Facility: CLINIC | Age: 76
End: 2021-01-10
Payer: MEDICARE

## 2021-01-10 DIAGNOSIS — Z23 NEED FOR VACCINATION: ICD-10-CM

## 2021-01-10 PROCEDURE — 91300 COVID-19, MRNA, LNP-S, PF, 30 MCG/0.3 ML DOSE VACCINE: CPT | Mod: PBBFAC | Performed by: FAMILY MEDICINE

## 2021-01-31 ENCOUNTER — IMMUNIZATION (OUTPATIENT)
Dept: FAMILY MEDICINE | Facility: CLINIC | Age: 76
End: 2021-01-31
Payer: MEDICARE

## 2021-01-31 DIAGNOSIS — Z23 NEED FOR VACCINATION: Primary | ICD-10-CM

## 2021-01-31 PROCEDURE — 0002A COVID-19, MRNA, LNP-S, PF, 30 MCG/0.3 ML DOSE VACCINE: CPT | Mod: PBBFAC | Performed by: INTERNAL MEDICINE

## 2021-01-31 PROCEDURE — 91300 COVID-19, MRNA, LNP-S, PF, 30 MCG/0.3 ML DOSE VACCINE: CPT | Mod: PBBFAC | Performed by: INTERNAL MEDICINE

## 2021-02-10 DIAGNOSIS — I10 ESSENTIAL HYPERTENSION: ICD-10-CM

## 2021-02-11 RX ORDER — AMLODIPINE BESYLATE 5 MG/1
TABLET ORAL
Qty: 90 TABLET | Refills: 3 | Status: SHIPPED | OUTPATIENT
Start: 2021-02-11 | End: 2022-01-30 | Stop reason: SDUPTHER

## 2021-02-18 ENCOUNTER — PES CALL (OUTPATIENT)
Dept: ADMINISTRATIVE | Facility: CLINIC | Age: 76
End: 2021-02-18

## 2021-03-16 DIAGNOSIS — E78.5 HYPERLIPIDEMIA, UNSPECIFIED HYPERLIPIDEMIA TYPE: ICD-10-CM

## 2021-03-18 RX ORDER — ROSUVASTATIN CALCIUM 10 MG/1
TABLET, COATED ORAL
Qty: 90 TABLET | Refills: 2 | Status: SHIPPED | OUTPATIENT
Start: 2021-03-18 | End: 2022-01-30 | Stop reason: SDUPTHER

## 2021-04-10 DIAGNOSIS — I10 ESSENTIAL HYPERTENSION: ICD-10-CM

## 2021-04-12 RX ORDER — OLMESARTAN MEDOXOMIL 40 MG/1
TABLET ORAL
Qty: 90 TABLET | Refills: 2 | Status: SHIPPED | OUTPATIENT
Start: 2021-04-12 | End: 2021-12-09 | Stop reason: SDUPTHER

## 2021-04-23 ENCOUNTER — PATIENT MESSAGE (OUTPATIENT)
Dept: FAMILY MEDICINE | Facility: CLINIC | Age: 76
End: 2021-04-23

## 2021-04-26 ENCOUNTER — PATIENT MESSAGE (OUTPATIENT)
Dept: FAMILY MEDICINE | Facility: CLINIC | Age: 76
End: 2021-04-26

## 2021-06-03 ENCOUNTER — PATIENT MESSAGE (OUTPATIENT)
Dept: ADMINISTRATIVE | Facility: OTHER | Age: 76
End: 2021-06-03

## 2021-06-08 ENCOUNTER — LAB VISIT (OUTPATIENT)
Dept: LAB | Facility: HOSPITAL | Age: 76
End: 2021-06-08
Attending: FAMILY MEDICINE
Payer: MEDICARE

## 2021-06-08 DIAGNOSIS — E78.5 HYPERLIPIDEMIA, UNSPECIFIED HYPERLIPIDEMIA TYPE: ICD-10-CM

## 2021-06-08 DIAGNOSIS — I10 ESSENTIAL HYPERTENSION: ICD-10-CM

## 2021-06-08 LAB
ALBUMIN SERPL BCP-MCNC: 4.1 G/DL (ref 3.5–5.2)
ALP SERPL-CCNC: 61 U/L (ref 55–135)
ALT SERPL W/O P-5'-P-CCNC: 24 U/L (ref 10–44)
ANION GAP SERPL CALC-SCNC: 10 MMOL/L (ref 8–16)
AST SERPL-CCNC: 30 U/L (ref 10–40)
BASOPHILS # BLD AUTO: 0.04 K/UL (ref 0–0.2)
BASOPHILS NFR BLD: 0.7 % (ref 0–1.9)
BILIRUB SERPL-MCNC: 1.4 MG/DL (ref 0.1–1)
BUN SERPL-MCNC: 14 MG/DL (ref 8–23)
CALCIUM SERPL-MCNC: 9.6 MG/DL (ref 8.7–10.5)
CHLORIDE SERPL-SCNC: 103 MMOL/L (ref 95–110)
CHOLEST SERPL-MCNC: 168 MG/DL (ref 120–199)
CHOLEST/HDLC SERPL: 3.4 {RATIO} (ref 2–5)
CO2 SERPL-SCNC: 26 MMOL/L (ref 23–29)
CREAT SERPL-MCNC: 1 MG/DL (ref 0.5–1.4)
DIFFERENTIAL METHOD: ABNORMAL
EOSINOPHIL # BLD AUTO: 0.1 K/UL (ref 0–0.5)
EOSINOPHIL NFR BLD: 1.7 % (ref 0–8)
ERYTHROCYTE [DISTWIDTH] IN BLOOD BY AUTOMATED COUNT: 12.9 % (ref 11.5–14.5)
EST. GFR  (AFRICAN AMERICAN): >60 ML/MIN/1.73 M^2
EST. GFR  (NON AFRICAN AMERICAN): >60 ML/MIN/1.73 M^2
GLUCOSE SERPL-MCNC: 107 MG/DL (ref 70–110)
HCT VFR BLD AUTO: 43.5 % (ref 40–54)
HDLC SERPL-MCNC: 49 MG/DL (ref 40–75)
HDLC SERPL: 29.2 % (ref 20–50)
HGB BLD-MCNC: 15.1 G/DL (ref 14–18)
IMM GRANULOCYTES # BLD AUTO: 0.01 K/UL (ref 0–0.04)
IMM GRANULOCYTES NFR BLD AUTO: 0.2 % (ref 0–0.5)
LDLC SERPL CALC-MCNC: 58.4 MG/DL (ref 63–159)
LYMPHOCYTES # BLD AUTO: 1.8 K/UL (ref 1–4.8)
LYMPHOCYTES NFR BLD: 33 % (ref 18–48)
MCH RBC QN AUTO: 33.5 PG (ref 27–31)
MCHC RBC AUTO-ENTMCNC: 34.7 G/DL (ref 32–36)
MCV RBC AUTO: 97 FL (ref 82–98)
MONOCYTES # BLD AUTO: 0.9 K/UL (ref 0.3–1)
MONOCYTES NFR BLD: 16 % (ref 4–15)
NEUTROPHILS # BLD AUTO: 2.6 K/UL (ref 1.8–7.7)
NEUTROPHILS NFR BLD: 48.4 % (ref 38–73)
NONHDLC SERPL-MCNC: 119 MG/DL
NRBC BLD-RTO: 0 /100 WBC
PLATELET # BLD AUTO: 232 K/UL (ref 150–450)
PMV BLD AUTO: 10.8 FL (ref 9.2–12.9)
POTASSIUM SERPL-SCNC: 4.2 MMOL/L (ref 3.5–5.1)
PROT SERPL-MCNC: 7 G/DL (ref 6–8.4)
RBC # BLD AUTO: 4.51 M/UL (ref 4.6–6.2)
SODIUM SERPL-SCNC: 139 MMOL/L (ref 136–145)
TRIGL SERPL-MCNC: 303 MG/DL (ref 30–150)
WBC # BLD AUTO: 5.36 K/UL (ref 3.9–12.7)

## 2021-06-08 PROCEDURE — 36415 COLL VENOUS BLD VENIPUNCTURE: CPT | Mod: PO | Performed by: FAMILY MEDICINE

## 2021-06-08 PROCEDURE — 85025 COMPLETE CBC W/AUTO DIFF WBC: CPT | Performed by: FAMILY MEDICINE

## 2021-06-08 PROCEDURE — 80061 LIPID PANEL: CPT | Performed by: FAMILY MEDICINE

## 2021-06-08 PROCEDURE — 80053 COMPREHEN METABOLIC PANEL: CPT | Performed by: FAMILY MEDICINE

## 2021-06-24 ENCOUNTER — OFFICE VISIT (OUTPATIENT)
Dept: FAMILY MEDICINE | Facility: CLINIC | Age: 76
End: 2021-06-24
Payer: MEDICARE

## 2021-06-24 VITALS
DIASTOLIC BLOOD PRESSURE: 70 MMHG | HEART RATE: 75 BPM | RESPIRATION RATE: 18 BRPM | SYSTOLIC BLOOD PRESSURE: 136 MMHG | HEIGHT: 71 IN | WEIGHT: 199.5 LBS | BODY MASS INDEX: 27.93 KG/M2 | TEMPERATURE: 98 F | OXYGEN SATURATION: 97 %

## 2021-06-24 DIAGNOSIS — C67.9 MALIGNANT NEOPLASM OF URINARY BLADDER, UNSPECIFIED SITE: ICD-10-CM

## 2021-06-24 DIAGNOSIS — C61 PROSTATE CANCER: ICD-10-CM

## 2021-06-24 DIAGNOSIS — E78.5 HYPERLIPIDEMIA, UNSPECIFIED HYPERLIPIDEMIA TYPE: ICD-10-CM

## 2021-06-24 DIAGNOSIS — I10 ESSENTIAL HYPERTENSION: Primary | ICD-10-CM

## 2021-06-24 DIAGNOSIS — M50.30 DDD (DEGENERATIVE DISC DISEASE), CERVICAL: ICD-10-CM

## 2021-06-24 PROCEDURE — 99999 PR PBB SHADOW E&M-EST. PATIENT-LVL III: ICD-10-PCS | Mod: PBBFAC,,, | Performed by: FAMILY MEDICINE

## 2021-06-24 PROCEDURE — 1125F PR PAIN SEVERITY QUANTIFIED, PAIN PRESENT: ICD-10-PCS | Mod: S$GLB,,, | Performed by: FAMILY MEDICINE

## 2021-06-24 PROCEDURE — 3075F SYST BP GE 130 - 139MM HG: CPT | Mod: CPTII,S$GLB,, | Performed by: FAMILY MEDICINE

## 2021-06-24 PROCEDURE — 1125F AMNT PAIN NOTED PAIN PRSNT: CPT | Mod: S$GLB,,, | Performed by: FAMILY MEDICINE

## 2021-06-24 PROCEDURE — 1159F PR MEDICATION LIST DOCUMENTED IN MEDICAL RECORD: ICD-10-PCS | Mod: S$GLB,,, | Performed by: FAMILY MEDICINE

## 2021-06-24 PROCEDURE — 3078F PR MOST RECENT DIASTOLIC BLOOD PRESSURE < 80 MM HG: ICD-10-PCS | Mod: CPTII,S$GLB,, | Performed by: FAMILY MEDICINE

## 2021-06-24 PROCEDURE — 3078F DIAST BP <80 MM HG: CPT | Mod: CPTII,S$GLB,, | Performed by: FAMILY MEDICINE

## 2021-06-24 PROCEDURE — 99499 RISK ADDL DX/OHS AUDIT: ICD-10-PCS | Mod: S$GLB,,, | Performed by: FAMILY MEDICINE

## 2021-06-24 PROCEDURE — 3288F PR FALLS RISK ASSESSMENT DOCUMENTED: ICD-10-PCS | Mod: CPTII,S$GLB,, | Performed by: FAMILY MEDICINE

## 2021-06-24 PROCEDURE — 1101F PR PT FALLS ASSESS DOC 0-1 FALLS W/OUT INJ PAST YR: ICD-10-PCS | Mod: CPTII,S$GLB,, | Performed by: FAMILY MEDICINE

## 2021-06-24 PROCEDURE — 99214 PR OFFICE/OUTPT VISIT, EST, LEVL IV, 30-39 MIN: ICD-10-PCS | Mod: S$GLB,,, | Performed by: FAMILY MEDICINE

## 2021-06-24 PROCEDURE — 99499 UNLISTED E&M SERVICE: CPT | Mod: S$GLB,,, | Performed by: FAMILY MEDICINE

## 2021-06-24 PROCEDURE — 3288F FALL RISK ASSESSMENT DOCD: CPT | Mod: CPTII,S$GLB,, | Performed by: FAMILY MEDICINE

## 2021-06-24 PROCEDURE — 1159F MED LIST DOCD IN RCRD: CPT | Mod: S$GLB,,, | Performed by: FAMILY MEDICINE

## 2021-06-24 PROCEDURE — 1101F PT FALLS ASSESS-DOCD LE1/YR: CPT | Mod: CPTII,S$GLB,, | Performed by: FAMILY MEDICINE

## 2021-06-24 PROCEDURE — 3075F PR MOST RECENT SYSTOLIC BLOOD PRESS GE 130-139MM HG: ICD-10-PCS | Mod: CPTII,S$GLB,, | Performed by: FAMILY MEDICINE

## 2021-06-24 PROCEDURE — 99214 OFFICE O/P EST MOD 30 MIN: CPT | Mod: S$GLB,,, | Performed by: FAMILY MEDICINE

## 2021-06-24 PROCEDURE — 99999 PR PBB SHADOW E&M-EST. PATIENT-LVL III: CPT | Mod: PBBFAC,,, | Performed by: FAMILY MEDICINE

## 2021-06-24 RX ORDER — TETANUS TOXOID, REDUCED DIPHTHERIA TOXOID AND ACELLULAR PERTUSSIS VACCINE, ADSORBED 5; 2.5; 8; 8; 2.5 [IU]/.5ML; [IU]/.5ML; UG/.5ML; UG/.5ML; UG/.5ML
SUSPENSION INTRAMUSCULAR
COMMUNITY
Start: 2021-01-03 | End: 2023-04-28 | Stop reason: CLARIF

## 2021-07-06 ENCOUNTER — PATIENT MESSAGE (OUTPATIENT)
Dept: PAIN MEDICINE | Facility: CLINIC | Age: 76
End: 2021-07-06

## 2021-08-03 ENCOUNTER — PATIENT OUTREACH (OUTPATIENT)
Dept: ADMINISTRATIVE | Facility: OTHER | Age: 76
End: 2021-08-03

## 2021-08-05 ENCOUNTER — OFFICE VISIT (OUTPATIENT)
Dept: PAIN MEDICINE | Facility: CLINIC | Age: 76
End: 2021-08-05
Payer: MEDICARE

## 2021-08-05 ENCOUNTER — HOSPITAL ENCOUNTER (OUTPATIENT)
Dept: RADIOLOGY | Facility: HOSPITAL | Age: 76
Discharge: HOME | End: 2021-08-05
Attending: ANESTHESIOLOGY
Payer: MEDICARE

## 2021-08-05 VITALS
HEART RATE: 72 BPM | BODY MASS INDEX: 28.41 KG/M2 | OXYGEN SATURATION: 96 % | WEIGHT: 202.94 LBS | DIASTOLIC BLOOD PRESSURE: 79 MMHG | HEIGHT: 71 IN | SYSTOLIC BLOOD PRESSURE: 149 MMHG

## 2021-08-05 DIAGNOSIS — S12.100A CLOSED ODONTOID FRACTURE, INITIAL ENCOUNTER: ICD-10-CM

## 2021-08-05 DIAGNOSIS — M50.30 DDD (DEGENERATIVE DISC DISEASE), CERVICAL: ICD-10-CM

## 2021-08-05 DIAGNOSIS — S12.100A CLOSED ODONTOID FRACTURE, INITIAL ENCOUNTER: Primary | ICD-10-CM

## 2021-08-05 DIAGNOSIS — M54.12 CERVICAL RADICULOPATHY: ICD-10-CM

## 2021-08-05 PROCEDURE — 3078F PR MOST RECENT DIASTOLIC BLOOD PRESSURE < 80 MM HG: ICD-10-PCS | Mod: CPTII,S$GLB,, | Performed by: ANESTHESIOLOGY

## 2021-08-05 PROCEDURE — 3077F SYST BP >= 140 MM HG: CPT | Mod: CPTII,S$GLB,, | Performed by: ANESTHESIOLOGY

## 2021-08-05 PROCEDURE — 1101F PT FALLS ASSESS-DOCD LE1/YR: CPT | Mod: CPTII,S$GLB,, | Performed by: ANESTHESIOLOGY

## 2021-08-05 PROCEDURE — 72052 XR CERVICAL SPINE 5 VIEW WITH FLEX AND EXT: ICD-10-PCS | Mod: 26,,, | Performed by: RADIOLOGY

## 2021-08-05 PROCEDURE — 3078F DIAST BP <80 MM HG: CPT | Mod: CPTII,S$GLB,, | Performed by: ANESTHESIOLOGY

## 2021-08-05 PROCEDURE — 72052 X-RAY EXAM NECK SPINE 6/>VWS: CPT | Mod: 26,,, | Performed by: RADIOLOGY

## 2021-08-05 PROCEDURE — 1160F PR REVIEW ALL MEDS BY PRESCRIBER/CLIN PHARMACIST DOCUMENTED: ICD-10-PCS | Mod: CPTII,S$GLB,, | Performed by: ANESTHESIOLOGY

## 2021-08-05 PROCEDURE — 99204 OFFICE O/P NEW MOD 45 MIN: CPT | Mod: S$GLB,,, | Performed by: ANESTHESIOLOGY

## 2021-08-05 PROCEDURE — 1159F MED LIST DOCD IN RCRD: CPT | Mod: CPTII,S$GLB,, | Performed by: ANESTHESIOLOGY

## 2021-08-05 PROCEDURE — 99204 PR OFFICE/OUTPT VISIT, NEW, LEVL IV, 45-59 MIN: ICD-10-PCS | Mod: S$GLB,,, | Performed by: ANESTHESIOLOGY

## 2021-08-05 PROCEDURE — 1159F PR MEDICATION LIST DOCUMENTED IN MEDICAL RECORD: ICD-10-PCS | Mod: CPTII,S$GLB,, | Performed by: ANESTHESIOLOGY

## 2021-08-05 PROCEDURE — 3288F PR FALLS RISK ASSESSMENT DOCUMENTED: ICD-10-PCS | Mod: CPTII,S$GLB,, | Performed by: ANESTHESIOLOGY

## 2021-08-05 PROCEDURE — 1160F RVW MEDS BY RX/DR IN RCRD: CPT | Mod: CPTII,S$GLB,, | Performed by: ANESTHESIOLOGY

## 2021-08-05 PROCEDURE — 3288F FALL RISK ASSESSMENT DOCD: CPT | Mod: CPTII,S$GLB,, | Performed by: ANESTHESIOLOGY

## 2021-08-05 PROCEDURE — 99999 PR PBB SHADOW E&M-EST. PATIENT-LVL III: CPT | Mod: PBBFAC,,, | Performed by: ANESTHESIOLOGY

## 2021-08-05 PROCEDURE — 99999 PR PBB SHADOW E&M-EST. PATIENT-LVL III: ICD-10-PCS | Mod: PBBFAC,,, | Performed by: ANESTHESIOLOGY

## 2021-08-05 PROCEDURE — 72052 X-RAY EXAM NECK SPINE 6/>VWS: CPT | Mod: TC,PO

## 2021-08-05 PROCEDURE — 3077F PR MOST RECENT SYSTOLIC BLOOD PRESSURE >= 140 MM HG: ICD-10-PCS | Mod: CPTII,S$GLB,, | Performed by: ANESTHESIOLOGY

## 2021-08-05 PROCEDURE — 1101F PR PT FALLS ASSESS DOC 0-1 FALLS W/OUT INJ PAST YR: ICD-10-PCS | Mod: CPTII,S$GLB,, | Performed by: ANESTHESIOLOGY

## 2021-08-05 PROCEDURE — 1125F AMNT PAIN NOTED PAIN PRSNT: CPT | Mod: CPTII,S$GLB,, | Performed by: ANESTHESIOLOGY

## 2021-08-05 PROCEDURE — 1125F PR PAIN SEVERITY QUANTIFIED, PAIN PRESENT: ICD-10-PCS | Mod: CPTII,S$GLB,, | Performed by: ANESTHESIOLOGY

## 2021-08-18 ENCOUNTER — TELEPHONE (OUTPATIENT)
Dept: PAIN MEDICINE | Facility: CLINIC | Age: 76
End: 2021-08-18

## 2021-08-18 DIAGNOSIS — Z01.818 PRE-OP TESTING: ICD-10-CM

## 2021-08-25 ENCOUNTER — OFFICE VISIT (OUTPATIENT)
Dept: PHYSICAL MEDICINE AND REHAB | Facility: CLINIC | Age: 76
End: 2021-08-25
Payer: MEDICARE

## 2021-08-25 VITALS — WEIGHT: 202.25 LBS | HEIGHT: 71 IN | RESPIRATION RATE: 18 BRPM | BODY MASS INDEX: 28.31 KG/M2

## 2021-08-25 DIAGNOSIS — G56.03 CARPAL TUNNEL SYNDROME ON BOTH SIDES: ICD-10-CM

## 2021-08-25 DIAGNOSIS — M67.911 TENDINOPATHY OF RIGHT ROTATOR CUFF: Primary | ICD-10-CM

## 2021-08-25 PROCEDURE — 99214 OFFICE O/P EST MOD 30 MIN: CPT | Mod: 25,GC,S$GLB, | Performed by: PHYSICAL MEDICINE & REHABILITATION

## 2021-08-25 PROCEDURE — 1159F MED LIST DOCD IN RCRD: CPT | Mod: CPTII,S$GLB,, | Performed by: PHYSICAL MEDICINE & REHABILITATION

## 2021-08-25 PROCEDURE — 1125F AMNT PAIN NOTED PAIN PRSNT: CPT | Mod: CPTII,S$GLB,, | Performed by: PHYSICAL MEDICINE & REHABILITATION

## 2021-08-25 PROCEDURE — 1160F PR REVIEW ALL MEDS BY PRESCRIBER/CLIN PHARMACIST DOCUMENTED: ICD-10-PCS | Mod: CPTII,S$GLB,, | Performed by: PHYSICAL MEDICINE & REHABILITATION

## 2021-08-25 PROCEDURE — 99999 PR PBB SHADOW E&M-EST. PATIENT-LVL III: CPT | Mod: PBBFAC,,, | Performed by: PHYSICAL MEDICINE & REHABILITATION

## 2021-08-25 PROCEDURE — 1160F RVW MEDS BY RX/DR IN RCRD: CPT | Mod: CPTII,S$GLB,, | Performed by: PHYSICAL MEDICINE & REHABILITATION

## 2021-08-25 PROCEDURE — 20611 DRAIN/INJ JOINT/BURSA W/US: CPT | Mod: RT,S$GLB,, | Performed by: PHYSICAL MEDICINE & REHABILITATION

## 2021-08-25 PROCEDURE — 20611 LARGE JOINT ASPIRATION/INJECTION: R SUBACROMIAL BURSA: ICD-10-PCS | Mod: RT,S$GLB,, | Performed by: PHYSICAL MEDICINE & REHABILITATION

## 2021-08-25 PROCEDURE — 1159F PR MEDICATION LIST DOCUMENTED IN MEDICAL RECORD: ICD-10-PCS | Mod: CPTII,S$GLB,, | Performed by: PHYSICAL MEDICINE & REHABILITATION

## 2021-08-25 PROCEDURE — 1125F PR PAIN SEVERITY QUANTIFIED, PAIN PRESENT: ICD-10-PCS | Mod: CPTII,S$GLB,, | Performed by: PHYSICAL MEDICINE & REHABILITATION

## 2021-08-25 PROCEDURE — 99214 PR OFFICE/OUTPT VISIT, EST, LEVL IV, 30-39 MIN: ICD-10-PCS | Mod: 25,GC,S$GLB, | Performed by: PHYSICAL MEDICINE & REHABILITATION

## 2021-08-25 PROCEDURE — 99999 PR PBB SHADOW E&M-EST. PATIENT-LVL III: ICD-10-PCS | Mod: PBBFAC,,, | Performed by: PHYSICAL MEDICINE & REHABILITATION

## 2021-08-25 RX ORDER — BETAMETHASONE SODIUM PHOSPHATE AND BETAMETHASONE ACETATE 3; 3 MG/ML; MG/ML
6 INJECTION, SUSPENSION INTRA-ARTICULAR; INTRALESIONAL; INTRAMUSCULAR; SOFT TISSUE
Status: DISCONTINUED | OUTPATIENT
Start: 2021-08-25 | End: 2021-08-25 | Stop reason: HOSPADM

## 2021-08-25 RX ADMIN — BETAMETHASONE SODIUM PHOSPHATE AND BETAMETHASONE ACETATE 6 MG: 3; 3 INJECTION, SUSPENSION INTRA-ARTICULAR; INTRALESIONAL; INTRAMUSCULAR; SOFT TISSUE at 10:08

## 2021-09-29 ENCOUNTER — IMMUNIZATION (OUTPATIENT)
Dept: FAMILY MEDICINE | Facility: CLINIC | Age: 76
End: 2021-09-29
Payer: MEDICARE

## 2021-09-29 DIAGNOSIS — Z23 NEED FOR VACCINATION: Primary | ICD-10-CM

## 2021-09-29 PROCEDURE — 91300 COVID-19, MRNA, LNP-S, PF, 30 MCG/0.3 ML DOSE VACCINE: CPT | Mod: HCNC,PBBFAC | Performed by: FAMILY MEDICINE

## 2021-09-29 PROCEDURE — 0003A COVID-19, MRNA, LNP-S, PF, 30 MCG/0.3 ML DOSE VACCINE: CPT | Mod: HCNC,PBBFAC | Performed by: FAMILY MEDICINE

## 2021-10-19 ENCOUNTER — IMMUNIZATION (OUTPATIENT)
Dept: PHARMACY | Facility: CLINIC | Age: 76
End: 2021-10-19
Payer: MEDICARE

## 2021-12-09 ENCOUNTER — PATIENT MESSAGE (OUTPATIENT)
Dept: FAMILY MEDICINE | Facility: CLINIC | Age: 76
End: 2021-12-09
Payer: MEDICARE

## 2021-12-09 DIAGNOSIS — I10 ESSENTIAL HYPERTENSION: ICD-10-CM

## 2021-12-09 NOTE — TELEPHONE ENCOUNTER
This Rx Request does not qualify for assessment with the OR.  Please review protocol details (hover to discover) and the Care Due Message for extra clinical information

## 2021-12-09 NOTE — TELEPHONE ENCOUNTER
No new care gaps identified.  Powered by [employer] by [employer]. Reference number: [id number].   12/09/2021 9:47:16 AM CST

## 2021-12-10 RX ORDER — OLMESARTAN MEDOXOMIL 40 MG/1
40 TABLET ORAL DAILY
Qty: 90 TABLET | Refills: 3 | Status: SHIPPED | OUTPATIENT
Start: 2021-12-10 | End: 2022-12-21 | Stop reason: SDUPTHER

## 2021-12-13 ENCOUNTER — LAB VISIT (OUTPATIENT)
Dept: LAB | Facility: HOSPITAL | Age: 76
End: 2021-12-13
Attending: FAMILY MEDICINE
Payer: MEDICARE

## 2021-12-13 DIAGNOSIS — E78.5 HYPERLIPIDEMIA, UNSPECIFIED HYPERLIPIDEMIA TYPE: ICD-10-CM

## 2021-12-13 DIAGNOSIS — I10 ESSENTIAL HYPERTENSION: ICD-10-CM

## 2021-12-13 LAB
ALBUMIN SERPL BCP-MCNC: 3.9 G/DL (ref 3.5–5.2)
ALP SERPL-CCNC: 53 U/L (ref 55–135)
ALT SERPL W/O P-5'-P-CCNC: 23 U/L (ref 10–44)
ANION GAP SERPL CALC-SCNC: 9 MMOL/L (ref 8–16)
AST SERPL-CCNC: 25 U/L (ref 10–40)
BASOPHILS # BLD AUTO: 0.06 K/UL (ref 0–0.2)
BASOPHILS NFR BLD: 1.2 % (ref 0–1.9)
BILIRUB SERPL-MCNC: 1 MG/DL (ref 0.1–1)
BUN SERPL-MCNC: 15 MG/DL (ref 8–23)
CALCIUM SERPL-MCNC: 9.4 MG/DL (ref 8.7–10.5)
CHLORIDE SERPL-SCNC: 104 MMOL/L (ref 95–110)
CHOLEST SERPL-MCNC: 163 MG/DL (ref 120–199)
CHOLEST/HDLC SERPL: 3.5 {RATIO} (ref 2–5)
CO2 SERPL-SCNC: 25 MMOL/L (ref 23–29)
CREAT SERPL-MCNC: 1 MG/DL (ref 0.5–1.4)
DIFFERENTIAL METHOD: ABNORMAL
EOSINOPHIL # BLD AUTO: 0.1 K/UL (ref 0–0.5)
EOSINOPHIL NFR BLD: 1.9 % (ref 0–8)
ERYTHROCYTE [DISTWIDTH] IN BLOOD BY AUTOMATED COUNT: 12.7 % (ref 11.5–14.5)
EST. GFR  (AFRICAN AMERICAN): >60 ML/MIN/1.73 M^2
EST. GFR  (NON AFRICAN AMERICAN): >60 ML/MIN/1.73 M^2
GLUCOSE SERPL-MCNC: 106 MG/DL (ref 70–110)
HCT VFR BLD AUTO: 44.6 % (ref 40–54)
HDLC SERPL-MCNC: 47 MG/DL (ref 40–75)
HDLC SERPL: 28.8 % (ref 20–50)
HGB BLD-MCNC: 15.1 G/DL (ref 14–18)
IMM GRANULOCYTES # BLD AUTO: 0.01 K/UL (ref 0–0.04)
IMM GRANULOCYTES NFR BLD AUTO: 0.2 % (ref 0–0.5)
LDLC SERPL CALC-MCNC: 77.2 MG/DL (ref 63–159)
LYMPHOCYTES # BLD AUTO: 1.7 K/UL (ref 1–4.8)
LYMPHOCYTES NFR BLD: 35.1 % (ref 18–48)
MCH RBC QN AUTO: 32.9 PG (ref 27–31)
MCHC RBC AUTO-ENTMCNC: 33.9 G/DL (ref 32–36)
MCV RBC AUTO: 97 FL (ref 82–98)
MONOCYTES # BLD AUTO: 0.8 K/UL (ref 0.3–1)
MONOCYTES NFR BLD: 17.5 % (ref 4–15)
NEUTROPHILS # BLD AUTO: 2.1 K/UL (ref 1.8–7.7)
NEUTROPHILS NFR BLD: 44.1 % (ref 38–73)
NONHDLC SERPL-MCNC: 116 MG/DL
NRBC BLD-RTO: 0 /100 WBC
PLATELET # BLD AUTO: 212 K/UL (ref 150–450)
PMV BLD AUTO: 11 FL (ref 9.2–12.9)
POTASSIUM SERPL-SCNC: 4 MMOL/L (ref 3.5–5.1)
PROT SERPL-MCNC: 6.7 G/DL (ref 6–8.4)
RBC # BLD AUTO: 4.59 M/UL (ref 4.6–6.2)
SODIUM SERPL-SCNC: 138 MMOL/L (ref 136–145)
TRIGL SERPL-MCNC: 194 MG/DL (ref 30–150)
WBC # BLD AUTO: 4.81 K/UL (ref 3.9–12.7)

## 2021-12-13 PROCEDURE — 80061 LIPID PANEL: CPT | Mod: HCNC | Performed by: FAMILY MEDICINE

## 2021-12-13 PROCEDURE — 80053 COMPREHEN METABOLIC PANEL: CPT | Mod: HCNC | Performed by: FAMILY MEDICINE

## 2021-12-13 PROCEDURE — 85025 COMPLETE CBC W/AUTO DIFF WBC: CPT | Mod: HCNC | Performed by: FAMILY MEDICINE

## 2021-12-13 PROCEDURE — 36415 COLL VENOUS BLD VENIPUNCTURE: CPT | Mod: HCNC,PO | Performed by: FAMILY MEDICINE

## 2021-12-20 ENCOUNTER — OFFICE VISIT (OUTPATIENT)
Dept: FAMILY MEDICINE | Facility: CLINIC | Age: 76
End: 2021-12-20
Payer: MEDICARE

## 2021-12-20 VITALS
TEMPERATURE: 98 F | HEART RATE: 77 BPM | OXYGEN SATURATION: 95 % | BODY MASS INDEX: 29.02 KG/M2 | WEIGHT: 207.25 LBS | SYSTOLIC BLOOD PRESSURE: 126 MMHG | RESPIRATION RATE: 18 BRPM | DIASTOLIC BLOOD PRESSURE: 70 MMHG | HEIGHT: 71 IN

## 2021-12-20 DIAGNOSIS — M50.30 DDD (DEGENERATIVE DISC DISEASE), CERVICAL: ICD-10-CM

## 2021-12-20 DIAGNOSIS — Z00.00 PREVENTATIVE HEALTH CARE: ICD-10-CM

## 2021-12-20 DIAGNOSIS — C67.9 MALIGNANT NEOPLASM OF URINARY BLADDER, UNSPECIFIED SITE: ICD-10-CM

## 2021-12-20 DIAGNOSIS — E78.5 HYPERLIPIDEMIA, UNSPECIFIED HYPERLIPIDEMIA TYPE: ICD-10-CM

## 2021-12-20 DIAGNOSIS — I10 ESSENTIAL HYPERTENSION: Primary | ICD-10-CM

## 2021-12-20 PROCEDURE — 90732 PPSV23 VACC 2 YRS+ SUBQ/IM: CPT | Mod: HCNC,S$GLB,, | Performed by: FAMILY MEDICINE

## 2021-12-20 PROCEDURE — 90732 PNEUMOCOCCAL POLYSACCHARIDE VACCINE 23-VALENT =>2YO SQ IM: ICD-10-PCS | Mod: HCNC,S$GLB,, | Performed by: FAMILY MEDICINE

## 2021-12-20 PROCEDURE — G0009 PNEUMOCOCCAL POLYSACCHARIDE VACCINE 23-VALENT =>2YO SQ IM: ICD-10-PCS | Mod: HCNC,S$GLB,, | Performed by: FAMILY MEDICINE

## 2021-12-20 PROCEDURE — 99999 PR PBB SHADOW E&M-EST. PATIENT-LVL III: ICD-10-PCS | Mod: PBBFAC,HCNC,, | Performed by: FAMILY MEDICINE

## 2021-12-20 PROCEDURE — 99499 RISK ADDL DX/OHS AUDIT: ICD-10-PCS | Mod: HCNC,S$GLB,, | Performed by: FAMILY MEDICINE

## 2021-12-20 PROCEDURE — 99214 OFFICE O/P EST MOD 30 MIN: CPT | Mod: HCNC,25,S$GLB, | Performed by: FAMILY MEDICINE

## 2021-12-20 PROCEDURE — 99499 UNLISTED E&M SERVICE: CPT | Mod: HCNC,S$GLB,, | Performed by: FAMILY MEDICINE

## 2021-12-20 PROCEDURE — 99214 PR OFFICE/OUTPT VISIT, EST, LEVL IV, 30-39 MIN: ICD-10-PCS | Mod: HCNC,25,S$GLB, | Performed by: FAMILY MEDICINE

## 2021-12-20 PROCEDURE — G0009 ADMIN PNEUMOCOCCAL VACCINE: HCPCS | Mod: HCNC,S$GLB,, | Performed by: FAMILY MEDICINE

## 2021-12-20 PROCEDURE — 99999 PR PBB SHADOW E&M-EST. PATIENT-LVL III: CPT | Mod: PBBFAC,HCNC,, | Performed by: FAMILY MEDICINE

## 2021-12-20 RX ORDER — TIMOLOL MALEATE 5 MG/ML
1 SOLUTION/ DROPS OPHTHALMIC 2 TIMES DAILY
COMMUNITY
Start: 2021-10-18

## 2021-12-20 RX ORDER — BRIMONIDINE TARTRATE 2 MG/ML
1 SOLUTION/ DROPS OPHTHALMIC 2 TIMES DAILY
COMMUNITY
Start: 2021-10-18

## 2022-01-26 DIAGNOSIS — I10 ESSENTIAL HYPERTENSION: ICD-10-CM

## 2022-01-26 DIAGNOSIS — E78.5 HYPERLIPIDEMIA, UNSPECIFIED HYPERLIPIDEMIA TYPE: ICD-10-CM

## 2022-01-26 NOTE — TELEPHONE ENCOUNTER
No new care gaps identified.  Powered by [employer] by [employer]. Reference number: [id number].   1/26/2022 3:14:10 PM CST

## 2022-01-29 ENCOUNTER — PATIENT MESSAGE (OUTPATIENT)
Dept: FAMILY MEDICINE | Facility: CLINIC | Age: 77
End: 2022-01-29
Payer: MEDICARE

## 2022-01-29 DIAGNOSIS — I10 ESSENTIAL HYPERTENSION: ICD-10-CM

## 2022-01-29 DIAGNOSIS — E78.5 HYPERLIPIDEMIA, UNSPECIFIED HYPERLIPIDEMIA TYPE: ICD-10-CM

## 2022-01-30 NOTE — TELEPHONE ENCOUNTER
No new care gaps identified.  Powered by [employer]. Reference number: [id number].   1/30/2022 10:22:38 AM CST

## 2022-01-31 RX ORDER — AMLODIPINE BESYLATE 5 MG/1
5 TABLET ORAL DAILY
Qty: 90 TABLET | Refills: 3 | Status: SHIPPED | OUTPATIENT
Start: 2022-01-31 | End: 2022-07-25 | Stop reason: ALTCHOICE

## 2022-01-31 RX ORDER — ROSUVASTATIN CALCIUM 10 MG/1
10 TABLET, COATED ORAL DAILY
Qty: 90 TABLET | Refills: 3 | Status: SHIPPED | OUTPATIENT
Start: 2022-01-31 | End: 2023-01-04 | Stop reason: SDUPTHER

## 2022-02-09 RX ORDER — AMLODIPINE BESYLATE 5 MG/1
TABLET ORAL
Qty: 90 TABLET | Refills: 3 | OUTPATIENT
Start: 2022-02-09

## 2022-02-09 RX ORDER — ROSUVASTATIN CALCIUM 10 MG/1
TABLET, COATED ORAL
Qty: 90 TABLET | Refills: 2 | OUTPATIENT
Start: 2022-02-09

## 2022-02-21 ENCOUNTER — LAB VISIT (OUTPATIENT)
Dept: LAB | Facility: HOSPITAL | Age: 77
End: 2022-02-21
Attending: NURSE PRACTITIONER
Payer: MEDICARE

## 2022-02-21 ENCOUNTER — PATIENT MESSAGE (OUTPATIENT)
Dept: FAMILY MEDICINE | Facility: CLINIC | Age: 77
End: 2022-02-21

## 2022-02-21 ENCOUNTER — TELEPHONE (OUTPATIENT)
Dept: FAMILY MEDICINE | Facility: CLINIC | Age: 77
End: 2022-02-21
Payer: MEDICARE

## 2022-02-21 ENCOUNTER — OFFICE VISIT (OUTPATIENT)
Dept: FAMILY MEDICINE | Facility: CLINIC | Age: 77
End: 2022-02-21
Payer: MEDICARE

## 2022-02-21 VITALS
HEIGHT: 71 IN | HEART RATE: 82 BPM | WEIGHT: 211 LBS | SYSTOLIC BLOOD PRESSURE: 138 MMHG | TEMPERATURE: 98 F | DIASTOLIC BLOOD PRESSURE: 82 MMHG | BODY MASS INDEX: 29.54 KG/M2 | OXYGEN SATURATION: 96 %

## 2022-02-21 DIAGNOSIS — R60.0 BILATERAL LEG EDEMA: ICD-10-CM

## 2022-02-21 DIAGNOSIS — R60.0 BILATERAL LEG EDEMA: Primary | ICD-10-CM

## 2022-02-21 LAB
ALBUMIN SERPL BCP-MCNC: 4 G/DL (ref 3.5–5.2)
ALP SERPL-CCNC: 55 U/L (ref 55–135)
ALT SERPL W/O P-5'-P-CCNC: 27 U/L (ref 10–44)
ANION GAP SERPL CALC-SCNC: 6 MMOL/L (ref 8–16)
AST SERPL-CCNC: 33 U/L (ref 10–40)
BASOPHILS # BLD AUTO: 0.04 K/UL (ref 0–0.2)
BASOPHILS NFR BLD: 0.5 % (ref 0–1.9)
BILIRUB SERPL-MCNC: 1.2 MG/DL (ref 0.1–1)
BNP SERPL-MCNC: 11 PG/ML (ref 0–99)
BUN SERPL-MCNC: 19 MG/DL (ref 8–23)
CALCIUM SERPL-MCNC: 9.5 MG/DL (ref 8.7–10.5)
CHLORIDE SERPL-SCNC: 104 MMOL/L (ref 95–110)
CO2 SERPL-SCNC: 27 MMOL/L (ref 23–29)
CREAT SERPL-MCNC: 1 MG/DL (ref 0.5–1.4)
DIFFERENTIAL METHOD: ABNORMAL
EOSINOPHIL # BLD AUTO: 0.1 K/UL (ref 0–0.5)
EOSINOPHIL NFR BLD: 1.1 % (ref 0–8)
ERYTHROCYTE [DISTWIDTH] IN BLOOD BY AUTOMATED COUNT: 12.3 % (ref 11.5–14.5)
EST. GFR  (AFRICAN AMERICAN): >60 ML/MIN/1.73 M^2
EST. GFR  (NON AFRICAN AMERICAN): >60 ML/MIN/1.73 M^2
GLUCOSE SERPL-MCNC: 100 MG/DL (ref 70–110)
HCT VFR BLD AUTO: 43 % (ref 40–54)
HGB BLD-MCNC: 14.9 G/DL (ref 14–18)
IMM GRANULOCYTES # BLD AUTO: 0.02 K/UL (ref 0–0.04)
IMM GRANULOCYTES NFR BLD AUTO: 0.3 % (ref 0–0.5)
LYMPHOCYTES # BLD AUTO: 1.7 K/UL (ref 1–4.8)
LYMPHOCYTES NFR BLD: 23.8 % (ref 18–48)
MCH RBC QN AUTO: 33.3 PG (ref 27–31)
MCHC RBC AUTO-ENTMCNC: 34.7 G/DL (ref 32–36)
MCV RBC AUTO: 96 FL (ref 82–98)
MONOCYTES # BLD AUTO: 1.3 K/UL (ref 0.3–1)
MONOCYTES NFR BLD: 18.1 % (ref 4–15)
NEUTROPHILS # BLD AUTO: 4.1 K/UL (ref 1.8–7.7)
NEUTROPHILS NFR BLD: 56.2 % (ref 38–73)
NRBC BLD-RTO: 0 /100 WBC
PLATELET # BLD AUTO: 207 K/UL (ref 150–450)
PMV BLD AUTO: 11.3 FL (ref 9.2–12.9)
POTASSIUM SERPL-SCNC: 3.9 MMOL/L (ref 3.5–5.1)
PROT SERPL-MCNC: 7 G/DL (ref 6–8.4)
RBC # BLD AUTO: 4.48 M/UL (ref 4.6–6.2)
SODIUM SERPL-SCNC: 137 MMOL/L (ref 136–145)
TSH SERPL DL<=0.005 MIU/L-ACNC: 2.09 UIU/ML (ref 0.4–4)
WBC # BLD AUTO: 7.3 K/UL (ref 3.9–12.7)

## 2022-02-21 PROCEDURE — 99214 PR OFFICE/OUTPT VISIT, EST, LEVL IV, 30-39 MIN: ICD-10-PCS | Mod: HCNC,S$GLB,, | Performed by: NURSE PRACTITIONER

## 2022-02-21 PROCEDURE — 1159F PR MEDICATION LIST DOCUMENTED IN MEDICAL RECORD: ICD-10-PCS | Mod: HCNC,CPTII,S$GLB, | Performed by: NURSE PRACTITIONER

## 2022-02-21 PROCEDURE — 3079F DIAST BP 80-89 MM HG: CPT | Mod: HCNC,CPTII,S$GLB, | Performed by: NURSE PRACTITIONER

## 2022-02-21 PROCEDURE — 36415 COLL VENOUS BLD VENIPUNCTURE: CPT | Mod: HCNC,PO | Performed by: NURSE PRACTITIONER

## 2022-02-21 PROCEDURE — 1160F PR REVIEW ALL MEDS BY PRESCRIBER/CLIN PHARMACIST DOCUMENTED: ICD-10-PCS | Mod: HCNC,CPTII,S$GLB, | Performed by: NURSE PRACTITIONER

## 2022-02-21 PROCEDURE — 3075F SYST BP GE 130 - 139MM HG: CPT | Mod: HCNC,CPTII,S$GLB, | Performed by: NURSE PRACTITIONER

## 2022-02-21 PROCEDURE — 1159F MED LIST DOCD IN RCRD: CPT | Mod: HCNC,CPTII,S$GLB, | Performed by: NURSE PRACTITIONER

## 2022-02-21 PROCEDURE — 1126F AMNT PAIN NOTED NONE PRSNT: CPT | Mod: HCNC,CPTII,S$GLB, | Performed by: NURSE PRACTITIONER

## 2022-02-21 PROCEDURE — 99999 PR PBB SHADOW E&M-EST. PATIENT-LVL IV: CPT | Mod: PBBFAC,HCNC,, | Performed by: NURSE PRACTITIONER

## 2022-02-21 PROCEDURE — 1160F RVW MEDS BY RX/DR IN RCRD: CPT | Mod: HCNC,CPTII,S$GLB, | Performed by: NURSE PRACTITIONER

## 2022-02-21 PROCEDURE — 84443 ASSAY THYROID STIM HORMONE: CPT | Mod: HCNC | Performed by: NURSE PRACTITIONER

## 2022-02-21 PROCEDURE — 1126F PR PAIN SEVERITY QUANTIFIED, NO PAIN PRESENT: ICD-10-PCS | Mod: HCNC,CPTII,S$GLB, | Performed by: NURSE PRACTITIONER

## 2022-02-21 PROCEDURE — 99214 OFFICE O/P EST MOD 30 MIN: CPT | Mod: HCNC,S$GLB,, | Performed by: NURSE PRACTITIONER

## 2022-02-21 PROCEDURE — 80053 COMPREHEN METABOLIC PANEL: CPT | Mod: HCNC | Performed by: NURSE PRACTITIONER

## 2022-02-21 PROCEDURE — 3075F PR MOST RECENT SYSTOLIC BLOOD PRESS GE 130-139MM HG: ICD-10-PCS | Mod: HCNC,CPTII,S$GLB, | Performed by: NURSE PRACTITIONER

## 2022-02-21 PROCEDURE — 99999 PR PBB SHADOW E&M-EST. PATIENT-LVL IV: ICD-10-PCS | Mod: PBBFAC,HCNC,, | Performed by: NURSE PRACTITIONER

## 2022-02-21 PROCEDURE — 85025 COMPLETE CBC W/AUTO DIFF WBC: CPT | Mod: HCNC | Performed by: NURSE PRACTITIONER

## 2022-02-21 PROCEDURE — 3079F PR MOST RECENT DIASTOLIC BLOOD PRESSURE 80-89 MM HG: ICD-10-PCS | Mod: HCNC,CPTII,S$GLB, | Performed by: NURSE PRACTITIONER

## 2022-02-21 PROCEDURE — 83880 ASSAY OF NATRIURETIC PEPTIDE: CPT | Mod: HCNC | Performed by: NURSE PRACTITIONER

## 2022-02-21 RX ORDER — FUROSEMIDE 20 MG/1
20 TABLET ORAL DAILY
Qty: 3 TABLET | Refills: 0 | Status: SHIPPED | OUTPATIENT
Start: 2022-02-21 | End: 2022-04-11

## 2022-02-21 NOTE — TELEPHONE ENCOUNTER
----- Message from Clare Thomas sent at 2/21/2022 10:32 AM CST -----  Regarding: Same Day Appointment  Type:  Same Day Appointment Request      Name of Caller:Patient need to speak with nurse.   Patient states both ankles swollen need appointment for today with Dr Avery     When is the first available appointment?  Symptoms:  Best Call Back Number:[phone]  Additional Information:

## 2022-02-21 NOTE — PATIENT INSTRUCTIONS
Lasix 20 mg daily x 3 days, labs pending to evaluate underlying cause. Elevate legs when sitting, avoid prolong standing for 2-3 days. Follow up if not resolving, immediately for new or worsening symptoms.

## 2022-02-21 NOTE — PROGRESS NOTES
Subjective:       Patient ID: Lloyd John Jr. is a 76 y.o. male.    Chief Complaint: Edema (Raheem ankles)    Patient says that Friday night after being at a wedding and on his feet all day he noticed swelling of both ankles.Mild soreness, no redness. No other abdominal symptoms.    Past Medical History:  No date: Bladder cancer      Comment:  Followed by Dr. Craig in [place]  2006: Colon polyp      Comment:  Bunny Payton / [facility]  No date: Glaucoma (increased eye pressure)  No date: HLD (hyperlipidemia)  No date: HTN (hypertension)  No date: Prostate cancer    Past Surgical History:  2019: BACK SURGERY  No date: bladder scrapping    Bunny Payton/LETY: COLONOSCOPY      Comment:  Polyps  2012  [facility]: COLONOSCOPY      Comment:  Diverticulosis and spasms.  No polyps.  2017: Urolift implant    Review of patient's family history indicates:  Problem: Coronary artery disease      Relation: Father          Age of Onset: (Not Specified)      Social History    Socioeconomic History      Marital status:       Number of children: 2    Occupational History      Occupation:  for law firm    Tobacco Use      Smoking status: Former Smoker        Quit date: 1997        Years since quittin.1      Smokeless tobacco: Never Used    Substance and Sexual Activity      Alcohol use: Yes        Alcohol/week: 1.7 standard drinks        Types: 2 drink(s) per week        Comment: weekends      Drug use: No    Social Determinants of Health  Financial Resource Strain: Low Risk       Difficulty of Paying Living Expenses: Not hard at all  Food Insecurity: No Food Insecurity      Worried About Running Out of Food in the Last Year: Never true      Ran Out of Food in the Last Year: Never true  Transportation Needs: No Transportation Needs      Lack of Transportation (Medical): No      Lack of Transportation (Non-Medical): No  Physical Activity: Insufficiently Active      Days of Exercise per Week: 3 days       Minutes of Exercise per Session: 20 min  Stress: No Stress Concern Present      Feeling of Stress : Only a little  Social Connections: Unknown      Frequency of Communication with Friends and Family: More than three times a week      Frequency of Social Gatherings with Friends and Family: Twice a week      Active Member of Clubs or Organizations: Yes      Attends Club or Organization Meetings: More than 4 times per year      Marital Status:   Housing Stability: Low Risk       Unable to Pay for Housing in the Last Year: No      Number of Places Lived in the Last Year: 1      Unstable Housing in the Last Year: No    Current Outpatient Medications:  amLODIPine (NORVASC) 5 MG tablet, Take 1 tablet (5 mg total) by mouth once daily., Disp: 90 tablet, Rfl: 3  ascorbic acid, vitamin C, (VITAMIN C) 1000 MG tablet, Take 1,000 mg by mouth once daily., Disp: , Rfl:   BOOSTRIX TDAP 2.5-8-5 Lf-mcg-Lf/0.5mL Syrg injection, , Disp: , Rfl:   brimonidine 0.2% (ALPHAGAN) 0.2 % Drop, , Disp: , Rfl:   COMBIGAN 0.2-0.5 % Drop, , Disp: , Rfl:   LUMIGAN 0.01 % Drop, , Disp: , Rfl:   MULTIVITAMIN W-MINERALS/LUTEIN (CENTRUM SILVER ORAL), Take by mouth., Disp: , Rfl:   olmesartan (BENICAR) 40 MG tablet, Take 1 tablet (40 mg total) by mouth once daily., Disp: 90 tablet, Rfl: 3  rosuvastatin (CRESTOR) 10 MG tablet, Take 1 tablet (10 mg total) by mouth once daily., Disp: 90 tablet, Rfl: 3  timolol maleate 0.5% (TIMOPTIC) 0.5 % Drop, , Disp: , Rfl:     No current facility-administered medications for this visit.      Review of patient's allergies indicates:   -- Bactrim (sulfamethoxazole-trimethoprim) -- Hives and Rash   -- Povidone-iodine -- Other (See Comments)    Review of Systems      Objective:      Physical Exam  Constitutional:       General: He is not in acute distress.     Appearance: Normal appearance. He is not toxic-appearing.   HENT:      Head: Normocephalic and atraumatic.   Eyes:      Pupils: Pupils are equal, round, and  reactive to light.   Cardiovascular:      Rate and Rhythm: Normal rate and regular rhythm.      Heart sounds: No murmur heard.  Pulmonary:      Effort: Pulmonary effort is normal. No respiratory distress.      Breath sounds: Normal breath sounds.   Musculoskeletal:         General: No tenderness.      Right lower leg: Edema (puls one, ankles) present.      Left lower leg: Edema (plus one ankles) present.   Skin:     Findings: No erythema or rash.   Neurological:      General: No focal deficit present.      Mental Status: He is alert and oriented to person, place, and time.   Psychiatric:         Mood and Affect: Mood normal.         Behavior: Behavior normal.         Assessment:       Problem List Items Addressed This Visit    None     Visit Diagnoses     Bilateral leg edema    -  Primary    Relevant Medications    furosemide (LASIX) 20 MG tablet    Other Relevant Orders    Comprehensive Metabolic Panel    BNP    CBC Auto Differential    TSH          Plan:       1. Bilateral leg edema  Lasix 20 mg daily x 3 days, labs pending to evaluate underlying cause. Elevate legs when sitting, avoid prolong standing for 2-3 days. Follow up if not resolving, immediately for new or worsening symptoms.   - Comprehensive Metabolic Panel; Future  - BNP; Future  - CBC Auto Differential; Future  - TSH; Future  - furosemide (LASIX) 20 MG tablet; Take 1 tablet (20 mg total) by mouth once daily. for 3 days  Dispense: 3 tablet; Refill: 0

## 2022-02-24 ENCOUNTER — PATIENT MESSAGE (OUTPATIENT)
Dept: FAMILY MEDICINE | Facility: CLINIC | Age: 77
End: 2022-02-24
Payer: MEDICARE

## 2022-04-07 ENCOUNTER — PATIENT MESSAGE (OUTPATIENT)
Dept: FAMILY MEDICINE | Facility: CLINIC | Age: 77
End: 2022-04-07
Payer: MEDICARE

## 2022-04-11 ENCOUNTER — OFFICE VISIT (OUTPATIENT)
Dept: FAMILY MEDICINE | Facility: CLINIC | Age: 77
End: 2022-04-11
Payer: MEDICARE

## 2022-04-11 VITALS
SYSTOLIC BLOOD PRESSURE: 116 MMHG | OXYGEN SATURATION: 98 % | HEIGHT: 71 IN | HEART RATE: 74 BPM | BODY MASS INDEX: 29.6 KG/M2 | WEIGHT: 211.44 LBS | DIASTOLIC BLOOD PRESSURE: 82 MMHG | TEMPERATURE: 98 F

## 2022-04-11 DIAGNOSIS — I10 PRIMARY HYPERTENSION: ICD-10-CM

## 2022-04-11 DIAGNOSIS — R60.9 EDEMA, UNSPECIFIED TYPE: Primary | ICD-10-CM

## 2022-04-11 DIAGNOSIS — E78.5 HYPERLIPIDEMIA, UNSPECIFIED HYPERLIPIDEMIA TYPE: ICD-10-CM

## 2022-04-11 PROCEDURE — 99999 PR PBB SHADOW E&M-EST. PATIENT-LVL IV: CPT | Mod: PBBFAC,,, | Performed by: NURSE PRACTITIONER

## 2022-04-11 PROCEDURE — 1160F RVW MEDS BY RX/DR IN RCRD: CPT | Mod: CPTII,S$GLB,, | Performed by: NURSE PRACTITIONER

## 2022-04-11 PROCEDURE — 1126F PR PAIN SEVERITY QUANTIFIED, NO PAIN PRESENT: ICD-10-PCS | Mod: CPTII,S$GLB,, | Performed by: NURSE PRACTITIONER

## 2022-04-11 PROCEDURE — 3079F DIAST BP 80-89 MM HG: CPT | Mod: CPTII,S$GLB,, | Performed by: NURSE PRACTITIONER

## 2022-04-11 PROCEDURE — 1159F MED LIST DOCD IN RCRD: CPT | Mod: CPTII,S$GLB,, | Performed by: NURSE PRACTITIONER

## 2022-04-11 PROCEDURE — 1126F AMNT PAIN NOTED NONE PRSNT: CPT | Mod: CPTII,S$GLB,, | Performed by: NURSE PRACTITIONER

## 2022-04-11 PROCEDURE — 3074F SYST BP LT 130 MM HG: CPT | Mod: CPTII,S$GLB,, | Performed by: NURSE PRACTITIONER

## 2022-04-11 PROCEDURE — 1101F PT FALLS ASSESS-DOCD LE1/YR: CPT | Mod: CPTII,S$GLB,, | Performed by: NURSE PRACTITIONER

## 2022-04-11 PROCEDURE — 1160F PR REVIEW ALL MEDS BY PRESCRIBER/CLIN PHARMACIST DOCUMENTED: ICD-10-PCS | Mod: CPTII,S$GLB,, | Performed by: NURSE PRACTITIONER

## 2022-04-11 PROCEDURE — 3079F PR MOST RECENT DIASTOLIC BLOOD PRESSURE 80-89 MM HG: ICD-10-PCS | Mod: CPTII,S$GLB,, | Performed by: NURSE PRACTITIONER

## 2022-04-11 PROCEDURE — 1159F PR MEDICATION LIST DOCUMENTED IN MEDICAL RECORD: ICD-10-PCS | Mod: CPTII,S$GLB,, | Performed by: NURSE PRACTITIONER

## 2022-04-11 PROCEDURE — 99214 PR OFFICE/OUTPT VISIT, EST, LEVL IV, 30-39 MIN: ICD-10-PCS | Mod: S$GLB,,, | Performed by: NURSE PRACTITIONER

## 2022-04-11 PROCEDURE — 3074F PR MOST RECENT SYSTOLIC BLOOD PRESSURE < 130 MM HG: ICD-10-PCS | Mod: CPTII,S$GLB,, | Performed by: NURSE PRACTITIONER

## 2022-04-11 PROCEDURE — 99999 PR PBB SHADOW E&M-EST. PATIENT-LVL IV: ICD-10-PCS | Mod: PBBFAC,,, | Performed by: NURSE PRACTITIONER

## 2022-04-11 PROCEDURE — 3288F PR FALLS RISK ASSESSMENT DOCUMENTED: ICD-10-PCS | Mod: CPTII,S$GLB,, | Performed by: NURSE PRACTITIONER

## 2022-04-11 PROCEDURE — 99214 OFFICE O/P EST MOD 30 MIN: CPT | Mod: S$GLB,,, | Performed by: NURSE PRACTITIONER

## 2022-04-11 PROCEDURE — 3288F FALL RISK ASSESSMENT DOCD: CPT | Mod: CPTII,S$GLB,, | Performed by: NURSE PRACTITIONER

## 2022-04-11 PROCEDURE — 1101F PR PT FALLS ASSESS DOC 0-1 FALLS W/OUT INJ PAST YR: ICD-10-PCS | Mod: CPTII,S$GLB,, | Performed by: NURSE PRACTITIONER

## 2022-04-11 RX ORDER — FUROSEMIDE 20 MG/1
20 TABLET ORAL DAILY PRN
Qty: 30 TABLET | Refills: 11 | Status: SHIPPED | OUTPATIENT
Start: 2022-04-11 | End: 2022-06-21 | Stop reason: ALTCHOICE

## 2022-04-11 NOTE — PROGRESS NOTES
Subjective:       Patient ID: Lloyd John Jr. is a 76 y.o. male.    Chief Complaint: Edema    Patient says he ate crawfish at a family gathering last week and has had mild bilateral leg and feet swelling since then. He had a similar episode in mid February, normal bnp and bmp and cbc at that time, resolved with 3 days of lasix, no issues since then until now. No shortness of breath, no chest pain, no other abnormal symptoms.    Past Medical History:  No date: Bladder cancer      Comment:  Followed by Dr. Craig in [place]  2006: Colon polyp      Comment:  Bunny Payton / [facility]  No date: Glaucoma (increased eye pressure)  No date: HLD (hyperlipidemia)  No date: HTN (hypertension)  No date: Prostate cancer    Past Surgical History:  2019: BACK SURGERY  No date: bladder scrapping    Bunny Payton/LETY: COLONOSCOPY      Comment:  Polyps  2012  [facility]: COLONOSCOPY      Comment:  Diverticulosis and spasms.  No polyps.  2017: Urolift implant    Review of patient's family history indicates:  Problem: Coronary artery disease      Relation: Father          Age of Onset: (Not Specified)      Social History    Socioeconomic History      Marital status:       Number of children: 2    Occupational History      Occupation:  for law firm    Tobacco Use      Smoking status: Former Smoker        Quit date: 1997        Years since quittin.2      Smokeless tobacco: Never Used    Substance and Sexual Activity      Alcohol use: Yes        Alcohol/week: 1.7 standard drinks        Types: 2 drink(s) per week        Comment: weekends      Drug use: No    Social Determinants of Health  Financial Resource Strain: Low Risk       Difficulty of Paying Living Expenses: Not hard at all  Food Insecurity: No Food Insecurity      Worried About Running Out of Food in the Last Year: Never true      Ran Out of Food in the Last Year: Never true  Transportation Needs: No Transportation Needs      Lack of  Transportation (Medical): No      Lack of Transportation (Non-Medical): No  Physical Activity: Insufficiently Active      Days of Exercise per Week: 3 days      Minutes of Exercise per Session: 20 min  Stress: No Stress Concern Present      Feeling of Stress : Not at all  Social Connections: Unknown      Frequency of Communication with Friends and Family: More than three times a week      Frequency of Social Gatherings with Friends and Family: Once a week      Active Member of Clubs or Organizations: Yes      Attends Club or Organization Meetings: More than 4 times per year      Marital Status:   Housing Stability: Low Risk       Unable to Pay for Housing in the Last Year: No      Number of Places Lived in the Last Year: 1      Unstable Housing in the Last Year: No    Current Outpatient Medications:  amLODIPine (NORVASC) 5 MG tablet, Take 1 tablet (5 mg total) by mouth once daily., Disp: 90 tablet, Rfl: 3  ascorbic acid, vitamin C, (VITAMIN C) 1000 MG tablet, Take 1,000 mg by mouth once daily., Disp: , Rfl:   BOOSTRIX TDAP 2.5-8-5 Lf-mcg-Lf/0.5mL Syrg injection, , Disp: , Rfl:   brimonidine 0.2% (ALPHAGAN) 0.2 % Drop, , Disp: , Rfl:   COMBIGAN 0.2-0.5 % Drop, , Disp: , Rfl:   LUMIGAN 0.01 % Drop, , Disp: , Rfl:   MULTIVITAMIN W-MINERALS/LUTEIN (CENTRUM SILVER ORAL), Take by mouth., Disp: , Rfl:   olmesartan (BENICAR) 40 MG tablet, Take 1 tablet (40 mg total) by mouth once daily., Disp: 90 tablet, Rfl: 3  rosuvastatin (CRESTOR) 10 MG tablet, Take 1 tablet (10 mg total) by mouth once daily., Disp: 90 tablet, Rfl: 3  timolol maleate 0.5% (TIMOPTIC) 0.5 % Drop, , Disp: , Rfl:   furosemide (LASIX) 20 MG tablet, Take 1 tablet (20 mg total) by mouth daily as needed (take daily x 2-3 days as needed for leg swelling)., Disp: 30 tablet, Rfl: 11    No current facility-administered medications for this visit.      Review of patient's allergies indicates:   -- Bactrim (sulfamethoxazole-trimethoprim) -- Hives and Rash   --  Povidone-iodine -- Other (See Comments)    Review of Systems   Constitutional: Negative.  Negative for chills, diaphoresis, fatigue and fever.   HENT: Negative.    Respiratory: Negative.  Negative for cough, shortness of breath and wheezing.    Cardiovascular: Positive for leg swelling. Negative for chest pain, palpitations and claudication.   Gastrointestinal: Negative.    Genitourinary: Negative.    Musculoskeletal: Negative.    Integumentary:  Negative.   Neurological: Negative.          Objective:      Physical Exam  Constitutional:       General: He is not in acute distress.     Appearance: Normal appearance.   HENT:      Head: Normocephalic and atraumatic.   Cardiovascular:      Rate and Rhythm: Normal rate.      Heart sounds: No murmur heard.  Pulmonary:      Effort: Pulmonary effort is normal. No respiratory distress.   Musculoskeletal:      Right lower leg: Edema (+1) present.      Left lower leg: Edema (+1) present.   Skin:     Findings: No erythema or rash.   Neurological:      General: No focal deficit present.      Mental Status: He is alert and oriented to person, place, and time.   Psychiatric:         Mood and Affect: Mood normal.         Behavior: Behavior normal.         Thought Content: Thought content normal.         Judgment: Judgment normal.         Assessment:       Problem List Items Addressed This Visit        Unprioritized    HLD (hyperlipidemia)    Relevant Medications    furosemide (LASIX) 20 MG tablet    Other Relevant Orders    Echo    HTN (hypertension)    Relevant Medications    furosemide (LASIX) 20 MG tablet    Other Relevant Orders    Echo      Other Visit Diagnoses     Edema, unspecified type    -  Primary    Relevant Medications    furosemide (LASIX) 20 MG tablet    Other Relevant Orders    Echo          Plan:       1. Edema, unspecified type  Likely just dependent edema related to salt intake, lasix x 2-3 days to resolve. Follow up if does not resolve or if reoccurs without  precipitating factor. Patient states understanding, says family is concerned regarding his heart, and that his father had CAD. ECHO to evaluate heart function ordered.   - furosemide (LASIX) 20 MG tablet; Take 1 tablet (20 mg total) by mouth daily as needed (take daily x 2-3 days as needed for leg swelling).  Dispense: 30 tablet; Refill: 11  - Echo; Future    2. Hyperlipidemia, unspecified hyperlipidemia type  Stable, lipids done 12/31/21, compliant with crestor.  - furosemide (LASIX) 20 MG tablet; Take 1 tablet (20 mg total) by mouth daily as needed (take daily x 2-3 days as needed for leg swelling).  Dispense: 30 tablet; Refill: 11  - Echo; Future    3. Primary hypertension  Stable, compliant with medication, BMP sable in February.  - furosemide (LASIX) 20 MG tablet; Take 1 tablet (20 mg total) by mouth daily as needed (take daily x 2-3 days as needed for leg swelling).  Dispense: 30 tablet; Refill: 11  - Echo; Future

## 2022-04-14 ENCOUNTER — PATIENT MESSAGE (OUTPATIENT)
Dept: FAMILY MEDICINE | Facility: CLINIC | Age: 77
End: 2022-04-14
Payer: MEDICARE

## 2022-04-28 ENCOUNTER — PATIENT MESSAGE (OUTPATIENT)
Dept: FAMILY MEDICINE | Facility: CLINIC | Age: 77
End: 2022-04-28
Payer: MEDICARE

## 2022-04-28 NOTE — TELEPHONE ENCOUNTER
Second part of his message. Please advise    I said it incorrectly.  My right foot is more swollen than my left foot.  I need more coffee.     Gayry,     Jose Elias John

## 2022-05-09 ENCOUNTER — PATIENT MESSAGE (OUTPATIENT)
Dept: SMOKING CESSATION | Facility: CLINIC | Age: 77
End: 2022-05-09
Payer: MEDICARE

## 2022-05-18 ENCOUNTER — CLINICAL SUPPORT (OUTPATIENT)
Dept: CARDIOLOGY | Facility: HOSPITAL | Age: 77
End: 2022-05-18
Attending: NURSE PRACTITIONER
Payer: MEDICARE

## 2022-05-18 VITALS — WEIGHT: 211 LBS | BODY MASS INDEX: 29.54 KG/M2 | HEIGHT: 71 IN | HEART RATE: 68 BPM

## 2022-05-18 DIAGNOSIS — E78.5 HYPERLIPIDEMIA, UNSPECIFIED HYPERLIPIDEMIA TYPE: ICD-10-CM

## 2022-05-18 DIAGNOSIS — I10 PRIMARY HYPERTENSION: ICD-10-CM

## 2022-05-18 DIAGNOSIS — R60.9 EDEMA, UNSPECIFIED TYPE: ICD-10-CM

## 2022-05-18 LAB
ASCENDING AORTA: 2.94 CM
AV INDEX (PROSTH): 0.91
AV MEAN GRADIENT: 4 MMHG
AV PEAK GRADIENT: 6 MMHG
AV VALVE AREA: 2.88 CM2
AV VELOCITY RATIO: 0.84
BSA FOR ECHO PROCEDURE: 2.19 M2
CV ECHO LV RWT: 0.25 CM
DOP CALC AO PEAK VEL: 1.21 M/S
DOP CALC AO VTI: 30.28 CM
DOP CALC LVOT AREA: 3.2 CM2
DOP CALC LVOT DIAMETER: 2.01 CM
DOP CALC LVOT PEAK VEL: 1.02 M/S
DOP CALC LVOT STROKE VOLUME: 87.09 CM3
DOP CALCLVOT PEAK VEL VTI: 27.46 CM
E WAVE DECELERATION TIME: 156.07 MSEC
E/A RATIO: 1.03
E/E' RATIO: 16.17 M/S
ECHO LV POSTERIOR WALL: 0.71 CM (ref 0.6–1.1)
EJECTION FRACTION: 65 %
FRACTIONAL SHORTENING: 38 % (ref 28–44)
INTERVENTRICULAR SEPTUM: 0.72 CM (ref 0.6–1.1)
IVRT: 51.38 MSEC
LA MAJOR: 4.71 CM
LA MINOR: 5.33 CM
LA WIDTH: 3.43 CM
LEFT ATRIUM SIZE: 3.54 CM
LEFT ATRIUM VOLUME INDEX: 23.9 ML/M2
LEFT ATRIUM VOLUME: 51.61 CM3
LEFT INTERNAL DIMENSION IN SYSTOLE: 3.57 CM (ref 2.1–4)
LEFT VENTRICLE DIASTOLIC VOLUME INDEX: 76.63 ML/M2
LEFT VENTRICLE DIASTOLIC VOLUME: 165.53 ML
LEFT VENTRICLE MASS INDEX: 70 G/M2
LEFT VENTRICLE SYSTOLIC VOLUME INDEX: 24.7 ML/M2
LEFT VENTRICLE SYSTOLIC VOLUME: 53.34 ML
LEFT VENTRICULAR INTERNAL DIMENSION IN DIASTOLE: 5.78 CM (ref 3.5–6)
LEFT VENTRICULAR MASS: 151.78 G
LV LATERAL E/E' RATIO: 16.17 M/S
LV SEPTAL E/E' RATIO: 16.17 M/S
MV A" WAVE DURATION": 13.42 MSEC
MV PEAK A VEL: 0.94 M/S
MV PEAK E VEL: 0.97 M/S
MV STENOSIS PRESSURE HALF TIME: 45.26 MS
MV VALVE AREA P 1/2 METHOD: 4.86 CM2
PISA MRMAX VEL: 0.04 M/S
PISA TR MAX VEL: 2.79 M/S
PULM VEIN S/D RATIO: 1.31
PV PEAK D VEL: 0.39 M/S
PV PEAK S VEL: 0.51 M/S
RA MAJOR: 4.39 CM
RA PRESSURE: 3 MMHG
RA WIDTH: 3.07 CM
RIGHT VENTRICULAR END-DIASTOLIC DIMENSION: 4.71 CM
RV TISSUE DOPPLER FREE WALL SYSTOLIC VELOCITY 1 (APICAL 4 CHAMBER VIEW): 16.71 CM/S
SINUS: 3.31 CM
STJ: 2.42 CM
TDI LATERAL: 0.06 M/S
TDI SEPTAL: 0.06 M/S
TDI: 0.06 M/S
TR MAX PG: 31 MMHG
TRICUSPID ANNULAR PLANE SYSTOLIC EXCURSION: 1.95 CM
TV REST PULMONARY ARTERY PRESSURE: 34 MMHG

## 2022-05-18 PROCEDURE — 93306 TTE W/DOPPLER COMPLETE: CPT | Mod: 26,,, | Performed by: INTERNAL MEDICINE

## 2022-05-18 PROCEDURE — 93306 TTE W/DOPPLER COMPLETE: CPT | Mod: PO

## 2022-05-18 PROCEDURE — 93306 ECHO (CUPID ONLY): ICD-10-PCS | Mod: 26,,, | Performed by: INTERNAL MEDICINE

## 2022-06-14 ENCOUNTER — LAB VISIT (OUTPATIENT)
Dept: LAB | Facility: HOSPITAL | Age: 77
End: 2022-06-14
Attending: FAMILY MEDICINE
Payer: MEDICARE

## 2022-06-14 DIAGNOSIS — I10 ESSENTIAL HYPERTENSION: ICD-10-CM

## 2022-06-14 LAB
ALBUMIN SERPL BCP-MCNC: 4.2 G/DL (ref 3.5–5.2)
ALP SERPL-CCNC: 52 U/L (ref 55–135)
ALT SERPL W/O P-5'-P-CCNC: 29 U/L (ref 10–44)
ANION GAP SERPL CALC-SCNC: 8 MMOL/L (ref 8–16)
AST SERPL-CCNC: 31 U/L (ref 10–40)
BASOPHILS # BLD AUTO: 0.04 K/UL (ref 0–0.2)
BASOPHILS NFR BLD: 0.9 % (ref 0–1.9)
BILIRUB SERPL-MCNC: 1.3 MG/DL (ref 0.1–1)
BUN SERPL-MCNC: 18 MG/DL (ref 8–23)
CALCIUM SERPL-MCNC: 10 MG/DL (ref 8.7–10.5)
CHLORIDE SERPL-SCNC: 103 MMOL/L (ref 95–110)
CHOLEST SERPL-MCNC: 153 MG/DL (ref 120–199)
CHOLEST/HDLC SERPL: 3.4 {RATIO} (ref 2–5)
CO2 SERPL-SCNC: 26 MMOL/L (ref 23–29)
CREAT SERPL-MCNC: 1.1 MG/DL (ref 0.5–1.4)
DIFFERENTIAL METHOD: ABNORMAL
EOSINOPHIL # BLD AUTO: 0.1 K/UL (ref 0–0.5)
EOSINOPHIL NFR BLD: 2.4 % (ref 0–8)
ERYTHROCYTE [DISTWIDTH] IN BLOOD BY AUTOMATED COUNT: 12.5 % (ref 11.5–14.5)
EST. GFR  (AFRICAN AMERICAN): >60 ML/MIN/1.73 M^2
EST. GFR  (NON AFRICAN AMERICAN): >60 ML/MIN/1.73 M^2
GLUCOSE SERPL-MCNC: 99 MG/DL (ref 70–110)
HCT VFR BLD AUTO: 43.9 % (ref 40–54)
HDLC SERPL-MCNC: 45 MG/DL (ref 40–75)
HDLC SERPL: 29.4 % (ref 20–50)
HGB BLD-MCNC: 15.1 G/DL (ref 14–18)
IMM GRANULOCYTES # BLD AUTO: 0.01 K/UL (ref 0–0.04)
IMM GRANULOCYTES NFR BLD AUTO: 0.2 % (ref 0–0.5)
LDLC SERPL CALC-MCNC: 77.2 MG/DL (ref 63–159)
LYMPHOCYTES # BLD AUTO: 1.6 K/UL (ref 1–4.8)
LYMPHOCYTES NFR BLD: 35.5 % (ref 18–48)
MCH RBC QN AUTO: 33.6 PG (ref 27–31)
MCHC RBC AUTO-ENTMCNC: 34.4 G/DL (ref 32–36)
MCV RBC AUTO: 98 FL (ref 82–98)
MONOCYTES # BLD AUTO: 0.8 K/UL (ref 0.3–1)
MONOCYTES NFR BLD: 18 % (ref 4–15)
NEUTROPHILS # BLD AUTO: 2 K/UL (ref 1.8–7.7)
NEUTROPHILS NFR BLD: 43 % (ref 38–73)
NONHDLC SERPL-MCNC: 108 MG/DL
NRBC BLD-RTO: 0 /100 WBC
PLATELET # BLD AUTO: 209 K/UL (ref 150–450)
PMV BLD AUTO: 11.4 FL (ref 9.2–12.9)
POTASSIUM SERPL-SCNC: 4.4 MMOL/L (ref 3.5–5.1)
PROT SERPL-MCNC: 6.8 G/DL (ref 6–8.4)
RBC # BLD AUTO: 4.49 M/UL (ref 4.6–6.2)
SODIUM SERPL-SCNC: 137 MMOL/L (ref 136–145)
TRIGL SERPL-MCNC: 154 MG/DL (ref 30–150)
WBC # BLD AUTO: 4.56 K/UL (ref 3.9–12.7)

## 2022-06-14 PROCEDURE — 85025 COMPLETE CBC W/AUTO DIFF WBC: CPT | Performed by: FAMILY MEDICINE

## 2022-06-14 PROCEDURE — 36415 COLL VENOUS BLD VENIPUNCTURE: CPT | Mod: PO | Performed by: FAMILY MEDICINE

## 2022-06-14 PROCEDURE — 80061 LIPID PANEL: CPT | Performed by: FAMILY MEDICINE

## 2022-06-14 PROCEDURE — 80053 COMPREHEN METABOLIC PANEL: CPT | Performed by: FAMILY MEDICINE

## 2022-06-16 ENCOUNTER — HOSPITAL ENCOUNTER (OUTPATIENT)
Dept: RADIOLOGY | Facility: HOSPITAL | Age: 77
Discharge: HOME | End: 2022-06-16
Attending: INTERNAL MEDICINE
Payer: MEDICARE

## 2022-06-16 ENCOUNTER — OFFICE VISIT (OUTPATIENT)
Dept: CARDIOLOGY | Facility: CLINIC | Age: 77
End: 2022-06-16
Payer: MEDICARE

## 2022-06-16 VITALS
HEIGHT: 71 IN | SYSTOLIC BLOOD PRESSURE: 132 MMHG | WEIGHT: 209.19 LBS | HEART RATE: 84 BPM | DIASTOLIC BLOOD PRESSURE: 68 MMHG | BODY MASS INDEX: 29.29 KG/M2

## 2022-06-16 DIAGNOSIS — R60.0 BILATERAL LOWER EXTREMITY EDEMA: ICD-10-CM

## 2022-06-16 DIAGNOSIS — R60.0 LOWER EXTREMITY EDEMA: Primary | ICD-10-CM

## 2022-06-16 DIAGNOSIS — E78.2 MIXED HYPERLIPIDEMIA: ICD-10-CM

## 2022-06-16 DIAGNOSIS — I10 PRIMARY HYPERTENSION: ICD-10-CM

## 2022-06-16 PROCEDURE — 1160F PR REVIEW ALL MEDS BY PRESCRIBER/CLIN PHARMACIST DOCUMENTED: ICD-10-PCS | Mod: CPTII,S$GLB,, | Performed by: INTERNAL MEDICINE

## 2022-06-16 PROCEDURE — 93010 EKG 12-LEAD: ICD-10-PCS | Mod: S$GLB,,, | Performed by: INTERNAL MEDICINE

## 2022-06-16 PROCEDURE — 3075F PR MOST RECENT SYSTOLIC BLOOD PRESS GE 130-139MM HG: ICD-10-PCS | Mod: CPTII,S$GLB,, | Performed by: INTERNAL MEDICINE

## 2022-06-16 PROCEDURE — 3078F PR MOST RECENT DIASTOLIC BLOOD PRESSURE < 80 MM HG: ICD-10-PCS | Mod: CPTII,S$GLB,, | Performed by: INTERNAL MEDICINE

## 2022-06-16 PROCEDURE — 3078F DIAST BP <80 MM HG: CPT | Mod: CPTII,S$GLB,, | Performed by: INTERNAL MEDICINE

## 2022-06-16 PROCEDURE — 3075F SYST BP GE 130 - 139MM HG: CPT | Mod: CPTII,S$GLB,, | Performed by: INTERNAL MEDICINE

## 2022-06-16 PROCEDURE — 93005 ELECTROCARDIOGRAM TRACING: CPT | Mod: PO

## 2022-06-16 PROCEDURE — 1101F PT FALLS ASSESS-DOCD LE1/YR: CPT | Mod: CPTII,S$GLB,, | Performed by: INTERNAL MEDICINE

## 2022-06-16 PROCEDURE — 1159F MED LIST DOCD IN RCRD: CPT | Mod: CPTII,S$GLB,, | Performed by: INTERNAL MEDICINE

## 2022-06-16 PROCEDURE — 1159F PR MEDICATION LIST DOCUMENTED IN MEDICAL RECORD: ICD-10-PCS | Mod: CPTII,S$GLB,, | Performed by: INTERNAL MEDICINE

## 2022-06-16 PROCEDURE — 3288F FALL RISK ASSESSMENT DOCD: CPT | Mod: CPTII,S$GLB,, | Performed by: INTERNAL MEDICINE

## 2022-06-16 PROCEDURE — 3288F PR FALLS RISK ASSESSMENT DOCUMENTED: ICD-10-PCS | Mod: CPTII,S$GLB,, | Performed by: INTERNAL MEDICINE

## 2022-06-16 PROCEDURE — 99999 PR PBB SHADOW E&M-EST. PATIENT-LVL III: CPT | Mod: PBBFAC,,, | Performed by: INTERNAL MEDICINE

## 2022-06-16 PROCEDURE — 99204 OFFICE O/P NEW MOD 45 MIN: CPT | Mod: S$GLB,,, | Performed by: INTERNAL MEDICINE

## 2022-06-16 PROCEDURE — 1126F PR PAIN SEVERITY QUANTIFIED, NO PAIN PRESENT: ICD-10-PCS | Mod: CPTII,S$GLB,, | Performed by: INTERNAL MEDICINE

## 2022-06-16 PROCEDURE — 1101F PR PT FALLS ASSESS DOC 0-1 FALLS W/OUT INJ PAST YR: ICD-10-PCS | Mod: CPTII,S$GLB,, | Performed by: INTERNAL MEDICINE

## 2022-06-16 PROCEDURE — 99204 PR OFFICE/OUTPT VISIT, NEW, LEVL IV, 45-59 MIN: ICD-10-PCS | Mod: S$GLB,,, | Performed by: INTERNAL MEDICINE

## 2022-06-16 PROCEDURE — 71046 XR CHEST PA AND LATERAL: ICD-10-PCS | Mod: 26,,, | Performed by: RADIOLOGY

## 2022-06-16 PROCEDURE — 71046 X-RAY EXAM CHEST 2 VIEWS: CPT | Mod: TC,FY,PO

## 2022-06-16 PROCEDURE — 93010 ELECTROCARDIOGRAM REPORT: CPT | Mod: S$GLB,,, | Performed by: INTERNAL MEDICINE

## 2022-06-16 PROCEDURE — 1160F RVW MEDS BY RX/DR IN RCRD: CPT | Mod: CPTII,S$GLB,, | Performed by: INTERNAL MEDICINE

## 2022-06-16 PROCEDURE — 99999 PR PBB SHADOW E&M-EST. PATIENT-LVL III: ICD-10-PCS | Mod: PBBFAC,,, | Performed by: INTERNAL MEDICINE

## 2022-06-16 PROCEDURE — 71046 X-RAY EXAM CHEST 2 VIEWS: CPT | Mod: 26,,, | Performed by: RADIOLOGY

## 2022-06-16 PROCEDURE — 1126F AMNT PAIN NOTED NONE PRSNT: CPT | Mod: CPTII,S$GLB,, | Performed by: INTERNAL MEDICINE

## 2022-06-16 NOTE — PROGRESS NOTES
Subjective:    Patient ID:  Lloyd John Jr. is a 76 y.o. male who presents for evaluation of No chief complaint on file.      Problem List Items Addressed This Visit        Cardiac/Vascular    Mixed hyperlipidemia    Primary hypertension      Other Visit Diagnoses     Lower extremity edema    -  Primary          HPI    Here to establish care  Had recent echocardiogram showed preserved ejection fraction without acute abnormalities    The patient states that he feels OK.    No chest pain.  No shortness of breath.    Having some leg swelling for 2 months    Heart failure symptoms - No  Frequency of Lasix use - daily, but without benefit    Personal history of heart attack or stroke - None that he is aware of  Family history of heart disease - Dad with MI at 50, and uncles 55 with MI    Past Medical History:   Diagnosis Date    Bladder cancer     Followed by Dr. Craig in [place]    Colon polyp     Bunny Payton / [facility]    Glaucoma (increased eye pressure)     HLD (hyperlipidemia)     HTN (hypertension)     Prostate cancer        Past Surgical History:   Procedure Laterality Date    BACK SURGERY  2019    bladder scrapping      COLONOSCOPY    [facility] Payton/[facility]    Polyps    COLONOSCOPY  2012  [facility]    Diverticulosis and spasms.  No polyps.    Urolift implant  2017       Family History   Problem Relation Age of Onset    Coronary artery disease Father        Social History     Socioeconomic History    Marital status:     Number of children: 2   Occupational History    Occupation:  for law firm   Tobacco Use    Smoking status: Former Smoker     Quit date: 1997     Years since quittin.4    Smokeless tobacco: Never Used   Substance and Sexual Activity    Alcohol use: Yes     Alcohol/week: 1.7 standard drinks     Types: 2 drink(s) per week     Comment: weekends    Drug use: No     Social Determinants of Health     Financial Resource Strain: Low Risk      Difficulty of Paying Living Expenses: Not hard at all   Food Insecurity: No Food Insecurity    Worried About Running Out of Food in the Last Year: Never true    Ran Out of Food in the Last Year: Never true   Transportation Needs: No Transportation Needs    Lack of Transportation (Medical): No    Lack of Transportation (Non-Medical): No   Physical Activity: Insufficiently Active    Days of Exercise per Week: 2 days    Minutes of Exercise per Session: 20 min   Stress: No Stress Concern Present    Feeling of Stress : Only a little   Social Connections: Unknown    Frequency of Communication with Friends and Family: More than three times a week    Frequency of Social Gatherings with Friends and Family: Twice a week    Active Member of Clubs or Organizations: Yes    Attends Club or Organization Meetings: More than 4 times per year    Marital Status:    Housing Stability: Low Risk     Unable to Pay for Housing in the Last Year: No    Number of Places Lived in the Last Year: 1    Unstable Housing in the Last Year: No       Review of patient's allergies indicates:   Allergen Reactions    Bactrim [sulfamethoxazole-trimethoprim] Hives and Rash    Povidone-iodine Other (See Comments)       Review of Systems   Constitutional: Negative for decreased appetite, fever and malaise/fatigue.   Eyes: Negative for blurred vision.   Cardiovascular: Negative for chest pain, dyspnea on exertion, irregular heartbeat and leg swelling.   Respiratory: Negative for cough, hemoptysis, shortness of breath and wheezing.    Endocrine: Negative for cold intolerance and heat intolerance.   Hematologic/Lymphatic: Negative for bleeding problem.   Musculoskeletal: Negative for muscle weakness and myalgias.   Gastrointestinal: Negative for abdominal pain, constipation and diarrhea.   Genitourinary: Negative for bladder incontinence.   Neurological: Negative for dizziness and weakness.   Psychiatric/Behavioral: Negative for  "depression.        Objective:     Vitals:    06/16/22 1426   BP: 132/68   BP Location: Right arm   Patient Position: Sitting   BP Method: Medium (Automatic)   Pulse: 84   Weight: 94.9 kg (209 lb 3.5 oz)   Height: 5' 11" (1.803 m)        Physical Exam  Constitutional:       Appearance: He is well-developed.   HENT:      Head: Normocephalic and atraumatic.   Neck:      Vascular: No JVD.   Cardiovascular:      Rate and Rhythm: Normal rate and regular rhythm.      Heart sounds: Normal heart sounds. No murmur heard.    No friction rub. No gallop.   Pulmonary:      Effort: Pulmonary effort is normal. No respiratory distress.      Breath sounds: Normal breath sounds. No wheezing or rales.   Abdominal:      General: Bowel sounds are normal.      Palpations: Abdomen is soft.      Tenderness: There is no abdominal tenderness. There is no guarding or rebound.   Musculoskeletal:      Cervical back: Normal range of motion and neck supple.   Skin:     General: Skin is warm and dry.   Neurological:      Mental Status: He is alert and oriented to person, place, and time.   Psychiatric:         Behavior: Behavior normal.             Current Outpatient Medications on File Prior to Visit   Medication Sig    amLODIPine (NORVASC) 5 MG tablet Take 1 tablet (5 mg total) by mouth once daily.    ascorbic acid, vitamin C, (VITAMIN C) 1000 MG tablet Take 1,000 mg by mouth once daily.    BOOSTRIX TDAP 2.5-8-5 Lf-mcg-Lf/0.5mL Syrg injection     brimonidine 0.2% (ALPHAGAN) 0.2 % Drop     COMBIGAN 0.2-0.5 % Drop     furosemide (LASIX) 20 MG tablet Take 1 tablet (20 mg total) by mouth daily as needed (take daily x 2-3 days as needed for leg swelling).    LUMIGAN 0.01 % Drop     MULTIVITAMIN W-MINERALS/LUTEIN (CENTRUM SILVER ORAL) Take by mouth.    olmesartan (BENICAR) 40 MG tablet Take 1 tablet (40 mg total) by mouth once daily.    rosuvastatin (CRESTOR) 10 MG tablet Take 1 tablet (10 mg total) by mouth once daily.    timolol maleate " 0.5% (TIMOPTIC) 0.5 % Drop      No current facility-administered medications on file prior to visit.       Lipid Panel:   Lab Results   Component Value Date    CHOL 153 06/14/2022    HDL 45 06/14/2022    LDLCALC 77.2 06/14/2022    TRIG 154 (H) 06/14/2022    CHOLHDL 29.4 06/14/2022         The 10-year ASCVD risk score (Zina HIGHTOWER Jr., et al., 2013) is: 34.1%    Values used to calculate the score:      Age: 76 years      Sex: Male      Is Non- : No      Diabetic: No      Tobacco smoker: No      Systolic Blood Pressure: 132 mmHg      Is BP treated: Yes      HDL Cholesterol: 45 mg/dL      Total Cholesterol: 153 mg/dL    All pertinent labs, imaging, and EKGs reviewed.  Patient's most recent EKG tracing was personally interpreted by this provider.    Assessment:       1. Lower extremity edema    2. Primary hypertension    3. Mixed hyperlipidemia         Plan:     Symptoms OK  BP/Pulse OK today  Most recent echocardiogram reviewed personally     Chest x-ray/BNP   Bilateral lower extremity venous Doppler   Continue amlodipine 5 mg PO Daily for now  If workup completely negative, consider changing amlodipine to alternative to see if leg swelling improves.  Continue Lasix 20 mg PO Daily PRN volume   Continue Benicar 40 mg PO Daily  Continue Crestor 10 mg PO Daily    Continue other cardiac medications  Mediterranean Diet/Cardiovascular Exercise Program    Patient queried and all questions were answered.    F/u in 6 months to reassess      Signed:    Rafita Hernandez MD  6/16/2022 7:58 AM

## 2022-06-20 ENCOUNTER — DOCUMENTATION ONLY (OUTPATIENT)
Dept: FAMILY MEDICINE | Facility: CLINIC | Age: 77
End: 2022-06-20
Payer: MEDICARE

## 2022-06-21 ENCOUNTER — OFFICE VISIT (OUTPATIENT)
Dept: FAMILY MEDICINE | Facility: CLINIC | Age: 77
End: 2022-06-21
Payer: MEDICARE

## 2022-06-21 VITALS
WEIGHT: 210.31 LBS | HEIGHT: 71 IN | DIASTOLIC BLOOD PRESSURE: 70 MMHG | OXYGEN SATURATION: 97 % | TEMPERATURE: 99 F | SYSTOLIC BLOOD PRESSURE: 124 MMHG | BODY MASS INDEX: 29.44 KG/M2 | HEART RATE: 70 BPM | RESPIRATION RATE: 18 BRPM

## 2022-06-21 DIAGNOSIS — I10 PRIMARY HYPERTENSION: Primary | ICD-10-CM

## 2022-06-21 DIAGNOSIS — Z12.11 COLON CANCER SCREENING: ICD-10-CM

## 2022-06-21 DIAGNOSIS — C61 PROSTATE CANCER: ICD-10-CM

## 2022-06-21 DIAGNOSIS — E78.5 HYPERLIPIDEMIA, UNSPECIFIED HYPERLIPIDEMIA TYPE: ICD-10-CM

## 2022-06-21 DIAGNOSIS — C67.9 MALIGNANT NEOPLASM OF URINARY BLADDER, UNSPECIFIED SITE: ICD-10-CM

## 2022-06-21 PROCEDURE — 3288F FALL RISK ASSESSMENT DOCD: CPT | Mod: CPTII,S$GLB,, | Performed by: FAMILY MEDICINE

## 2022-06-21 PROCEDURE — 99999 PR PBB SHADOW E&M-EST. PATIENT-LVL III: CPT | Mod: PBBFAC,,, | Performed by: FAMILY MEDICINE

## 2022-06-21 PROCEDURE — 3078F DIAST BP <80 MM HG: CPT | Mod: CPTII,S$GLB,, | Performed by: FAMILY MEDICINE

## 2022-06-21 PROCEDURE — 1126F PR PAIN SEVERITY QUANTIFIED, NO PAIN PRESENT: ICD-10-PCS | Mod: CPTII,S$GLB,, | Performed by: FAMILY MEDICINE

## 2022-06-21 PROCEDURE — 99214 OFFICE O/P EST MOD 30 MIN: CPT | Mod: S$GLB,,, | Performed by: FAMILY MEDICINE

## 2022-06-21 PROCEDURE — 3288F PR FALLS RISK ASSESSMENT DOCUMENTED: ICD-10-PCS | Mod: CPTII,S$GLB,, | Performed by: FAMILY MEDICINE

## 2022-06-21 PROCEDURE — 3074F PR MOST RECENT SYSTOLIC BLOOD PRESSURE < 130 MM HG: ICD-10-PCS | Mod: CPTII,S$GLB,, | Performed by: FAMILY MEDICINE

## 2022-06-21 PROCEDURE — 1101F PR PT FALLS ASSESS DOC 0-1 FALLS W/OUT INJ PAST YR: ICD-10-PCS | Mod: CPTII,S$GLB,, | Performed by: FAMILY MEDICINE

## 2022-06-21 PROCEDURE — 1159F MED LIST DOCD IN RCRD: CPT | Mod: CPTII,S$GLB,, | Performed by: FAMILY MEDICINE

## 2022-06-21 PROCEDURE — 3074F SYST BP LT 130 MM HG: CPT | Mod: CPTII,S$GLB,, | Performed by: FAMILY MEDICINE

## 2022-06-21 PROCEDURE — 99214 PR OFFICE/OUTPT VISIT, EST, LEVL IV, 30-39 MIN: ICD-10-PCS | Mod: S$GLB,,, | Performed by: FAMILY MEDICINE

## 2022-06-21 PROCEDURE — 3078F PR MOST RECENT DIASTOLIC BLOOD PRESSURE < 80 MM HG: ICD-10-PCS | Mod: CPTII,S$GLB,, | Performed by: FAMILY MEDICINE

## 2022-06-21 PROCEDURE — 99999 PR PBB SHADOW E&M-EST. PATIENT-LVL III: ICD-10-PCS | Mod: PBBFAC,,, | Performed by: FAMILY MEDICINE

## 2022-06-21 PROCEDURE — 1101F PT FALLS ASSESS-DOCD LE1/YR: CPT | Mod: CPTII,S$GLB,, | Performed by: FAMILY MEDICINE

## 2022-06-21 PROCEDURE — 1159F PR MEDICATION LIST DOCUMENTED IN MEDICAL RECORD: ICD-10-PCS | Mod: CPTII,S$GLB,, | Performed by: FAMILY MEDICINE

## 2022-06-21 PROCEDURE — 1126F AMNT PAIN NOTED NONE PRSNT: CPT | Mod: CPTII,S$GLB,, | Performed by: FAMILY MEDICINE

## 2022-06-21 RX ORDER — GABAPENTIN 600 MG/1
600 TABLET ORAL NIGHTLY PRN
COMMUNITY
Start: 2022-03-14 | End: 2023-06-22

## 2022-06-21 NOTE — PROGRESS NOTES
Chief Complaint   Patient presents with    Hypertension     6 month follow up with labs     HPI:This is a 76 year-old male presents for 6 month f/u on chronic jose m issues    HLD - tolerating Crestor 10mg and Fish Oil  HTN - tolerating amlodipine 5mg, Benicar 40mg; BP well-controlled at 112/75  Bladder/prostate cancer ([place] [facility]) - following with urology, Dr. Bowman,  every 6 months.   Considering robotic prostatectomy, but now just doing watchful waiting. PSA has been stable around 10  Glaucoma - on Combigan and Lumigan; followed by Dr. Emery  Lumbar DDD with right sciatica, cervical radiculopathy - resolved with back surgery in 2019; taking gabapentin 600mg PRN      PAST MEDICAL HISTORY:  Prostate cancer - [place] [facility]; following with watchful waiting  Bladder carcinoma - followed with Dr. Bowman every 3 months; treated in past with Thiotrpa, BCG  HLD (Dx )  HTN (Dx )  colon polyp  Lumbar DDD    PAST SURGICAL HISTORY:  multiple bladder scrappings (11 total)    FAMILY HISTORY:  Father:  at 50 of CAD  Mother:  at 78 insterstial lung fibrosis, carotid artery disease  Grandpartents unknown    SOCIAL HISTORY:  Tobacco use: quit    Alcohol use: 2 drinks of bourbon daily  Occupation:  for law firm  Marital status: (wife with schizophrenia)  Children: 2  enjoys wood working and fishing  exercises daily    REVIEW OF SYSTEMS:  GENERAL: No fever, chills, fatigability or weight changes.  SKIN/BREASTS: No rashes, sores, itching or changes in color or texture of skin.   HEAD: No headaches or recent head trauma.   EYES: Visual acuity fine. Denies blurriness, tearing, itching, photophobia, diplopia, or visual changes.   EARS: Denies ear pain, discharge, tinnitus or vertigo. Denies hearing loss.   NOSE: No loss of smell, epistaxis, postnasal drip, discharge, obstruction, or sneezing.  MOUTH & THROAT: No hoarseness, change in voice, swallowing difficulty. No excessive gum bleeding.  "  HEMATOLOGICAL/NODES: Denies swollen glands. No bleeding or bruising.  CHEST: No TOLEDO, cyanosis, wheezing, cough or sputum production.  CARDIOVASCULAR: Denies chest pain, dyspnea, orthopnea, or palpitations.  GI/ABDOMEN: Appetite fine. No weight loss. Denies nausea, vomiting, diarrhea, constipation, abdominal pain, hematemesis or blood in stool.  URINARY: No dysuria,hematuria, nocturia, incontinence, flank pain, urgency, or urinary difficulty.  PERIPHERAL VASCULAR: No claudication, cold intolerance or cyanosis.  NEUROLOGIC: No history of seizures, paralysis, alteration of gait or coordination.  ENDOCRINE: Sexual maturation. Denies weight change, heat/cold intolerance, hair loss or gain, loss of libido, polyuria, polydipsia, polyphagia, pruritus, unexplained flushing, or excessive sweating.   PSYCH: No crying spells. Denies anxiety symptoms.      PHYSICAL EXAM:  /70   Pulse 70   Temp 98.5 °F (36.9 °C) (Oral)   Resp 18   Ht 5' 11" (1.803 m)   Wt 95.4 kg (210 lb 5.1 oz)   SpO2 97%   BMI 29.33 kg/m²     APPEARANCE: Well nourished, well developed, neatly groomed, in no acute distress.   SKIN: Warm, moist and dry. No lesions or rashes.  EYES: Conjunctivae and lids clear. PERRL. EOMI.   NOSE: Mucosa pink. Nares clear.  MOUTH & THROAT: Oral mucosa pink. No tonsillar enlargement. No pharyngeal erythema or exudate.   NECK: Supple with full range of motion. No masses. No thyromegaly. No JVD or bruits.  LYMPH: No cervical, supraclavicular, axillary or inguinal lymph node enlargement.  RESPIRATORY: Easy and unlabored respirations. Lungs clear to auscultation throughout all lung fields. No wheezes or rales.  CARDIOVASCULAR: Regular rate and rhythm. Normal S1, S2. No rubs, murmurs or gallops.   ABDOMEN: Bowel sounds normal. Nondistended and soft. No hepatosplenomegaly, masses, or tenderness.  EXTREMITIES: No edema or cyanosis. Pedal pulses 2+ bilaterally. No varicosities   NEUROLOGIC: Cranial Nerves: II-XII grossly " intact  Gait & Posture: Normal gait and fine motion.  PSYCH: Awake, alert, oriented to person, place, time, and situation. Pleasant and cooperative mood with intact judgment.    Results for orders placed or performed in visit on 06/16/22   BNP   Result Value Ref Range    BNP <10 0 - 99 pg/mL       ASSESSMENT/PLAN:  Primary hypertension  -     CBC Auto Differential; Future; Expected date: 12/18/2022    Hyperlipidemia, unspecified hyperlipidemia type  -     Comprehensive Metabolic Panel; Future; Expected date: 12/18/2022  -     Lipid Panel; Future; Expected date: 12/18/2022    Malignant neoplasm of urinary bladder, unspecified site    Prostate cancer    Colon cancer screening  -     Fecal Immunochemical Test (iFOBT); Future; Expected date: 06/21/2022       overall doing well  Fit kit  1. HTN (controlled) - continue amlodipine 5mg and Benicar 40mg  2. HLD (controlled) - continue Crestor 10mg daily, Fish Oil 1000mg daily  3. bladder carcinoma/prostate cancer - continue present treatment and f/u with Urology    f/u 6 months with CMP, lipid profile, CBC

## 2022-07-20 ENCOUNTER — CLINICAL SUPPORT (OUTPATIENT)
Dept: CARDIOLOGY | Facility: HOSPITAL | Age: 77
End: 2022-07-20
Attending: INTERNAL MEDICINE
Payer: MEDICARE

## 2022-07-20 DIAGNOSIS — R60.0 BILATERAL LOWER EXTREMITY EDEMA: ICD-10-CM

## 2022-07-20 PROCEDURE — 93970 CV US LOWER VENOUS INSUFFICIENCY BILATERAL (CUPID ONLY): ICD-10-PCS | Mod: 26,,, | Performed by: INTERNAL MEDICINE

## 2022-07-20 PROCEDURE — 93970 EXTREMITY STUDY: CPT | Mod: 26,,, | Performed by: INTERNAL MEDICINE

## 2022-07-20 PROCEDURE — 93970 EXTREMITY STUDY: CPT | Mod: TC,PO

## 2022-07-21 LAB
LEFT GREAT SAPHENOUS DISTAL THIGH DIA: 0.4 CM
LEFT GREAT SAPHENOUS JUNCTION DIA: 0.9 CM
LEFT GREAT SAPHENOUS KNEE DIA: 0.3 CM
LEFT GREAT SAPHENOUS MIDDLE THIGH DIA: 0.8 CM
LEFT GREAT SAPHENOUS PROXIMAL CALF DIA: 0.2 CM
LEFT SMALL SAPHENOUS KNEE DIA: 0.6 CM
LEFT SMALL SAPHENOUS SPJ DIA: 0.5 CM
RIGHT GREAT SAPHENOUS DISTAL THIGH DIA: 0.5 CM
RIGHT GREAT SAPHENOUS JUNCTION DIA: 0.7 CM
RIGHT GREAT SAPHENOUS KNEE DIA: 0.4 CM
RIGHT GREAT SAPHENOUS MIDDLE THIGH DIA: 0.5 CM
RIGHT GREAT SAPHENOUS PROXIMAL CALF DIA: 0.04 CM
RIGHT SMALL SAPHENOUS KNEE DIA: 0.8 CM
RIGHT SMALL SAPHENOUS SPJ DIA: 0.4 CM

## 2022-07-22 ENCOUNTER — PATIENT MESSAGE (OUTPATIENT)
Dept: CARDIOLOGY | Facility: CLINIC | Age: 77
End: 2022-07-22
Payer: MEDICARE

## 2022-07-25 RX ORDER — NIFEDIPINE 30 MG/1
30 TABLET, FILM COATED, EXTENDED RELEASE ORAL DAILY
Qty: 30 TABLET | Refills: 11 | Status: SHIPPED | OUTPATIENT
Start: 2022-07-25 | End: 2022-09-08 | Stop reason: ALTCHOICE

## 2022-08-17 ENCOUNTER — TELEPHONE (OUTPATIENT)
Dept: UROLOGY | Facility: CLINIC | Age: 77
End: 2022-08-17
Payer: MEDICARE

## 2022-08-22 ENCOUNTER — PATIENT MESSAGE (OUTPATIENT)
Dept: CARDIOLOGY | Facility: CLINIC | Age: 77
End: 2022-08-22
Payer: MEDICARE

## 2022-08-24 ENCOUNTER — TELEPHONE (OUTPATIENT)
Dept: UROLOGY | Facility: CLINIC | Age: 77
End: 2022-08-24

## 2022-08-24 ENCOUNTER — OFFICE VISIT (OUTPATIENT)
Dept: UROLOGY | Facility: CLINIC | Age: 77
End: 2022-08-24
Payer: MEDICARE

## 2022-08-24 ENCOUNTER — LAB VISIT (OUTPATIENT)
Dept: LAB | Facility: HOSPITAL | Age: 77
End: 2022-08-24
Attending: STUDENT IN AN ORGANIZED HEALTH CARE EDUCATION/TRAINING PROGRAM
Payer: MEDICARE

## 2022-08-24 VITALS — WEIGHT: 214 LBS | BODY MASS INDEX: 29.96 KG/M2 | HEIGHT: 71 IN

## 2022-08-24 DIAGNOSIS — N40.1 BPH WITH URINARY OBSTRUCTION: ICD-10-CM

## 2022-08-24 DIAGNOSIS — N13.8 BPH WITH URINARY OBSTRUCTION: ICD-10-CM

## 2022-08-24 DIAGNOSIS — C61 PROSTATE CANCER: ICD-10-CM

## 2022-08-24 DIAGNOSIS — C61 PROSTATE CANCER: Primary | ICD-10-CM

## 2022-08-24 DIAGNOSIS — Z85.51 HISTORY OF BLADDER CANCER: ICD-10-CM

## 2022-08-24 LAB
BACTERIA #/AREA URNS HPF: ABNORMAL /HPF
COMPLEXED PSA SERPL-MCNC: 11.2 NG/ML (ref 0–4)
MICROSCOPIC COMMENT: ABNORMAL
WBC #/AREA URNS HPF: 5 /HPF (ref 0–5)

## 2022-08-24 PROCEDURE — 99499 RISK ADDL DX/OHS AUDIT: ICD-10-PCS | Mod: S$GLB,,, | Performed by: STUDENT IN AN ORGANIZED HEALTH CARE EDUCATION/TRAINING PROGRAM

## 2022-08-24 PROCEDURE — 36415 COLL VENOUS BLD VENIPUNCTURE: CPT | Mod: PO | Performed by: STUDENT IN AN ORGANIZED HEALTH CARE EDUCATION/TRAINING PROGRAM

## 2022-08-24 PROCEDURE — 1101F PT FALLS ASSESS-DOCD LE1/YR: CPT | Mod: CPTII,S$GLB,, | Performed by: STUDENT IN AN ORGANIZED HEALTH CARE EDUCATION/TRAINING PROGRAM

## 2022-08-24 PROCEDURE — 99999 PR PBB SHADOW E&M-EST. PATIENT-LVL III: ICD-10-PCS | Mod: PBBFAC,,, | Performed by: STUDENT IN AN ORGANIZED HEALTH CARE EDUCATION/TRAINING PROGRAM

## 2022-08-24 PROCEDURE — 99499 UNLISTED E&M SERVICE: CPT | Mod: S$GLB,,, | Performed by: STUDENT IN AN ORGANIZED HEALTH CARE EDUCATION/TRAINING PROGRAM

## 2022-08-24 PROCEDURE — 3288F FALL RISK ASSESSMENT DOCD: CPT | Mod: CPTII,S$GLB,, | Performed by: STUDENT IN AN ORGANIZED HEALTH CARE EDUCATION/TRAINING PROGRAM

## 2022-08-24 PROCEDURE — 99999 PR PBB SHADOW E&M-EST. PATIENT-LVL III: CPT | Mod: PBBFAC,,, | Performed by: STUDENT IN AN ORGANIZED HEALTH CARE EDUCATION/TRAINING PROGRAM

## 2022-08-24 PROCEDURE — 99205 OFFICE O/P NEW HI 60 MIN: CPT | Mod: S$GLB,,, | Performed by: STUDENT IN AN ORGANIZED HEALTH CARE EDUCATION/TRAINING PROGRAM

## 2022-08-24 PROCEDURE — 84153 ASSAY OF PSA TOTAL: CPT | Performed by: STUDENT IN AN ORGANIZED HEALTH CARE EDUCATION/TRAINING PROGRAM

## 2022-08-24 PROCEDURE — 99205 PR OFFICE/OUTPT VISIT, NEW, LEVL V, 60-74 MIN: ICD-10-PCS | Mod: S$GLB,,, | Performed by: STUDENT IN AN ORGANIZED HEALTH CARE EDUCATION/TRAINING PROGRAM

## 2022-08-24 PROCEDURE — 1159F PR MEDICATION LIST DOCUMENTED IN MEDICAL RECORD: ICD-10-PCS | Mod: CPTII,S$GLB,, | Performed by: STUDENT IN AN ORGANIZED HEALTH CARE EDUCATION/TRAINING PROGRAM

## 2022-08-24 PROCEDURE — 1126F AMNT PAIN NOTED NONE PRSNT: CPT | Mod: CPTII,S$GLB,, | Performed by: STUDENT IN AN ORGANIZED HEALTH CARE EDUCATION/TRAINING PROGRAM

## 2022-08-24 PROCEDURE — 1126F PR PAIN SEVERITY QUANTIFIED, NO PAIN PRESENT: ICD-10-PCS | Mod: CPTII,S$GLB,, | Performed by: STUDENT IN AN ORGANIZED HEALTH CARE EDUCATION/TRAINING PROGRAM

## 2022-08-24 PROCEDURE — 81000 URINALYSIS NONAUTO W/SCOPE: CPT | Mod: PO | Performed by: STUDENT IN AN ORGANIZED HEALTH CARE EDUCATION/TRAINING PROGRAM

## 2022-08-24 PROCEDURE — 3288F PR FALLS RISK ASSESSMENT DOCUMENTED: ICD-10-PCS | Mod: CPTII,S$GLB,, | Performed by: STUDENT IN AN ORGANIZED HEALTH CARE EDUCATION/TRAINING PROGRAM

## 2022-08-24 PROCEDURE — 1159F MED LIST DOCD IN RCRD: CPT | Mod: CPTII,S$GLB,, | Performed by: STUDENT IN AN ORGANIZED HEALTH CARE EDUCATION/TRAINING PROGRAM

## 2022-08-24 PROCEDURE — 1101F PR PT FALLS ASSESS DOC 0-1 FALLS W/OUT INJ PAST YR: ICD-10-PCS | Mod: CPTII,S$GLB,, | Performed by: STUDENT IN AN ORGANIZED HEALTH CARE EDUCATION/TRAINING PROGRAM

## 2022-08-24 RX ORDER — FUROSEMIDE 20 MG/1
TABLET ORAL
COMMUNITY
Start: 2022-06-25 | End: 2022-09-07 | Stop reason: CLARIF

## 2022-08-24 NOTE — PROGRESS NOTES
[facility] - [facility]  Clinic Note    SUBJECTIVE:     Chief Complaint: prostate cancer and bladder cancer    History of Present Illness:  Lloyd John Jr. is a 76 y.o. male who presents to clinic for evaluation and management of prostate cancer and history of bladder cancer. He is new to our clinic to our clinic. He has a complex urologic history and has been seen by many different providers, most recently with Dr. Barbosa. He wishes to establish urologic care here.     I personally reviewed his outside records including the following labs, pathology, and operative notes.     Prostate cancer  PSA trend  2/2017 - 6.0  9/2017 - 10.4  1/2018 - 6.5  9/2018 - 8.5  4/2019 - 11.75  2/2021 - 12.05  8/2021 - 11.91  3/2022 - 11.9    Biopsies  5/2016 - GG1 LA 1/2 (total cores 1/15)  10/2017 - GG2 LLM 1/1, GG1 RA 1/1 (total cores 2/12)  7/2019 - GG1 LA 1/1, RA 1/2, LM 1/1, LA 1/1 (total cores 4/14)    Family History - no family history of prostate cancer  Volume - 68 cc (MRI 2022)  PSA density - 0.175  Percentage of cores positive - 28%  Stage - T1c  MRI - P4 L PZ B, P4 R PZ A. No EPE, SVI, NVB, or nodes.    NCCN - favorable intermediate  Prolaris score (10/2017) - 4.9, 8.7% 10 year mortality (with conservative mgmt), 5.4% 10 year metastasis risk (with definitive treatment).    Bladder Cancer Chronology  1997? dx - no pathology available for review, s/p BCG and Thiotepa    6/2014 - negative cysto and b/l RPG  1/2015 - negative cysto and b/l RPG, negative cytology  7/2016 - negative cysto  11/2015 - negative cysto  5/2016 - negative cysto, negative cytology, TRUS bx, 35 cc  3/2017 - negative cysto and Urolift  10/2017 - negative cysto, TRUS bx, 42 cc  3/2018 - negative cysto with b/l RPG  11/2018 - negative cysto with b/l RPG, negative cytology  7/2019 - negative cysto, negative cytology, TRUS bx, 42 cc,   3/2021 - negative cysto, negative cytology   9/2021 - negative cytology    No family history of bladder  "cancer    BPH  Urolift in 3/2017 with Dr. Barbosa with 4 implants. AUASS today 2/1.  He reports excellent results since the urolift. Not currently requiring any BPH medications  A post void residual bladder scan was performed immediately after voiding. His PVR was noted to be 57 cc.     He reports no previous abdominal surgeries    Anticoagulation:  No    OBJECTIVE:     Estimated body mass index is 29.85 kg/m² as calculated from the following:    Height as of this encounter: 5' 11" (1.803 m).    Weight as of this encounter: 97.1 kg (214 lb).    Vital Signs (Most Recent)       Physical Exam  Vitals and nursing note reviewed.   Constitutional:       General: He is not in acute distress.     Appearance: Normal appearance. He is well-developed. He is not ill-appearing or toxic-appearing.   Pulmonary:      Effort: Pulmonary effort is normal. No accessory muscle usage or respiratory distress.   Abdominal:      General: There is no distension.      Palpations: Abdomen is soft.      Tenderness: There is no abdominal tenderness. There is no right CVA tenderness or left CVA tenderness.   Neurological:      General: No focal deficit present.      Mental Status: He is alert and oriented to person, place, and time. Mental status is at baseline.   Psychiatric:         Thought Content: Thought content normal.         Judgment: Judgment normal.         Lab Results   Component Value Date    BUN 18 06/14/2022    CREATININE 1.1 06/14/2022    WBC 4.56 06/14/2022    HGB 15.1 06/14/2022    HCT 43.9 06/14/2022     06/14/2022    AST 31 06/14/2022    ALT 29 06/14/2022    ALKPHOS 52 (L) 06/14/2022    ALBUMIN 4.2 06/14/2022        Lab Results   Component Value Date    PSAFREE 1.19 11/02/2015    PSAFREEPCT 16.53 11/02/2015     ASSESSMENT     1. Prostate cancer    2. BPH with urinary obstruction    3. History of bladder cancer      PLAN:   Lloyd was seen today for other.    Diagnoses and all orders for this visit:    Prostate " cancer    BPH with urinary obstruction    History of bladder cancer       [id number] Aubrey 3+4=7 prostate adenocarcinoma.    We discussed his prostate cancer, the Aubrey grading, cancer staging and the NCCN risk categories. He has favorable intermediate risk disease. We discussed options for this risk category including active surveillance versus treatment including prostatectomy or radiation therapy. He has been on active surveillance, and I currently agree to continue this plan. Despite his relatively good health, I think that given his age radiation would be the best course of action if treatment were planned.     Per the NCCN, the protocol consists of a confirmatory biopsy with 1-2 years of the initial biopsy. An MRI may be ordered to evaluate for higher stage disease, and may be used to guide future biopsies. PSA tests will be drawn at 6 months or greater intervals and NA at 12 months or greater intervals.    I independently reviewed and interpreted his MRI from January of 2022.  There were 2 PI-RADS 4 lesions 1 at the left base in the peripheral zone and 1 at the right apex.  Images are somewhat limited due to the uro lift device.  The PSA density is not overly concerning, however given his PSA above 10 I would like to biopsy he has a PI-RADS 4 lesions before making a definitive decision on further treatment.    - Plan for PSA today as his last one was in March  - will plan for [facility] prostate biopsy and cystoscopy at the time   - if the biopsy remains consistent with previous biopsies, I feel that we can continue to monitor with just PSA, if there is more aggressive disease, I feel that radiation therapy may be warranted in which case we could seek a rad Onc consultation    - I do not have the original bladder cancer pathologies, however given the relative stability and consistently normal findings as far back as 2014, I feel that there is unlikely a significant risk of bladder cancer recurrence at  this point.  If cystoscopy is normal I feel that continued surveillance with cystoscopy is unlikely to be necessary    - he is doing excellent from a BPH standpoint    Justo Luciano II, MD     I spent a total of 60 minutes on the day of the visit.  This includes face to face time and non-face to face time preparing to see the patient (eg, review of tests), obtaining and/or reviewing separately obtained history, documenting clinical information in the electronic or other health record, independently interpreting results and communicating results to the patient/family/caregiver, or care coordinator.

## 2022-09-02 ENCOUNTER — TELEPHONE (OUTPATIENT)
Dept: UROLOGY | Facility: CLINIC | Age: 77
End: 2022-09-02
Payer: MEDICARE

## 2022-09-07 ENCOUNTER — TELEPHONE (OUTPATIENT)
Dept: CARDIOLOGY | Facility: CLINIC | Age: 77
End: 2022-09-07
Payer: MEDICARE

## 2022-09-07 ENCOUNTER — PATIENT MESSAGE (OUTPATIENT)
Dept: CARDIOLOGY | Facility: CLINIC | Age: 77
End: 2022-09-07
Payer: MEDICARE

## 2022-09-07 NOTE — TELEPHONE ENCOUNTER
----- Message from Cat Cervantes RN sent at 9/7/2022  8:17 AM CDT -----  At preop vs this am, pt's initial bp was 189/120; retake was 173/90.  He felt fine.  He said he is holding his bp meds with today being last day.  Just wanted to let  you know.

## 2022-09-08 DIAGNOSIS — I10 PRIMARY HYPERTENSION: Primary | ICD-10-CM

## 2022-09-08 RX ORDER — CARVEDILOL 6.25 MG/1
6.25 TABLET ORAL 2 TIMES DAILY WITH MEALS
Qty: 60 TABLET | Refills: 9 | Status: SHIPPED | OUTPATIENT
Start: 2022-09-08 | End: 2023-01-10 | Stop reason: DRUGHIGH

## 2022-09-09 ENCOUNTER — TELEPHONE (OUTPATIENT)
Dept: UROLOGY | Facility: CLINIC | Age: 77
End: 2022-09-09

## 2022-09-19 ENCOUNTER — PATIENT MESSAGE (OUTPATIENT)
Dept: CARDIOLOGY | Facility: CLINIC | Age: 77
End: 2022-09-19
Payer: MEDICARE

## 2022-09-21 ENCOUNTER — OFFICE VISIT (OUTPATIENT)
Dept: URGENT CARE | Facility: CLINIC | Age: 77
End: 2022-09-21
Payer: MEDICARE

## 2022-09-21 VITALS
DIASTOLIC BLOOD PRESSURE: 84 MMHG | OXYGEN SATURATION: 96 % | HEART RATE: 78 BPM | WEIGHT: 208 LBS | TEMPERATURE: 98 F | SYSTOLIC BLOOD PRESSURE: 145 MMHG | HEIGHT: 71 IN | RESPIRATION RATE: 18 BRPM | BODY MASS INDEX: 29.12 KG/M2

## 2022-09-21 DIAGNOSIS — R05.9 COUGH: ICD-10-CM

## 2022-09-21 DIAGNOSIS — U07.1 COVID-19 VIRUS INFECTION: Primary | ICD-10-CM

## 2022-09-21 DIAGNOSIS — U07.1 COVID-19 VIRUS DETECTED: ICD-10-CM

## 2022-09-21 LAB
CTP QC/QA: YES
SARS-COV-2 RDRP RESP QL NAA+PROBE: POSITIVE

## 2022-09-21 PROCEDURE — U0002 COVID-19 LAB TEST NON-CDC: HCPCS | Mod: QW,S$GLB,, | Performed by: PHYSICIAN ASSISTANT

## 2022-09-21 PROCEDURE — 1126F AMNT PAIN NOTED NONE PRSNT: CPT | Mod: CPTII,S$GLB,, | Performed by: PHYSICIAN ASSISTANT

## 2022-09-21 PROCEDURE — 1159F PR MEDICATION LIST DOCUMENTED IN MEDICAL RECORD: ICD-10-PCS | Mod: CPTII,S$GLB,, | Performed by: PHYSICIAN ASSISTANT

## 2022-09-21 PROCEDURE — 3079F PR MOST RECENT DIASTOLIC BLOOD PRESSURE 80-89 MM HG: ICD-10-PCS | Mod: CPTII,S$GLB,, | Performed by: PHYSICIAN ASSISTANT

## 2022-09-21 PROCEDURE — U0002: ICD-10-PCS | Mod: QW,S$GLB,, | Performed by: PHYSICIAN ASSISTANT

## 2022-09-21 PROCEDURE — 1160F PR REVIEW ALL MEDS BY PRESCRIBER/CLIN PHARMACIST DOCUMENTED: ICD-10-PCS | Mod: CPTII,S$GLB,, | Performed by: PHYSICIAN ASSISTANT

## 2022-09-21 PROCEDURE — 3077F PR MOST RECENT SYSTOLIC BLOOD PRESSURE >= 140 MM HG: ICD-10-PCS | Mod: CPTII,S$GLB,, | Performed by: PHYSICIAN ASSISTANT

## 2022-09-21 PROCEDURE — 3079F DIAST BP 80-89 MM HG: CPT | Mod: CPTII,S$GLB,, | Performed by: PHYSICIAN ASSISTANT

## 2022-09-21 PROCEDURE — 1126F PR PAIN SEVERITY QUANTIFIED, NO PAIN PRESENT: ICD-10-PCS | Mod: CPTII,S$GLB,, | Performed by: PHYSICIAN ASSISTANT

## 2022-09-21 PROCEDURE — 99213 PR OFFICE/OUTPT VISIT, EST, LEVL III, 20-29 MIN: ICD-10-PCS | Mod: S$GLB,,, | Performed by: PHYSICIAN ASSISTANT

## 2022-09-21 PROCEDURE — 99213 OFFICE O/P EST LOW 20 MIN: CPT | Mod: S$GLB,,, | Performed by: PHYSICIAN ASSISTANT

## 2022-09-21 PROCEDURE — 3077F SYST BP >= 140 MM HG: CPT | Mod: CPTII,S$GLB,, | Performed by: PHYSICIAN ASSISTANT

## 2022-09-21 PROCEDURE — 1159F MED LIST DOCD IN RCRD: CPT | Mod: CPTII,S$GLB,, | Performed by: PHYSICIAN ASSISTANT

## 2022-09-21 PROCEDURE — 1160F RVW MEDS BY RX/DR IN RCRD: CPT | Mod: CPTII,S$GLB,, | Performed by: PHYSICIAN ASSISTANT

## 2022-09-21 RX ORDER — FLUTICASONE PROPIONATE 50 MCG
1 SPRAY, SUSPENSION (ML) NASAL DAILY
COMMUNITY
End: 2023-06-13

## 2022-09-21 NOTE — PATIENT INSTRUCTIONS
You must understand that you've received an Urgent Care treatment only and that you may be released before all your medical problems are known or treated. You, the patient, will arrange for follow up care as instructed.  Follow up with your PCP or specialty clinic as directed if not improved or as needed. You can call [phone] to schedule an appointment with the appropriate provider.  If your condition worsens we recommend that you receive another evaluation at the Emergency Department for any concerns or worsening of condition.  Patient aware and verbalized understanding.    Reviewed COVID-19 results with patient.  Counseled patient and answered questions in regards to COVID-19 testing.  Advised patient to go home, treat symptoms with over-the-counter (OTC) medications and avoid contact with others at this time.  Increase fluids and rest is important.  Humidifier use at home.  OTC Claritin or Zyrtec or Allegra daily as needed for nasal congestion/postnasal drip/allergies.  OTC Flonase Nasal Spray daily as needed for nasal congestion/postnasal drip/allergies.  Advised patient to take OTC VITAMIN C and VITAMIN D and ZINC unless contraindicated as discussed.  Info given for virtual visit, covid 19 information line, state info line.   Advised patient to follow-up with PCP and/or Specialist for further evaluation as needed.   Strict ER precautions given to patient.  Follow local/state guidelines per covid emergency.   Patient aware, verbalized understanding and agreed with plan of care.    IF NOT IMPROVING, FOLLOW UP WITH VIRTUAL ONLINE VISIT WITH A PROVIDER 24/7 - FOR MORE INFORMATION OR TO DOWNLOAD THE DAVID, VISIT [facility] ANYWHERE CARE AT [email or web address]  FOR 24/7 NURSE ADVICE, CALL [phone]  FOR COVID 19 RELATED QUESTIONS, CALL the [facility] covid hotline: [phone]  [place] FOR UP TO DATE INFORMATION: Text or dial 211, test keyword [id number] [id number] OR DIAL 211    HELPFUL EXTERNAL  RESOURCES:  OFFICE OF PUBLIC HEALTH: [place] - http://ldh.[place].gov/ and 1-550.967.9292  CENTER FOR DISEASE CONTROL - https://www.cdc.gov/   WORLD HEALTH ORGANIZATION (WHO) - https://www.who.int/   CDC WHEN TO QUARANTINE - https://www.cdc.gov/coronavirus/2019-ncov/if-you-are-sick/quarantine.html     INFO ABOUT [facility] COVID-19 RAPID TESTING:  This test utilizes isothermal nucleic acid amplification technology to detect the SARS-CoV-2 RdRp nucleic acid segment.   The analytical sensitivity (limit of detection) is 125 genome equivalents/mL.   A POSITIVE result implies infection with the SARS-CoV-2 virus; the patient is presumed to be contagious.     A NEGATIVE result means that SARS-CoV-2 nucleic acids are not present above the limit of detection.   A NEGATIVE result should be treated as presumptive. It does not rule out the possibility of COVID-19 and should not be the sole basis for treatment decisions.   This test is only for use under the Food and Drug Administration s Emergency Use Authorization (EUA).   Commercial kits are provided by [employer]. Performance characteristics of the EUA have been independently verified by [facility] Department of Pathology and Laboratory Medicine.   _________________________________________________________________   The authorized Fact Sheet for Healthcare Providers and the authorized Fact Sheet for Patients of the ID NOW COVID-19 are available on the FDA website:   https://www.fda.gov/media/904942/download  https://www.fda.gov/media/393769/download

## 2022-09-21 NOTE — PROGRESS NOTES
"Subjective:       Patient ID: Lloyd John Jr. is a 77 y.o. male.    Vitals:  height is 5' 11" (1.803 m) and weight is 94.3 kg (208 lb). His oral temperature is 98.4 °F (36.9 °C). His blood pressure is 145/84 (abnormal) and his pulse is 78. His respiration is 18 and oxygen saturation is 96%.     Chief Complaint: Cough    Cough  This is a new problem. The current episode started in the past 7 days. The problem has been unchanged. The problem occurs every few hours. The cough is Non-productive. Associated symptoms include nasal congestion, postnasal drip, rhinorrhea and a sore throat. Pertinent negatives include no chest pain, chills, ear congestion, ear pain, eye redness, fever, headaches, heartburn, hemoptysis, myalgias, rash, shortness of breath, sweats, weight loss or wheezing. Nothing aggravates the symptoms. Treatments tried: Advil and Tylenol. The treatment provided mild relief.     Constitution: Negative for chills, sweating, fatigue and fever.   HENT:  Positive for congestion, postnasal drip, sinus pressure and sore throat. Negative for ear pain, drooling, nosebleeds, foreign body in nose, sinus pain, trouble swallowing and voice change.    Neck: Negative for neck pain, neck stiffness, painful lymph nodes and neck swelling.   Cardiovascular:  Negative for chest pain, leg swelling, palpitations, sob on exertion and passing out.   Eyes:  Negative for eye discharge, eye itching, eye pain, eye redness and eyelid swelling.   Respiratory:  Positive for cough. Negative for chest tightness, sputum production, bloody sputum, shortness of breath, stridor and wheezing.    Gastrointestinal:  Negative for abdominal pain, abdominal bloating, nausea, vomiting, constipation, diarrhea and heartburn.   Genitourinary:  Negative for urine decreased.   Musculoskeletal:  Negative for joint pain, joint swelling, abnormal ROM of joint, pain with walking, muscle cramps and muscle ache.   Skin:  Negative for rash and hives. "   Allergic/Immunologic: Negative for hives, itching and sneezing.   Neurological:  Negative for dizziness, light-headedness, passing out, loss of balance, headaches, altered mental status, loss of consciousness, numbness and seizures.   Hematologic/Lymphatic: Negative for swollen lymph nodes.   Psychiatric/Behavioral:  Negative for altered mental status and nervous/anxious. The patient is not nervous/anxious.      Objective:      Physical Exam   Constitutional: He is oriented to person, place, and time. He appears well-developed. He is cooperative.  Non-toxic appearance. He does not appear ill. No distress.   HENT:   Head: Normocephalic and atraumatic.   Ears:   Right Ear: Hearing, tympanic membrane, external ear and ear canal normal.   Left Ear: Hearing, tympanic membrane, external ear and ear canal normal.   Nose: Mucosal edema and rhinorrhea present. No nasal deformity. No epistaxis. Right sinus exhibits no maxillary sinus tenderness and no frontal sinus tenderness. Left sinus exhibits no maxillary sinus tenderness and no frontal sinus tenderness.   Mouth/Throat: Uvula is midline and mucous membranes are normal. No trismus in the jaw. Normal dentition. No uvula swelling. Posterior oropharyngeal erythema and cobblestoning present. No oropharyngeal exudate, posterior oropharyngeal edema or tonsillar abscesses. No tonsillar exudate.   Eyes: Conjunctivae and lids are normal. No scleral icterus.   Neck: Trachea normal and phonation normal. Neck supple. No edema present. No erythema present. No neck rigidity present.   Cardiovascular: Normal rate, regular rhythm, normal heart sounds and normal pulses.   Pulmonary/Chest: Effort normal and breath sounds normal. No accessory muscle usage or stridor. No respiratory distress. He has no decreased breath sounds. He has no wheezes. He has no rhonchi. He has no rales.   Abdominal: Normal appearance.   Musculoskeletal: Normal range of motion.         General: No deformity.  Normal range of motion.   Lymphadenopathy:     He has no cervical adenopathy.   Neurological: He is alert and oriented to person, place, and time. He has normal sensation. He exhibits normal muscle tone. Gait normal. Coordination normal.   Skin: Skin is warm, dry, intact, not diaphoretic, not pale and no rash. Capillary refill takes less than 2 seconds.   Psychiatric: His speech is normal and behavior is normal. Judgment and thought content normal.   Nursing note and vitals reviewed.      Results for orders placed or performed in visit on 09/21/22   POCT COVID-19 Rapid Screening   Result Value Ref Range    POC Rapid COVID Positive (A) Negative     Acceptable Yes        Assessment:       1. COVID-19 virus infection    2. Cough          Plan:     Discussed COVID-19 results with patient. CDC precautions given to patient. Discussed treatment options and patient does not want to try Antiviral RX at this time. Advised close follow-up with PCP and/or Specialist for further evaluation as needed. Strict ER precautions given to patient as well. Patient aware, verbalized understanding and agreed with plan of care.    COVID-19 virus infection    Cough  -     POCT COVID-19 Rapid Screening       Patient Instructions   You must understand that you've received an Urgent Care treatment only and that you may be released before all your medical problems are known or treated. You, the patient, will arrange for follow up care as instructed.  Follow up with your PCP or specialty clinic as directed if not improved or as needed. You can call [phone] to schedule an appointment with the appropriate provider.  If your condition worsens we recommend that you receive another evaluation at the Emergency Department for any concerns or worsening of condition.  Patient aware and verbalized understanding.    Reviewed COVID-19 results with patient.  Counseled patient and answered questions in regards to COVID-19 testing.  Advised  patient to go home, treat symptoms with over-the-counter (OTC) medications and avoid contact with others at this time.  Increase fluids and rest is important.  Humidifier use at home.  OTC Claritin or Zyrtec or Allegra daily as needed for nasal congestion/postnasal drip/allergies.  OTC Flonase Nasal Spray daily as needed for nasal congestion/postnasal drip/allergies.  Advised patient to take OTC VITAMIN C and VITAMIN D and ZINC unless contraindicated as discussed.  Info given for virtual visit, covid 19 information line, state info line.   Advised patient to follow-up with PCP and/or Specialist for further evaluation as needed.   Strict ER precautions given to patient.  Follow local/state guidelines per covid emergency.   Patient aware, verbalized understanding and agreed with plan of care.    IF NOT IMPROVING, FOLLOW UP WITH VIRTUAL ONLINE VISIT WITH A PROVIDER 24/7 - FOR MORE INFORMATION OR TO DOWNLOAD THE DAVID, VISIT [facility] ANYWHERE CARE AT [email or web address]  FOR 24/7 NURSE ADVICE, CALL [phone]  FOR COVID 19 RELATED QUESTIONS, CALL the [facility] covid hotline: [phone]  [place] FOR UP TO DATE INFORMATION: Text or dial 211, test keyword [id number] [id number] OR DIAL 211    HELPFUL EXTERNAL RESOURCES:  OFFICE OF PUBLIC HEALTH: [place] - http://ldh.la.gov/ and 1-797.250.3395  [facility] FOR DIS[facility]ASE CONTROL - https://www.cdc.gov/   WORLD HEALTH ORGANIZATION (WHO) - https://www.who.int/   CDC WHEN TO QUARANTINE - https://www.cdc.gov/coronavirus/2019-ncov/if-you-are-sick/quarantine.html     INFO ABOUT [facility] COVID-19 RAPID TESTING:  This test utilizes isothermal nucleic acid amplification technology to detect the SARS-CoV-2 RdRp nucleic acid segment.   The analytical sensitivity (limit of detection) is 125 genome equivalents/mL.   A POSITIVE result implies infection with the SARS-CoV-2 virus; the patient is presumed to be contagious.     A NEGATIVE result means that SARS-CoV-2 nucleic acids are not present  above the limit of detection.   A NEGATIVE result should be treated as presumptive. It does not rule out the possibility of COVID-19 and should not be the sole basis for treatment decisions.   This test is only for use under the Food and Drug Administration s Emergency Use Authorization (EUA).   Commercial kits are provided by [employer] Performance characteristics of the EUA have been independently verified by [facility] Department of Pathology and Laboratory Medicine.   _________________________________________________________________   The authorized Fact Sheet for Healthcare Providers and the authorized Fact Sheet for Patients of the ID NOW COVID-19 are available on the FDA website:   https://www.fda.gov/media/129394/download  https://www.fda.gov/media/577311/download

## 2022-09-27 DIAGNOSIS — C61 PROSTATE CANCER: Primary | ICD-10-CM

## 2022-09-29 NOTE — PROGRESS NOTES
Called patient to schedule 6 month PSA f/u with Dr. Luciano.  No Answer/voicemail message was left.      Per Dr. Luciano:  Please arrange for a PSA in 6 months and a follow up with me following the labs.

## 2022-12-14 ENCOUNTER — LAB VISIT (OUTPATIENT)
Dept: LAB | Facility: HOSPITAL | Age: 77
End: 2022-12-14
Attending: FAMILY MEDICINE
Payer: MEDICARE

## 2022-12-14 DIAGNOSIS — I10 PRIMARY HYPERTENSION: ICD-10-CM

## 2022-12-14 DIAGNOSIS — E78.5 HYPERLIPIDEMIA, UNSPECIFIED HYPERLIPIDEMIA TYPE: ICD-10-CM

## 2022-12-14 LAB
ALBUMIN SERPL BCP-MCNC: 4.3 G/DL (ref 3.5–5.2)
ALP SERPL-CCNC: 60 U/L (ref 55–135)
ALT SERPL W/O P-5'-P-CCNC: 34 U/L (ref 10–44)
ANION GAP SERPL CALC-SCNC: 9 MMOL/L (ref 8–16)
AST SERPL-CCNC: 55 U/L (ref 10–40)
BASOPHILS # BLD AUTO: 0.05 K/UL (ref 0–0.2)
BASOPHILS NFR BLD: 1 % (ref 0–1.9)
BILIRUB SERPL-MCNC: 1.9 MG/DL (ref 0.1–1)
BUN SERPL-MCNC: 22 MG/DL (ref 8–23)
CALCIUM SERPL-MCNC: 9.9 MG/DL (ref 8.7–10.5)
CHLORIDE SERPL-SCNC: 101 MMOL/L (ref 95–110)
CHOLEST SERPL-MCNC: 160 MG/DL (ref 120–199)
CHOLEST/HDLC SERPL: 3.3 {RATIO} (ref 2–5)
CO2 SERPL-SCNC: 26 MMOL/L (ref 23–29)
CREAT SERPL-MCNC: 1.1 MG/DL (ref 0.5–1.4)
DIFFERENTIAL METHOD: ABNORMAL
EOSINOPHIL # BLD AUTO: 0.1 K/UL (ref 0–0.5)
EOSINOPHIL NFR BLD: 2.2 % (ref 0–8)
ERYTHROCYTE [DISTWIDTH] IN BLOOD BY AUTOMATED COUNT: 13 % (ref 11.5–14.5)
EST. GFR  (NO RACE VARIABLE): >60 ML/MIN/1.73 M^2
GLUCOSE SERPL-MCNC: 104 MG/DL (ref 70–110)
HCT VFR BLD AUTO: 44.6 % (ref 40–54)
HDLC SERPL-MCNC: 49 MG/DL (ref 40–75)
HDLC SERPL: 30.6 % (ref 20–50)
HGB BLD-MCNC: 15.5 G/DL (ref 14–18)
IMM GRANULOCYTES # BLD AUTO: 0.01 K/UL (ref 0–0.04)
IMM GRANULOCYTES NFR BLD AUTO: 0.2 % (ref 0–0.5)
LDLC SERPL CALC-MCNC: 78 MG/DL (ref 63–159)
LYMPHOCYTES # BLD AUTO: 1.7 K/UL (ref 1–4.8)
LYMPHOCYTES NFR BLD: 33.9 % (ref 18–48)
MCH RBC QN AUTO: 33.7 PG (ref 27–31)
MCHC RBC AUTO-ENTMCNC: 34.8 G/DL (ref 32–36)
MCV RBC AUTO: 97 FL (ref 82–98)
MONOCYTES # BLD AUTO: 1 K/UL (ref 0.3–1)
MONOCYTES NFR BLD: 20 % (ref 4–15)
NEUTROPHILS # BLD AUTO: 2.1 K/UL (ref 1.8–7.7)
NEUTROPHILS NFR BLD: 42.7 % (ref 38–73)
NONHDLC SERPL-MCNC: 111 MG/DL
NRBC BLD-RTO: 0 /100 WBC
PLATELET # BLD AUTO: 201 K/UL (ref 150–450)
PMV BLD AUTO: 11.2 FL (ref 9.2–12.9)
POTASSIUM SERPL-SCNC: 4 MMOL/L (ref 3.5–5.1)
PROT SERPL-MCNC: 7.1 G/DL (ref 6–8.4)
RBC # BLD AUTO: 4.6 M/UL (ref 4.6–6.2)
SODIUM SERPL-SCNC: 136 MMOL/L (ref 136–145)
TRIGL SERPL-MCNC: 165 MG/DL (ref 30–150)
WBC # BLD AUTO: 4.9 K/UL (ref 3.9–12.7)

## 2022-12-14 PROCEDURE — 85025 COMPLETE CBC W/AUTO DIFF WBC: CPT | Performed by: FAMILY MEDICINE

## 2022-12-14 PROCEDURE — 80061 LIPID PANEL: CPT | Performed by: FAMILY MEDICINE

## 2022-12-14 PROCEDURE — 80053 COMPREHEN METABOLIC PANEL: CPT | Performed by: FAMILY MEDICINE

## 2022-12-14 PROCEDURE — 36415 COLL VENOUS BLD VENIPUNCTURE: CPT | Mod: PO | Performed by: FAMILY MEDICINE

## 2022-12-16 ENCOUNTER — TELEPHONE (OUTPATIENT)
Dept: UROLOGY | Facility: CLINIC | Age: 77
End: 2022-12-16
Payer: MEDICARE

## 2022-12-21 ENCOUNTER — OFFICE VISIT (OUTPATIENT)
Dept: FAMILY MEDICINE | Facility: CLINIC | Age: 77
End: 2022-12-21
Payer: MEDICARE

## 2022-12-21 VITALS
RESPIRATION RATE: 18 BRPM | TEMPERATURE: 98 F | DIASTOLIC BLOOD PRESSURE: 70 MMHG | HEART RATE: 68 BPM | OXYGEN SATURATION: 98 % | BODY MASS INDEX: 29.44 KG/M2 | HEIGHT: 71 IN | SYSTOLIC BLOOD PRESSURE: 134 MMHG | WEIGHT: 210.31 LBS

## 2022-12-21 DIAGNOSIS — I10 PRIMARY HYPERTENSION: Primary | ICD-10-CM

## 2022-12-21 DIAGNOSIS — R79.89 ELEVATED LIVER FUNCTION TESTS: ICD-10-CM

## 2022-12-21 DIAGNOSIS — C61 PROSTATE CANCER: ICD-10-CM

## 2022-12-21 DIAGNOSIS — I10 ESSENTIAL HYPERTENSION: ICD-10-CM

## 2022-12-21 DIAGNOSIS — C67.9 MALIGNANT NEOPLASM OF URINARY BLADDER, UNSPECIFIED SITE: ICD-10-CM

## 2022-12-21 DIAGNOSIS — E78.5 HYPERLIPIDEMIA, UNSPECIFIED HYPERLIPIDEMIA TYPE: ICD-10-CM

## 2022-12-21 PROCEDURE — 99214 PR OFFICE/OUTPT VISIT, EST, LEVL IV, 30-39 MIN: ICD-10-PCS | Mod: S$GLB,,, | Performed by: FAMILY MEDICINE

## 2022-12-21 PROCEDURE — 1125F PR PAIN SEVERITY QUANTIFIED, PAIN PRESENT: ICD-10-PCS | Mod: CPTII,S$GLB,, | Performed by: FAMILY MEDICINE

## 2022-12-21 PROCEDURE — 99999 PR PBB SHADOW E&M-EST. PATIENT-LVL III: CPT | Mod: PBBFAC,,, | Performed by: FAMILY MEDICINE

## 2022-12-21 PROCEDURE — 3075F SYST BP GE 130 - 139MM HG: CPT | Mod: CPTII,S$GLB,, | Performed by: FAMILY MEDICINE

## 2022-12-21 PROCEDURE — 1160F PR REVIEW ALL MEDS BY PRESCRIBER/CLIN PHARMACIST DOCUMENTED: ICD-10-PCS | Mod: CPTII,S$GLB,, | Performed by: FAMILY MEDICINE

## 2022-12-21 PROCEDURE — 99214 OFFICE O/P EST MOD 30 MIN: CPT | Mod: S$GLB,,, | Performed by: FAMILY MEDICINE

## 2022-12-21 PROCEDURE — 3288F PR FALLS RISK ASSESSMENT DOCUMENTED: ICD-10-PCS | Mod: CPTII,S$GLB,, | Performed by: FAMILY MEDICINE

## 2022-12-21 PROCEDURE — 1125F AMNT PAIN NOTED PAIN PRSNT: CPT | Mod: CPTII,S$GLB,, | Performed by: FAMILY MEDICINE

## 2022-12-21 PROCEDURE — 1101F PR PT FALLS ASSESS DOC 0-1 FALLS W/OUT INJ PAST YR: ICD-10-PCS | Mod: CPTII,S$GLB,, | Performed by: FAMILY MEDICINE

## 2022-12-21 PROCEDURE — 1159F MED LIST DOCD IN RCRD: CPT | Mod: CPTII,S$GLB,, | Performed by: FAMILY MEDICINE

## 2022-12-21 PROCEDURE — 1159F PR MEDICATION LIST DOCUMENTED IN MEDICAL RECORD: ICD-10-PCS | Mod: CPTII,S$GLB,, | Performed by: FAMILY MEDICINE

## 2022-12-21 PROCEDURE — 1160F RVW MEDS BY RX/DR IN RCRD: CPT | Mod: CPTII,S$GLB,, | Performed by: FAMILY MEDICINE

## 2022-12-21 PROCEDURE — 3078F PR MOST RECENT DIASTOLIC BLOOD PRESSURE < 80 MM HG: ICD-10-PCS | Mod: CPTII,S$GLB,, | Performed by: FAMILY MEDICINE

## 2022-12-21 PROCEDURE — 1101F PT FALLS ASSESS-DOCD LE1/YR: CPT | Mod: CPTII,S$GLB,, | Performed by: FAMILY MEDICINE

## 2022-12-21 PROCEDURE — 3075F PR MOST RECENT SYSTOLIC BLOOD PRESS GE 130-139MM HG: ICD-10-PCS | Mod: CPTII,S$GLB,, | Performed by: FAMILY MEDICINE

## 2022-12-21 PROCEDURE — 3078F DIAST BP <80 MM HG: CPT | Mod: CPTII,S$GLB,, | Performed by: FAMILY MEDICINE

## 2022-12-21 PROCEDURE — 3288F FALL RISK ASSESSMENT DOCD: CPT | Mod: CPTII,S$GLB,, | Performed by: FAMILY MEDICINE

## 2022-12-21 PROCEDURE — 99999 PR PBB SHADOW E&M-EST. PATIENT-LVL III: ICD-10-PCS | Mod: PBBFAC,,, | Performed by: FAMILY MEDICINE

## 2022-12-21 RX ORDER — OLMESARTAN MEDOXOMIL 40 MG/1
40 TABLET ORAL DAILY
Qty: 90 TABLET | Refills: 3 | Status: SHIPPED | OUTPATIENT
Start: 2022-12-21 | End: 2023-04-28 | Stop reason: CLARIF

## 2022-12-21 RX ORDER — INFLUENZA A VIRUS A/VICTORIA/2570/2019 IVR-215 (H1N1) ANTIGEN (FORMALDEHYDE INACTIVATED), INFLUENZA A VIRUS A/DARWIN/9/2021 SAN-010 (H3N2) ANTIGEN (FORMALDEHYDE INACTIVATED), INFLUENZA B VIRUS B/PHUKET/3073/2013 ANTIGEN (FORMALDEHYDE INACTIVATED), AND INFLUENZA B VIRUS B/MICHIGAN/01/2021 ANTIGEN (FORMALDEHYDE INACTIVATED) 60; 60; 60; 60 UG/.7ML; UG/.7ML; UG/.7ML; UG/.7ML
INJECTION, SUSPENSION INTRAMUSCULAR
COMMUNITY
Start: 2022-09-14 | End: 2023-04-28 | Stop reason: CLARIF

## 2022-12-21 NOTE — PROGRESS NOTES
Chief Complaint   Patient presents with    Hypertension     6 month follow up with labs     HPI:This is a 77 year-old male presents for 6 month f/u on chronic jose m issues    HLD - tolerating Crestor 10mg and Fish Oil  HTN - tolerating coreg 6.25 BID, Benicar 40mg; BP well-controlled at 112/75  Bladder/prostate cancer ([place] 7) - following with urology, Dr. Bowman,  every 6 months.   Considering robotic prostatectomy, but now just doing watchful waiting. PSA has been stable around 10  Glaucoma - on Combigan and Lumigan; followed by Dr. Emery  Lumbar DDD with right sciatica, cervical radiculopathy - resolved with back surgery in 2019; taking gabapentin 600mg PRN    C/o some right lateral upper arm pains in certain motions.    Recently recovered from Covid      PAST MEDICAL HISTORY:  Prostate cancer - [place] 7; following with watchful waiting  Bladder carcinoma - followed with Dr. Bowman every 3 months; treated in past with Thiotrpa, BCG  HLD (Dx )  HTN (Dx )  colon polyp  Lumbar DDD    PAST SURGICAL HISTORY:  multiple bladder scrappings (11 total)    FAMILY HISTORY:  Father:  at 50 of CAD  Mother:  at 78 insterstial lung fibrosis, carotid artery disease  Grandpartents unknown    SOCIAL HISTORY:  Tobacco use: quit    Alcohol use: 2 drinks of bourbon daily  Occupation:  for [employer]  Marital status: (wife with schizophrenia)  Children: 2  enjoys wood working and fishing  exercises daily    REVIEW OF SYSTEMS:  GENERAL: No fever, chills, fatigability or weight changes.  SKIN/BREASTS: No rashes, sores, itching or changes in color or texture of skin.   HEAD: No headaches or recent head trauma.   EYES: Visual acuity fine. Denies blurriness, tearing, itching, photophobia, diplopia, or visual changes.   EARS: Denies ear pain, discharge, tinnitus or vertigo. Denies hearing loss.   NOSE: No loss of smell, epistaxis, postnasal drip, discharge, obstruction, or sneezing.  MOUTH &  "THROAT: No hoarseness, change in voice, swallowing difficulty. No excessive gum bleeding.   HEMATOLOGICAL/NODES: Denies swollen glands. No bleeding or bruising.  CHEST: No TOLEDO, cyanosis, wheezing, cough or sputum production.  CARDIOVASCULAR: Denies chest pain, dyspnea, orthopnea, or palpitations.  GI/ABDOMEN: Appetite fine. No weight loss. Denies nausea, vomiting, diarrhea, constipation, abdominal pain, hematemesis or blood in stool.  URINARY: No dysuria,hematuria, nocturia, incontinence, flank pain, urgency, or urinary difficulty.  PERIPHERAL VASCULAR: No claudication, cold intolerance or cyanosis.  NEUROLOGIC: No history of seizures, paralysis, alteration of gait or coordination.  ENDOCRINE: Sexual maturation. Denies weight change, heat/cold intolerance, hair loss or gain, loss of libido, polyuria, polydipsia, polyphagia, pruritus, unexplained flushing, or excessive sweating.   PSYCH: No crying spells. Denies anxiety symptoms.      PHYSICAL EXAM:  /70   Pulse 68   Temp 97.6 °F (36.4 °C) (Oral)   Resp 18   Ht 5' 11" (1.803 m)   Wt 95.4 kg (210 lb 5.1 oz)   SpO2 98%   BMI 29.33 kg/m²     APPEARANCE: Well nourished, well developed, neatly groomed, in no acute distress.   SKIN: Warm, moist and dry. No lesions or rashes.  EYES: Conjunctivae and lids clear. PERRL. EOMI.   NOSE: Mucosa pink. Nares clear.  MOUTH & THROAT: Oral mucosa pink. No tonsillar enlargement. No pharyngeal erythema or exudate.   NECK: Supple with full range of motion. No masses. No thyromegaly. No JVD or bruits.  LYMPH: No cervical, supraclavicular, axillary or inguinal lymph node enlargement.  RESPIRATORY: Easy and unlabored respirations. Lungs clear to auscultation throughout all lung fields. No wheezes or rales.  CARDIOVASCULAR: Regular rate and rhythm. Normal S1, S2. No rubs, murmurs or gallops.   ABDOMEN: Bowel sounds normal. Nondistended and soft. No hepatosplenomegaly, masses, or tenderness.  EXTREMITIES: No edema or cyanosis. " Pedal pulses 2+ bilaterally. No varicosities   NEUROLOGIC: Cranial Nerves: II-XII grossly intact  Gait & Posture: Normal gait and fine motion.  PSYCH: Awake, alert, oriented to person, place, time, and situation. Pleasant and cooperative mood with intact judgment.    Results for orders placed or performed in visit on 12/14/22   Comprehensive Metabolic Panel   Result Value Ref Range    Sodium 136 136 - 145 mmol/L    Potassium 4.0 3.5 - 5.1 mmol/L    Chloride 101 95 - 110 mmol/L    CO2 26 23 - 29 mmol/L    Glucose 104 70 - 110 mg/dL    BUN 22 8 - 23 mg/dL    Creatinine 1.1 0.5 - 1.4 mg/dL    Calcium 9.9 8.7 - 10.5 mg/dL    Total Protein 7.1 6.0 - 8.4 g/dL    Albumin 4.3 3.5 - 5.2 g/dL    Total Bilirubin 1.9 (H) 0.1 - 1.0 mg/dL    Alkaline Phosphatase 60 55 - 135 U/L    AST 55 (H) 10 - 40 U/L    ALT 34 10 - 44 U/L    Anion Gap 9 8 - 16 mmol/L    eGFR >60.0 >60 mL/min/1.73 m^2   Lipid Panel   Result Value Ref Range    Cholesterol 160 120 - 199 mg/dL    Triglycerides 165 (H) 30 - 150 mg/dL    HDL 49 40 - 75 mg/dL    LDL Cholesterol 78.0 63.0 - 159.0 mg/dL    HDL/Cholesterol Ratio 30.6 20.0 - 50.0 %    Total Cholesterol/HDL Ratio 3.3 2.0 - 5.0    Non-HDL Cholesterol 111 mg/dL   CBC Auto Differential   Result Value Ref Range    WBC 4.90 3.90 - 12.70 K/uL    RBC 4.60 4.60 - 6.20 M/uL    Hemoglobin 15.5 14.0 - 18.0 g/dL    Hematocrit 44.6 40.0 - 54.0 %    MCV 97 82 - 98 fL    MCH 33.7 (H) 27.0 - 31.0 pg    MCHC 34.8 32.0 - 36.0 g/dL    RDW 13.0 11.5 - 14.5 %    Platelets 201 150 - 450 K/uL    MPV 11.2 9.2 - 12.9 fL    Immature Granulocytes 0.2 0.0 - 0.5 %    Gran # (ANC) 2.1 1.8 - 7.7 K/uL    Immature Grans (Abs) 0.01 0.00 - 0.04 K/uL    Lymph # 1.7 1.0 - 4.8 K/uL    Mono # 1.0 0.3 - 1.0 K/uL    Eos # 0.1 0.0 - 0.5 K/uL    Baso # 0.05 0.00 - 0.20 K/uL    nRBC 0 0 /100 WBC    Gran % 42.7 38.0 - 73.0 %    Lymph % 33.9 18.0 - 48.0 %    Mono % 20.0 (H) 4.0 - 15.0 %    Eosinophil % 2.2 0.0 - 8.0 %    Basophil % 1.0 0.0 - 1.9 %     Differential Method Automated        ASSESSMENT/PLAN:  Primary hypertension  -     CBC Auto Differential; Future; Expected date: 06/19/2023    Hyperlipidemia, unspecified hyperlipidemia type  -     Comprehensive Metabolic Panel; Future; Expected date: 06/19/2023  -     Lipid Panel; Future; Expected date: 06/19/2023    Malignant neoplasm of urinary bladder, unspecified site    Prostate cancer    Essential hypertension  -     olmesartan (BENICAR) 40 MG tablet; Take 1 tablet (40 mg total) by mouth once daily.  Dispense: 90 tablet; Refill: 3    Elevated liver function tests      overall doing well  Reassurance regarding resolving LFT from covid  1. HTN (controlled) - continue Coreg 6.25 mg BID and Benicar 40mg  2. HLD (controlled) - continue Crestor 10mg daily, Fish Oil 1000mg daily  3. bladder carcinoma/prostate cancer - continue present treatment and f/u with Urology    f/u 6 months with CMP, lipid profile, CBC

## 2023-01-03 ENCOUNTER — OFFICE VISIT (OUTPATIENT)
Dept: CARDIOLOGY | Facility: CLINIC | Age: 78
End: 2023-01-03
Payer: MEDICARE

## 2023-01-03 VITALS
DIASTOLIC BLOOD PRESSURE: 80 MMHG | WEIGHT: 215.63 LBS | HEIGHT: 71 IN | HEART RATE: 83 BPM | SYSTOLIC BLOOD PRESSURE: 138 MMHG | BODY MASS INDEX: 30.19 KG/M2

## 2023-01-03 DIAGNOSIS — I10 PRIMARY HYPERTENSION: ICD-10-CM

## 2023-01-03 DIAGNOSIS — E78.2 MIXED HYPERLIPIDEMIA: Primary | ICD-10-CM

## 2023-01-03 PROCEDURE — 99214 PR OFFICE/OUTPT VISIT, EST, LEVL IV, 30-39 MIN: ICD-10-PCS | Mod: S$GLB,,, | Performed by: INTERNAL MEDICINE

## 2023-01-03 PROCEDURE — 1160F PR REVIEW ALL MEDS BY PRESCRIBER/CLIN PHARMACIST DOCUMENTED: ICD-10-PCS | Mod: CPTII,S$GLB,, | Performed by: INTERNAL MEDICINE

## 2023-01-03 PROCEDURE — 1125F AMNT PAIN NOTED PAIN PRSNT: CPT | Mod: CPTII,S$GLB,, | Performed by: INTERNAL MEDICINE

## 2023-01-03 PROCEDURE — 3075F PR MOST RECENT SYSTOLIC BLOOD PRESS GE 130-139MM HG: ICD-10-PCS | Mod: CPTII,S$GLB,, | Performed by: INTERNAL MEDICINE

## 2023-01-03 PROCEDURE — 1159F PR MEDICATION LIST DOCUMENTED IN MEDICAL RECORD: ICD-10-PCS | Mod: CPTII,S$GLB,, | Performed by: INTERNAL MEDICINE

## 2023-01-03 PROCEDURE — 3075F SYST BP GE 130 - 139MM HG: CPT | Mod: CPTII,S$GLB,, | Performed by: INTERNAL MEDICINE

## 2023-01-03 PROCEDURE — 99999 PR PBB SHADOW E&M-EST. PATIENT-LVL III: ICD-10-PCS | Mod: PBBFAC,,, | Performed by: INTERNAL MEDICINE

## 2023-01-03 PROCEDURE — 99999 PR PBB SHADOW E&M-EST. PATIENT-LVL III: CPT | Mod: PBBFAC,,, | Performed by: INTERNAL MEDICINE

## 2023-01-03 PROCEDURE — 3079F DIAST BP 80-89 MM HG: CPT | Mod: CPTII,S$GLB,, | Performed by: INTERNAL MEDICINE

## 2023-01-03 PROCEDURE — 3288F PR FALLS RISK ASSESSMENT DOCUMENTED: ICD-10-PCS | Mod: CPTII,S$GLB,, | Performed by: INTERNAL MEDICINE

## 2023-01-03 PROCEDURE — 3079F PR MOST RECENT DIASTOLIC BLOOD PRESSURE 80-89 MM HG: ICD-10-PCS | Mod: CPTII,S$GLB,, | Performed by: INTERNAL MEDICINE

## 2023-01-03 PROCEDURE — 99214 OFFICE O/P EST MOD 30 MIN: CPT | Mod: S$GLB,,, | Performed by: INTERNAL MEDICINE

## 2023-01-03 PROCEDURE — 99499 UNLISTED E&M SERVICE: CPT | Mod: HCNC,S$GLB,, | Performed by: INTERNAL MEDICINE

## 2023-01-03 PROCEDURE — 3288F FALL RISK ASSESSMENT DOCD: CPT | Mod: CPTII,S$GLB,, | Performed by: INTERNAL MEDICINE

## 2023-01-03 PROCEDURE — 1160F RVW MEDS BY RX/DR IN RCRD: CPT | Mod: CPTII,S$GLB,, | Performed by: INTERNAL MEDICINE

## 2023-01-03 PROCEDURE — 1101F PT FALLS ASSESS-DOCD LE1/YR: CPT | Mod: CPTII,S$GLB,, | Performed by: INTERNAL MEDICINE

## 2023-01-03 PROCEDURE — 1101F PR PT FALLS ASSESS DOC 0-1 FALLS W/OUT INJ PAST YR: ICD-10-PCS | Mod: CPTII,S$GLB,, | Performed by: INTERNAL MEDICINE

## 2023-01-03 PROCEDURE — 99499 RISK ADDL DX/OHS AUDIT: ICD-10-PCS | Mod: HCNC,S$GLB,, | Performed by: INTERNAL MEDICINE

## 2023-01-03 PROCEDURE — 1125F PR PAIN SEVERITY QUANTIFIED, PAIN PRESENT: ICD-10-PCS | Mod: CPTII,S$GLB,, | Performed by: INTERNAL MEDICINE

## 2023-01-03 PROCEDURE — 1159F MED LIST DOCD IN RCRD: CPT | Mod: CPTII,S$GLB,, | Performed by: INTERNAL MEDICINE

## 2023-01-03 NOTE — PROGRESS NOTES
"Subjective:    Patient ID:  Lloyd John Jr. is a 77 y.o. male who presents for follow-up of Hyperlipidemia (6mth f/u)      Problem List Items Addressed This Visit          Cardiac/Vascular    Mixed hyperlipidemia - Primary    Primary hypertension       HPI    Patient was last seen on 06/16/2022 at which time he was doing okay from cardiac standpoint other than leg swelling.  CXR and BNP ordered in addition to bilateral lower extremity venous Doppler for evaluation.  All tests were negative.    Since that time, amlodipine was switched to carvedilol with improvement in leg swelling.    On assessment today, the patient states that he feels OK.     No chest pain.  No shortness of breath.  Back to riding bike and playing golf    How is leg swelling - Much better       Objective:     Vitals:    01/03/23 1019   BP: 138/80   BP Location: Left arm   Patient Position: Sitting   BP Method: Large (Automatic)   Pulse: 83   Weight: 97.8 kg (215 lb 9.8 oz)   Height: 5' 11" (1.803 m)        Physical Exam  Constitutional:       Appearance: He is well-developed.   HENT:      Head: Normocephalic and atraumatic.   Neck:      Vascular: No JVD.   Cardiovascular:      Rate and Rhythm: Normal rate and regular rhythm.      Heart sounds: Normal heart sounds. No murmur heard.    No friction rub. No gallop.   Pulmonary:      Effort: Pulmonary effort is normal. No respiratory distress.      Breath sounds: Normal breath sounds. No wheezing or rales.   Abdominal:      General: Bowel sounds are normal.      Palpations: Abdomen is soft.      Tenderness: There is no abdominal tenderness. There is no guarding or rebound.   Musculoskeletal:      Cervical back: Normal range of motion and neck supple.   Skin:     General: Skin is warm and dry.   Neurological:      Mental Status: He is alert and oriented to person, place, and time.   Psychiatric:         Behavior: Behavior normal.           Current Outpatient Medications   Medication Instructions "    BOOSTRIX TDAP 2.5-8-5 Lf-mcg-Lf/0.5mL Syrg injection No dose, route, or frequency recorded.    brimonidine 0.2% (ALPHAGAN) 0.2 % Drop 1 drop, Both Eyes, 2 times daily    carvediloL (COREG) 6.25 mg, Oral, 2 times daily with meals    fluticasone propionate (FLONASE) 50 mcg/actuation nasal spray 1 spray, Each Nostril, Daily    FLUZONE HIGHDOSE QUAD 22-23  mcg/0.7 mL Syrg No dose, route, or frequency recorded.    gabapentin (NEURONTIN) 600 mg, Oral, 3 times daily PRN    LUMIGAN 0.01 % Drop 1 drop, Both Eyes, Every Mon, Wed, Fri    MULTIVITAMIN W-MINERALS/LUTEIN (CENTRUM SILVER ORAL) 1 capsule, Oral, Nightly,      olmesartan (BENICAR) 40 mg, Oral, Daily    rosuvastatin (CRESTOR) 10 mg, Oral, Daily    timolol maleate 0.5% (TIMOPTIC) 0.5 % Drop 1 drop, Both Eyes, 2 times daily       Lipid Panel:   Lab Results   Component Value Date    CHOL 160 12/14/2022    HDL 49 12/14/2022    LDLCALC 78.0 12/14/2022    TRIG 165 (H) 12/14/2022    CHOLHDL 30.6 12/14/2022       The 10-year ASCVD risk score (Billy HOLLIS, et al., 2019) is: 38%    Values used to calculate the score:      Age: 77 years      Sex: Male      Is Non- : No      Diabetic: No      Tobacco smoker: No      Systolic Blood Pressure: 138 mmHg      Is BP treated: Yes      HDL Cholesterol: 49 mg/dL      Total Cholesterol: 160 mg/dL    All pertinent labs, imaging, and EKGs reviewed.  Patient's most recent EKG tracing was personally interpreted by this provider.    Most Recent Echocardiogram Results  Results for orders placed in visit on 05/18/22    Echo    Interpretation Summary  · The left ventricle is normal in size with normal systolic function.  · The estimated ejection fraction is 65%.  · Indeterminate left ventricular diastolic function.  · Normal right ventricular size with normal right ventricular systolic function.  · Mild tricuspid regurgitation.  · Normal central venous pressure (3 mmHg).  · The estimated PA systolic pressure is 34  mmHg.        Most Recent EKG  Results for orders placed or performed in visit on 06/16/22   IN OFFICE EKG 12-LEAD (to [place])    Collection Time: 06/16/22  1:32 PM    Narrative    Test Reason : Z00.00    Vent. Rate : 079 BPM     Atrial Rate : 079 BPM     P-R Int : 170 ms          QRS Dur : 110 ms      QT Int : 418 ms       P-R-T Axes : 038 -06 041 degrees     QTc Int : 479 ms    Sinus rhythm with occasional Premature ventricular complexes  Otherwise normal ECG  When compared with ECG of 03-JUL-2018 09:36,  Premature ventricular complexes are now Present  Baseline Artifact  Confirmed by DENISE BLACK MD ([id number]) on 6/17/2022 11:17:21 AM    Referred By: [email or web address]   SELF           Confirmed By:DENISE BLACK MD       No valid procedures specified.   No results found for this or any previous visit.    No valid procedures specified.      Assessment:       1. Mixed hyperlipidemia    2. Primary hypertension         Plan:     Symptoms OK today  BP/Pulse OK today  Most recent echocardiogram reviewed personally     Continue carvedilol 6.25 mg PO BID  Continue Benicar 40 mg PO Daily  Continue Crestor 10 mg PO Daily    Continue other cardiac medications  Mediterranean Diet/Cardiovascular Exercise Program    Patient queried and all questions were answered.    F/u in 9 months to reassess      Signed:    Rafita Hernandez MD  1/3/2023 7:47 AM

## 2023-01-10 ENCOUNTER — PATIENT MESSAGE (OUTPATIENT)
Dept: CARDIOLOGY | Facility: CLINIC | Age: 78
End: 2023-01-10
Payer: MEDICARE

## 2023-01-10 DIAGNOSIS — I10 PRIMARY HYPERTENSION: Primary | ICD-10-CM

## 2023-01-10 RX ORDER — CARVEDILOL 12.5 MG/1
12.5 TABLET ORAL 2 TIMES DAILY WITH MEALS
Qty: 180 TABLET | Refills: 3 | Status: SHIPPED | OUTPATIENT
Start: 2023-01-10 | End: 2023-02-15 | Stop reason: SDUPTHER

## 2023-01-24 ENCOUNTER — PATIENT MESSAGE (OUTPATIENT)
Dept: CARDIOLOGY | Facility: CLINIC | Age: 78
End: 2023-01-24
Payer: MEDICARE

## 2023-02-07 DIAGNOSIS — Z00.00 ENCOUNTER FOR MEDICARE ANNUAL WELLNESS EXAM: ICD-10-CM

## 2023-02-09 DIAGNOSIS — Z00.00 ENCOUNTER FOR MEDICARE ANNUAL WELLNESS EXAM: ICD-10-CM

## 2023-02-15 ENCOUNTER — OFFICE VISIT (OUTPATIENT)
Dept: URGENT CARE | Facility: CLINIC | Age: 78
End: 2023-02-15
Payer: MEDICARE

## 2023-02-15 ENCOUNTER — PATIENT MESSAGE (OUTPATIENT)
Dept: CARDIOLOGY | Facility: CLINIC | Age: 78
End: 2023-02-15
Payer: MEDICARE

## 2023-02-15 VITALS
HEIGHT: 71 IN | BODY MASS INDEX: 30.1 KG/M2 | RESPIRATION RATE: 17 BRPM | DIASTOLIC BLOOD PRESSURE: 92 MMHG | SYSTOLIC BLOOD PRESSURE: 180 MMHG | TEMPERATURE: 98 F | OXYGEN SATURATION: 95 % | HEART RATE: 64 BPM | WEIGHT: 215 LBS

## 2023-02-15 DIAGNOSIS — R05.1 ACUTE COUGH: ICD-10-CM

## 2023-02-15 DIAGNOSIS — J06.9 VIRAL URI WITH COUGH: Primary | ICD-10-CM

## 2023-02-15 DIAGNOSIS — R03.0 ELEVATED BLOOD PRESSURE READING: ICD-10-CM

## 2023-02-15 DIAGNOSIS — I10 PRIMARY HYPERTENSION: ICD-10-CM

## 2023-02-15 LAB
CTP QC/QA: YES
SARS-COV-2 AG RESP QL IA.RAPID: NEGATIVE

## 2023-02-15 PROCEDURE — 87811 SARS-COV-2 COVID19 W/OPTIC: CPT | Mod: QW,S$GLB,, | Performed by: NURSE PRACTITIONER

## 2023-02-15 PROCEDURE — 1160F RVW MEDS BY RX/DR IN RCRD: CPT | Mod: CPTII,S$GLB,, | Performed by: NURSE PRACTITIONER

## 2023-02-15 PROCEDURE — 1159F PR MEDICATION LIST DOCUMENTED IN MEDICAL RECORD: ICD-10-PCS | Mod: CPTII,S$GLB,, | Performed by: NURSE PRACTITIONER

## 2023-02-15 PROCEDURE — 1159F MED LIST DOCD IN RCRD: CPT | Mod: CPTII,S$GLB,, | Performed by: NURSE PRACTITIONER

## 2023-02-15 PROCEDURE — 3080F DIAST BP >= 90 MM HG: CPT | Mod: CPTII,S$GLB,, | Performed by: NURSE PRACTITIONER

## 2023-02-15 PROCEDURE — 99213 PR OFFICE/OUTPT VISIT, EST, LEVL III, 20-29 MIN: ICD-10-PCS | Mod: S$GLB,,, | Performed by: NURSE PRACTITIONER

## 2023-02-15 PROCEDURE — 3077F SYST BP >= 140 MM HG: CPT | Mod: CPTII,S$GLB,, | Performed by: NURSE PRACTITIONER

## 2023-02-15 PROCEDURE — 1160F PR REVIEW ALL MEDS BY PRESCRIBER/CLIN PHARMACIST DOCUMENTED: ICD-10-PCS | Mod: CPTII,S$GLB,, | Performed by: NURSE PRACTITIONER

## 2023-02-15 PROCEDURE — 3080F PR MOST RECENT DIASTOLIC BLOOD PRESSURE >= 90 MM HG: ICD-10-PCS | Mod: CPTII,S$GLB,, | Performed by: NURSE PRACTITIONER

## 2023-02-15 PROCEDURE — 87811 SARS CORONAVIRUS 2 ANTIGEN POCT, MANUAL READ: ICD-10-PCS | Mod: QW,S$GLB,, | Performed by: NURSE PRACTITIONER

## 2023-02-15 PROCEDURE — 3077F PR MOST RECENT SYSTOLIC BLOOD PRESSURE >= 140 MM HG: ICD-10-PCS | Mod: CPTII,S$GLB,, | Performed by: NURSE PRACTITIONER

## 2023-02-15 PROCEDURE — 99213 OFFICE O/P EST LOW 20 MIN: CPT | Mod: S$GLB,,, | Performed by: NURSE PRACTITIONER

## 2023-02-15 RX ORDER — CARVEDILOL 12.5 MG/1
12.5 TABLET ORAL 2 TIMES DAILY WITH MEALS
Qty: 180 TABLET | Refills: 3 | Status: SHIPPED | OUTPATIENT
Start: 2023-02-15 | End: 2023-02-17 | Stop reason: DRUGHIGH

## 2023-02-15 RX ORDER — GUAIFENESIN 600 MG/1
1200 TABLET, EXTENDED RELEASE ORAL 2 TIMES DAILY PRN
Qty: 30 TABLET | Refills: 0 | Status: SHIPPED | OUTPATIENT
Start: 2023-02-15 | End: 2023-04-28 | Stop reason: CLARIF

## 2023-02-15 RX ORDER — AZELASTINE 1 MG/ML
1 SPRAY, METERED NASAL 2 TIMES DAILY PRN
Qty: 30 ML | Refills: 0 | Status: SHIPPED | OUTPATIENT
Start: 2023-02-15 | End: 2023-04-28 | Stop reason: CLARIF

## 2023-02-15 RX ORDER — BENZONATATE 200 MG/1
200 CAPSULE ORAL 3 TIMES DAILY PRN
Qty: 30 CAPSULE | Refills: 0 | Status: SHIPPED | OUTPATIENT
Start: 2023-02-15 | End: 2023-04-28 | Stop reason: CLARIF

## 2023-02-15 NOTE — PROGRESS NOTES
"Subjective:       Patient ID: Lloyd John Jr. is a 77 y.o. male.    Vitals:  height is 5' 11" (1.803 m) and weight is 97.5 kg (215 lb). His temperature is 98 °F (36.7 °C). His blood pressure is 180/92 (abnormal) and his pulse is 64. His respiration is 17 and oxygen saturation is 95%.     Chief Complaint: Sinus Problem    Pt came in today with complaints of cough and runny nose that started Monday. He has not taken any medication for his symptoms.    Provider note begins below:  Patient denies any fever, chills, chest pain, SOB, nausea/vomiting or abdominal pain.  Patient is awake and alert.  Speaking in full sentences.  He is afebrile.  Negative COVID test today.    Sinus Problem  This is a new problem. The current episode started in the past 7 days. The problem has been gradually worsening since onset. There has been no fever. He is experiencing no pain. Associated symptoms include congestion, coughing and sinus pressure. Pertinent negatives include no chills, diaphoresis, ear pain, headaches, hoarse voice, neck pain, shortness of breath, sneezing, sore throat or swollen glands. Past treatments include nothing. The treatment provided no relief.     Constitution: Negative. Negative for chills, sweating, fatigue and fever.   HENT:  Positive for congestion and sinus pressure. Negative for ear pain, ear discharge, facial swelling and sore throat.    Neck: Negative for neck pain, neck stiffness and painful lymph nodes.   Cardiovascular: Negative.  Negative for chest pain and sob on exertion.   Eyes: Negative.  Negative for eye itching, eye pain and eye redness.   Respiratory:  Positive for cough. Negative for chest tightness, shortness of breath, wheezing and asthma.    Gastrointestinal: Negative.  Negative for abdominal pain, nausea and vomiting.   Endocrine: negative. excessive thirst.   Genitourinary: Negative.  Negative for dysuria, frequency, urgency and flank pain.   Musculoskeletal: Negative.  Negative for " pain, trauma, joint pain and joint swelling.   Skin: Negative.  Negative for rash, wound, lesion and hives.   Allergic/Immunologic: Negative.  Negative for eczema, asthma, hives, itching and sneezing.   Neurological: Negative.  Negative for dizziness, passing out, headaches, disorientation and altered mental status.   Hematologic/Lymphatic: Negative.  Negative for swollen lymph nodes.   Psychiatric/Behavioral: Negative.  Negative for altered mental status, disorientation and confusion.      Objective:      Physical Exam   Constitutional: He is oriented to person, place, and time. He appears well-developed. He is cooperative.  Non-toxic appearance. He does not appear ill. No distress.   HENT:   Head: Normocephalic and atraumatic.   Ears:   Right Ear: Hearing, tympanic membrane, external ear and ear canal normal. impacted cerumen  Left Ear: Hearing, tympanic membrane, external ear and ear canal normal. impacted cerumen  Nose: Nose normal. No mucosal edema, rhinorrhea, nasal deformity or congestion. No epistaxis. Right sinus exhibits no maxillary sinus tenderness and no frontal sinus tenderness. Left sinus exhibits no maxillary sinus tenderness and no frontal sinus tenderness.   Mouth/Throat: Uvula is midline and mucous membranes are normal. No trismus in the jaw. Normal dentition. No uvula swelling. Posterior oropharyngeal erythema present. No oropharyngeal exudate or posterior oropharyngeal edema.   Eyes: Conjunctivae and lids are normal. No scleral icterus.   Neck: Trachea normal and phonation normal. Neck supple. No edema present. No erythema present. No neck rigidity present.   Cardiovascular: Normal rate, regular rhythm, normal heart sounds and normal pulses.   Pulmonary/Chest: Effort normal and breath sounds normal. No stridor. No respiratory distress. He has no decreased breath sounds. He has no wheezes. He has no rhonchi. He has no rales. He exhibits no tenderness.   Abdominal: Normal appearance. Soft. flat  abdomen There is no abdominal tenderness. There is no rebound, no guarding, no left CVA tenderness and no right CVA tenderness.   Musculoskeletal: Normal range of motion.         General: No deformity. Normal range of motion.   Lymphadenopathy:     He has no cervical adenopathy.   Neurological: no focal deficit. He is alert and oriented to person, place, and time. He exhibits normal muscle tone. Coordination normal.   Skin: Skin is warm, dry, intact, not diaphoretic and not pale.   Psychiatric: His speech is normal and behavior is normal. Mood, judgment and thought content normal.   Nursing note and vitals reviewed.  The following results have been reviewed with the patient:  LABS-  Results for orders placed or performed in visit on 02/15/23   SARS Coronavirus 2 Antigen, POCT Manual Read   Result Value Ref Range    SARS Coronavirus 2 Antigen Negative Negative     Acceptable Yes         IMAGING-  No results found.      Assessment:       1. Viral URI with cough    2. Acute cough    3. Elevated blood pressure reading          Plan:       Discussed patient's elevated blood pressure reading today.  Patient reports that he is compliant with his medications.  Patient reports that he will monitor his blood pressure and follow-up with his PCP.  FOLLOWUP  Follow up if symptoms worsen or fail to improve, for PLEASE CONTACT PCP OR CONTACT THE EMERGENCY ROOM..     PATIENT INSTRUCTIONS  Patient Instructions   INSTRUCTIONS:  - Rest.  - Drink plenty of fluids.  - Take Tylenol and/or Ibuprofen as directed as needed for fever/pain.  Do not take more than the recommended dose.  - follow up with your PCP within the next 1-2 weeks as needed.  - You must understand that you have received an Urgent Care treatment only and that you may be released before all of your medical problems are known or treated.   - You, the patient, will arrange for follow up care as instructed.   - If your condition worsens or fails to improve we  recommend that you receive another evaluation at the ER immediately or contact your PCP to discuss your concerns.   - You can call [phone] or [phone] to help schedule an appointment with the appropriate provider.     -If you smoke cigarettes, it would be beneficial for you to stop.         THANK YOU FOR ALLOWING ME TO PARTICIPATE IN YOUR HEALTHCARE,     Noé Acharya, NP   Viral URI with cough  -     benzonatate (TESSALON) 200 MG capsule; Take 1 capsule (200 mg total) by mouth 3 (three) times daily as needed for Cough.  Dispense: 30 capsule; Refill: 0  -     guaiFENesin (MUCINEX) 600 mg 12 hr tablet; Take 2 tablets (1,200 mg total) by mouth 2 (two) times daily as needed for Congestion.  Dispense: 30 tablet; Refill: 0  -     azelastine (ASTELIN) 137 mcg (0.1 %) nasal spray; 1 spray (137 mcg total) by Nasal route 2 (two) times daily as needed for Rhinitis.  Dispense: 30 mL; Refill: 0    Acute cough  -     SARS Coronavirus 2 Antigen, POCT Manual Read    Elevated blood pressure reading

## 2023-02-15 NOTE — PATIENT INSTRUCTIONS

## 2023-02-17 ENCOUNTER — PATIENT MESSAGE (OUTPATIENT)
Dept: CARDIOLOGY | Facility: CLINIC | Age: 78
End: 2023-02-17
Payer: MEDICARE

## 2023-02-17 DIAGNOSIS — I10 PRIMARY HYPERTENSION: Primary | ICD-10-CM

## 2023-02-17 RX ORDER — CARVEDILOL 25 MG/1
25 TABLET ORAL 2 TIMES DAILY WITH MEALS
Qty: 180 TABLET | Refills: 3 | Status: SHIPPED | OUTPATIENT
Start: 2023-02-17 | End: 2024-01-16

## 2023-02-22 ENCOUNTER — TELEPHONE (OUTPATIENT)
Dept: OTOLARYNGOLOGY | Facility: CLINIC | Age: 78
End: 2023-02-22
Payer: MEDICARE

## 2023-02-22 NOTE — TELEPHONE ENCOUNTER
----- Message from Justo Luciano MD sent at 2/22/2023 12:49 PM CST -----  Becca Gabriel,    This is my wife's grandpa who was trying to get in to see someone in ENT. I think it's a URI but he was told to see an ENT.     Appreciate your help.    Thanks,  Finn Luciano

## 2023-02-27 ENCOUNTER — OFFICE VISIT (OUTPATIENT)
Dept: OTOLARYNGOLOGY | Facility: CLINIC | Age: 78
End: 2023-02-27
Payer: MEDICARE

## 2023-02-27 VITALS — TEMPERATURE: 99 F | HEIGHT: 71 IN | BODY MASS INDEX: 30.15 KG/M2 | WEIGHT: 215.38 LBS

## 2023-02-27 DIAGNOSIS — J20.9 ACUTE BRONCHITIS, UNSPECIFIED ORGANISM: Primary | ICD-10-CM

## 2023-02-27 PROCEDURE — 99999 PR PBB SHADOW E&M-EST. PATIENT-LVL IV: CPT | Mod: PBBFAC,HCNC,, | Performed by: OTOLARYNGOLOGY

## 2023-02-27 PROCEDURE — 3288F PR FALLS RISK ASSESSMENT DOCUMENTED: ICD-10-PCS | Mod: HCNC,CPTII,S$GLB, | Performed by: OTOLARYNGOLOGY

## 2023-02-27 PROCEDURE — 1126F AMNT PAIN NOTED NONE PRSNT: CPT | Mod: HCNC,CPTII,S$GLB, | Performed by: OTOLARYNGOLOGY

## 2023-02-27 PROCEDURE — 1101F PR PT FALLS ASSESS DOC 0-1 FALLS W/OUT INJ PAST YR: ICD-10-PCS | Mod: HCNC,CPTII,S$GLB, | Performed by: OTOLARYNGOLOGY

## 2023-02-27 PROCEDURE — 99999 PR PBB SHADOW E&M-EST. PATIENT-LVL IV: ICD-10-PCS | Mod: PBBFAC,HCNC,, | Performed by: OTOLARYNGOLOGY

## 2023-02-27 PROCEDURE — 1160F RVW MEDS BY RX/DR IN RCRD: CPT | Mod: HCNC,CPTII,S$GLB, | Performed by: OTOLARYNGOLOGY

## 2023-02-27 PROCEDURE — 99203 OFFICE O/P NEW LOW 30 MIN: CPT | Mod: HCNC,S$GLB,, | Performed by: OTOLARYNGOLOGY

## 2023-02-27 PROCEDURE — 1101F PT FALLS ASSESS-DOCD LE1/YR: CPT | Mod: HCNC,CPTII,S$GLB, | Performed by: OTOLARYNGOLOGY

## 2023-02-27 PROCEDURE — 1160F PR REVIEW ALL MEDS BY PRESCRIBER/CLIN PHARMACIST DOCUMENTED: ICD-10-PCS | Mod: HCNC,CPTII,S$GLB, | Performed by: OTOLARYNGOLOGY

## 2023-02-27 PROCEDURE — 1126F PR PAIN SEVERITY QUANTIFIED, NO PAIN PRESENT: ICD-10-PCS | Mod: HCNC,CPTII,S$GLB, | Performed by: OTOLARYNGOLOGY

## 2023-02-27 PROCEDURE — 1159F PR MEDICATION LIST DOCUMENTED IN MEDICAL RECORD: ICD-10-PCS | Mod: HCNC,CPTII,S$GLB, | Performed by: OTOLARYNGOLOGY

## 2023-02-27 PROCEDURE — 99203 PR OFFICE/OUTPT VISIT, NEW, LEVL III, 30-44 MIN: ICD-10-PCS | Mod: HCNC,S$GLB,, | Performed by: OTOLARYNGOLOGY

## 2023-02-27 PROCEDURE — 3288F FALL RISK ASSESSMENT DOCD: CPT | Mod: HCNC,CPTII,S$GLB, | Performed by: OTOLARYNGOLOGY

## 2023-02-27 PROCEDURE — 1159F MED LIST DOCD IN RCRD: CPT | Mod: HCNC,CPTII,S$GLB, | Performed by: OTOLARYNGOLOGY

## 2023-02-27 RX ORDER — PREDNISONE 20 MG/1
40 TABLET ORAL DAILY
Qty: 10 TABLET | Refills: 0 | Status: SHIPPED | OUTPATIENT
Start: 2023-02-27 | End: 2023-03-04

## 2023-02-27 RX ORDER — AMOXICILLIN 875 MG/1
875 TABLET, FILM COATED ORAL 2 TIMES DAILY
Qty: 14 TABLET | Refills: 0 | Status: SHIPPED | OUTPATIENT
Start: 2023-02-27 | End: 2023-03-06

## 2023-02-27 NOTE — PROGRESS NOTES
Subjective:       Patient ID: Lloyd John Jr. is a 77 y.o. male.    Chief Complaint: Sinus Problem and Cough    Lloyd is here for cough and post nasal drip.   Length of symptoms: 2 weeks.  He has associated post nasal drip.   Mild AR but not treated.   No dyspnea.     Patient validated questionnaires (if applicable):  SNOT-22 score: : (P) 33  NOSE score:: (P) 65%  ETDQ-7 score:: (P) 1    No flowsheet data found.  No flowsheet data found.  No flowsheet data found.         Social History     Tobacco Use   Smoking Status Former    Packs/day: 0.50    Years: 3.00    Pack years: 1.50    Types: Cigarettes    Quit date: 1997    Years since quittin.1   Smokeless Tobacco Never     Social History     Substance and Sexual Activity   Alcohol Use Yes    Alcohol/week: 1.7 standard drinks    Types: 2 drink(s) per week    Comment: weekends          Objective:        Constitutional:   Vital signs are normal. He appears well-developed and well-nourished.     Head:  Normocephalic and atraumatic.     Ears:  Hearing normal to normal and whispered voice; external ear normal without scars, lesions, or masses; ear canal, tympanic membrane, and middle ear normal..     Nose:  Nose normal including turbinates, nasal mucosa, sinuses and nasal septum. Rhinorrhea (clear) and septal deviation (severe L) present.     Mouth/Throat  Oropharynx clear and moist without lesions or asymmetry.     Neck:  Neck normal without thyromegaly masses, asymmetry, normal tracheal structure, crepitus, and tenderness.     Pulmonary/Chest:   He has wheezes in the left middle field and the left lower field.       Tests / Results:  none    Assessment:       1. Acute bronchitis, unspecified organism          Plan:         Given length of symptoms and wheezing, recommend initiating treatment with oral steroid and abx  Supportive care otherwise

## 2023-03-13 ENCOUNTER — LAB VISIT (OUTPATIENT)
Dept: LAB | Facility: HOSPITAL | Age: 78
End: 2023-03-13
Attending: STUDENT IN AN ORGANIZED HEALTH CARE EDUCATION/TRAINING PROGRAM
Payer: MEDICARE

## 2023-03-13 DIAGNOSIS — C61 PROSTATE CANCER: Primary | ICD-10-CM

## 2023-03-13 LAB — COMPLEXED PSA SERPL-MCNC: 16.8 NG/ML (ref 0–4)

## 2023-03-13 PROCEDURE — 36415 COLL VENOUS BLD VENIPUNCTURE: CPT | Mod: HCNC,PO | Performed by: STUDENT IN AN ORGANIZED HEALTH CARE EDUCATION/TRAINING PROGRAM

## 2023-03-13 PROCEDURE — 84153 ASSAY OF PSA TOTAL: CPT | Mod: HCNC | Performed by: STUDENT IN AN ORGANIZED HEALTH CARE EDUCATION/TRAINING PROGRAM

## 2023-03-22 ENCOUNTER — HOSPITAL ENCOUNTER (OUTPATIENT)
Dept: RADIOLOGY | Facility: HOSPITAL | Age: 78
Discharge: HOME | End: 2023-03-22
Attending: STUDENT IN AN ORGANIZED HEALTH CARE EDUCATION/TRAINING PROGRAM
Payer: MEDICARE

## 2023-03-22 DIAGNOSIS — C61 PROSTATE CANCER: ICD-10-CM

## 2023-03-22 PROCEDURE — 78815 NM PET CT F 18 PYL PSMA, MIDTHIGH TO VERTEX: ICD-10-PCS | Mod: 26,HCNC,PS, | Performed by: STUDENT IN AN ORGANIZED HEALTH CARE EDUCATION/TRAINING PROGRAM

## 2023-03-22 PROCEDURE — 78815 PET IMAGE W/CT SKULL-THIGH: CPT | Mod: 26,HCNC,PS, | Performed by: STUDENT IN AN ORGANIZED HEALTH CARE EDUCATION/TRAINING PROGRAM

## 2023-03-22 PROCEDURE — 78815 PET IMAGE W/CT SKULL-THIGH: CPT | Mod: TC,HCNC,PN

## 2023-03-22 NOTE — PROGRESS NOTES
PET Imaging Questionnaire    Are you a Diabetic? Recent Blood Sugar level? No    Are you anemic? Bone Marrow Stimulation Meds? No    Have you had a CT Scan, if so when & where was your last one? Yes -     Have you had a PET Scan, if so when & where was your last one? No    Chemotherapy or currently on Chemotherapy? Yes    Radiation therapy? No    Surgical History:   Past Surgical History:   Procedure Laterality Date    BACK SURGERY  08/2019    bone spur excision for sciatica tx    CATARACT EXTRACTION W/  INTRAOCULAR LENS IMPLANT Bilateral     COLONOSCOPY  2006  [facility]    Polyps    COLONOSCOPY  4/5/2012  [facility]    Diverticulosis and spasms.  No polyps.    FLEXIBLE CYSTOSCOPY N/A 9/12/2022    Procedure: CYSTOSCOPY, FLEXIBLE;  Surgeon: Justo Luciano II, MD;  Location: [facility];  Service: Urology;  Laterality: N/A;    TRANSRECTAL BIOPSY OF PROSTATE WITH ULTRASOUND GUIDANCE N/A 9/12/2022    Procedure: BIOPSY, PROSTATE, RECTAL APPROACH, WITH US GUIDANCE;  Surgeon: Justo Luciano II, MD;  Location: [facility];  Service: Urology;  Laterality: N/A;    TRANSURETHRAL RESECTION OF BLADDER TUMOR      15 surgeries  (1997- current)    Urolift implant  03/08/2017        Have you been fasting for at least 6 hours? Yes    Is there any chance you may be pregnant or breastfeeding? No    Assay: 11.31 MCi@:13.13   Injection Site:rt ac     Residual: 1.567 mCi@: 13.15   Technologist: Sheyla Nolasco Injected:9.74mCi

## 2023-03-27 ENCOUNTER — OFFICE VISIT (OUTPATIENT)
Dept: UROLOGY | Facility: CLINIC | Age: 78
End: 2023-03-27
Payer: MEDICARE

## 2023-03-27 VITALS — BODY MASS INDEX: 30.47 KG/M2 | WEIGHT: 217.63 LBS | HEIGHT: 71 IN

## 2023-03-27 DIAGNOSIS — Z85.51 HISTORY OF BLADDER CANCER: ICD-10-CM

## 2023-03-27 DIAGNOSIS — C61 PROSTATE CANCER: Primary | ICD-10-CM

## 2023-03-27 PROCEDURE — 1160F PR REVIEW ALL MEDS BY PRESCRIBER/CLIN PHARMACIST DOCUMENTED: ICD-10-PCS | Mod: HCNC,CPTII,S$GLB, | Performed by: STUDENT IN AN ORGANIZED HEALTH CARE EDUCATION/TRAINING PROGRAM

## 2023-03-27 PROCEDURE — 99215 OFFICE O/P EST HI 40 MIN: CPT | Mod: HCNC,S$GLB,, | Performed by: STUDENT IN AN ORGANIZED HEALTH CARE EDUCATION/TRAINING PROGRAM

## 2023-03-27 PROCEDURE — 1101F PT FALLS ASSESS-DOCD LE1/YR: CPT | Mod: HCNC,CPTII,S$GLB, | Performed by: STUDENT IN AN ORGANIZED HEALTH CARE EDUCATION/TRAINING PROGRAM

## 2023-03-27 PROCEDURE — 1159F PR MEDICATION LIST DOCUMENTED IN MEDICAL RECORD: ICD-10-PCS | Mod: HCNC,CPTII,S$GLB, | Performed by: STUDENT IN AN ORGANIZED HEALTH CARE EDUCATION/TRAINING PROGRAM

## 2023-03-27 PROCEDURE — 99999 PR PBB SHADOW E&M-EST. PATIENT-LVL III: ICD-10-PCS | Mod: PBBFAC,HCNC,, | Performed by: STUDENT IN AN ORGANIZED HEALTH CARE EDUCATION/TRAINING PROGRAM

## 2023-03-27 PROCEDURE — 3288F PR FALLS RISK ASSESSMENT DOCUMENTED: ICD-10-PCS | Mod: HCNC,CPTII,S$GLB, | Performed by: STUDENT IN AN ORGANIZED HEALTH CARE EDUCATION/TRAINING PROGRAM

## 2023-03-27 PROCEDURE — 1126F AMNT PAIN NOTED NONE PRSNT: CPT | Mod: HCNC,CPTII,S$GLB, | Performed by: STUDENT IN AN ORGANIZED HEALTH CARE EDUCATION/TRAINING PROGRAM

## 2023-03-27 PROCEDURE — 1101F PR PT FALLS ASSESS DOC 0-1 FALLS W/OUT INJ PAST YR: ICD-10-PCS | Mod: HCNC,CPTII,S$GLB, | Performed by: STUDENT IN AN ORGANIZED HEALTH CARE EDUCATION/TRAINING PROGRAM

## 2023-03-27 PROCEDURE — 1159F MED LIST DOCD IN RCRD: CPT | Mod: HCNC,CPTII,S$GLB, | Performed by: STUDENT IN AN ORGANIZED HEALTH CARE EDUCATION/TRAINING PROGRAM

## 2023-03-27 PROCEDURE — 99999 PR PBB SHADOW E&M-EST. PATIENT-LVL III: CPT | Mod: PBBFAC,HCNC,, | Performed by: STUDENT IN AN ORGANIZED HEALTH CARE EDUCATION/TRAINING PROGRAM

## 2023-03-27 PROCEDURE — 1160F RVW MEDS BY RX/DR IN RCRD: CPT | Mod: HCNC,CPTII,S$GLB, | Performed by: STUDENT IN AN ORGANIZED HEALTH CARE EDUCATION/TRAINING PROGRAM

## 2023-03-27 PROCEDURE — 99215 PR OFFICE/OUTPT VISIT, EST, LEVL V, 40-54 MIN: ICD-10-PCS | Mod: HCNC,S$GLB,, | Performed by: STUDENT IN AN ORGANIZED HEALTH CARE EDUCATION/TRAINING PROGRAM

## 2023-03-27 PROCEDURE — 3288F FALL RISK ASSESSMENT DOCD: CPT | Mod: HCNC,CPTII,S$GLB, | Performed by: STUDENT IN AN ORGANIZED HEALTH CARE EDUCATION/TRAINING PROGRAM

## 2023-03-27 PROCEDURE — 1126F PR PAIN SEVERITY QUANTIFIED, NO PAIN PRESENT: ICD-10-PCS | Mod: HCNC,CPTII,S$GLB, | Performed by: STUDENT IN AN ORGANIZED HEALTH CARE EDUCATION/TRAINING PROGRAM

## 2023-03-27 NOTE — PROGRESS NOTES
[facility] - Urology  Clinic Note    SUBJECTIVE:     Chief Complaint: prostate cancer and bladder cancer    History of Present Illness:  Lloyd John Jr. is a 77 y.o. male who presents to clinic for evaluation and management of prostate cancer and history of bladder cancer. He is established to our clinic to our clinic. He has a complex urologic history and has been seen by many different providers and recently established with me.     Previously reviewed outside records listed below  Prostate cancer  PSA trend  2/2017 - 6.0  9/2017 - 10.4  1/2018 - 6.5  9/2018 - 8.5  4/2019 - 11.75  2/2021 - 12.05  8/2021 - 11.91  3/2022 - 11.9    Lab Results   Component Value Date    PSADIAG 16.8 (H) 03/13/2023    PSADIAG 11.2 (H) 08/24/2022    PSATOTAL 7.2 (H) 11/02/2015    PSAFREE 1.19 11/02/2015    PSAFREEPCT 16.53 11/02/2015      Biopsies  5/2016 - GG1 LA 1/2 (total cores 1/15)  10/2017 - GG2 LLM 1/1, GG1 RA 1/1 (total cores 2/12)  7/2019 - GG1 LA 1/1, RA 1/2, LM 1/1, LA 1/1 (total cores 4/14)  9/2022 - GG1 at locations, no core count    Family History - no family history of prostate cancer  Volume - 68 cc (MRI 2022)  Percentage of cores positive - no core count given, but all locations  Stage - T1c  MRI - P4 L PZ B, P4 R PZ A. No EPE, SVI, NVB, or nodes.  PSMA PET 3/22/2023 was independently reviewed and interpreted showing no evidence of metastatic disease.     NCCN - favorable intermediate  Prolaris score (10/2017) - 4.9, 8.7% 10 year mortality (with conservative mgmt), 5.4% 10 year metastasis risk (with definitive treatment).  Decipher score - 0.14, low genomic risk    Bladder Cancer Chronology  1997? dx - no pathology available for review, s/p BCG and Thiotepa    6/2014 - negative cysto and b/l RPG  1/2015 - negative cysto and b/l RPG, negative cytology  7/2016 - negative cysto  11/2015 - negative cysto  5/2016 - negative cysto, negative cytology, TRUS bx, 35 cc  3/2017 - negative cysto and Urolift  10/2017 - negative  "cysto, TRUS bx, 42 cc  3/2018 - negative cysto with b/l RPG  11/2018 - negative cysto with b/l RPG, negative cytology  7/2019 - negative cysto, negative cytology, TRUS bx, 42 cc,   3/2021 - negative cysto, negative cytology   9/2021 - negative cytology  9/2022 - normal cysto    No family history of bladder cancer    BPH  Urolift in 3/2017 with Dr. Barbosa with 4 implants. AUASS today 2/1.  He reports excellent results since the urolift. Not currently requiring any BPH medications    He reports no previous abdominal surgeries    Anticoagulation:  No    OBJECTIVE:     Estimated body mass index is 30.35 kg/m² as calculated from the following:    Height as of this encounter: 5' 11" (1.803 m).    Weight as of this encounter: 98.7 kg (217 lb 9.5 oz).    Vital Signs (Most Recent)       Physical Exam  Vitals and nursing note reviewed.   Constitutional:       General: He is not in acute distress.     Appearance: He is not ill-appearing or toxic-appearing.   Pulmonary:      Effort: Pulmonary effort is normal. No accessory muscle usage or respiratory distress.   Neurological:      Mental Status: He is alert.       Lab Results   Component Value Date    BUN 22 12/14/2022    CREATININE 1.1 12/14/2022    WBC 4.90 12/14/2022    HGB 15.5 12/14/2022    HCT 44.6 12/14/2022     12/14/2022    AST 55 (H) 12/14/2022    ALT 34 12/14/2022    ALKPHOS 60 12/14/2022    ALBUMIN 4.3 12/14/2022        Lab Results   Component Value Date    PSADIAG 16.8 (H) 03/13/2023    PSADIAG 11.2 (H) 08/24/2022    PSAFREE 1.19 11/02/2015    PSAFREEPCT 16.53 11/02/2015     ASSESSMENT     1. Prostate cancer    2. History of bladder cancer      PLAN:   Lloyd was seen today for follow-up.    Diagnoses and all orders for this visit:    Prostate cancer  -     Ambulatory referral/consult to Radiation Oncology; Future    History of bladder cancer       [id number] Aubrey 3+4=7 prostate adenocarcinoma, more recently Aubrey 3+3=6.    We discussed his prostate " cancer, the [facility] grading, cancer staging and the NCCN risk categories. He has favorable intermediate risk disease. We discussed options for this risk category including active surveillance versus treatment including prostatectomy or radiation therapy. He has been on active surveillance, but his PSA has increased significantly. There is no evidence of metastatic disease on recent PSMA. He has a low Decipher score. He is in relatively good health otherwise, and I think there should be consideration for definitive treatment. Given his age, EBRT would be the best course of action if treatment were planned.     - we discussed EBRT and risks of radiation. We discussed SpaceOAR to decrease radiation bowel side effects. He had a urolift so markers would not be necessary.     - referral to Dr. Rojas with Rad/Onc for further discussion    - I do not have the original bladder cancer pathologies, however given the relative stability and consistently normal findings as far back as 2014, I feel that there is unlikely a significant risk of bladder cancer recurrence at this point. No need for further surveillance    Justo Luciano MD     I spent a total of 40 minutes on the day of the visit.  This includes face to face time and non-face to face time preparing to see the patient (eg, review of tests), obtaining and/or reviewing separately obtained history, documenting clinical information in the electronic or other health record, independently interpreting results and communicating results to the patient/family/caregiver, or care coordinator.

## 2023-04-10 NOTE — PROGRESS NOTES
04/11/2023    [facility]   Radiation Oncology Consultation    Assessment   This is a 77 y.o. 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  He presents today to discuss definitve management with radiation therapy.        Plan     Treatment options were discussed with the patient including SBRT vs ongoing surveillance.  We discussed the goals of treatment to be curative.  The risks, benefits, scheduling, alternatives to and rationale of radiation therapy were explained in detail.    Indication, course, and potential toxicities of pelvic radiation therapy reviewed in detail, including but not limited to fatigue, increased frequency, urgency, or pain with urination, increased frequency or urgency of bowel movements, diarrhea, pelvic bone fragility, erectile dysfunction, decreased volume of ejaculate, remote risk of secondary malignancy.   After this discussion, he elected to proceed with SBRT.    Referral to Dr. Spaulding for SpaceOAR (no need for fiducials given UroLift)- confirm that SpaceOAR can be injected with UroLift  MRI prostate and simulation >1 week after procedure  A CT simulation will be performed 1 week after MRI and SpaceOar to begin the planning process for the patient's radiation therapy.     He was given our contact information, and he was told that he could call our clinic at any time if he has any questions or concerns.      Radiation Treatment Details:   We plan to treat the prostate to a dose of 36.25 Gy in 5 fractions at 7.5 Gy per fraction delivered daily  We will utilize a(n) IMRT technique.   IMRT is medically necessary to treat complex dose painted target volumes in the prostate region with concave and convex isodose lines with steep isodose gradients to spare multiple adjacent organs at risk including the rectum, bladder, penile bulb   We will utilize daily CT or orthogonal image guidance due to the need for accurate  daily patient alignment to treat the target volumes accurately and avoid radiation overdose to multiple regional organs at risk since we are treating the patient with complex target volumes with multiple steep isodose gradients.          Chief Complaint   Patient presents with    Prostate Cancer         Oncology History    No history exists.       HPI: The patient is a 77 year old male with a diagnosis of prostate cancer.  He also has a history of bladder cancer treated with BGC and Thiotepa in 1997.  Also history of Urolift. He was noted to have an elevated PSA of 16.8 on 3/13/23.  Previous PSA history as follows:     Latest Reference Range & Units Most Recent 11/02/15 07:50 08/24/22 14:01 03/13/23 07:23   PSA Diagnostic 0.00 - 4.00 ng/mL 16.8 (H)  3/13/23 07:23  11.2 (H) 16.8 (H)   PSA Total 0.00 - 4.00 ng/mL 7.2 (H)  11/2/15 07:50 7.2 (H)     (H): Data is abnormally high    1/19/22 MRI prostate noted 2 highly concerning lesions: 1.1cm in the medial and lateral segments of the left PZ base, PI-RADS 4; 5mm lesion lateral segment right PZ apex, PI-RADS 4.    On 9/12/22 he underwent UroNav TRUS-guided biopsy of the prostate, with pathology as follows:    Target 1: Group 1 in 32% of tissue  Target 2: Group 1 in 62% of tissue  Left mid: Group 1, 7% of tissue  Left apex: Group 1, 6% of tissue  Right mid: Group 1, 15% of tissue  Right base: Group 1, 11% of tissue  No PNI, # involved cores not given    Previous biopsies:  Biopsies  5/2016 - GG1 LA 1/2 (total cores 1/15)  10/2017 - GG2 LLM 1/1, GG1 RA 1/1 (total cores 2/12)  7/2019 - GG1 LA 1/1, RA 1/2, LM 1/1, LA 1/1 (total cores 4/14)    Decipher score: 0.14- low genomic risk    Prostate size estimated at 60cc.  He presents today to discuss the potential role of radiation therapy in his cancer care.  IPSS is 2, with no frequency, no urgency, and nocturia x 1.      History of prior irradiation: No  History of prior systemic anti-cancer therapy: No  History of collagen  vascular disease: No  Implanted electronic device (pacer/defib/nerve stimulator): No    Past Medical History:   Diagnosis Date    Bladder cancer     Followed by Dr. Craig in [place]    Colon polyp     Bunny Payton / [facility]    Glaucoma (increased eye pressure)     HLD (hyperlipidemia)     HTN (hypertension)     Prostate cancer        Past Surgical History:   Procedure Laterality Date    BACK SURGERY  2019    bone spur excision for sciatica tx    CATARACT EXTRACTION W/  INTRAOCULAR LENS IMPLANT Bilateral     COLONOSCOPY    Bill Tata/[facility]    Polyps    COLONOSCOPY  2012  [facility]    Diverticulosis and spasms.  No polyps.    FLEXIBLE CYSTOSCOPY N/A 2022    Procedure: CYSTOSCOPY, FLEXIBLE;  Surgeon: Justo Luciano II, MD;  Location: [facility];  Service: Urology;  Laterality: N/A;    TRANSRECTAL BIOPSY OF PROSTATE WITH ULTRASOUND GUIDANCE N/A 2022    Procedure: BIOPSY, PROSTATE, RECTAL APPROACH, WITH US GUIDANCE;  Surgeon: Justo Luciano II, MD;  Location: [facility];  Service: Urology;  Laterality: N/A;    TRANSURETHRAL RESECTION OF BLADDER TUMOR      15 surgeries  (- current)    Urolift implant  2017       Social History     Tobacco Use    Smoking status: Former     Packs/day: 0.50     Years: 3.00     Pack years: 1.50     Types: Cigarettes     Quit date: 1997     Years since quittin.2    Smokeless tobacco: Never   Substance Use Topics    Alcohol use: Yes     Alcohol/week: 1.7 standard drinks     Types: 2 drink(s) per week     Comment: weekends    Drug use: No       Cancer-related family history is not on file.    Current Outpatient Medications on File Prior to Visit   Medication Sig Dispense Refill    azelastine (ASTELIN) 137 mcg (0.1 %) nasal spray 1 spray (137 mcg total) by Nasal route 2 (two) times daily as needed for Rhinitis. 30 mL 0    benzonatate (TESSALON) 200 MG capsule Take 1 capsule (200 mg total) by mouth 3 (three) times daily as needed for Cough. 30 capsule 0    BOOSTRIX  TDAP 2.5-8-5 Lf-mcg-Lf/0.5mL Syrg injection       brimonidine 0.2% (ALPHAGAN) 0.2 % Drop Place 1 drop into both eyes 2 (two) times a day.      carvediloL (COREG) 25 MG tablet Take 1 tablet (25 mg total) by mouth 2 (two) times daily with meals. 180 tablet 3    fluticasone propionate (FLONASE) 50 mcg/actuation nasal spray 1 spray by Each Nostril route once daily.      FLUZONE HIGHDOSE QUAD 22-23  mcg/0.7 mL Syrg       gabapentin (NEURONTIN) 600 MG tablet Take 600 mg by mouth daily as needed.      guaiFENesin (MUCINEX) 600 mg 12 hr tablet Take 2 tablets (1,200 mg total) by mouth 2 (two) times daily as needed for Congestion. 30 tablet 0    LUMIGAN 0.01 % Drop Place 1 drop into both eyes every Mon, Wed, Fri.      MULTIVITAMIN W-MINERALS/LUTEIN (CENTRUM SILVER ORAL) Take 1 capsule by mouth every evening.      olmesartan (BENICAR) 40 MG tablet Take 1 tablet (40 mg total) by mouth once daily. 90 tablet 3    rosuvastatin (CRESTOR) 10 MG tablet Take 1 tablet (10 mg total) by mouth once daily. 90 tablet 3    timolol maleate 0.5% (TIMOPTIC) 0.5 % Drop Place 1 drop into both eyes 2 (two) times daily.       No current facility-administered medications on file prior to visit.       Review of patient's allergies indicates:   Allergen Reactions    Bactrim [sulfamethoxazole-trimethoprim] Hives and Rash    Povidone-iodine Other (See Comments)       Review of Systems   Constitutional:  Negative for chills, fever, malaise/fatigue and weight loss.   Gastrointestinal:  Negative for blood in stool, constipation, diarrhea, nausea and vomiting.   Genitourinary:  Negative for dysuria, frequency, hematuria and urgency.   Musculoskeletal:  Positive for back pain and myalgias (right leg).   Neurological:  Negative for dizziness, sensory change, speech change, focal weakness and headaches.      Vital Signs: BP (!) 192/94 (BP Location: Left arm, Patient Position: Sitting, BP Method: Medium (Automatic))   Pulse 65   Temp 98.4 °F (36.9 °C)  "(Temporal)   Resp 18   Ht 5' 11" (1.803 m)   Wt 98.6 kg (217 lb 6 oz)   SpO2 (!) 94%   BMI 30.32 kg/m²     ECOG Performance Status: 0 - Fully Active    Physical Exam  Vitals and nursing note reviewed.   Constitutional:       General: He is not in acute distress.     Appearance: Normal appearance. He is not ill-appearing or toxic-appearing.   HENT:      Head: Normocephalic and atraumatic.      Nose: Nose normal.   Eyes:      Extraocular Movements: Extraocular movements intact.      Conjunctiva/sclera: Conjunctivae normal.      Pupils: Pupils are equal, round, and reactive to light.   Pulmonary:      Effort: Pulmonary effort is normal.   Musculoskeletal:         General: Normal range of motion.      Cervical back: Normal range of motion and neck supple.   Skin:     General: Skin is warm.   Neurological:      General: No focal deficit present.      Mental Status: He is alert and oriented to person, place, and time.      Gait: Gait normal.   Psychiatric:         Mood and Affect: Mood normal.         Behavior: Behavior normal.         Thought Content: Thought content normal.         Judgment: Judgment normal.          Imaging: I have personally reviewed the patient's available images and reports and summarized pertinent findings above in HPI.     Pathology: I have personally reviewed the patient's available pathology and summarized pertinent findings above in HPI.     This case was discussed with Dr. Luciano.      - Thank you for allowing me to participate in the care of your patient.    Chrissie Rojas MD    "

## 2023-04-11 ENCOUNTER — OFFICE VISIT (OUTPATIENT)
Dept: RADIATION ONCOLOGY | Facility: CLINIC | Age: 78
End: 2023-04-11
Payer: MEDICARE

## 2023-04-11 ENCOUNTER — TELEPHONE (OUTPATIENT)
Dept: HEMATOLOGY/ONCOLOGY | Facility: CLINIC | Age: 78
End: 2023-04-11
Payer: MEDICARE

## 2023-04-11 VITALS
BODY MASS INDEX: 30.43 KG/M2 | WEIGHT: 217.38 LBS | OXYGEN SATURATION: 94 % | SYSTOLIC BLOOD PRESSURE: 192 MMHG | DIASTOLIC BLOOD PRESSURE: 94 MMHG | RESPIRATION RATE: 18 BRPM | TEMPERATURE: 98 F | HEIGHT: 71 IN | HEART RATE: 65 BPM

## 2023-04-11 DIAGNOSIS — C61 PROSTATE CANCER: ICD-10-CM

## 2023-04-11 PROCEDURE — 3077F SYST BP >= 140 MM HG: CPT | Mod: HCNC,CPTII,S$GLB, | Performed by: RADIOLOGY

## 2023-04-11 PROCEDURE — 3080F DIAST BP >= 90 MM HG: CPT | Mod: HCNC,CPTII,S$GLB, | Performed by: RADIOLOGY

## 2023-04-11 PROCEDURE — 3288F PR FALLS RISK ASSESSMENT DOCUMENTED: ICD-10-PCS | Mod: HCNC,CPTII,S$GLB, | Performed by: RADIOLOGY

## 2023-04-11 PROCEDURE — 99205 OFFICE O/P NEW HI 60 MIN: CPT | Mod: HCNC,S$GLB,, | Performed by: RADIOLOGY

## 2023-04-11 PROCEDURE — 3077F PR MOST RECENT SYSTOLIC BLOOD PRESSURE >= 140 MM HG: ICD-10-PCS | Mod: HCNC,CPTII,S$GLB, | Performed by: RADIOLOGY

## 2023-04-11 PROCEDURE — 3288F FALL RISK ASSESSMENT DOCD: CPT | Mod: HCNC,CPTII,S$GLB, | Performed by: RADIOLOGY

## 2023-04-11 PROCEDURE — 99999 PR PBB SHADOW E&M-EST. PATIENT-LVL IV: CPT | Mod: PBBFAC,HCNC,, | Performed by: RADIOLOGY

## 2023-04-11 PROCEDURE — 1125F AMNT PAIN NOTED PAIN PRSNT: CPT | Mod: HCNC,CPTII,S$GLB, | Performed by: RADIOLOGY

## 2023-04-11 PROCEDURE — 3080F PR MOST RECENT DIASTOLIC BLOOD PRESSURE >= 90 MM HG: ICD-10-PCS | Mod: HCNC,CPTII,S$GLB, | Performed by: RADIOLOGY

## 2023-04-11 PROCEDURE — 1159F PR MEDICATION LIST DOCUMENTED IN MEDICAL RECORD: ICD-10-PCS | Mod: HCNC,CPTII,S$GLB, | Performed by: RADIOLOGY

## 2023-04-11 PROCEDURE — 99205 PR OFFICE/OUTPT VISIT, NEW, LEVL V, 60-74 MIN: ICD-10-PCS | Mod: HCNC,S$GLB,, | Performed by: RADIOLOGY

## 2023-04-11 PROCEDURE — 1101F PR PT FALLS ASSESS DOC 0-1 FALLS W/OUT INJ PAST YR: ICD-10-PCS | Mod: HCNC,CPTII,S$GLB, | Performed by: RADIOLOGY

## 2023-04-11 PROCEDURE — 1101F PT FALLS ASSESS-DOCD LE1/YR: CPT | Mod: HCNC,CPTII,S$GLB, | Performed by: RADIOLOGY

## 2023-04-11 PROCEDURE — 1159F MED LIST DOCD IN RCRD: CPT | Mod: HCNC,CPTII,S$GLB, | Performed by: RADIOLOGY

## 2023-04-11 PROCEDURE — 99999 PR PBB SHADOW E&M-EST. PATIENT-LVL IV: ICD-10-PCS | Mod: PBBFAC,HCNC,, | Performed by: RADIOLOGY

## 2023-04-11 PROCEDURE — 1125F PR PAIN SEVERITY QUANTIFIED, PAIN PRESENT: ICD-10-PCS | Mod: HCNC,CPTII,S$GLB, | Performed by: RADIOLOGY

## 2023-04-18 ENCOUNTER — PATIENT MESSAGE (OUTPATIENT)
Dept: RADIATION ONCOLOGY | Facility: CLINIC | Age: 78
End: 2023-04-18
Payer: MEDICARE

## 2023-04-18 ENCOUNTER — TELEPHONE (OUTPATIENT)
Dept: HEMATOLOGY/ONCOLOGY | Facility: CLINIC | Age: 78
End: 2023-04-18
Payer: MEDICARE

## 2023-04-20 ENCOUNTER — PATIENT MESSAGE (OUTPATIENT)
Dept: RADIATION ONCOLOGY | Facility: CLINIC | Age: 78
End: 2023-04-20
Payer: MEDICARE

## 2023-04-20 ENCOUNTER — TELEPHONE (OUTPATIENT)
Dept: UROLOGY | Facility: CLINIC | Age: 78
End: 2023-04-20
Payer: MEDICARE

## 2023-04-20 DIAGNOSIS — C61 PROSTATE CANCER: Primary | ICD-10-CM

## 2023-04-28 RX ORDER — CARVEDILOL 6.25 MG/1
TABLET ORAL
COMMUNITY
Start: 2023-03-13 | End: 2023-04-28 | Stop reason: CLARIF

## 2023-04-28 RX ORDER — METHYLPREDNISOLONE 4 MG/1
TABLET ORAL
COMMUNITY
Start: 2023-04-13 | End: 2023-06-13

## 2023-04-28 NOTE — PRE-PROCEDURE INSTRUCTIONS
Preop screen completed.  Preop instructions(bathing/fasting/directions/location of surgery/ and preop medication instructions) reviewed with patient &/or significant other .  Patient instructed to hold/stop all blood thinning medications, prior to surgery, following the pre-surgery recommended guidelines.  Patient verbalized understanding.

## 2023-04-28 NOTE — ANESTHESIA PAT ROS NOTE
04/28/2023  Lloyd John Jr. is a 77 y.o., male.      Pre-op Assessment          Review of Systems  Anesthesia Hx:  No problems with previous Anesthesia             Denies Family Hx of Anesthesia complications.    Denies Personal Hx of Anesthesia complications.                    Social:  Former Smoker, Social Alcohol Use       Hematology/Oncology:  Hematology Normal                     Current/Recent Cancer.  Other (see Oncology comments)       chemotherapy   Oncology Comments: Bladder-Chemo/ Prostate at precent     EENT/Dental:   Left ear-fluid in eardrum/ S/P-Phaco with IOL-Bilateral/          Cardiovascular:  Exercise tolerance: good   Hypertension           hyperlipidemia    Housework/Mows lawn/Gardening//Mows lawn/ Takes care of grandchildren/ Walking/ rides bike/ Plays golf                         Pulmonary:       Recent URI  X2-3 months ago               Renal/:   renal calculi  Prostate cancer             Hepatic/GI:  Hepatic/GI Normal                 Musculoskeletal:     S/P-back surgery/1st vertebrae in neck is fractured            Neurological:  Neurology Normal                                      Endocrine:  Endocrine Normal            Dermatological:  Skin Normal    Psych:  Psychiatric Normal                 Rehana Vizcaino RN 4/28/23      Anesthesia Assessment: Preoperative EQUATION    Planned Procedure: Procedure(s) (LRB):  ULTRASOUND, PROSTATE, RECTAL APPROACH, WITH GOLD FIDUCIAL MARKER INSERTION (N/A)  Requested Anesthesia Type:Monitor Anesthesia Care  Surgeon: Paulo Spaulding MD  Service: Urology  Known or anticipated Date of Surgery:5/8/2023    Surgeon notes: reviewed and Prostate cancer    Previous anesthesia records:No problems and 9/12/22-Biopsy, Prostate, Rectal Approach, with US Guidance Cystoscopy, Flexible-MAC -no apparent anesthetic complications- no  nausea/vomiting    Anesthesia Hx:  No problems with previous Anesthesia        Airway:  Mallampati: II   Mouth Opening: Normal  TM Distance: 4 - 6 cm  Neck ROM: Normal ROM    Last PCP note: 3-6 months ago , within [facility] , 12/21/22-Daniel Avery MD [facility]-Primary hypertension +5 more Dx-Hypertension    Subspecialty notes: Cardiology: General and Otolaryngology, Pain Management, PM&R, Radiology/ONC     Other important co-morbidities: HLD, HTN, and Prostate cancer     Medical History    Diagnosis Date Comment Source   Bladder cancer  Followed by Dr. Craig in [place]    Colon polyp 2006 Bill Payton / [facility]    Glaucoma (increased eye pressure)      HLD (hyperlipidemia)      HTN (hypertension)      Prostate cancer        Tests already available:  Results have been reviewed. Labs-3/13/23-PSA,Dx/ 2/15/23-SARS Coronavirus 2 Antigen, Dx/ CXR-6/16/22/ EKG-6/16/22                  Plan:Phone pending      Testing:  BMP        Patient  has previously scheduled Medical Appointment:None at this time.    Navigation:Phone Completed                        Tests Scheduled. BMP done 5/2/23 &  reviewed by anesthesia-Dr. Cronin.             Consults scheduled.None needed at this time.            Rehana Vizcaino, RN  4/28/23 & 5/1/23 & 5/3/23

## 2023-05-01 ENCOUNTER — PATIENT MESSAGE (OUTPATIENT)
Dept: RADIATION ONCOLOGY | Facility: CLINIC | Age: 78
End: 2023-05-01
Payer: MEDICARE

## 2023-05-02 ENCOUNTER — LAB VISIT (OUTPATIENT)
Dept: LAB | Facility: HOSPITAL | Age: 78
End: 2023-05-02
Attending: ANESTHESIOLOGY
Payer: MEDICARE

## 2023-05-02 DIAGNOSIS — Z01.818 PREOP TESTING: ICD-10-CM

## 2023-05-02 LAB
ANION GAP SERPL CALC-SCNC: 8 MMOL/L (ref 8–16)
BUN SERPL-MCNC: 18 MG/DL (ref 8–23)
CALCIUM SERPL-MCNC: 9.5 MG/DL (ref 8.7–10.5)
CHLORIDE SERPL-SCNC: 104 MMOL/L (ref 95–110)
CO2 SERPL-SCNC: 27 MMOL/L (ref 23–29)
CREAT SERPL-MCNC: 1.1 MG/DL (ref 0.5–1.4)
EST. GFR  (NO RACE VARIABLE): >60 ML/MIN/1.73 M^2
GLUCOSE SERPL-MCNC: 116 MG/DL (ref 70–110)
POTASSIUM SERPL-SCNC: 4.6 MMOL/L (ref 3.5–5.1)
SODIUM SERPL-SCNC: 139 MMOL/L (ref 136–145)

## 2023-05-02 PROCEDURE — 36415 COLL VENOUS BLD VENIPUNCTURE: CPT | Mod: HCNC,PO | Performed by: ANESTHESIOLOGY

## 2023-05-02 PROCEDURE — 80048 BASIC METABOLIC PNL TOTAL CA: CPT | Mod: HCNC | Performed by: ANESTHESIOLOGY

## 2023-05-05 ENCOUNTER — TELEPHONE (OUTPATIENT)
Dept: UROLOGY | Facility: CLINIC | Age: 78
End: 2023-05-05
Payer: MEDICARE

## 2023-05-05 NOTE — TELEPHONE ENCOUNTER
Called pt to confirm arrival time of 9am for procedure on 5/8/2023. Gave pt NPO instructions and gave pt opportunity to ask questions. Pt verbalized understanding.

## 2023-05-08 ENCOUNTER — ANESTHESIA (OUTPATIENT)
Dept: SURGERY | Facility: HOSPITAL | Age: 78
End: 2023-05-08
Payer: MEDICARE

## 2023-05-08 ENCOUNTER — HOSPITAL ENCOUNTER (OUTPATIENT)
Facility: HOSPITAL | Age: 78
Discharge: HOME | End: 2023-05-08
Attending: UROLOGY | Admitting: UROLOGY
Payer: MEDICARE

## 2023-05-08 ENCOUNTER — ANESTHESIA EVENT (OUTPATIENT)
Dept: SURGERY | Facility: HOSPITAL | Age: 78
End: 2023-05-08
Payer: MEDICARE

## 2023-05-08 VITALS
HEIGHT: 71 IN | BODY MASS INDEX: 29.82 KG/M2 | DIASTOLIC BLOOD PRESSURE: 68 MMHG | HEART RATE: 61 BPM | RESPIRATION RATE: 20 BRPM | SYSTOLIC BLOOD PRESSURE: 138 MMHG | TEMPERATURE: 98 F | OXYGEN SATURATION: 93 % | WEIGHT: 213 LBS

## 2023-05-08 DIAGNOSIS — C61 PROSTATE CANCER: ICD-10-CM

## 2023-05-08 PROCEDURE — 36000707: Mod: HCNC | Performed by: UROLOGY

## 2023-05-08 PROCEDURE — 37000009 HC ANESTHESIA EA ADD 15 MINS: Mod: HCNC | Performed by: UROLOGY

## 2023-05-08 PROCEDURE — 71000044 HC DOSC ROUTINE RECOVERY FIRST HOUR: Mod: HCNC | Performed by: UROLOGY

## 2023-05-08 PROCEDURE — D9220A PRA ANESTHESIA: Mod: HCNC,CRNA,, | Performed by: NURSE ANESTHETIST, CERTIFIED REGISTERED

## 2023-05-08 PROCEDURE — 71000015 HC POSTOP RECOV 1ST HR: Mod: HCNC | Performed by: UROLOGY

## 2023-05-08 PROCEDURE — D9220A PRA ANESTHESIA: ICD-10-PCS | Mod: HCNC,CRNA,, | Performed by: NURSE ANESTHETIST, CERTIFIED REGISTERED

## 2023-05-08 PROCEDURE — C1889 IMPLANT/INSERT DEVICE, NOC: HCPCS | Mod: HCNC | Performed by: UROLOGY

## 2023-05-08 PROCEDURE — 55874 PR PLACEMENT, BIODEGR MATERIAL, TRANSPERINEAL, W/IMG GUID: ICD-10-PCS | Mod: HCNC,,, | Performed by: UROLOGY

## 2023-05-08 PROCEDURE — 55874 TPRNL PLMT BIODEGRDABL MATRL: CPT | Mod: HCNC,,, | Performed by: UROLOGY

## 2023-05-08 PROCEDURE — 36000706: Mod: HCNC | Performed by: UROLOGY

## 2023-05-08 PROCEDURE — 25000003 PHARM REV CODE 250: Mod: HCNC | Performed by: NURSE ANESTHETIST, CERTIFIED REGISTERED

## 2023-05-08 PROCEDURE — D9220A PRA ANESTHESIA: Mod: HCNC,ANES,, | Performed by: ANESTHESIOLOGY

## 2023-05-08 PROCEDURE — 63600175 PHARM REV CODE 636 W HCPCS: Mod: HCNC | Performed by: NURSE ANESTHETIST, CERTIFIED REGISTERED

## 2023-05-08 PROCEDURE — D9220A PRA ANESTHESIA: ICD-10-PCS | Mod: HCNC,ANES,, | Performed by: ANESTHESIOLOGY

## 2023-05-08 PROCEDURE — 37000008 HC ANESTHESIA 1ST 15 MINUTES: Mod: HCNC | Performed by: UROLOGY

## 2023-05-08 DEVICE — SPACER SPACEOAR DELIVERY SYS: Type: IMPLANTABLE DEVICE | Site: PROSTATE | Status: FUNCTIONAL

## 2023-05-08 RX ORDER — LIDOCAINE HYDROCHLORIDE 10 MG/ML
INJECTION INFILTRATION; PERINEURAL
Status: DISCONTINUED
Start: 2023-05-08 | End: 2023-05-08 | Stop reason: HOSPADM

## 2023-05-08 RX ORDER — LIDOCAINE HYDROCHLORIDE 10 MG/ML
INJECTION, SOLUTION INTRAVENOUS
Status: DISCONTINUED | OUTPATIENT
Start: 2023-05-08 | End: 2023-05-08

## 2023-05-08 RX ORDER — PROPOFOL 10 MG/ML
VIAL (ML) INTRAVENOUS CONTINUOUS PRN
Status: DISCONTINUED | OUTPATIENT
Start: 2023-05-08 | End: 2023-05-08

## 2023-05-08 RX ORDER — HALOPERIDOL 5 MG/ML
0.5 INJECTION INTRAMUSCULAR EVERY 10 MIN PRN
Status: DISCONTINUED | OUTPATIENT
Start: 2023-05-08 | End: 2023-05-08 | Stop reason: HOSPADM

## 2023-05-08 RX ORDER — PROPOFOL 10 MG/ML
VIAL (ML) INTRAVENOUS
Status: DISCONTINUED | OUTPATIENT
Start: 2023-05-08 | End: 2023-05-08

## 2023-05-08 RX ORDER — HYDROMORPHONE HYDROCHLORIDE 1 MG/ML
0.2 INJECTION, SOLUTION INTRAMUSCULAR; INTRAVENOUS; SUBCUTANEOUS EVERY 5 MIN PRN
Status: DISCONTINUED | OUTPATIENT
Start: 2023-05-08 | End: 2023-05-08 | Stop reason: HOSPADM

## 2023-05-08 RX ORDER — CEFAZOLIN SODIUM 1 G/3ML
INJECTION, POWDER, FOR SOLUTION INTRAMUSCULAR; INTRAVENOUS
Status: DISCONTINUED | OUTPATIENT
Start: 2023-05-08 | End: 2023-05-08

## 2023-05-08 RX ORDER — FENTANYL CITRATE 50 UG/ML
INJECTION, SOLUTION INTRAMUSCULAR; INTRAVENOUS
Status: DISCONTINUED | OUTPATIENT
Start: 2023-05-08 | End: 2023-05-08

## 2023-05-08 RX ORDER — OLMESARTAN MEDOXOMIL 40 MG/1
40 TABLET ORAL NIGHTLY
Status: ON HOLD | COMMUNITY
End: 2023-06-16 | Stop reason: SDUPTHER

## 2023-05-08 RX ADMIN — PROPOFOL 30 MG: 10 INJECTION, EMULSION INTRAVENOUS at 10:05

## 2023-05-08 RX ADMIN — PROPOFOL 40 MG: 10 INJECTION, EMULSION INTRAVENOUS at 10:05

## 2023-05-08 RX ADMIN — FENTANYL CITRATE 25 MCG: 50 INJECTION, SOLUTION INTRAMUSCULAR; INTRAVENOUS at 10:05

## 2023-05-08 RX ADMIN — CEFAZOLIN 2 G: 330 INJECTION, POWDER, FOR SOLUTION INTRAMUSCULAR; INTRAVENOUS at 10:05

## 2023-05-08 RX ADMIN — SODIUM CHLORIDE: 0.9 INJECTION, SOLUTION INTRAVENOUS at 10:05

## 2023-05-08 RX ADMIN — PROPOFOL 60 MG: 10 INJECTION, EMULSION INTRAVENOUS at 10:05

## 2023-05-08 RX ADMIN — LIDOCAINE HYDROCHLORIDE 80 MG: 10 INJECTION, SOLUTION INTRAVENOUS at 10:05

## 2023-05-08 RX ADMIN — Medication 150 MCG/KG/MIN: at 10:05

## 2023-05-08 NOTE — OP NOTE
[facility]     Operative Note     DATE OF PROCEDURE:   5/8/2023    PRE-OP DIAGNOSES:   1) Prostate Cancer      POST-OP DIAGNOSES AND OPERATIVE FINDINGS:   1) Prostate Cancer     PROCEDURES:  1) SpaceOAR placement     SURGEONS:   Surgeon(s) and Role:     * Paulo Spaulding MD - Primary     * Gary Corona MD - Resident - Assisting     * Ebenezer Steel MD - Resident - Assisting    ANESTHESIA:   Anesthesiologist: Evelio Nixon MD  CRNA: Violet Wasserman CRNA  Student Nurse Anesthetist: Ivory Eid    STAFF:   Circulator: Teresa Castrejon, RN; Naima Gomez, ALEXANDER; Suellen Hines RN    ANESTHESIA TYPE:  Sedation with local     ESTIMATED BLOOD LOSS: minimal    COMPLICATIONS:   None    ANTIBIOTICS:  ancef      PROCEDURE SUMMARY:   Patient was brought to the operative theater. Anesthesia was induced without complication. Patient placed in lithotomy position and prepped and draped usual sterile fashion. After a timeout and consent was verified in conjunction with [facility] institutional policy the procedure was begun. Scrotum was pulled out of the field. Using lidocaine gel the transrectal ultrasound probe inserted and prostate visualized. Bilateral pudendal nerve block performed-10 ccs 1% lidocaine on right and 10 ccs on left.     Next, the SpaceOAR instillation needle was advanced into the landing zone at dale-rectal fat for SpaceOAR. 2 ccs of saline were instilled to confirm location in the sagittal and transverse planes. 10 ccs of SpaceOAR gel were then instilled over 10 seconds.    No evidence of bleeding or hematoma.    The patient tolerated the procedure well. He will follow up with radiation oncology for simulation

## 2023-05-08 NOTE — ANESTHESIA PREPROCEDURE EVALUATION
05/08/2023  Lloyd John Jr. is a 77 y.o., male.      Pre-op Assessment          Review of Systems  Social:  Social Alcohol Use    Hematology/Oncology:        Oncology Comments: Prostate cancer  Bladder cancer   Cardiovascular:   Hypertension hyperlipidemia        Physical Exam  General: Cooperative, Alert and Oriented    Airway:  TM Distance: Normal          Anesthesia Plan  Type of Anesthesia, risks & benefits discussed:    Anesthesia Type: Gen Supraglottic Airway, Gen ETT  Intra-op Monitoring Plan: Standard ASA Monitors  Post Op Pain Control Plan: multimodal analgesia  Induction:  IV  Informed Consent: Informed consent signed with the Patient and all parties understand the risks and agree with anesthesia plan.  All questions answered.   ASA Score: 2  Day of Surgery Review of History & Physical: H&P Update referred to the surgeon/provider.    Ready For Surgery From Anesthesia Perspective.     .

## 2023-05-08 NOTE — H&P
Urology [facility]    HPI:  Lloyd John Jr. is a 77 y.o. male with prostate cancer.      ROS:  Neg except per HPI    Past Medical History:   Diagnosis Date    Bladder cancer     Followed by Dr. Craig in [place]    Colon polyp     Bill Payton / [facility]    Glaucoma (increased eye pressure)     HLD (hyperlipidemia)     HTN (hypertension)     Prostate cancer        Past Surgical History:   Procedure Laterality Date    BACK SURGERY  2019    bone spur excision for sciatica tx    CATARACT EXTRACTION W/  INTRAOCULAR LENS IMPLANT Bilateral     COLONOSCOPY    [facility]    Polyps    COLONOSCOPY  2012  [facility]    Diverticulosis and spasms.  No polyps.    FLEXIBLE CYSTOSCOPY N/A 2022    Procedure: CYSTOSCOPY, FLEXIBLE;  Surgeon: Justo Luciano II, MD;  Location: [facility];  Service: Urology;  Laterality: N/A;    TRANSRECTAL BIOPSY OF PROSTATE WITH ULTRASOUND GUIDANCE N/A 2022    Procedure: BIOPSY, PROSTATE, RECTAL APPROACH, WITH US GUIDANCE;  Surgeon: Justo Luciano II, MD;  Location: [facility];  Service: Urology;  Laterality: N/A;    TRANSURETHRAL RESECTION OF BLADDER TUMOR      15 surgeries  (- current)    Urolift implant  2017       Social History     Socioeconomic History    Marital status:     Number of children: 2   Occupational History    Occupation:  for law firm   Tobacco Use    Smoking status: Former     Packs/day: 0.50     Years: 3.00     Pack years: 1.50     Types: Cigarettes     Quit date: 1997     Years since quittin.3    Smokeless tobacco: Never   Substance and Sexual Activity    Alcohol use: Yes     Alcohol/week: 1.7 standard drinks     Types: 2 drink(s) per week     Comment: weekends    Drug use: No     Social Determinants of Health     Financial Resource Strain: Unknown    Difficulty of Paying Living Expenses: Patient refused   Food Insecurity: Unknown    Worried About Running Out of Food in the Last Year: Patient refused    Ran Out of Food  in the Last Year: Patient refused   Transportation Needs: Unknown    Lack of Transportation (Medical): Patient refused    Lack of Transportation (Non-Medical): Patient refused   Physical Activity: Unknown    Days of Exercise per Week: Patient refused    Minutes of Exercise per Session: 30 min   Stress: Unknown    Feeling of Stress : Patient refused   Social Connections: Unknown    Frequency of Communication with Friends and Family: Patient refused    Frequency of Social Gatherings with Friends and Family: Patient refused    Active Member of Clubs or Organizations: Patient refused    Attends Club or Organization Meetings: Patient refused    Marital Status: Patient refused   Housing Stability: Unknown    Unable to Pay for Housing in the Last Year: Patient refused    Number of Places Lived in the Last Year: 1    Unstable Housing in the Last Year: Patient refused       Family History   Problem Relation Age of Onset    COPD Mother         interstitial fibrosis    Coronary artery disease Father     Heart attacks under age 50 Father     Anesthesia problems Neg Hx        Review of patient's allergies indicates:   Allergen Reactions    Bactrim [sulfamethoxazole-trimethoprim] Hives and Rash    Povidone-iodine Other (See Comments)       No current facility-administered medications on file prior to encounter.     Current Outpatient Medications on File Prior to Encounter   Medication Sig Dispense Refill    brimonidine 0.2% (ALPHAGAN) 0.2 % Drop Place 1 drop into both eyes 2 (two) times a day.      carvediloL (COREG) 25 MG tablet Take 1 tablet (25 mg total) by mouth 2 (two) times daily with meals. 180 tablet 3    gabapentin (NEURONTIN) 600 MG tablet Take 600 mg by mouth daily as needed.      LUMIGAN 0.01 % Drop Place 1 drop into both eyes every Mon, Wed, Fri.      MULTIVITAMIN W-MINERALS/LUTEIN (CENTRUM SILVER ORAL) Take 1 capsule by mouth every evening.      olmesartan (BENICAR) 40 MG tablet Take 40 mg by mouth once daily.       "rosuvastatin (CRESTOR) 10 MG tablet Take 1 tablet (10 mg total) by mouth once daily. 90 tablet 3    timolol maleate 0.5% (TIMOPTIC) 0.5 % Drop Place 1 drop into both eyes 2 (two) times daily.      fluticasone propionate (FLONASE) 50 mcg/actuation nasal spray 1 spray by Each Nostril route once daily.      methylPREDNISolone (MEDROL DOSEPACK) 4 mg tablet FOLLOW PACKAGE DIRECTIONS         Anticoagulation:  No    Physical Exam:  Estimated body mass index is 29.71 kg/m² as calculated from the following:    Height as of this encounter: 5' 11" (1.803 m).    Weight as of this encounter: 96.6 kg (213 lb).    General: No acute distress, well developed. AAOx3  Head: Normocephalic, Atraumatic  Eyes: Extra-occular movements intact, No discharge  Neck: supple, symmetrical, trachea midline  Lungs: normal respiratory effort, no respiratory distress, no wheezes  CV: regular rate, 2+ pulses  Abdomen: soft, non-tender, non-distended, no organomegaly  MSK: no edema, no deformities, normal ROM  Skin: skin color, texture, turgor normal.  Neurologic: no focal deficits, sensation intact    Labs:    Urine dipstick today - Urine dipstick - negative for all components.    Lab Results   Component Value Date    WBC 4.90 12/14/2022    HGB 15.5 12/14/2022    HCT 44.6 12/14/2022    MCV 97 12/14/2022     12/14/2022           BMP  Lab Results   Component Value Date     05/02/2023    K 4.6 05/02/2023     05/02/2023    CO2 27 05/02/2023    BUN 18 05/02/2023    CREATININE 1.1 05/02/2023    CALCIUM 9.5 05/02/2023    ANIONGAP 8 05/02/2023    EGFRNORACEVR >60.0 05/02/2023         Assessment: Lloyd John Jr. is a 77 y.o. male with prostate cancer.    Plan:     1. To OR for TRUS with Space OAR placement.  2. Consents signed   3. I have explained the risk, benefits, and alternatives of the procedure in detail. The patient voices understanding and all questions have been answered. The patient agrees to proceed as planned. "     Gary Corona MD

## 2023-05-08 NOTE — DISCHARGE INSTRUCTIONS
Post Cystoscopy Instructions  Do not strain to have a bowel movement  No strenuous exercise x 7 days  No driving while you are on narcotic pain medications or if your jesus  catheter is in place     You can expect:  To pass stone fragments if you had a stone procedure  Have pain when you void from your stent if you have a stent in place  See blood in your urine if you have a stent in place     If you have a catheter, please return to the ER if your catheter stops draining or you are having abdominal pain.     Call the doctor if:  Temperature is greater than 101F  Persistent vomiting and inability to keep food down  Inability to void if you do not have a catheter      What to Expect After a Cystoscopy  For the next 24-48 hours, you may feel a mild burning when you urinate. This burning is normal and expected. Drink plenty of water to dilute the urine to help relieve the burning sensation. You may also see a small amount of blood in your urine after the procedure. Do not strain to have a bowel movement. No strenuous exercise x 7 days. No driving while you are on narcotic pain medications or if your jesus catheter is in place.     You can expect:  To pass stone fragments if you had a stone procedure  Have pain when you void from your stent if you have a stent in place  See blood in your urine if you have a stent in place     Unless you are already taking antibiotics, you may be given an antibiotic after the test to prevent infection.     Signs and Symptoms to Report  Call the [facility] at [phone] if you develop any of the following:  Fever of 101 degrees or higher  Chills or persistent bleeding  Inability to urinate        If you have a catheter, please return to the ER if your catheter stops draining or you are having abdominal pain.

## 2023-05-08 NOTE — DISCHARGE SUMMARY
[facility]  Discharge Note  Short Stay    Admit Date: 5/8/2023    Discharge Date and Time: 05/08/2023 11:08 AM      Attending Physician: Paulo Spaulding MD     Discharge Provider: Gary Corona    Diagnoses:  Past Medical History:   Diagnosis Date    Bladder cancer     Followed by Dr. Craig in [place]    Colon polyp 2006    [facility] / [facility]    Glaucoma (increased eye pressure)     HLD (hyperlipidemia)     HTN (hypertension)     Prostate cancer        Discharged Condition: good    Hospital Course: Patient was admitted for Space OAR and tolerated the procedure well with no complications. The patient was discharged home in good condition on the same day.       Final Diagnoses: Same as principal problem.    Disposition: Home or Self Care    Follow up/Patient Instructions:    Medications:  Reconciled Home Medications:   Current Discharge Medication List        CONTINUE these medications which have NOT CHANGED    Details   brimonidine 0.2% (ALPHAGAN) 0.2 % Drop Place 1 drop into both eyes 2 (two) times a day.      carvediloL (COREG) 25 MG tablet Take 1 tablet (25 mg total) by mouth 2 (two) times daily with meals.  Qty: 180 tablet, Refills: 3    Comments: .  Associated Diagnoses: Primary hypertension      gabapentin (NEURONTIN) 600 MG tablet Take 600 mg by mouth daily as needed.      LUMIGAN 0.01 % Drop Place 1 drop into both eyes every Mon, Wed, Fri.      MULTIVITAMIN W-MINERALS/LUTEIN (CENTRUM SILVER ORAL) Take 1 capsule by mouth every evening.      olmesartan (BENICAR) 40 MG tablet Take 40 mg by mouth once daily.      rosuvastatin (CRESTOR) 10 MG tablet Take 1 tablet (10 mg total) by mouth once daily.  Qty: 90 tablet, Refills: 3    Comments: Please inactivate all prior scripts with same name and strength including on holds.  Associated Diagnoses: Hyperlipidemia, unspecified hyperlipidemia type      timolol maleate 0.5% (TIMOPTIC) 0.5 % Drop Place 1 drop into both eyes 2 (two) times daily.       fluticasone propionate (FLONASE) 50 mcg/actuation nasal spray 1 spray by Each Nostril route once daily.      methylPREDNISolone (MEDROL DOSEPACK) 4 mg tablet FOLLOW PACKAGE DIRECTIONS           No discharge procedures on file.   Follow-up Information       Chrissie Rojas MD Follow up.    Specialty: Radiation Oncology  Why: Follow up  Contact information:  [address] [facility] [address] [place] [address]  [phone]                             Discharge Procedure Orders (must include Diet, Follow-up, Activity):  No discharge procedures on file.

## 2023-05-08 NOTE — PATIENT INSTRUCTIONS
What to Expect After a Prostate Ultrasound and Space OAR    You may have mild bleeding from the rectum or urine for about 1 week to 1 month, or in your ejaculate for several months. This bleeding is normal and expected, and it will stop. You may have mild discomfort in your rectal or urethral area for 24-48 hours.    You cannot do any strenuous lifting, straining, or exercising for 24 hours. You may return to full activity the day after the procedure.    You may continue to take all your regular medications after the procedure except for the blood thinners.    You may resume all blood-thinning medications once you no longer see any bleeding or whenever your physician prescribing the medication says it is all right to do so. You may take Tylenol if you have a fever and your temperature is less than 100° F or if you have some discomfort.    Follow up with Radiation Oncology as scheduled    Signs and Symptoms to Report    Call your [facility] urologist at [phone] if you develop any of the following:  Temperature greater than 101°  F  Inability to urinate  A large amount of bleeding from the rectum or in the urine  Persistent or severe pain    After hours or on weekends, you may reach a urology resident on call at this number: [phone].

## 2023-05-08 NOTE — TRANSFER OF CARE
"Anesthesia Transfer of Care Note    Patient: Lloyd John Jr.    Procedure(s) Performed: Procedure(s):  ULTRASOUND, RECTAL APPROACH  TRANSPERINEAL INSERTION OF PERIPROSTATIC GEL    Patient location: PACU    Anesthesia Type: MAC    Transport from OR: Transported from OR on 6-10 L/min O2 by face mask with adequate spontaneous ventilation    Post pain: adequate analgesia    Post assessment: no apparent anesthetic complications    Post vital signs: stable    Level of consciousness: awake and alert    Nausea/Vomiting: no nausea/vomiting    Complications: none    Transfer of care protocol was followed      Last vitals:   Visit Vitals  BP (!) 142/98 (BP Location: Left arm, Patient Position: Lying)   Pulse 65   Temp 36.6 °C (97.8 °F) (Skin)   Resp 20   Ht 5' 11" (1.803 m)   Wt 96.6 kg (213 lb)   SpO2 96%   BMI 29.71 kg/m²     "

## 2023-05-09 NOTE — ANESTHESIA POSTPROCEDURE EVALUATION
Anesthesia Post Evaluation    Patient: Lloyd John JrBerta    Procedure(s) Performed: Procedure(s):  ULTRASOUND, RECTAL APPROACH  TRANSPERINEAL INSERTION OF PERIPROSTATIC GEL    Final Anesthesia Type: MAC      Patient location during evaluation: PACU  Patient participation: Yes- Able to Participate  Level of consciousness: awake and alert  Post-procedure vital signs: reviewed and stable  Pain management: adequate  Airway patency: patent    PONV status at discharge: No PONV  Anesthetic complications: no      Cardiovascular status: blood pressure returned to baseline  Respiratory status: unassisted  Hydration status: euvolemic  Follow-up not needed.          Vitals Value Taken Time   /81 05/08/23 1147   Temp 36.6 °C (97.8 °F) 05/08/23 1111   Pulse 58 05/08/23 1200   Resp 23 05/08/23 1200   SpO2 98 % 05/08/23 1200   Vitals shown include unvalidated device data.      No case tracking events are documented in the log.      Pain/Amanda Score: Amanda Score: 10 (5/8/2023 11:45 AM)         home

## 2023-05-10 ENCOUNTER — PATIENT MESSAGE (OUTPATIENT)
Dept: RADIATION ONCOLOGY | Facility: CLINIC | Age: 78
End: 2023-05-10
Payer: MEDICARE

## 2023-05-15 ENCOUNTER — HOSPITAL ENCOUNTER (OUTPATIENT)
Dept: RADIATION THERAPY | Facility: HOSPITAL | Age: 78
Discharge: HOME | End: 2023-05-15
Attending: RADIOLOGY
Payer: MEDICARE

## 2023-05-15 PROCEDURE — 77014 PR  CT GUIDANCE PLACEMENT RAD THERAPY FIELDS: ICD-10-PCS | Mod: 26,,, | Performed by: RADIOLOGY

## 2023-05-15 PROCEDURE — 77014 PR  CT GUIDANCE PLACEMENT RAD THERAPY FIELDS: CPT | Mod: 26,,, | Performed by: RADIOLOGY

## 2023-05-15 PROCEDURE — 77014 HC CT GUIDANCE RADIATION THERAPY FLDS PLACEMENT: CPT | Mod: TC,PN | Performed by: RADIOLOGY

## 2023-05-15 PROCEDURE — 77334 PR  RADN TREATMENT AID(S) COMPLX: ICD-10-PCS | Mod: 26,,, | Performed by: RADIOLOGY

## 2023-05-15 PROCEDURE — 77263 PR  RADIATION THERAPY PLAN COMPLEX: ICD-10-PCS | Mod: ,,, | Performed by: RADIOLOGY

## 2023-05-15 PROCEDURE — 77334 RADIATION TREATMENT AID(S): CPT | Mod: TC,PN | Performed by: RADIOLOGY

## 2023-05-15 PROCEDURE — 77334 RADIATION TREATMENT AID(S): CPT | Mod: 26,,, | Performed by: RADIOLOGY

## 2023-05-15 PROCEDURE — 77263 THER RADIOLOGY TX PLNG CPLX: CPT | Mod: ,,, | Performed by: RADIOLOGY

## 2023-05-17 ENCOUNTER — PATIENT MESSAGE (OUTPATIENT)
Dept: RADIATION ONCOLOGY | Facility: CLINIC | Age: 78
End: 2023-05-17
Payer: MEDICARE

## 2023-05-19 ENCOUNTER — PATIENT MESSAGE (OUTPATIENT)
Dept: RADIATION ONCOLOGY | Facility: CLINIC | Age: 78
End: 2023-05-19
Payer: MEDICARE

## 2023-05-19 PROCEDURE — 77301 RADIOTHERAPY DOSE PLAN IMRT: CPT | Mod: 26,,, | Performed by: RADIOLOGY

## 2023-05-19 PROCEDURE — 77301 PR  INTEN MOD RADIOTHER PLAN W/DOSE VOL HIST: ICD-10-PCS | Mod: 26,,, | Performed by: RADIOLOGY

## 2023-05-19 PROCEDURE — 77301 RADIOTHERAPY DOSE PLAN IMRT: CPT | Mod: TC,PN | Performed by: RADIOLOGY

## 2023-05-20 PROCEDURE — 77338 DESIGN MLC DEVICE FOR IMRT: CPT | Mod: TC,PN | Performed by: RADIOLOGY

## 2023-05-20 PROCEDURE — 77338 PR  MLC IMRT DESIGN & CONSTRUCTION PER IMRT PLAN: ICD-10-PCS | Mod: 26,,, | Performed by: RADIOLOGY

## 2023-05-20 PROCEDURE — 77470 SPECIAL RADIATION TREATMENT: CPT | Mod: 59,TC,PN | Performed by: RADIOLOGY

## 2023-05-20 PROCEDURE — 77338 DESIGN MLC DEVICE FOR IMRT: CPT | Mod: 26,,, | Performed by: RADIOLOGY

## 2023-05-20 PROCEDURE — 77370 RADIATION PHYSICS CONSULT: CPT | Mod: PN | Performed by: RADIOLOGY

## 2023-05-20 PROCEDURE — 77470 PR  SPECIAL RADIATION TREATMENT: ICD-10-PCS | Mod: 26,59,, | Performed by: RADIOLOGY

## 2023-05-20 PROCEDURE — 77300 RADIATION THERAPY DOSE PLAN: CPT | Mod: TC,PN | Performed by: RADIOLOGY

## 2023-05-20 PROCEDURE — 77300 PR RADIATION THERAPY,DOSIMETRY PLAN: ICD-10-PCS | Mod: 26,,, | Performed by: RADIOLOGY

## 2023-05-20 PROCEDURE — 77300 RADIATION THERAPY DOSE PLAN: CPT | Mod: 26,,, | Performed by: RADIOLOGY

## 2023-05-20 PROCEDURE — 77470 SPECIAL RADIATION TREATMENT: CPT | Mod: 26,59,, | Performed by: RADIOLOGY

## 2023-05-23 PROCEDURE — 77014 HC CT GUIDANCE RADIATION THERAPY FLDS PLACEMENT: CPT | Mod: TC,PN | Performed by: RADIOLOGY

## 2023-05-23 PROCEDURE — 77373 STRTCTC BDY RAD THER TX DLVR: CPT | Mod: PN | Performed by: RADIOLOGY

## 2023-05-23 PROCEDURE — 77014 PR  CT GUIDANCE PLACEMENT RAD THERAPY FIELDS: CPT | Mod: 26,,, | Performed by: RADIOLOGY

## 2023-05-23 PROCEDURE — 77014 PR  CT GUIDANCE PLACEMENT RAD THERAPY FIELDS: ICD-10-PCS | Mod: 26,,, | Performed by: RADIOLOGY

## 2023-05-25 PROCEDURE — 77373 STRTCTC BDY RAD THER TX DLVR: CPT | Mod: PN | Performed by: RADIOLOGY

## 2023-05-25 PROCEDURE — 77014 PR  CT GUIDANCE PLACEMENT RAD THERAPY FIELDS: ICD-10-PCS | Mod: 26,,, | Performed by: RADIOLOGY

## 2023-05-25 PROCEDURE — 77014 PR  CT GUIDANCE PLACEMENT RAD THERAPY FIELDS: CPT | Mod: 26,,, | Performed by: RADIOLOGY

## 2023-05-25 PROCEDURE — 77014 HC CT GUIDANCE RADIATION THERAPY FLDS PLACEMENT: CPT | Mod: TC,PN,59 | Performed by: RADIOLOGY

## 2023-05-27 RX ORDER — SOLIFENACIN SUCCINATE 5 MG/1
5 TABLET, FILM COATED ORAL DAILY
Qty: 30 TABLET | Refills: 11 | Status: SHIPPED | OUTPATIENT
Start: 2023-05-27 | End: 2023-06-13

## 2023-05-29 ENCOUNTER — TELEPHONE (OUTPATIENT)
Dept: RADIATION ONCOLOGY | Facility: CLINIC | Age: 78
End: 2023-05-29
Payer: MEDICARE

## 2023-05-29 NOTE — TELEPHONE ENCOUNTER
Pt noted mild dysuria and frequency over weekend, status post 2/5fxn SBRT prostate. Was given rec by Urology for Azo and Vesicare.  Recommend continue Azo, trial of moderated dose ibuprofen.  Will contact our office later  today to review sx response. MANSOOR

## 2023-05-30 ENCOUNTER — PATIENT MESSAGE (OUTPATIENT)
Dept: RADIATION ONCOLOGY | Facility: CLINIC | Age: 78
End: 2023-05-30
Payer: MEDICARE

## 2023-05-30 PROCEDURE — 77014 PR  CT GUIDANCE PLACEMENT RAD THERAPY FIELDS: ICD-10-PCS | Mod: 26,,, | Performed by: RADIOLOGY

## 2023-05-30 PROCEDURE — 77373 STRTCTC BDY RAD THER TX DLVR: CPT | Mod: PN | Performed by: RADIOLOGY

## 2023-05-30 PROCEDURE — 77014 HC CT GUIDANCE RADIATION THERAPY FLDS PLACEMENT: CPT | Mod: TC,PN,59 | Performed by: RADIOLOGY

## 2023-05-30 PROCEDURE — 77014 PR  CT GUIDANCE PLACEMENT RAD THERAPY FIELDS: CPT | Mod: 26,,, | Performed by: RADIOLOGY

## 2023-06-01 ENCOUNTER — HOSPITAL ENCOUNTER (OUTPATIENT)
Dept: RADIATION THERAPY | Facility: HOSPITAL | Age: 78
Discharge: HOME | End: 2023-06-01
Attending: RADIOLOGY
Payer: MEDICARE

## 2023-06-01 PROCEDURE — 77014 PR  CT GUIDANCE PLACEMENT RAD THERAPY FIELDS: CPT | Mod: 26,,, | Performed by: RADIOLOGY

## 2023-06-01 PROCEDURE — 77373 STRTCTC BDY RAD THER TX DLVR: CPT | Mod: PN | Performed by: RADIOLOGY

## 2023-06-01 PROCEDURE — 77014 HC CT GUIDANCE RADIATION THERAPY FLDS PLACEMENT: CPT | Mod: TC,PN | Performed by: RADIOLOGY

## 2023-06-01 PROCEDURE — 77014 PR  CT GUIDANCE PLACEMENT RAD THERAPY FIELDS: ICD-10-PCS | Mod: 26,,, | Performed by: RADIOLOGY

## 2023-06-05 DIAGNOSIS — C61 PROSTATE CANCER: Primary | ICD-10-CM

## 2023-06-05 PROCEDURE — 77014 PR  CT GUIDANCE PLACEMENT RAD THERAPY FIELDS: ICD-10-PCS | Mod: 26,,, | Performed by: RADIOLOGY

## 2023-06-05 PROCEDURE — 77014 PR  CT GUIDANCE PLACEMENT RAD THERAPY FIELDS: CPT | Mod: 26,,, | Performed by: RADIOLOGY

## 2023-06-05 PROCEDURE — 77014 HC CT GUIDANCE RADIATION THERAPY FLDS PLACEMENT: CPT | Mod: TC,PN | Performed by: RADIOLOGY

## 2023-06-05 PROCEDURE — 77336 RADIATION PHYSICS CONSULT: CPT | Mod: PN | Performed by: RADIOLOGY

## 2023-06-05 PROCEDURE — 77373 STRTCTC BDY RAD THER TX DLVR: CPT | Mod: PN | Performed by: RADIOLOGY

## 2023-06-08 ENCOUNTER — LAB VISIT (OUTPATIENT)
Dept: LAB | Facility: HOSPITAL | Age: 78
End: 2023-06-08
Attending: STUDENT IN AN ORGANIZED HEALTH CARE EDUCATION/TRAINING PROGRAM
Payer: MEDICARE

## 2023-06-08 DIAGNOSIS — R39.89 SUSPECTED UTI: ICD-10-CM

## 2023-06-08 PROCEDURE — 87086 URINE CULTURE/COLONY COUNT: CPT | Performed by: STUDENT IN AN ORGANIZED HEALTH CARE EDUCATION/TRAINING PROGRAM

## 2023-06-08 PROCEDURE — 87077 CULTURE AEROBIC IDENTIFY: CPT | Performed by: STUDENT IN AN ORGANIZED HEALTH CARE EDUCATION/TRAINING PROGRAM

## 2023-06-08 PROCEDURE — 87186 SC STD MICRODIL/AGAR DIL: CPT | Performed by: STUDENT IN AN ORGANIZED HEALTH CARE EDUCATION/TRAINING PROGRAM

## 2023-06-08 PROCEDURE — 87088 URINE BACTERIA CULTURE: CPT | Performed by: STUDENT IN AN ORGANIZED HEALTH CARE EDUCATION/TRAINING PROGRAM

## 2023-06-11 ENCOUNTER — PATIENT MESSAGE (OUTPATIENT)
Dept: UROLOGY | Facility: CLINIC | Age: 78
End: 2023-06-11
Payer: MEDICARE

## 2023-06-11 DIAGNOSIS — N30.00 ACUTE CYSTITIS WITHOUT HEMATURIA: Primary | ICD-10-CM

## 2023-06-11 LAB — BACTERIA UR CULT: ABNORMAL

## 2023-06-11 RX ORDER — NITROFURANTOIN 25; 75 MG/1; MG/1
100 CAPSULE ORAL 2 TIMES DAILY
Qty: 28 CAPSULE | Refills: 0 | Status: SHIPPED | OUTPATIENT
Start: 2023-06-11 | End: 2023-06-13

## 2023-06-12 ENCOUNTER — TELEPHONE (OUTPATIENT)
Dept: NEUROSURGERY | Facility: CLINIC | Age: 78
End: 2023-06-12
Payer: MEDICARE

## 2023-06-12 ENCOUNTER — TELEPHONE (OUTPATIENT)
Dept: FAMILY MEDICINE | Facility: CLINIC | Age: 78
End: 2023-06-12
Payer: MEDICARE

## 2023-06-12 NOTE — TELEPHONE ENCOUNTER
----- Message from Darius Caro sent at 6/12/2023  8:51 AM CDT -----  Regarding: est care / possible emergency surgery needed  Type:  Sooner Appointment Request    Caller is requesting a sooner appointment.      Name of Caller:  daughter // lizandro    When is the first available appointment?  Dept book    Symptoms:  severe back pain // ed at [facility] told may need emergency surgery    Best Call Back Number:  [phone]    Additional Information:

## 2023-06-12 NOTE — TELEPHONE ENCOUNTER
----- Message from Nehal Cowan sent at 6/12/2023 10:18 AM CDT -----  Contact: self  Type:  Sooner Appointment Request    Caller is requesting a sooner appointment.  Caller declined first available appointment listed below.  Caller will not accept being placed on the waitlist and is requesting a message be sent to doctor.    Name of Caller: pt  When is the first available appointment?  N/a  Symptoms:  back pain  Best Call Back Number:  [phone]    Additional Information:  Pt would like to have appt asap   Pt need f/u appt  Thank you

## 2023-06-13 PROBLEM — Z71.89 ACP (ADVANCE CARE PLANNING): Status: ACTIVE | Noted: 2023-06-13

## 2023-06-13 PROBLEM — G83.4 CAUDA EQUINA SYNDROME: Status: ACTIVE | Noted: 2023-06-13

## 2023-06-13 PROBLEM — M48.062 SPINAL STENOSIS OF LUMBAR REGION WITH NEUROGENIC CLAUDICATION: Status: ACTIVE | Noted: 2023-06-13

## 2023-06-13 PROBLEM — R09.02 HYPOXIA: Status: ACTIVE | Noted: 2023-06-13

## 2023-06-18 ENCOUNTER — PATIENT MESSAGE (OUTPATIENT)
Dept: FAMILY MEDICINE | Facility: CLINIC | Age: 78
End: 2023-06-18
Payer: MEDICARE

## 2023-06-20 ENCOUNTER — LAB VISIT (OUTPATIENT)
Dept: LAB | Facility: HOSPITAL | Age: 78
End: 2023-06-20
Attending: FAMILY MEDICINE
Payer: MEDICARE

## 2023-06-20 DIAGNOSIS — E78.5 HYPERLIPIDEMIA, UNSPECIFIED HYPERLIPIDEMIA TYPE: ICD-10-CM

## 2023-06-20 DIAGNOSIS — I10 PRIMARY HYPERTENSION: ICD-10-CM

## 2023-06-20 LAB
ALBUMIN SERPL BCP-MCNC: 3.7 G/DL (ref 3.5–5.2)
ALP SERPL-CCNC: 50 U/L (ref 55–135)
ALT SERPL W/O P-5'-P-CCNC: 26 U/L (ref 10–44)
ANION GAP SERPL CALC-SCNC: 10 MMOL/L (ref 8–16)
AST SERPL-CCNC: 30 U/L (ref 10–40)
BASOPHILS # BLD AUTO: 0.02 K/UL (ref 0–0.2)
BASOPHILS NFR BLD: 0.4 % (ref 0–1.9)
BILIRUB SERPL-MCNC: 1.5 MG/DL (ref 0.1–1)
BUN SERPL-MCNC: 35 MG/DL (ref 8–23)
CALCIUM SERPL-MCNC: 9.9 MG/DL (ref 8.7–10.5)
CHLORIDE SERPL-SCNC: 104 MMOL/L (ref 95–110)
CHOLEST SERPL-MCNC: 121 MG/DL (ref 120–199)
CHOLEST/HDLC SERPL: 3.5 {RATIO} (ref 2–5)
CO2 SERPL-SCNC: 24 MMOL/L (ref 23–29)
CREAT SERPL-MCNC: 1.3 MG/DL (ref 0.5–1.4)
DIFFERENTIAL METHOD: ABNORMAL
EOSINOPHIL # BLD AUTO: 0.1 K/UL (ref 0–0.5)
EOSINOPHIL NFR BLD: 2.5 % (ref 0–8)
ERYTHROCYTE [DISTWIDTH] IN BLOOD BY AUTOMATED COUNT: 12.1 % (ref 11.5–14.5)
EST. GFR  (NO RACE VARIABLE): 55.9 ML/MIN/1.73 M^2
GLUCOSE SERPL-MCNC: 109 MG/DL (ref 70–110)
HCT VFR BLD AUTO: 37.7 % (ref 40–54)
HDLC SERPL-MCNC: 35 MG/DL (ref 40–75)
HDLC SERPL: 28.9 % (ref 20–50)
HGB BLD-MCNC: 13 G/DL (ref 14–18)
IMM GRANULOCYTES # BLD AUTO: 0.02 K/UL (ref 0–0.04)
IMM GRANULOCYTES NFR BLD AUTO: 0.4 % (ref 0–0.5)
LDLC SERPL CALC-MCNC: 58.2 MG/DL (ref 63–159)
LYMPHOCYTES # BLD AUTO: 1.3 K/UL (ref 1–4.8)
LYMPHOCYTES NFR BLD: 24.6 % (ref 18–48)
MCH RBC QN AUTO: 33.2 PG (ref 27–31)
MCHC RBC AUTO-ENTMCNC: 34.5 G/DL (ref 32–36)
MCV RBC AUTO: 96 FL (ref 82–98)
MONOCYTES # BLD AUTO: 0.9 K/UL (ref 0.3–1)
MONOCYTES NFR BLD: 16.7 % (ref 4–15)
NEUTROPHILS # BLD AUTO: 2.9 K/UL (ref 1.8–7.7)
NEUTROPHILS NFR BLD: 55.4 % (ref 38–73)
NONHDLC SERPL-MCNC: 86 MG/DL
NRBC BLD-RTO: 0 /100 WBC
PLATELET # BLD AUTO: 234 K/UL (ref 150–450)
PMV BLD AUTO: 10.8 FL (ref 9.2–12.9)
POTASSIUM SERPL-SCNC: 5.2 MMOL/L (ref 3.5–5.1)
PROT SERPL-MCNC: 6.5 G/DL (ref 6–8.4)
RBC # BLD AUTO: 3.92 M/UL (ref 4.6–6.2)
SODIUM SERPL-SCNC: 138 MMOL/L (ref 136–145)
TRIGL SERPL-MCNC: 139 MG/DL (ref 30–150)
WBC # BLD AUTO: 5.2 K/UL (ref 3.9–12.7)

## 2023-06-20 PROCEDURE — 85025 COMPLETE CBC W/AUTO DIFF WBC: CPT | Mod: HCNC | Performed by: FAMILY MEDICINE

## 2023-06-20 PROCEDURE — 80061 LIPID PANEL: CPT | Mod: HCNC | Performed by: FAMILY MEDICINE

## 2023-06-20 PROCEDURE — 80053 COMPREHEN METABOLIC PANEL: CPT | Mod: HCNC | Performed by: FAMILY MEDICINE

## 2023-06-20 PROCEDURE — 36415 COLL VENOUS BLD VENIPUNCTURE: CPT | Mod: HCNC,PO | Performed by: FAMILY MEDICINE

## 2023-06-22 ENCOUNTER — OFFICE VISIT (OUTPATIENT)
Dept: FAMILY MEDICINE | Facility: CLINIC | Age: 78
End: 2023-06-22
Payer: MEDICARE

## 2023-06-22 VITALS
OXYGEN SATURATION: 95 % | SYSTOLIC BLOOD PRESSURE: 120 MMHG | DIASTOLIC BLOOD PRESSURE: 62 MMHG | BODY MASS INDEX: 28.1 KG/M2 | HEART RATE: 74 BPM | WEIGHT: 201.5 LBS

## 2023-06-22 DIAGNOSIS — E78.5 HYPERLIPIDEMIA, UNSPECIFIED HYPERLIPIDEMIA TYPE: ICD-10-CM

## 2023-06-22 DIAGNOSIS — M62.838 MUSCLE SPASMS OF BOTH LOWER EXTREMITIES: Primary | ICD-10-CM

## 2023-06-22 DIAGNOSIS — I10 PRIMARY HYPERTENSION: ICD-10-CM

## 2023-06-22 DIAGNOSIS — G83.4 CAUDA EQUINA SYNDROME: ICD-10-CM

## 2023-06-22 PROCEDURE — 99214 PR OFFICE/OUTPT VISIT, EST, LEVL IV, 30-39 MIN: ICD-10-PCS | Mod: HCNC,S$GLB,, | Performed by: NURSE PRACTITIONER

## 2023-06-22 PROCEDURE — 1111F DSCHRG MED/CURRENT MED MERGE: CPT | Mod: HCNC,CPTII,S$GLB, | Performed by: NURSE PRACTITIONER

## 2023-06-22 PROCEDURE — 3074F SYST BP LT 130 MM HG: CPT | Mod: HCNC,CPTII,S$GLB, | Performed by: NURSE PRACTITIONER

## 2023-06-22 PROCEDURE — 99214 OFFICE O/P EST MOD 30 MIN: CPT | Mod: HCNC,S$GLB,, | Performed by: NURSE PRACTITIONER

## 2023-06-22 PROCEDURE — 1160F RVW MEDS BY RX/DR IN RCRD: CPT | Mod: HCNC,CPTII,S$GLB, | Performed by: NURSE PRACTITIONER

## 2023-06-22 PROCEDURE — 1159F MED LIST DOCD IN RCRD: CPT | Mod: HCNC,CPTII,S$GLB, | Performed by: NURSE PRACTITIONER

## 2023-06-22 PROCEDURE — 1159F PR MEDICATION LIST DOCUMENTED IN MEDICAL RECORD: ICD-10-PCS | Mod: HCNC,CPTII,S$GLB, | Performed by: NURSE PRACTITIONER

## 2023-06-22 PROCEDURE — 1126F AMNT PAIN NOTED NONE PRSNT: CPT | Mod: HCNC,CPTII,S$GLB, | Performed by: NURSE PRACTITIONER

## 2023-06-22 PROCEDURE — 1160F PR REVIEW ALL MEDS BY PRESCRIBER/CLIN PHARMACIST DOCUMENTED: ICD-10-PCS | Mod: HCNC,CPTII,S$GLB, | Performed by: NURSE PRACTITIONER

## 2023-06-22 PROCEDURE — 3078F PR MOST RECENT DIASTOLIC BLOOD PRESSURE < 80 MM HG: ICD-10-PCS | Mod: HCNC,CPTII,S$GLB, | Performed by: NURSE PRACTITIONER

## 2023-06-22 PROCEDURE — 99999 PR PBB SHADOW E&M-EST. PATIENT-LVL III: CPT | Mod: PBBFAC,HCNC,, | Performed by: NURSE PRACTITIONER

## 2023-06-22 PROCEDURE — 1126F PR PAIN SEVERITY QUANTIFIED, NO PAIN PRESENT: ICD-10-PCS | Mod: HCNC,CPTII,S$GLB, | Performed by: NURSE PRACTITIONER

## 2023-06-22 PROCEDURE — 1111F PR DISCHARGE MEDS RECONCILED W/ CURRENT OUTPATIENT MED LIST: ICD-10-PCS | Mod: HCNC,CPTII,S$GLB, | Performed by: NURSE PRACTITIONER

## 2023-06-22 PROCEDURE — 99999 PR PBB SHADOW E&M-EST. PATIENT-LVL III: ICD-10-PCS | Mod: PBBFAC,HCNC,, | Performed by: NURSE PRACTITIONER

## 2023-06-22 PROCEDURE — 3074F PR MOST RECENT SYSTOLIC BLOOD PRESSURE < 130 MM HG: ICD-10-PCS | Mod: HCNC,CPTII,S$GLB, | Performed by: NURSE PRACTITIONER

## 2023-06-22 PROCEDURE — 3078F DIAST BP <80 MM HG: CPT | Mod: HCNC,CPTII,S$GLB, | Performed by: NURSE PRACTITIONER

## 2023-06-22 RX ORDER — CYCLOBENZAPRINE HCL 5 MG
5 TABLET ORAL NIGHTLY
Qty: 20 TABLET | Refills: 0 | Status: SHIPPED | OUTPATIENT
Start: 2023-06-22 | End: 2023-07-12

## 2023-06-22 NOTE — PROGRESS NOTES
Subjective     Patient ID: Lloyd John Jr. is a 77 y.o. male.    Chief Complaint: Follow-up (6 month)    Patient is here for hospital follow up after having back surgery. He has a July 25 follow up with Dr De Guzman. He has home health with [facility].  Having muscle spasms in legs and arms at night, none during the day, not sleeping because of that, has been going on since discharge. All of his other pain is completely resolved and says he feels wonderful.   No medication changes during hospitalization.   Labs done 6/20/23 showed mild decrease in GFR, liver enzymes stable with previous, lipids acceptable.     Follow-up  Associated symptoms include myalgias. Pertinent negatives include no chills, diaphoresis, fatigue, neck pain, numbness or weakness.   Review of Systems   Constitutional:  Negative for chills, diaphoresis and fatigue.   Respiratory: Negative.     Cardiovascular: Negative.    Gastrointestinal: Negative.    Genitourinary: Negative.    Musculoskeletal:  Positive for myalgias. Negative for back pain and neck pain.   Neurological: Negative.  Negative for weakness and numbness.        Objective     Physical Exam  Constitutional:       General: He is not in acute distress.     Appearance: Normal appearance.   HENT:      Head: Normocephalic and atraumatic.   Eyes:      Pupils: Pupils are equal, round, and reactive to light.   Neck:      Vascular: No carotid bruit.   Cardiovascular:      Rate and Rhythm: Normal rate and regular rhythm.      Heart sounds: No murmur heard.  Pulmonary:      Effort: Pulmonary effort is normal. No respiratory distress.      Breath sounds: Normal breath sounds.   Abdominal:      General: Bowel sounds are normal.      Tenderness: There is no abdominal tenderness.   Musculoskeletal:      Cervical back: Normal range of motion.   Neurological:      Mental Status: He is alert and oriented to person, place, and time.   Psychiatric:         Mood and Affect: Mood normal.          Behavior: Behavior normal.          Assessment and Plan     1. Muscle spasms of both lower extremities  -     cyclobenzaprine (FLEXERIL) 5 MG tablet; Take 1 tablet (5 mg total) by mouth nightly. for 20 days  Dispense: 20 tablet; Refill: 0    2. Primary hypertension    3. Hyperlipidemia, unspecified hyperlipidemia type    4. Cauda equina syndrome        Recovering well from surgery, continue home health and neurosurgery follow up. HTn and hyperlipdemia stable, continue current medication and recheck in 6 months. Treat muscle spasms with flexeril at bedtime prn, advised to discuss with neurosurgery at follow up.          No follow-ups on file.

## 2023-06-26 ENCOUNTER — CLINICAL SUPPORT (OUTPATIENT)
Dept: UROLOGY | Facility: CLINIC | Age: 78
End: 2023-06-26
Payer: MEDICARE

## 2023-06-26 ENCOUNTER — CLINICAL SUPPORT (OUTPATIENT)
Dept: NEUROSURGERY | Facility: CLINIC | Age: 78
End: 2023-06-26
Payer: MEDICARE

## 2023-06-26 ENCOUNTER — TELEPHONE (OUTPATIENT)
Dept: NEUROSURGERY | Facility: CLINIC | Age: 78
End: 2023-06-26

## 2023-06-26 DIAGNOSIS — N30.00 ACUTE CYSTITIS WITHOUT HEMATURIA: Primary | ICD-10-CM

## 2023-06-26 DIAGNOSIS — R35.1 NOCTURIA: ICD-10-CM

## 2023-06-26 LAB
BILIRUBIN, UA POC OHS: NEGATIVE
BLOOD, UA POC OHS: ABNORMAL
CLARITY, UA POC OHS: ABNORMAL
COLOR, UA POC OHS: YELLOW
GLUCOSE, UA POC OHS: NEGATIVE
KETONES, UA POC OHS: NEGATIVE
LEUKOCYTES, UA POC OHS: ABNORMAL
NITRITE, UA POC OHS: POSITIVE
PH, UA POC OHS: 5.5
POC RESIDUAL URINE VOLUME: 31 ML (ref 0–100)
PROTEIN, UA POC OHS: 100
SPECIFIC GRAVITY, UA POC OHS: 1.01
UROBILINOGEN, UA POC OHS: 0.2

## 2023-06-26 PROCEDURE — 81003 POCT URINALYSIS(INSTRUMENT): ICD-10-PCS | Mod: QW,HCNC,S$GLB, | Performed by: STUDENT IN AN ORGANIZED HEALTH CARE EDUCATION/TRAINING PROGRAM

## 2023-06-26 PROCEDURE — 81003 URINALYSIS AUTO W/O SCOPE: CPT | Mod: QW,HCNC,S$GLB, | Performed by: STUDENT IN AN ORGANIZED HEALTH CARE EDUCATION/TRAINING PROGRAM

## 2023-06-26 PROCEDURE — 87088 URINE BACTERIA CULTURE: CPT | Mod: HCNC | Performed by: STUDENT IN AN ORGANIZED HEALTH CARE EDUCATION/TRAINING PROGRAM

## 2023-06-26 PROCEDURE — 51798 POCT BLADDER SCAN: ICD-10-PCS | Mod: HCNC,S$GLB,, | Performed by: STUDENT IN AN ORGANIZED HEALTH CARE EDUCATION/TRAINING PROGRAM

## 2023-06-26 PROCEDURE — 87077 CULTURE AEROBIC IDENTIFY: CPT | Mod: HCNC | Performed by: STUDENT IN AN ORGANIZED HEALTH CARE EDUCATION/TRAINING PROGRAM

## 2023-06-26 PROCEDURE — 51798 US URINE CAPACITY MEASURE: CPT | Mod: HCNC,S$GLB,, | Performed by: STUDENT IN AN ORGANIZED HEALTH CARE EDUCATION/TRAINING PROGRAM

## 2023-06-26 PROCEDURE — 87086 URINE CULTURE/COLONY COUNT: CPT | Mod: HCNC | Performed by: STUDENT IN AN ORGANIZED HEALTH CARE EDUCATION/TRAINING PROGRAM

## 2023-06-26 PROCEDURE — 87186 SC STD MICRODIL/AGAR DIL: CPT | Mod: HCNC | Performed by: STUDENT IN AN ORGANIZED HEALTH CARE EDUCATION/TRAINING PROGRAM

## 2023-06-26 NOTE — PROGRESS NOTES
Patient to clinic for u/a , PVR and nurse to send off urine for culture. Pvr shows 31 ml post void.Please see poct results. Spoke with patient and advised to increase fluid intake and Dr Luciano will send in antibiotics to patient pharmacy , patient expressed understanding.

## 2023-06-26 NOTE — PROGRESS NOTES
Pt reports no fever, numbness, tingling, headaches, or pain. Incision appears with no signs of infection. Tape was removed at this visit with no complications or drainage. Reports weakness in left leg that was present before sx is improving. No questions for provider. Next appt date given. Pt has UTI and has an appt with urology today.

## 2023-06-28 LAB — BACTERIA UR CULT: ABNORMAL

## 2023-07-03 ENCOUNTER — TELEPHONE (OUTPATIENT)
Dept: PHARMACY | Facility: CLINIC | Age: 78
End: 2023-07-03
Payer: MEDICARE

## 2023-07-07 ENCOUNTER — PES CALL (OUTPATIENT)
Dept: ADMINISTRATIVE | Facility: CLINIC | Age: 78
End: 2023-07-07
Payer: MEDICARE

## 2023-07-12 ENCOUNTER — PATIENT MESSAGE (OUTPATIENT)
Dept: HEMATOLOGY/ONCOLOGY | Facility: CLINIC | Age: 78
End: 2023-07-12
Payer: MEDICARE

## 2023-07-18 ENCOUNTER — LAB VISIT (OUTPATIENT)
Dept: LAB | Facility: HOSPITAL | Age: 78
End: 2023-07-18
Attending: RADIOLOGY
Payer: MEDICARE

## 2023-07-18 DIAGNOSIS — C61 PROSTATE CANCER: ICD-10-CM

## 2023-07-18 LAB — COMPLEXED PSA SERPL-MCNC: 6.3 NG/ML (ref 0–4)

## 2023-07-18 PROCEDURE — 84153 ASSAY OF PSA TOTAL: CPT | Mod: HCNC | Performed by: RADIOLOGY

## 2023-07-18 PROCEDURE — 36415 COLL VENOUS BLD VENIPUNCTURE: CPT | Mod: HCNC,PN | Performed by: RADIOLOGY

## 2023-07-25 ENCOUNTER — OFFICE VISIT (OUTPATIENT)
Dept: RADIATION ONCOLOGY | Facility: CLINIC | Age: 78
End: 2023-07-25
Payer: MEDICARE

## 2023-07-25 ENCOUNTER — OFFICE VISIT (OUTPATIENT)
Dept: NEUROSURGERY | Facility: CLINIC | Age: 78
End: 2023-07-25
Payer: MEDICARE

## 2023-07-25 VITALS
HEIGHT: 71 IN | WEIGHT: 201 LBS | BODY MASS INDEX: 28.14 KG/M2 | SYSTOLIC BLOOD PRESSURE: 180 MMHG | RESPIRATION RATE: 18 BRPM | DIASTOLIC BLOOD PRESSURE: 90 MMHG

## 2023-07-25 VITALS
TEMPERATURE: 98 F | HEART RATE: 77 BPM | BODY MASS INDEX: 29.16 KG/M2 | DIASTOLIC BLOOD PRESSURE: 79 MMHG | HEIGHT: 71 IN | OXYGEN SATURATION: 94 % | RESPIRATION RATE: 16 BRPM | WEIGHT: 208.31 LBS | SYSTOLIC BLOOD PRESSURE: 169 MMHG

## 2023-07-25 DIAGNOSIS — C61 PROSTATE CANCER: Primary | ICD-10-CM

## 2023-07-25 DIAGNOSIS — Z98.890 S/P LUMBAR MICRODISCECTOMY: Primary | ICD-10-CM

## 2023-07-25 PROCEDURE — 3288F PR FALLS RISK ASSESSMENT DOCUMENTED: ICD-10-PCS | Mod: HCNC,CPTII,S$GLB, | Performed by: NEUROLOGICAL SURGERY

## 2023-07-25 PROCEDURE — 3288F FALL RISK ASSESSMENT DOCD: CPT | Mod: HCNC,CPTII,S$GLB, | Performed by: RADIOLOGY

## 2023-07-25 PROCEDURE — 99213 OFFICE O/P EST LOW 20 MIN: CPT | Mod: HCNC,S$GLB,, | Performed by: RADIOLOGY

## 2023-07-25 PROCEDURE — 3078F DIAST BP <80 MM HG: CPT | Mod: HCNC,CPTII,S$GLB, | Performed by: RADIOLOGY

## 2023-07-25 PROCEDURE — 1125F PR PAIN SEVERITY QUANTIFIED, PAIN PRESENT: ICD-10-PCS | Mod: HCNC,CPTII,S$GLB, | Performed by: NEUROLOGICAL SURGERY

## 2023-07-25 PROCEDURE — 1100F PTFALLS ASSESS-DOCD GE2>/YR: CPT | Mod: HCNC,CPTII,S$GLB, | Performed by: NEUROLOGICAL SURGERY

## 2023-07-25 PROCEDURE — 3288F FALL RISK ASSESSMENT DOCD: CPT | Mod: HCNC,CPTII,S$GLB, | Performed by: NEUROLOGICAL SURGERY

## 2023-07-25 PROCEDURE — 1100F PR PT FALLS ASSESS DOC 2+ FALLS/FALL W/INJURY/YR: ICD-10-PCS | Mod: HCNC,CPTII,S$GLB, | Performed by: RADIOLOGY

## 2023-07-25 PROCEDURE — 1125F PR PAIN SEVERITY QUANTIFIED, PAIN PRESENT: ICD-10-PCS | Mod: HCNC,CPTII,S$GLB, | Performed by: RADIOLOGY

## 2023-07-25 PROCEDURE — 3288F PR FALLS RISK ASSESSMENT DOCUMENTED: ICD-10-PCS | Mod: HCNC,CPTII,S$GLB, | Performed by: RADIOLOGY

## 2023-07-25 PROCEDURE — 3078F PR MOST RECENT DIASTOLIC BLOOD PRESSURE < 80 MM HG: ICD-10-PCS | Mod: HCNC,CPTII,S$GLB, | Performed by: RADIOLOGY

## 2023-07-25 PROCEDURE — 1125F AMNT PAIN NOTED PAIN PRSNT: CPT | Mod: HCNC,CPTII,S$GLB, | Performed by: RADIOLOGY

## 2023-07-25 PROCEDURE — 3077F SYST BP >= 140 MM HG: CPT | Mod: HCNC,CPTII,S$GLB, | Performed by: RADIOLOGY

## 2023-07-25 PROCEDURE — 1100F PTFALLS ASSESS-DOCD GE2>/YR: CPT | Mod: HCNC,CPTII,S$GLB, | Performed by: RADIOLOGY

## 2023-07-25 PROCEDURE — 3080F PR MOST RECENT DIASTOLIC BLOOD PRESSURE >= 90 MM HG: ICD-10-PCS | Mod: HCNC,CPTII,S$GLB, | Performed by: NEUROLOGICAL SURGERY

## 2023-07-25 PROCEDURE — 99999 PR PBB SHADOW E&M-EST. PATIENT-LVL V: ICD-10-PCS | Mod: PBBFAC,HCNC,, | Performed by: RADIOLOGY

## 2023-07-25 PROCEDURE — 3077F SYST BP >= 140 MM HG: CPT | Mod: HCNC,CPTII,S$GLB, | Performed by: NEUROLOGICAL SURGERY

## 2023-07-25 PROCEDURE — 3077F PR MOST RECENT SYSTOLIC BLOOD PRESSURE >= 140 MM HG: ICD-10-PCS | Mod: HCNC,CPTII,S$GLB, | Performed by: RADIOLOGY

## 2023-07-25 PROCEDURE — 1159F PR MEDICATION LIST DOCUMENTED IN MEDICAL RECORD: ICD-10-PCS | Mod: HCNC,CPTII,S$GLB, | Performed by: NEUROLOGICAL SURGERY

## 2023-07-25 PROCEDURE — 3080F DIAST BP >= 90 MM HG: CPT | Mod: HCNC,CPTII,S$GLB, | Performed by: NEUROLOGICAL SURGERY

## 2023-07-25 PROCEDURE — 1159F MED LIST DOCD IN RCRD: CPT | Mod: HCNC,CPTII,S$GLB, | Performed by: NEUROLOGICAL SURGERY

## 2023-07-25 PROCEDURE — 99999 PR PBB SHADOW E&M-EST. PATIENT-LVL V: CPT | Mod: PBBFAC,HCNC,, | Performed by: RADIOLOGY

## 2023-07-25 PROCEDURE — 3077F PR MOST RECENT SYSTOLIC BLOOD PRESSURE >= 140 MM HG: ICD-10-PCS | Mod: HCNC,CPTII,S$GLB, | Performed by: NEUROLOGICAL SURGERY

## 2023-07-25 PROCEDURE — 1125F AMNT PAIN NOTED PAIN PRSNT: CPT | Mod: HCNC,CPTII,S$GLB, | Performed by: NEUROLOGICAL SURGERY

## 2023-07-25 PROCEDURE — 99024 POSTOP FOLLOW-UP VISIT: CPT | Mod: HCNC,S$GLB,, | Performed by: NEUROLOGICAL SURGERY

## 2023-07-25 PROCEDURE — 1159F PR MEDICATION LIST DOCUMENTED IN MEDICAL RECORD: ICD-10-PCS | Mod: HCNC,CPTII,S$GLB, | Performed by: RADIOLOGY

## 2023-07-25 PROCEDURE — 99024 PR POST-OP FOLLOW-UP VISIT: ICD-10-PCS | Mod: HCNC,S$GLB,, | Performed by: NEUROLOGICAL SURGERY

## 2023-07-25 PROCEDURE — 1100F PR PT FALLS ASSESS DOC 2+ FALLS/FALL W/INJURY/YR: ICD-10-PCS | Mod: HCNC,CPTII,S$GLB, | Performed by: NEUROLOGICAL SURGERY

## 2023-07-25 PROCEDURE — 99213 PR OFFICE/OUTPT VISIT, EST, LEVL III, 20-29 MIN: ICD-10-PCS | Mod: HCNC,S$GLB,, | Performed by: RADIOLOGY

## 2023-07-25 PROCEDURE — 1159F MED LIST DOCD IN RCRD: CPT | Mod: HCNC,CPTII,S$GLB, | Performed by: RADIOLOGY

## 2023-07-25 NOTE — PROGRESS NOTES
Neurosurgery History and Physical    Patient ID: Lloyd John Jr. is a 77 y.o. male.    Chief Complaint   Patient presents with    Post-op Evaluation     Patient present to clinic today as POV. Surgery date of 23. States of low left back pain; very painful when wearing brace (rubbing). Left knee pain, last week onset of symptoms; weakness of Lt leg.        Patient presents today for a post op follow up 6 weeks status post L2-3 microdiscetomy. He states last week he started noticing left knee and left lower back discomfort. His pain is mainly in the anterior knee, and doesn't involve the thigh or lower leg. He hasn't really been active since surgery, but doesn't have the knee pain at rest. The knee pain is typically when he initially goes to walk after sitting, and lets up after he walks a bit. He notices knee weakness. The lower back pain is worse with his brace rubbing, but is unsure if he feels better without it on. He states he doesn't wear it religiously. The knee and back pain are different from the pain he had prior to surgery. He has no issues with bowel or bladder dysfunction. He has no numbness or tingling in the lower extremities.     Review of Systems   Musculoskeletal:  Positive for back pain and gait problem.   Neurological:  Negative for weakness and numbness.     Past Medical History:   Diagnosis Date    Bladder cancer     Followed by Dr. Craig in [place]    Colon polyp 2006    [facility] / [facility]    Glaucoma (increased eye pressure)     HLD (hyperlipidemia)     HTN (hypertension)     Prostate cancer      Social History     Socioeconomic History    Marital status:     Number of children: 2   Occupational History    Occupation:  for law firm   Tobacco Use    Smoking status: Former     Packs/day: 0.50     Years: 3.00     Pack years: 1.50     Types: Cigarettes     Quit date: 1997     Years since quittin.5    Smokeless tobacco: Never   Substance and Sexual  Activity    Alcohol use: Yes     Alcohol/week: 1.7 standard drinks     Types: 2 drink(s) per week     Comment: weekends    Drug use: No     Social Determinants of Health     Financial Resource Strain: Low Risk     Difficulty of Paying Living Expenses: Not hard at all   Food Insecurity: No Food Insecurity    Worried About Running Out of Food in the Last Year: Never true    Ran Out of Food in the Last Year: Never true   Transportation Needs: No Transportation Needs    Lack of Transportation (Medical): No    Lack of Transportation (Non-Medical): No   Physical Activity: Insufficiently Active    Days of Exercise per Week: 2 days    Minutes of Exercise per Session: 20 min   Stress: No Stress Concern Present    Feeling of Stress : Not at all   Social Connections: Unknown    Frequency of Communication with Friends and Family: More than three times a week    Frequency of Social Gatherings with Friends and Family: More than three times a week    Active Member of Clubs or Organizations: Yes    Attends Club or Organization Meetings: More than 4 times per year    Marital Status:    Housing Stability: Low Risk     Unable to Pay for Housing in the Last Year: No    Number of Places Lived in the Last Year: 1    Unstable Housing in the Last Year: No     Family History   Problem Relation Age of Onset    COPD Mother         interstitial fibrosis    Coronary artery disease Father     Heart attacks under age 50 Father     Anesthesia problems Neg Hx      Review of patient's allergies indicates:   Allergen Reactions    Bactrim [sulfamethoxazole-trimethoprim] Hives and Rash    Povidone-iodine Other (See Comments)       Current Outpatient Medications:     ascorbic acid, vitamin C, (VITAMIN C) 1000 MG tablet, Take 1,000 mg by mouth nightly., Disp: , Rfl:     brimonidine 0.2% (ALPHAGAN) 0.2 % Drop, Place 1 drop into both eyes 2 (two) times a day., Disp: , Rfl:     carvediloL (COREG) 25 MG tablet, Take 1 tablet (25 mg total) by mouth 2  "(two) times daily with meals., Disp: 180 tablet, Rfl: 3    ibuprofen (ADVIL,MOTRIN) 200 MG tablet, Take 800 mg by mouth every 8 (eight) hours as needed for Pain (mild)., Disp: , Rfl:     loperamide (IMODIUM A-D) 2 mg Tab, Take 4 mg by mouth daily as needed (loose stool)., Disp: , Rfl:     LUMIGAN 0.01 % Drop, Place 1 drop into both eyes every Mon, Wed, Fri. (at bedtime), Disp: , Rfl:     MULTIVITAMIN W-MINERALS/LUTEIN (CENTRUM SILVER ORAL), Take 1 tablet by mouth every evening.  , Disp: , Rfl:     olmesartan (BENICAR) 40 MG tablet, Take 1 tablet by mouth every evening. Resume taking on June 20, 2023., Disp: , Rfl:     phenazopyridine HCl (AZO STANDARD MAXIMUM STRENGTH ORAL), Take 2 tablets by mouth 3 (three) times daily., Disp: , Rfl:     polyethylene glycol (GLYCOLAX) 17 gram PwPk, Take 17 g by mouth 2 (two) times daily as needed. (Patient taking differently: Take 17 g by mouth 2 (two) times daily as needed (constipation).), Disp: 14 packet, Rfl: 0    rosuvastatin (CRESTOR) 10 MG tablet, Take 1 tablet (10 mg total) by mouth once daily. (Patient taking differently: Take 10 mg by mouth every evening.), Disp: 90 tablet, Rfl: 3    tamsulosin (FLOMAX) 0.4 mg Cap, Take 0.4 mg by mouth once daily., Disp: , Rfl:     timolol maleate 0.5% (TIMOPTIC) 0.5 % Drop, Place 1 drop into both eyes 2 (two) times daily., Disp: , Rfl:   Blood pressure (!) 180/90, resp. rate 18, height 5' 11" (1.803 m), weight 91.2 kg (201 lb).      Neurologic Exam     Mental Status   Oriented to person, place, and time.   Attention: normal. Concentration: normal.   Speech: speech is normal   Level of consciousness: alert  Knowledge: good.   Normal comprehension.     Cranial Nerves     CN III, IV, VI   Extraocular motions are normal.     CN VII   Facial expression full, symmetric.     CN VIII   Hearing: intact    Motor Exam     Strength   Right deltoid: 5/5  Left deltoid: 5/5  Right biceps: 5/5  Left biceps: 5/5  Right triceps: 5/5  Left triceps: " "5/5  Right iliopsoas: 5/5  Right quadriceps: 5/5  Left quadriceps: 5/5  Right anterior tibial: 5/5  Left anterior tibial: 5/5  Right posterior tibial: 5/5  Left posterior tibial: 5/5  Right peroneal: 5/5  Left peroneal: 5/5  Right gastroc: 5/5  Left gastroc: 5/5  Left hip flexion 4+/5     Sensory Exam   Light touch normal.     Pain in back with modified straight leg raise.      Gait, Coordination, and Reflexes     Reflexes   Right brachioradialis: 2+  Left brachioradialis: 2+  Right biceps: 2+  Left biceps: 2+  Right triceps: 2+  Left triceps: 2+  Right patellar: 0  Left patellar: 0  Right achilles: 1+  Left achilles: 0  Right plantar: normal  Left plantar: normal  Right ankle clonus: present (1 beat)  Left ankle clonus: absent    Physical Exam  Eyes:      Extraocular Movements: EOM normal.   Musculoskeletal:      Comments:    Bilateral foot swelling (normal per daughter)   Neurological:      Mental Status: He is oriented to person, place, and time.      Deep Tendon Reflexes:      Reflex Scores:       Tricep reflexes are 2+ on the right side and 2+ on the left side.       Bicep reflexes are 2+ on the right side and 2+ on the left side.       Brachioradialis reflexes are 2+ on the right side and 2+ on the left side.       Patellar reflexes are 0 on the right side and 0 on the left side.       Achilles reflexes are 1+ on the right side and 0 on the left side.     Comments:   No bony tenderness along lumbar spine.    Psychiatric:         Speech: Speech normal.       Vital Signs  Resp: 18  BP: (!) 180/90  BP Location: Right arm  Patient Position: Sitting  Pain Score:   5  Pain Loc: Back  Height and Weight  Height: 5' 11" (180.3 cm)  Weight: 91.2 kg (201 lb)  BSA (Calculated - sq m): 2.14 sq meters  BMI (Calculated): 28  Weight in (lb) to have BMI = 25: 178.9]    Provider dictation:    We will order outpatient PT. There are no red flags that would prompt additional imaging at this point. If pain worsened, radiates down " the legs, or leg weakness progresses, we will order an MRI lumbar spine at that time. He will continue to avoid heavy lifting. He will hold off on golfing at this time. He may go fishing, being mindful of twisting. He will continue his brace for 2 more weeks and will return to clinic in 2 months for his 3 month post op visit.     Visit Diagnosis:  S/P lumbar microdiscectomy

## 2023-07-25 NOTE — PROGRESS NOTES
[facility] Department of Radiation Oncology  Follow Up Visit Note    Diagnosis:  Lloyd John Jr. is a 77 y.o. 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  He completed a course of radiation therapy on 6/5/23.      Oncologic History:  Oncology History   Prostate cancer   1/14/2013 Initial Diagnosis    Prostate cancer     4/11/2023 Cancer Staged    Staging form: Prostate, AJCC 7th Edition  - Clinical stage from 4/11/2023: Stage IIA (T1c, N0, M0, PSA: 10 to 19, Marblemount <= 6)     5/23/2023 - 6/5/2023 Radiation Therapy    Treating physician: Chrissie Rojas  Total Dose: 36.25 Gy  Fractions: 5  Treatment Site Ref. ID Energy Dose/Fx (Gy) #Fx Dose Correction (Gy) Total Dose (Gy) Start Date End Date Elapsed Days   SBRT Prostate PTV 6X 7.25 5 / 5 0 36.25 5/23/2023 6/5/2023 13           Interval History  The patient presents today for a regularly scheduled follow up visit.  He was last seen in our clinic on 6/5/23 at completion of treatment.   Since that time, interval PSA on 7/18/23 was 6.3.  Also had urinary symptoms (frequency, dysuria) and found to have UTI- responded to Abx.      Latest Reference Range & Units Most Recent 08/24/22 14:01 03/13/23 07:23 07/18/23 09:53   PSA Diagnostic 0.00 - 4.00 ng/mL 6.3 (H)  7/18/23 09:53 11.2 (H) 16.8 (H) 6.3 (H)   (H): Data is abnormally high    Presented to ED 6/13/23 c/o back/left leg pain and weakness, fecal incontinence. Outside MRI showed severe canal stenosis at L2-3.     6/13/23 L2-3 urgent/emergent microdiscectomy with Dr. Haines. Doing much better now. Pain relieved, return of continence. Seen earlier to day in Neurosurg clinic for follow up. Plan for PT.    Review of Systems   Review of Systems   Constitutional:  Negative for chills, fever and malaise/fatigue.   Gastrointestinal:  Negative for blood in stool, constipation, diarrhea, nausea and vomiting.   Genitourinary:  Negative for  "dysuria.   Musculoskeletal:  Positive for back pain (at surgical site) and joint pain (left knee pain x 1 week). Negative for myalgias.   Neurological:  Negative for dizziness, sensory change, weakness and headaches.     IPSS today is 3    Social History:  Social History     Tobacco Use    Smoking status: Former     Packs/day: 0.50     Years: 3.00     Pack years: 1.50     Types: Cigarettes     Quit date: 1997     Years since quittin.5    Smokeless tobacco: Never   Substance Use Topics    Alcohol use: Yes     Alcohol/week: 1.7 standard drinks     Types: 2 drink(s) per week     Comment: weekends    Drug use: No       Family History:  Cancer-related family history is not on file.    Exam:  Vitals:    23 1355   BP: (!) 169/79   BP Location: Left arm   Patient Position: Sitting   Pulse: 77   Resp: 16   Temp: 98.2 °F (36.8 °C)   SpO2: (!) 94%   Weight: 94.5 kg (208 lb 5.4 oz)   Height: 5' 11" (1.803 m)     Constitutional: Pleasant 77 y.o. male in no acute distress.  Well nourished. Well groomed.   HEENT: Normocephalic and atraumatic   Lungs: No audible wheezing.  Normal effort.   Musculoskeletal: No gross MSK deformities. Ambulates back brace in place  Skin: No rashes appreciated.   Psych: Alert and oriented with appropriate mood and affect.  Neuro:   Grossly normal.      Assessment:  Recovering well from acute radiation therapy toxicities.  PSA responding well  ECOG: (1) Restricted in physically strenuous activity, ambulatory and able to do work of light nature    Plan:  Follow up in 6 months with PSA  Follow up with Urology, Neurosurgery as directed  He was given our contact information, and he was told that he could call our clinic at anytime if he has any questions or concerns.  Follow up with other providers as directed        "

## 2023-07-27 ENCOUNTER — PATIENT MESSAGE (OUTPATIENT)
Dept: FAMILY MEDICINE | Facility: CLINIC | Age: 78
End: 2023-07-27
Payer: MEDICARE

## 2023-07-27 DIAGNOSIS — E78.5 HYPERLIPIDEMIA, UNSPECIFIED HYPERLIPIDEMIA TYPE: ICD-10-CM

## 2023-07-27 DIAGNOSIS — I10 PRIMARY HYPERTENSION: Primary | ICD-10-CM

## 2023-07-27 NOTE — TELEPHONE ENCOUNTER
Please requesting lab orders prior to 6 month appointment in January. Orders pended. Please advise

## 2023-07-28 ENCOUNTER — CLINICAL SUPPORT (OUTPATIENT)
Dept: REHABILITATION | Facility: HOSPITAL | Age: 78
End: 2023-07-28
Attending: NEUROLOGICAL SURGERY
Payer: MEDICARE

## 2023-07-28 DIAGNOSIS — R26.9 GAIT ABNORMALITY: ICD-10-CM

## 2023-07-28 DIAGNOSIS — Z98.890 S/P LUMBAR MICRODISCECTOMY: ICD-10-CM

## 2023-07-28 DIAGNOSIS — M53.86 DECREASED RANGE OF MOTION OF LUMBAR SPINE: ICD-10-CM

## 2023-07-28 DIAGNOSIS — R29.898 DECREASED STRENGTH OF TRUNK AND BACK: ICD-10-CM

## 2023-07-28 PROCEDURE — 97110 THERAPEUTIC EXERCISES: CPT | Mod: HCNC,PO

## 2023-07-28 PROCEDURE — 97112 NEUROMUSCULAR REEDUCATION: CPT | Mod: HCNC,PO

## 2023-07-28 PROCEDURE — 97162 PT EVAL MOD COMPLEX 30 MIN: CPT | Mod: HCNC,PO

## 2023-07-28 NOTE — PLAN OF CARE
[facility]  Physical Therapy Initial Evaluation    Name: Lloyd John Jr.  Clinic Number: [id number]    Therapy Diagnosis:   Encounter Diagnoses   Name Primary?    S/P lumbar microdiscectomy     Decreased range of motion of lumbar spine     Decreased strength of trunk and back     Gait abnormality        Physician: Mike Dallas, *    Physician Orders: PT Eval and Treat   Medical Diagnosis from Referral: Z98.890 (ICD-10-CM) - S/P lumbar microdiscectomy  Evaluation Date: 7/28/2023  Authorization Period Expiration: 7/24/2024  Plan of Care Expiration: 10/1/2023  Visit # / Visits authorized: 1/ 1    Time In: 12:00 pm  Time Out: 12:50 pm  Total Billable Time: 50 minutes    Precautions: Standard and History of CA, history of cauda equina  S/p L2-3 microdiscectomy performed on 6/13/23 due to cauda equina syndrome(incomplete-hx of LLE weakness)  -Remain in brace for activity for the next 2 weeks(8/8/2023)  -no bending, twisting, or lifting over 10 lbs    Subjective   Date of onset: 6/13/2023  History of current condition - Lloyd reports: L2-3 Microdiscectomy on 6/13/2023 performed by MD Burt at [facility]. States some discomfort in his LLE. Denies any bowel and bladder incontinence. No numbness or tingling. No falls since surgery. Donning back brace for another 2 weeks except with sleeping. Difficulty sleeping, possible sleep apnea. No issues with ADL's. No lifting over 10 lbs, bending, twisting per physician.     Medical History:   Past Medical History:   Diagnosis Date    Bladder cancer     Followed by Dr. Craig in [place]    Colon polyp 2006    Bunny Payton / [facility]    Glaucoma (increased eye pressure)     HLD (hyperlipidemia)     HTN (hypertension)     Prostate cancer        Surgical History:   Lloyd John Jr.  has a past surgical history that includes Colonoscopy (2006  Bunny Payton/[facility]); Colonoscopy (4/5/2012  [facility]); Urolift implant (03/08/2017); Back surgery  (08/2019); Transurethral resection of bladder tumor; Cataract extraction w/  intraocular lens implant (Bilateral); Transrectal biopsy of prostate with ultrasound guidance (N/A, 9/12/2022); Flexible cystoscopy (N/A, 9/12/2022); Transrectal ultrasound examination (5/8/2023); transperineal insertion of periprostatic gel (5/8/2023); and Laminectomy using minimally invasive technique (Left, 6/13/2023).    Medications:   Lloyd has a current medication list which includes the following prescription(s): ascorbic acid (vitamin c), brimonidine 0.2%, carvedilol, ibuprofen, loperamide, lumigan, folic acid/multivit-min/lutein, olmesartan, phenazopyridine hcl, polyethylene glycol, rosuvastatin, tamsulosin, and timolol maleate 0.5%.    Allergies:   Review of patient's allergies indicates:   Allergen Reactions    Bactrim [sulfamethoxazole-trimethoprim] Hives and Rash    Povidone-iodine Other (See Comments)        Imaging, MRI studies: 6/13/2023  1. No acute osseous abnormality is identified.  2. Multilevel degenerative disc disease and facet arthropathy most severe at the L2-L3 level where there is a large disc protrusion in conjunction with facet arthropathy causes severe spinal canal narrowing with near complete obliteration of the thecal sac.  Moderate bilateral neural foraminal narrowing is noted at this level.  Additional degenerative changes level by level as above.  3. No pathologic enhancement identified.    Prior Therapy: none since surgery  Social History:  lives alone, 1 threshold to enter  Occupation: retired   Prior Level of Function: independent   Current Level of Function: modified independent, donning brace for another 2 weeks. Difficulty sleeping    Pain:  Current 0/10, worst 6/10, best 0/10   Location: left bottom and LLE  Description: nagging pain  Aggravating Factors: nothing specifically  Easing Factors: OTC medications as needed    Pts goals: return to prior level of function.    Objective        Observation: donning brace    Posture Alignment: reduced lumbar curve    Sensation: intact to light touch    GAIT DEVIATIONS: Lloyd displays increased base of support;decreased weight shift    Lumbar Range of Motion:    %   Flexion NT due to precautions     Extension NT due to precautions     Left Side Bending NT due to precautions   Right Side Bending NT due to precautions   Left rotation   NT due to precautions   Right Rotation   NT due to precautions    *= pain  Lumbar  Strength Increased Pain?   Flexion 3/5     Extension 3/5     Side Bending Right 3/5     Side Bending Left 3/5     *Tested with patient seated    Lower Extremity Strength    Right LE  Left LE    Hip flexion: 4/5 Hip flexion: 4/5   Knee extension: 5/5 Knee extension: 4+/5   Knee flexion: 4+/5 Knee flexion: 4+/5   Hip extension:  NT Hip extension: NT   Hip abduction: 3+/5 Hip abduction: 3/5   Ankle dorsiflexion: 4+/5 Ankle dorsiflexion: 4-/5   Ankle plantarflexion: 4+/5 Ankle plantarflexion: 4/5     TUG: Next visit  30 sec sit to stand: Next visit    Joint Mobility: hypomobile in thoracic    Palpation: no TTP    Flexibility: tightness to B hamstrings, glutes, gastrocs     PT Evaluation Completed? Yes  Discussed Plan of Care with patient: Yes        CMS Impairment/Limitation/Restriction for FOTO Lumbar Survey    Therapist reviewed FOTO scores for Lloyd oJhn  on 7/28/2023.   FOTO documents entered into [employer] - see Media section.    Limitation Score: 51%  Category: Mobility           TREATMENT   Treatment Time In: 12:22 pm  Treatment Time Out: 12:50 pm  Total Treatment time separate from Evaluation: 28 minutes    Lloyd received therapeutic exercises to develop flexibility for 8 minutes including:    Supine HS stretch 3 x 15 seconds  SKTC 5 x 5 sec holds    Lloyd participated in neuromuscular re-education activities to improve: Kinesthetic, Proprioception, and Posture for 20 minutes. The following activities were included:    PPT  with TA 10 x 5 sec  TA with marching in supine x 20 alternating  TA with SLR x 5 each  Bridges with TA x 10    Standing TA with GTB pull down 15 x 5 sec hold  Rows with BTB x 20      Home Exercises and Patient Education Provided    Education provided:   - log roll when getting out of bed  - when sore from exercise, ice and continue with stretching and mobility exercises    Written Home Exercises Provided: yes.  Exercises were reviewed and Lloyd was able to demonstrate them prior to the end of the session.  Lloyd demonstrated good  understanding of the education provided.     See EMR under Patient Instructions for exercises provided 7/28/2023.    Assessment   Lloyd is a 77 y.o. male referred to outpatient Physical Therapy with a medical diagnosis of S/P lumbar microdiscectomy. Pt presents to therapy s/p lumbar microdiscectomy due to incomplete cauda equina syndrome with LLE weakness. He displays weakness of trunk/core and LE, limited trunk range of motion, poor flexibility to B lower leg muscles, and discomfort to LLE, and impaired gait and balance. Precautions restrict bending, twisting, and lifting over 10 lbs. Currently donning lumbar brace for next 2 weeks while active. Pt is appropriate for physical therapy to address limitations listed above and return to prior level of function.     Pt prognosis is Good.   Pt will benefit from skilled outpatient Physical Therapy to address the deficits stated above and in the chart below, provide pt/family education, and to maximize pt's level of independence.     Plan of care discussed with patient: Yes  Pt's spiritual, cultural and educational needs considered and patient is agreeable to the plan of care and goals as stated below:     Anticipated Barriers for therapy: none    Medical Necessity is demonstrated by the following  History  Co-morbidities and personal factors that may impact the plan of care Co-morbidities:   advanced age, high BMI, history of cancer, and  HTN    Personal Factors:   age, lifestyle     mod   Examination  Body Structures and Functions, activity limitations and participation restrictions that may impact the plan of care Body Regions:   back  lower extremities  trunk    Body Systems:    gross symmetry  ROM  strength  balance  gait  transfers  transitions    Participation Restrictions:   No bending, twisting, lifting over 10 lbs    Activity limitations:   Learning and applying knowledge  no deficits    General Tasks and Commands  no deficits    Communication  no deficits    Mobility  lifting and carrying objects  walking  Bending forward    Self care  washing oneself (bathing, drying, washing hands)  dressing  looking after one's health    Domestic Life  doing house work (cleaning house, washing dishes, laundry)    Interactions/Relationships  no deficits    Life Areas  no deficits    Community and Social Life  community life  recreation and leisure         Complexity: mod  See FOTO outcome assessment   Clinical Presentation evolving clinical presentation with changing clinical characteristics mod   Decision Making/ Complexity Score: mod     GOALS: Short Term Goals:  4 weeks  1. Report decreased in pain at worse less than  <   / =  3  /10  to increase tolerance for functional activities  2. Pt to tolerate being out of brace for entire day without increase in pain.  3. Increased BLE MMT 1/2  to increase tolerance for ADL and work activities.  4. Pt to tolerate exercise for full 45 minutes without increase in symptoms.  5. Pt to tolerate HEP to improve ROM and independence with ADL's  6. Pt to improve range of motion by 25% to allow for improved functional mobility to allow for improvement in IADLs.   7. Assess 30 sec sit to stand and TUG.    Long Term Goals: 8 weeks  1. Report decreased in pain at worse less than  <   / =  1  /10  to increase tolerance for functional mobility.   2. Increased BLE MMT 1 grade to increase tolerance for ADL and work  activities.  3. Pt to improve range of motion by 50% to allow for improved functional mobility to allow for improvement in IADLs.       Plan   Plan of care Certification: 7/28/2023 to 10/1/2023.    Outpatient Physical Therapy 2 times weekly for 8 weeks to include the following interventions: Gait Training, Manual Therapy, Moist Heat/ Ice, Neuromuscular Re-ed, Patient Education, Therapeutic Activities, and Therapeutic Exercise.     Alessandra Parisi, PT

## 2023-08-02 ENCOUNTER — CLINICAL SUPPORT (OUTPATIENT)
Dept: REHABILITATION | Facility: HOSPITAL | Age: 78
End: 2023-08-02
Payer: MEDICARE

## 2023-08-02 DIAGNOSIS — M53.86 DECREASED RANGE OF MOTION OF LUMBAR SPINE: Primary | ICD-10-CM

## 2023-08-02 DIAGNOSIS — R29.898 DECREASED STRENGTH OF TRUNK AND BACK: ICD-10-CM

## 2023-08-02 DIAGNOSIS — R26.9 GAIT ABNORMALITY: ICD-10-CM

## 2023-08-02 PROCEDURE — 97110 THERAPEUTIC EXERCISES: CPT | Mod: HCNC,PO,CQ

## 2023-08-02 PROCEDURE — 97112 NEUROMUSCULAR REEDUCATION: CPT | Mod: HCNC,PO,CQ

## 2023-08-02 NOTE — PROGRESS NOTES
Physical Therapy Daily Treatment Note     Name: Lloyd John Jr.  Clinic Number: [id number]    Therapy Diagnosis:   Encounter Diagnoses   Name Primary?    Decreased range of motion of lumbar spine Yes    Decreased strength of trunk and back     Gait abnormality      Physician: Mike Dallas, *    Visit Date: 8/2/2023    Physician Orders: PT Eval and Treat   Medical Diagnosis from Referral: Z98.890 (ICD-10-CM) - S/P lumbar microdiscectomy  Evaluation Date: 7/28/2023  Authorization Period Expiration: 7/24/2024  Plan of Care Expiration: 10/1/2023  Visit # / Visits authorized: 1/ 20 +Eval    Time In: 9:55 am  Time Out: 10:50  Total Billable Time: 55 minutes 27 minutes 1:1    Precautions: Standard and History of CA, history of cauda equina  S/p L2-3 microdiscectomy performed on 6/13/23 due to cauda equina syndrome(incomplete-hx of LLE weakness)  -Remain in brace for activity for the next 2 weeks(8/8/2023)  -no bending, twisting, or lifting over 10 lbs    Subjective     Pt reports: minimal pain levels upon arrival for first follow up visit. He was complaint with HEP since initial evaluation.    He was compliant with home exercise program.  Response to previous treatment: min increase in soreness  Functional change: None yet    Pain: 1/10  Location:  left bottom and LLE     Objective     Lumbar  Strength Increased Pain?   Flexion 3/5     Extension 3/5     Side Bending Right 3/5     Side Bending Left 3/5     *Tested with patient seated     Lower Extremity Strength     Right LE   Left LE     Hip flexion: 4/5 Hip flexion: 4/5   Knee extension: 5/5 Knee extension: 4+/5   Knee flexion: 4+/5 Knee flexion: 4+/5   Hip extension:  NT Hip extension: NT   Hip abduction: 3+/5 Hip abduction: 3/5   Ankle dorsiflexion: 4+/5 Ankle dorsiflexion: 4-/5   Ankle plantarflexion: 4+/5 Ankle plantarflexion: 4/5      TUG: Next visit  30 sec sit to stand: 11 reps    TREATMENT     Lloyd received therapeutic exercises to develop strength,  "endurance, ROM, flexibility, posture and core stabilization for 10 minutes including:  +Nustep x5 minutes  Supine HS stretch 3 x 30 seconds  SKTC 10 x 5 sec holds ea    Lloyd received the following manual therapy techniques:  were applied to the:  for  minutes, including:      Lloyd participated in neuromuscular re-education activities to improve: Balance, Coordination, Proprioception and Posture for 45 minutes. The following activities were included:    PPT with TA 2x10 x 5 sec  TA with marching in supine x 20 alternating  TA with SLR 2x10 each  Bridges with TA 2x 10  +Supine clamshells RTB x20  +Supine hip add (ball squeeze) x20 5" hold  SAQ's 2# ankle weight 2x10 ea  Standing TA with GTB pull down 15 x 5 sec hold  Rows with BTB x 20      Home Exercises Provided and Patient Education Provided     Education provided:   - HEP review    Written Home Exercises Provided: Patient instructed to cont prior HEP.  Exercises were reviewed and Lloyd was able to demonstrate them prior to the end of the session.  Lloyd demonstrated good  understanding of the education provided.     See EMR under Patient Instructions for exercises provided prior visit.    Assessment   Lloyd returned for his first follow up visit reporting min pain levels. He has been complaint with HEP since initial evaluation. Treatment began with HEP review, He demonstrated good understanding of ex's requiring only min VC to ensure proper form. Treatment progressed to continue LE strengthening under MD protocols to avoid precautions. 30 sec sit to stand test performed today with pt completed 11 reps. Will continue to progress per pt's tolerance.    Lloyd Is progressing well towards his goals.   Pt prognosis is Good.     Pt will continue to benefit from skilled outpatient physical therapy to address the deficits listed in the problem list box on initial evaluation, provide pt/family education and to maximize pt's level of independence in the home " and community environment.     Pt's spiritual, cultural and educational needs considered and pt agreeable to plan of care and goals.     Anticipated barriers to physical therapy: None    Goals:   GOALS: Short Term Goals:  4 weeks  1. Report decreased in pain at worse less than  <   / =  3  /10  to increase tolerance for functional activities  2. Pt to tolerate being out of brace for entire day without increase in pain.  3. Increased BLE MMT 1/2  to increase tolerance for ADL and work activities.  4. Pt to tolerate exercise for full 45 minutes without increase in symptoms.  5. Pt to tolerate HEP to improve ROM and independence with ADL's  6. Pt to improve range of motion by 25% to allow for improved functional mobility to allow for improvement in IADLs.   7. Assess 30 sec sit to stand and TUG.     Long Term Goals: 8 weeks  1. Report decreased in pain at worse less than  <   / =  1  /10  to increase tolerance for functional mobility.   2. Increased BLE MMT 1 grade to increase tolerance for ADL and work activities.  3. Pt to improve range of motion by 50% to allow for improved functional mobility to allow for improvement in IADLs.     Plan     Plan of care Certification: 7/28/2023 to 10/1/2023.     Outpatient Physical Therapy 2 times weekly for 8 weeks to include the following interventions: Gait Training, Manual Therapy, Moist Heat/ Ice, Neuromuscular Re-ed, Patient Education, Therapeutic Activities, and Therapeutic Exercise.        Jacques Pradhan, PTA

## 2023-08-09 ENCOUNTER — CLINICAL SUPPORT (OUTPATIENT)
Dept: REHABILITATION | Facility: HOSPITAL | Age: 78
End: 2023-08-09
Payer: MEDICARE

## 2023-08-09 DIAGNOSIS — R26.9 GAIT ABNORMALITY: ICD-10-CM

## 2023-08-09 DIAGNOSIS — M53.86 DECREASED RANGE OF MOTION OF LUMBAR SPINE: Primary | ICD-10-CM

## 2023-08-09 DIAGNOSIS — R29.898 DECREASED STRENGTH OF TRUNK AND BACK: ICD-10-CM

## 2023-08-09 PROCEDURE — 97112 NEUROMUSCULAR REEDUCATION: CPT | Mod: HCNC,PO,CQ

## 2023-08-09 PROCEDURE — 97110 THERAPEUTIC EXERCISES: CPT | Mod: HCNC,PO,CQ

## 2023-08-09 NOTE — PROGRESS NOTES
Physical Therapy Daily Treatment Note     Name: Lloyd John Jr.  Clinic Number: [id number]    Therapy Diagnosis:   Encounter Diagnoses   Name Primary?    Decreased range of motion of lumbar spine Yes    Decreased strength of trunk and back     Gait abnormality      Physician: Mike Dallas, *    Visit Date: 8/9/2023    Physician Orders: PT Eval and Treat   Medical Diagnosis from Referral: Z98.890 (ICD-10-CM) - S/P lumbar microdiscectomy  Evaluation Date: 7/28/2023  Authorization Period Expiration: 7/24/2024  Plan of Care Expiration: 10/1/2023  Visit # / Visits authorized: 2/ 20 +Eval    Time In: 10:45 am  Time Out: 11:45 am  Total Billable Time: 60 minutes 45 minutes 1:1    Precautions: Standard and History of CA, history of cauda equina  S/p L2-3 microdiscectomy performed on 6/13/23 due to cauda equina syndrome(incomplete-hx of LLE weakness)  -Remain in brace for activity for the next 2 weeks(8/8/2023)  -no bending, twisting, or lifting over 10 lbs    Subjective     Pt reports: he stopped wearing his brace today for the first time. He experienced increased L sided lower back/SI pain this morning but states that the pain eased up as he got moving around.    He was compliant with home exercise program.  Response to previous treatment: min increase in soreness  Functional change: None yet    Pain: 1/10  Location:  left bottom and LLE     Objective     Lumbar  Strength Increased Pain?   Flexion 3/5     Extension 3/5     Side Bending Right 3/5     Side Bending Left 3/5     *Tested with patient seated     Lower Extremity Strength     Right LE   Left LE     Hip flexion: 4/5 Hip flexion: 4/5   Knee extension: 5/5 Knee extension: 4+/5   Knee flexion: 4+/5 Knee flexion: 4+/5   Hip extension:  NT Hip extension: NT   Hip abduction: 3+/5 Hip abduction: 3/5   Ankle dorsiflexion: 4+/5 Ankle dorsiflexion: 4-/5   Ankle plantarflexion: 4+/5 Ankle plantarflexion: 4/5      TUG: Next visit  30 sec sit to stand: 11  "reps    TREATMENT     Lloyd received therapeutic exercises to develop strength, endurance, ROM, flexibility, posture and core stabilization for 15 minutes including:  Nustep level 2 x5 minutes  Supine HS stretch 3 x 30 seconds  SKTC 10 x 5 sec holds ea  +piriformis stretch 3x20" hold    Lloyd received the following manual therapy techniques:  were applied to the:  for  minutes, including:      Lloyd participated in neuromuscular re-education activities to improve: Balance, Coordination, Proprioception and Posture for 45 minutes. The following activities were included:    PPT with TA 2x10 x 5 sec  TA with marching in supine x 20 alternating  TA with Tball SLR 2x10 each  Bridges with TA 2x 10  Supine clamshells RTB x20  Supine hip add (ball squeeze) x20 5" hold  SAQ's 2# ankle weight 2x10 ea  Standing TA with GTB pull down 15 x 5 sec hold  Rows with BTB x 20  +Sit to stands 3x10  +Heel raises x30  +Standing hip abd 2x10 ea      Home Exercises Provided and Patient Education Provided     Education provided:   - HEP review    Written Home Exercises Provided: Patient instructed to cont prior HEP.  Exercises were reviewed and Lloyd was able to demonstrate them prior to the end of the session.  Lloyd demonstrated good  understanding of the education provided.     See EMR under Patient Instructions for exercises provided prior visit.    Assessment   Lloyd returned reporting minimal lower back discomfort currently but did wake up with increased L sided lumbar paraspinal/SI pain.  Treatment continued with lumbopelvic mobility, core activation, and LE strengthening ex's, progressing by adding standing functional LE strengthening ex's which were tolerated well with no increase in lower back pain. Will continue to progress per pt's tolerance.      Lloyd Is progressing well towards his goals.   Pt prognosis is Good.     Pt will continue to benefit from skilled outpatient physical therapy to address the deficits " listed in the problem list box on initial evaluation, provide pt/family education and to maximize pt's level of independence in the home and community environment.     Pt's spiritual, cultural and educational needs considered and pt agreeable to plan of care and goals.     Anticipated barriers to physical therapy: None    Goals:   GOALS: Short Term Goals:  4 weeks  1. Report decreased in pain at worse less than  <   / =  3  /10  to increase tolerance for functional activities  2. Pt to tolerate being out of brace for entire day without increase in pain.  3. Increased BLE MMT 1/2  to increase tolerance for ADL and work activities.  4. Pt to tolerate exercise for full 45 minutes without increase in symptoms.  5. Pt to tolerate HEP to improve ROM and independence with ADL's  6. Pt to improve range of motion by 25% to allow for improved functional mobility to allow for improvement in IADLs.   7. Assess 30 sec sit to stand and TUG.     Long Term Goals: 8 weeks  1. Report decreased in pain at worse less than  <   / =  1  /10  to increase tolerance for functional mobility.   2. Increased BLE MMT 1 grade to increase tolerance for ADL and work activities.  3. Pt to improve range of motion by 50% to allow for improved functional mobility to allow for improvement in IADLs.     Plan     Plan of care Certification: 7/28/2023 to 10/1/2023.     Outpatient Physical Therapy 2 times weekly for 8 weeks to include the following interventions: Gait Training, Manual Therapy, Moist Heat/ Ice, Neuromuscular Re-ed, Patient Education, Therapeutic Activities, and Therapeutic Exercise.        Jacques Pradhan, PTA

## 2023-08-11 ENCOUNTER — CLINICAL SUPPORT (OUTPATIENT)
Dept: REHABILITATION | Facility: HOSPITAL | Age: 78
End: 2023-08-11
Payer: MEDICARE

## 2023-08-11 ENCOUNTER — PATIENT MESSAGE (OUTPATIENT)
Dept: NEUROSURGERY | Facility: CLINIC | Age: 78
End: 2023-08-11
Payer: MEDICARE

## 2023-08-11 DIAGNOSIS — R26.9 GAIT ABNORMALITY: ICD-10-CM

## 2023-08-11 DIAGNOSIS — R29.898 DECREASED STRENGTH OF TRUNK AND BACK: ICD-10-CM

## 2023-08-11 DIAGNOSIS — M53.86 DECREASED RANGE OF MOTION OF LUMBAR SPINE: Primary | ICD-10-CM

## 2023-08-11 PROCEDURE — 97112 NEUROMUSCULAR REEDUCATION: CPT | Mod: HCNC,PO

## 2023-08-11 PROCEDURE — 97110 THERAPEUTIC EXERCISES: CPT | Mod: HCNC,PO

## 2023-08-11 NOTE — PROGRESS NOTES
"Physical Therapy Daily Treatment Note     Name: Lloyd John .  Clinic Number: [id number]    Therapy Diagnosis:   Encounter Diagnoses   Name Primary?    Decreased range of motion of lumbar spine Yes    Decreased strength of trunk and back     Gait abnormality      Physician: Mike Dallas, *    Visit Date: 2023    Physician Orders: PT Eval and Treat   Medical Diagnosis from Referral: Z98.890 (ICD-10-CM) - S/P lumbar microdiscectomy  Evaluation Date: 2023  Authorization Period Expiration: 2024  Plan of Care Expiration: 10/1/2023  Visit # / Visits authorized: 3/ 20 +Eval  PN: 2023    Time In: 8:32 am  Time Out: 9:15 am  Total Billable Time: 43 minutes     Precautions: Standard and History of CA, history of cauda equina  S/p L2-3 microdiscectomy performed on 23 due to cauda equina syndrome(incomplete-hx of LLE weakness)  -no bending, twisting, or lifting over 10 lbs    Subjective     Pt reports: he still has some Left side low back pain, went to physician who states he is unsure of what specifically is causing his discomfort.    He was compliant with home exercise program.  Response to previous treatment: min increase in soreness  Functional change: None yet    Pain: 610  Location:  left bottom and LLE     Objective     TU seconds  30 sec sit to stand: 11 reps    TREATMENT     Lloyd received therapeutic exercises to develop strength, endurance, ROM, flexibility, posture and core stabilization for 15 minutes including:    Nustep level 2 x 7 minutes  Supine HS stretch 3 x 30 seconds  SKTC 10 x 5 sec holds ea  piriformis stretch 3x30" hold    Lloyd received the following manual therapy techniques:  were applied to the:  for  minutes, including:      Lloyd participated in neuromuscular re-education activities to improve: Balance, Coordination, Proprioception and Posture for  32 minutes. The following activities were included:    PPT with TA 2x10 x 5 sec  TA with marching in " "supine x 20 alternating  TA with Tball SLR 2x10 each  Bridges with TA 2x 10  Supine clamshells RTB x20  Supine hip add (ball squeeze) x20 5" hold-NP  SAQ's 2# ankle weight 2x10 ea-NP  Standing TA with GTB pull down 15 x 5 sec hold-NP  Rows with BTB x 20-NP  Sit to stands 3x10  Heel raises x30  Standing hip abd 2x10 ea      Home Exercises Provided and Patient Education Provided     Education provided:   - HEP review    Written Home Exercises Provided: Patient instructed to cont prior HEP.  Exercises were reviewed and Lloyd was able to demonstrate them prior to the end of the session.  Lloyd demonstrated good  understanding of the education provided.     See EMR under Patient Instructions for exercises provided prior visit.    Assessment   Lloyd reports continued left sided low back pain. Pain located to L SIJ region. Pain likely due to recent removal of brace and weakness on L lumbopelvic region. Continued stability exercises and lumbar mobility stretches for pain reduction and progression for standing and activity tolerance. Will continue to progress per pt's tolerance.      Lloyd Is progressing well towards his goals.   Pt prognosis is Good.     Pt will continue to benefit from skilled outpatient physical therapy to address the deficits listed in the problem list box on initial evaluation, provide pt/family education and to maximize pt's level of independence in the home and community environment.     Pt's spiritual, cultural and educational needs considered and pt agreeable to plan of care and goals.     Anticipated barriers to physical therapy: None    Goals:   GOALS: Short Term Goals:  4 weeks  1. Report decreased in pain at worse less than  <   / =  3  /10  to increase tolerance for functional activities  2. Pt to tolerate being out of brace for entire day without increase in pain.  3. Increased BLE MMT 1/2  to increase tolerance for ADL and work activities.  4. Pt to tolerate exercise for full 45 " minutes without increase in symptoms.  5. Pt to tolerate HEP to improve ROM and independence with ADL's  6. Pt to improve range of motion by 25% to allow for improved functional mobility to allow for improvement in IADLs.   7. Assess 30 sec sit to stand and TUG.     Long Term Goals: 8 weeks  1. Report decreased in pain at worse less than  <   / =  1  /10  to increase tolerance for functional mobility.   2. Increased BLE MMT 1 grade to increase tolerance for ADL and work activities.  3. Pt to improve range of motion by 50% to allow for improved functional mobility to allow for improvement in IADLs.     Plan     Plan of care Certification: 7/28/2023 to 10/1/2023.     Outpatient Physical Therapy 2 times weekly for 8 weeks to include the following interventions: Gait Training, Manual Therapy, Moist Heat/ Ice, Neuromuscular Re-ed, Patient Education, Therapeutic Activities, and Therapeutic Exercise.        Alessandra Parisi, PT

## 2023-08-16 ENCOUNTER — CLINICAL SUPPORT (OUTPATIENT)
Dept: REHABILITATION | Facility: HOSPITAL | Age: 78
End: 2023-08-16
Payer: MEDICARE

## 2023-08-16 DIAGNOSIS — R26.9 GAIT ABNORMALITY: ICD-10-CM

## 2023-08-16 DIAGNOSIS — R29.898 DECREASED STRENGTH OF TRUNK AND BACK: ICD-10-CM

## 2023-08-16 DIAGNOSIS — M53.86 DECREASED RANGE OF MOTION OF LUMBAR SPINE: Primary | ICD-10-CM

## 2023-08-16 PROCEDURE — 97112 NEUROMUSCULAR REEDUCATION: CPT | Mod: HCNC,PO,CQ

## 2023-08-16 PROCEDURE — 97110 THERAPEUTIC EXERCISES: CPT | Mod: HCNC,PO,CQ

## 2023-08-16 NOTE — PROGRESS NOTES
"Physical Therapy Daily Treatment Note     Name: Lloyd John .  Clinic Number: [id number]    Therapy Diagnosis:   Encounter Diagnoses   Name Primary?    Decreased range of motion of lumbar spine Yes    Decreased strength of trunk and back     Gait abnormality      Physician: Mike Dallas, *    Visit Date: 2023    Physician Orders: PT Eval and Treat   Medical Diagnosis from Referral: Z98.890 (ICD-10-CM) - S/P lumbar microdiscectomy  Evaluation Date: 2023  Authorization Period Expiration: 2024  Plan of Care Expiration: 10/1/2023  Visit # / Visits authorized:  +Eval  PN: 2023    Time In: 9:00 am  Time Out: 9:57 am  Total Billable Time: 57 minutes     Precautions: Standard and History of CA, history of cauda equina  S/p L2-3 microdiscectomy performed on 23 due to cauda equina syndrome(incomplete-hx of LLE weakness)  -no bending, twisting, or lifting over 10 lbs    Subjective     Pt reports: increased L sided lower back pain levels upon arrival fort treatment. He messaged his MD about progressing by bending, lifting, twisting precautions. MD told him he is clear to begin light lifting and slow and controlled bending per pt's tolerance.    He was compliant with home exercise program.  Response to previous treatment: min increase in soreness  Functional change: None yet    Pain: 9/10  Location:  left bottom and LLE     Objective     TU seconds  30 sec sit to stand: 11 reps    TREATMENT     Lloyd received therapeutic exercises to develop strength, endurance, ROM, flexibility, posture and core stabilization for 15 minutes including:    Nustep level 2 x 7 minutes  Supine HS stretch 3 x 30 seconds  SKTC 10 x 5 sec holds ea  LTR x10 ea side  piriformis stretch 3x30" hold    Lloyd received the following manual therapy techniques:  were applied to the:  for  minutes, including:      Lloyd participated in neuromuscular re-education activities to improve: Balance, Coordination, " "Proprioception and Posture for  42 minutes. The following activities were included:    PPT with TA 2x10 x 5 sec  PPT + marching in supine x 20 alternating  PPT+ SLR 2x10 each  Bridges with TA 2x 10  SL clamshells RTB x15  Supine hip add (ball squeeze) x20 5" hold-NP  SAQ's 2# ankle weight 2x10 ea-NP  Standing TA with GTB pull down 15 x 5 sec hold-NP  Rows with BTB x 20-NP  Sit to stands 3x10  Heel raises x30  Standing hip abd 2x10 ea  +Standing hip ext 2x10 ea  +Hip Hinge x10 (long dowel along spine for tactile cueing)      Home Exercises Provided and Patient Education Provided     Education provided:   - HEP review    Written Home Exercises Provided: Patient instructed to cont prior HEP.  Exercises were reviewed and Lloyd was able to demonstrate them prior to the end of the session.  Lloyd demonstrated good  understanding of the education provided.     See EMR under Patient Instructions for exercises provided prior visit.    Assessment   Lloyd returned with increased L sided lower back pain upon arrival for treatment. MD has cleared pt to begin very light lifting and slow and controlled bending.  Treatment continued with lumbopelvic   Mobility and core stability ex's, progressing by instructing pt in hip hinges to teach safe lumbar flexion motion. He tolerated treatment well reporting no L sided lower back pain post treatment. Will continue to progress per pt's tolerance.    Lloyd Is progressing well towards his goals.   Pt prognosis is Good.     Pt will continue to benefit from skilled outpatient physical therapy to address the deficits listed in the problem list box on initial evaluation, provide pt/family education and to maximize pt's level of independence in the home and community environment.     Pt's spiritual, cultural and educational needs considered and pt agreeable to plan of care and goals.     Anticipated barriers to physical therapy: None    Goals:   GOALS: Short Term Goals:  4 weeks  1. " Report decreased in pain at worse less than  <   / =  3  /10  to increase tolerance for functional activities  2. Pt to tolerate being out of brace for entire day without increase in pain.  3. Increased BLE MMT 1/2  to increase tolerance for ADL and work activities.  4. Pt to tolerate exercise for full 45 minutes without increase in symptoms.  5. Pt to tolerate HEP to improve ROM and independence with ADL's  6. Pt to improve range of motion by 25% to allow for improved functional mobility to allow for improvement in IADLs.   7. Assess 30 sec sit to stand and TUG.     Long Term Goals: 8 weeks  1. Report decreased in pain at worse less than  <   / =  1  /10  to increase tolerance for functional mobility.   2. Increased BLE MMT 1 grade to increase tolerance for ADL and work activities.  3. Pt to improve range of motion by 50% to allow for improved functional mobility to allow for improvement in IADLs.     Plan     Plan of care Certification: 7/28/2023 to 10/1/2023.     Outpatient Physical Therapy 2 times weekly for 8 weeks to include the following interventions: Gait Training, Manual Therapy, Moist Heat/ Ice, Neuromuscular Re-ed, Patient Education, Therapeutic Activities, and Therapeutic Exercise.        Jacques Pradhan, PTA

## 2023-08-18 ENCOUNTER — CLINICAL SUPPORT (OUTPATIENT)
Dept: REHABILITATION | Facility: HOSPITAL | Age: 78
End: 2023-08-18
Payer: MEDICARE

## 2023-08-18 ENCOUNTER — PES CALL (OUTPATIENT)
Dept: ADMINISTRATIVE | Facility: CLINIC | Age: 78
End: 2023-08-18
Payer: MEDICARE

## 2023-08-18 DIAGNOSIS — R26.9 GAIT ABNORMALITY: ICD-10-CM

## 2023-08-18 DIAGNOSIS — M53.86 DECREASED RANGE OF MOTION OF LUMBAR SPINE: Primary | ICD-10-CM

## 2023-08-18 DIAGNOSIS — R29.898 DECREASED STRENGTH OF TRUNK AND BACK: ICD-10-CM

## 2023-08-18 PROCEDURE — 97110 THERAPEUTIC EXERCISES: CPT | Mod: HCNC,PO

## 2023-08-18 PROCEDURE — 97112 NEUROMUSCULAR REEDUCATION: CPT | Mod: HCNC,PO

## 2023-08-18 NOTE — PROGRESS NOTES
"Physical Therapy Daily Treatment Note     Name: Lloyd John Jr.  Clinic Number: [id number]    Therapy Diagnosis:   Encounter Diagnoses   Name Primary?    Decreased range of motion of lumbar spine Yes    Decreased strength of trunk and back     Gait abnormality        Physician: Mike Dallas, *    Visit Date: 2023    Physician Orders: PT Eval and Treat   Medical Diagnosis from Referral: Z98.890 (ICD-10-CM) - S/P lumbar microdiscectomy  Evaluation Date: 2023  Authorization Period Expiration: 2024  Plan of Care Expiration: 10/1/2023  Visit # / Visits authorized:  +Eval  PN: 2023    Time In: 8:30 am  Time Out: 9:16 am  Total Billable Time: 46 minutes     Precautions: Standard and History of CA, history of cauda equina  S/p L2-3 microdiscectomy performed on 23 due to cauda equina syndrome(incomplete-hx of LLE weakness)  -He messaged his MD about progressing by bending, lifting, twisting precautions. MD told him he is clear to begin light lifting and slow and controlled bending per pt's tolerance.    Subjective     Pt reports: He had pain of 9/10 prior to last treatment session but was able to control pain during exercises. When he left his pain increased once again. Today he arrives with 5/10 pain to L SIJ region and low back.    He was compliant with home exercise program.  Response to previous treatment: min increase in soreness  Functional change: None yet    Pain: 10/10  Location:  left bottom and LLE     Objective     TU seconds  30 sec sit to stand: 11 reps    TREATMENT     Lloyd received therapeutic exercises to develop strength, endurance, ROM, flexibility, posture and core stabilization for 15 minutes including:    Nustep level 2 x 7 minutes  Supine HS stretch 3 x 30 seconds  SKTC 10 x 5 sec holds ea  LTR x10 ea side  piriformis stretch 3x30" hold    Lloyd received the following manual therapy techniques:  were applied to the:  for  minutes, " "including:      Lloyd participated in neuromuscular re-education activities to improve: Balance, Coordination, Proprioception and Posture for  31 minutes. The following activities were included:    +MET for L anterior innominate correction 10 x 3 sec, x 2 during treatment    PPT with TA 2x10 x 5 sec  PPT + marching in supine x 20 alternating  PPT+ SLR 2x10 each  +dead bugs using red PB x 10 (LE heel taps only)  Bridges with TA and hip abd with RTB 2x 10  SL clamshells RTB x15  Supine hip add (ball squeeze) x20 5" hold-NP  SAQ's 2# ankle weight 2x10 ea-NP  Standing TA with GTB pull down 15 x 5 sec hold-NP  Rows with BTB x 20-NP  Sit to stands 3x10  Heel raises x30-NP  Standing hip abd 2x10 ea  Standing hip ext 2x10 ea-NP  Seated/standing Hip Hinge x10 (long dowel along spine for tactile cueing)      Home Exercises Provided and Patient Education Provided     Education provided:   - HEP review    Written Home Exercises Provided: Patient instructed to cont prior HEP.  Exercises were reviewed and Lloyd was able to demonstrate them prior to the end of the session.  Lloyd demonstrated good  understanding of the education provided.     See EMR under Patient Instructions for exercises provided prior visit.    Assessment   Lloyd arrived with anterior rotation of L innominate. Performed MET correction with reduction in symptoms. Continued stabilization and lumbopelvic mobility exercises with good tolerance. Given updated HEP for MET. Will continue to progress per pt's tolerance.    Lloyd Is progressing well towards his goals.   Pt prognosis is Good.     Pt will continue to benefit from skilled outpatient physical therapy to address the deficits listed in the problem list box on initial evaluation, provide pt/family education and to maximize pt's level of independence in the home and community environment.     Pt's spiritual, cultural and educational needs considered and pt agreeable to plan of care and goals.   "   Anticipated barriers to physical therapy: None    Goals:   GOALS: Short Term Goals:  4 weeks  1. Report decreased in pain at worse less than  <   / =  3  /10  to increase tolerance for functional activities  2. Pt to tolerate being out of brace for entire day without increase in pain.  3. Increased BLE MMT 1/2  to increase tolerance for ADL and work activities.  4. Pt to tolerate exercise for full 45 minutes without increase in symptoms.  5. Pt to tolerate HEP to improve ROM and independence with ADL's  6. Pt to improve range of motion by 25% to allow for improved functional mobility to allow for improvement in IADLs.   7. Assess 30 sec sit to stand and TUG.     Long Term Goals: 8 weeks  1. Report decreased in pain at worse less than  <   / =  1  /10  to increase tolerance for functional mobility.   2. Increased BLE MMT 1 grade to increase tolerance for ADL and work activities.  3. Pt to improve range of motion by 50% to allow for improved functional mobility to allow for improvement in IADLs.     Plan     Plan of care Certification: 7/28/2023 to 10/1/2023.     Outpatient Physical Therapy 2 times weekly for 8 weeks to include the following interventions: Gait Training, Manual Therapy, Moist Heat/ Ice, Neuromuscular Re-ed, Patient Education, Therapeutic Activities, and Therapeutic Exercise.        Alessandra Parisi, PT

## 2023-08-23 ENCOUNTER — CLINICAL SUPPORT (OUTPATIENT)
Dept: REHABILITATION | Facility: HOSPITAL | Age: 78
End: 2023-08-23
Payer: MEDICARE

## 2023-08-23 DIAGNOSIS — R26.9 GAIT ABNORMALITY: ICD-10-CM

## 2023-08-23 DIAGNOSIS — R29.898 DECREASED STRENGTH OF TRUNK AND BACK: ICD-10-CM

## 2023-08-23 DIAGNOSIS — M53.86 DECREASED RANGE OF MOTION OF LUMBAR SPINE: Primary | ICD-10-CM

## 2023-08-23 PROCEDURE — 97110 THERAPEUTIC EXERCISES: CPT | Mod: HCNC,PO,CQ

## 2023-08-23 PROCEDURE — 97112 NEUROMUSCULAR REEDUCATION: CPT | Mod: HCNC,PO,CQ

## 2023-08-25 ENCOUNTER — CLINICAL SUPPORT (OUTPATIENT)
Dept: REHABILITATION | Facility: HOSPITAL | Age: 78
End: 2023-08-25
Payer: MEDICARE

## 2023-08-25 DIAGNOSIS — R29.898 DECREASED STRENGTH OF TRUNK AND BACK: ICD-10-CM

## 2023-08-25 DIAGNOSIS — M53.86 DECREASED RANGE OF MOTION OF LUMBAR SPINE: Primary | ICD-10-CM

## 2023-08-25 DIAGNOSIS — R26.9 GAIT ABNORMALITY: ICD-10-CM

## 2023-08-25 PROCEDURE — 97110 THERAPEUTIC EXERCISES: CPT | Mod: HCNC,PO,CQ

## 2023-08-25 PROCEDURE — 97112 NEUROMUSCULAR REEDUCATION: CPT | Mod: HCNC,PO,CQ

## 2023-08-25 NOTE — PROGRESS NOTES
"Physical Therapy Daily Treatment Note     Name: Lloyd John Jr.  Clinic Number: [id number]    Therapy Diagnosis:   Encounter Diagnoses   Name Primary?    Decreased range of motion of lumbar spine Yes    Decreased strength of trunk and back     Gait abnormality        Physician: Mike Dallas, *    Visit Date: 2023    Physician Orders: PT Eval and Treat   Medical Diagnosis from Referral: Z98.890 (ICD-10-CM) - S/P lumbar microdiscectomy  Evaluation Date: 2023  Authorization Period Expiration: 2024  Plan of Care Expiration: 10/1/2023  Visit # / Visits authorized:  +Eval  PN: 2023    Time In: 9:45 am  Time Out: 10:45 am  Total Billable Time: 60 minutes 30 minutes 1:1    Precautions: Standard and History of CA, history of cauda equina  S/p L2-3 microdiscectomy performed on 23 due to cauda equina syndrome(incomplete-hx of LLE weakness)  -He messaged his MD about progressing by bending, lifting, twisting precautions. MD told him he is clear to begin light lifting and slow and controlled bending per pt's tolerance.    Subjective     Pt reports: "I woke up really stiff this morning". He continues to report no L sided lower back pain.  He was compliant with home exercise program.  Response to previous treatment: min increase in soreness  Functional change: None yet    Pain: 0/10  Location:  left bottom and LLE     Objective     TU seconds  30 sec sit to stand: 11 reps    TREATMENT     Lloyd received therapeutic exercises to develop strength, endurance, ROM, flexibility, posture and core stabilization for 15 minutes including:    Nustep level 2 x 10 minutes  Supine HS stretch 3 x 30 seconds  SKTC 10 x 5 sec holds ea  LTR x10 ea side  piriformis stretch 3x30" hold  +Supine QL stretch 3x20" L side only    Lloyd received the following manual therapy techniques:  were applied to the:  for  minutes, including:      Lloyd participated in neuromuscular re-education activities to " "improve: Balance, Coordination, Proprioception and Posture for  45 minutes. The following activities were included:    MET for L anterior innominate correction 10 x 3 sec, x 2 during treatment    PPT with TA 2x10 x 5 sec  PPT + marching in supine x 20 alternating  PPT+ SLR 2x10 each  Dead bugs  x 10 (LE heel taps only)  Bridges with TA and hip abd with GTB 3x10  SL clamshells GTB x20  Sit to stands 3x10-NP  Heel raises x30  Standing hip abd UE pushing into red PB x10 ea  Standing hip ext UE pushing into red PBx10 ea  Seated/standing Hip Hinge x15  Palloff Press RTB x15 ea  +Leg press 50# 3x10    Not performed 8/25/2023 :  Supine hip add (ball squeeze) x20 5" hold-NP  SAQ's 2# ankle weight 2x10 ea-NP  Standing TA with GTB pull down 15 x 5 sec hold-NP  Rows with BTB x 20-NP         Home Exercises Provided and Patient Education Provided     Education provided:   - HEP review    Written Home Exercises Provided: Patient instructed to cont prior HEP.  Exercises were reviewed and Lloyd was able to demonstrate them prior to the end of the session.  Lloyd demonstrated good  understanding of the education provided.     See EMR under Patient Instructions for exercises provided prior visit.    Assessment   Lloyd returned without complaints of lower back pain but with increased muscular stiffness. Lumbopelvic mobility and core/LE strengthening ex's continued, progressing by introducing leg press to progress from sit to stands. He demonstrated improved motor control, no longer requiring long dowel for tactile cueing. Exercise progressions tolerated well with no increase in pain and reporting decreased muscular stiffness post treatment.    Lloyd Is progressing well towards his goals.   Pt prognosis is Good.     Pt will continue to benefit from skilled outpatient physical therapy to address the deficits listed in the problem list box on initial evaluation, provide pt/family education and to maximize pt's level of " independence in the home and community environment.     Pt's spiritual, cultural and educational needs considered and pt agreeable to plan of care and goals.     Anticipated barriers to physical therapy: None    Goals:   GOALS: Short Term Goals:  4 weeks  1. Report decreased in pain at worse less than  <   / =  3  /10  to increase tolerance for functional activities  2. Pt to tolerate being out of brace for entire day without increase in pain.  3. Increased BLE MMT 1/2  to increase tolerance for ADL and work activities.  4. Pt to tolerate exercise for full 45 minutes without increase in symptoms.  5. Pt to tolerate HEP to improve ROM and independence with ADL's  6. Pt to improve range of motion by 25% to allow for improved functional mobility to allow for improvement in IADLs.   7. Assess 30 sec sit to stand and TUG.     Long Term Goals: 8 weeks  1. Report decreased in pain at worse less than  <   / =  1  /10  to increase tolerance for functional mobility.   2. Increased BLE MMT 1 grade to increase tolerance for ADL and work activities.  3. Pt to improve range of motion by 50% to allow for improved functional mobility to allow for improvement in IADLs.     Plan     Plan of care Certification: 7/28/2023 to 10/1/2023.     Outpatient Physical Therapy 2 times weekly for 8 weeks to include the following interventions: Gait Training, Manual Therapy, Moist Heat/ Ice, Neuromuscular Re-ed, Patient Education, Therapeutic Activities, and Therapeutic Exercise.        Jacques Pradhan, PTA

## 2023-08-30 ENCOUNTER — CLINICAL SUPPORT (OUTPATIENT)
Dept: REHABILITATION | Facility: HOSPITAL | Age: 78
End: 2023-08-30
Payer: MEDICARE

## 2023-08-30 DIAGNOSIS — M53.86 DECREASED RANGE OF MOTION OF LUMBAR SPINE: Primary | ICD-10-CM

## 2023-08-30 DIAGNOSIS — R29.898 DECREASED STRENGTH OF TRUNK AND BACK: ICD-10-CM

## 2023-08-30 DIAGNOSIS — R26.9 GAIT ABNORMALITY: ICD-10-CM

## 2023-08-30 PROCEDURE — 97112 NEUROMUSCULAR REEDUCATION: CPT | Mod: HCNC,PO,CQ

## 2023-08-30 PROCEDURE — 97110 THERAPEUTIC EXERCISES: CPT | Mod: HCNC,PO,CQ

## 2023-08-30 NOTE — PROGRESS NOTES
"Physical Therapy Daily Treatment Note     Name: Lloyd John Jr.  Clinic Number: [id number]    Therapy Diagnosis:   Encounter Diagnoses   Name Primary?    Decreased range of motion of lumbar spine Yes    Decreased strength of trunk and back     Gait abnormality        Physician: Mike Dallas, *    Visit Date: 2023    Physician Orders: PT Eval and Treat   Medical Diagnosis from Referral: Z98.890 (ICD-10-CM) - S/P lumbar microdiscectomy  Evaluation Date: 2023  Authorization Period Expiration: 2024  Plan of Care Expiration: 10/1/2023  Visit # / Visits authorized:  +Eval  PN: 2023    Time In: 8:50 am  Time Out: 9:48 am  Total Billable Time: 58 minutes     Precautions: Standard and History of CA, history of cauda equina  S/p L2-3 microdiscectomy performed on 23 due to cauda equina syndrome(incomplete-hx of LLE weakness)  -He messaged his MD about progressing by bending, lifting, twisting precautions. MD told him he is clear to begin light lifting and slow and controlled bending per pt's tolerance.    Subjective     Pt reports: No L sided lower back pain currently, but continues to feel increased stiffness and discomfort each morning that tends to loosen up as he gets moving. He did experience a significant increase in L sided lower back pain that caused him to "lay around all day".     He was compliant with home exercise program.  Response to previous treatment: min increase in soreness  Functional change: None yet    Pain: 0/10  Location:  left bottom and LLE     Objective     TU seconds  30 sec sit to stand: 11 reps    TREATMENT     Lloyd received therapeutic exercises to develop strength, endurance, ROM, flexibility, posture and core stabilization for 13 minutes including:    Nustep level 2 x 10 minutes-NP  +Treadmill 1.7 mph x 8 minutes  Supine HS stretch 3 x 30 seconds  SKTC 10 x 5 sec holds ea  LTR x10 ea side  piriformis stretch 3x30" hold  Supine QL stretch 3x20" L " "side only    Lloyd received the following manual therapy techniques:  were applied to the:  for  minutes, including:      Lloyd participated in neuromuscular re-education activities to improve: Balance, Coordination, Proprioception and Posture for  45 minutes. The following activities were included:    MET for L anterior innominate correction 10 x 3 sec, x 2 during treatment-NP    PPT with TA 2x10 x 5 sec  PPT + marching in supine 2# x 20 alternating  PPT+ SLR 2# 2x10 each  Dead bugs  x 10 (LE heel taps only)  Bridges with TA and hip abd with GTB 3x10  SL clamshells GTB x20  Sit to stands 3x10-NP  Heel raises x30  Standing hip abd UE pushing into red PB x10 ea  Standing hip ext UE pushing into red PBx10 ea  standing Hip Hinge w/5# dowel x15  Palloff Press RTB x15 ea  Leg press 60# 3x10    Not performed 8/30/2023 :  Supine hip add (ball squeeze) x20 5" hold-NP  SAQ's 2# ankle weight 2x10 ea-NP  Standing TA with GTB pull down 15 x 5 sec hold-NP  Rows with BTB x 20-NP         Home Exercises Provided and Patient Education Provided     Education provided:   - HEP review    Written Home Exercises Provided: Patient instructed to cont prior HEP.  Exercises were reviewed and Lloyd was able to demonstrate them prior to the end of the session.  Lloyd demonstrated good  understanding of the education provided.     See EMR under Patient Instructions for exercises provided prior visit.    Assessment   Lloyd returned without complaints of lower back pain but continues to experience increased muscular stiffness and discomfort each morning. Lumbopelvic mobility and core/LE strengthening ex's continued, progressing by adding resistance to PPT + exercises. Resistance was also added to hip hinge for added posterior chain strengthening. He tolerated exercise progressions well with no increase in pain. Will continue to progress per pt's tolerance.    Lloyd Is progressing well towards his goals.   Pt prognosis is Good.     Pt " will continue to benefit from skilled outpatient physical therapy to address the deficits listed in the problem list box on initial evaluation, provide pt/family education and to maximize pt's level of independence in the home and community environment.     Pt's spiritual, cultural and educational needs considered and pt agreeable to plan of care and goals.     Anticipated barriers to physical therapy: None    Goals:   GOALS: Short Term Goals:  4 weeks  1. Report decreased in pain at worse less than  <   / =  3  /10  to increase tolerance for functional activities  2. Pt to tolerate being out of brace for entire day without increase in pain.  3. Increased BLE MMT 1/2  to increase tolerance for ADL and work activities.  4. Pt to tolerate exercise for full 45 minutes without increase in symptoms.  5. Pt to tolerate HEP to improve ROM and independence with ADL's  6. Pt to improve range of motion by 25% to allow for improved functional mobility to allow for improvement in IADLs.   7. Assess 30 sec sit to stand and TUG.     Long Term Goals: 8 weeks  1. Report decreased in pain at worse less than  <   / =  1  /10  to increase tolerance for functional mobility.   2. Increased BLE MMT 1 grade to increase tolerance for ADL and work activities.  3. Pt to improve range of motion by 50% to allow for improved functional mobility to allow for improvement in IADLs.     Plan     Plan of care Certification: 7/28/2023 to 10/1/2023.     Outpatient Physical Therapy 2 times weekly for 8 weeks to include the following interventions: Gait Training, Manual Therapy, Moist Heat/ Ice, Neuromuscular Re-ed, Patient Education, Therapeutic Activities, and Therapeutic Exercise.        Jacques Pradhan, PTA

## 2023-09-01 ENCOUNTER — CLINICAL SUPPORT (OUTPATIENT)
Dept: REHABILITATION | Facility: HOSPITAL | Age: 78
End: 2023-09-01
Payer: MEDICARE

## 2023-09-01 DIAGNOSIS — R29.898 DECREASED STRENGTH OF TRUNK AND BACK: ICD-10-CM

## 2023-09-01 DIAGNOSIS — M53.86 DECREASED RANGE OF MOTION OF LUMBAR SPINE: Primary | ICD-10-CM

## 2023-09-01 DIAGNOSIS — R26.9 GAIT ABNORMALITY: ICD-10-CM

## 2023-09-01 PROCEDURE — 97110 THERAPEUTIC EXERCISES: CPT | Mod: HCNC,PO

## 2023-09-01 PROCEDURE — 97112 NEUROMUSCULAR REEDUCATION: CPT | Mod: HCNC,PO

## 2023-09-01 NOTE — PROGRESS NOTES
Physical Therapy Daily Treatment Note     Name: Lloyd John Jr.  Clinic Number: [id number]    Therapy Diagnosis:   Encounter Diagnoses   Name Primary?    Decreased range of motion of lumbar spine Yes    Decreased strength of trunk and back     Gait abnormality            Physician: Mike Dallas, *    Visit Date: 9/1/2023    Physician Orders: PT Eval and Treat   Medical Diagnosis from Referral: Z98.890 (ICD-10-CM) - S/P lumbar microdiscectomy  Evaluation Date: 7/28/2023  Authorization Period Expiration: 7/24/2024  Plan of Care Expiration: 10/1/2023  Visit # / Visits authorized: 8/ 20 +Eval  PN: 9/28/2023    Time In: 9:15 am  Time Out: 10:02 am  Total Billable Time: 47 minutes     Precautions: Standard and History of CA, history of cauda equina  S/p L2-3 microdiscectomy performed on 6/13/23 due to cauda equina syndrome(incomplete-hx of LLE weakness)  -He messaged his MD about progressing by bending, lifting, twisting precautions. MD told him he is clear to begin light lifting and slow and controlled bending per pt's tolerance.    Subjective     Pt reports: Minimal left sided low back pain currently, but has days where pain gets up to 6/10. Denies any specific motion or activity causing pain. Has some radicular pain continued to LLE, present prior to and following surgery.      He was compliant with home exercise program.  Response to previous treatment: min increase in soreness  Functional change: None yet    Pain: 0/10  Location:  left bottom and LLE     Objective     GAIT DEVIATIONS: Lloyd displays increased base of support;decreased weight shift     Lumbar Range of Motion:     %   Flexion 75%, no pain      Extension 25%, no pain      Left Side Bending 25%,pain to L   Right Side Bending 25%, pain to L   Left rotation    75%, pain to L   Right Rotation    50%, pain to L    *= pain  Lumbar  Strength Increased Pain?   Flexion 4/5     Extension 4/5     Side Bending Right 4/5     Side Bending Left 4/5    "  *Tested with patient seated     Lower Extremity Strength     Right LE   Left LE     Hip flexion: 5/5 Hip flexion: 4+/5*   Knee extension: 5/5 Knee extension: 5/5   Knee flexion: 4+/5 Knee flexion: 4+/5   Hip extension:  4/5 Hip extension: 4-/5   Hip abduction: 4+/5 Hip abduction: 4/5*   Ankle dorsiflexion: 5/5 Ankle dorsiflexion: 4/5   Ankle plantarflexion: 4+/5 Ankle plantarflexion: 4/5      TU seconds  30 sec sit to stand: 16 reps    TREATMENT     Lloyd received therapeutic exercises to develop strength, endurance, ROM, flexibility, posture and core stabilization for 20 minutes including:    Reassessment    Treadmill 1.7 mph x 8 minutes-NP due to time  Supine HS stretch 3 x 30 seconds  SKTC 10 x 5 sec holds ea-NP  LTR x10 ea side-NP  piriformis stretch 3x30" hold  Supine QL stretch 3x20" L side only    Lloyd received the following manual therapy techniques:  were applied to the:  for  minutes, including:      Lloyd participated in neuromuscular re-education activities to improve: Balance, Coordination, Proprioception and Posture for  27 minutes. The following activities were included:    MET for R anterior    PPT with TA 2x10 x 5 sec  PPT + marching in supine 2# x 20 alternating  PPT+ SLR 2# 2x10 each  Dead bugs  x 10 (LE heel taps only)  Bridges with TA and hip abd with GTB 3x10  SL clamshells GTB x20 with isometric holds for 3 seconds  Sit to stands 3x10-NP  Heel raises x30  Standing hip abd UE pushing into red PB x10 ea  Standing hip ext UE pushing into red PBx10 ea-NP  +Semi prone hip extension into red PB with 5 sec hold x 10 each  standing Hip Hinge w/5# dowel 2 x 10  Palloff Press RTB x15 ea  Leg press 60# 3x10-NP due to time    Not performed 2023 :  Supine hip add (ball squeeze) x20 5" hold-NP  SAQ's 2# ankle weight 2x10 ea-NP  Standing TA with GTB pull down 15 x 5 sec hold-NP  Rows with BTB x 20-NP         Home Exercises Provided and Patient Education Provided     Education provided:   - " HEP review    Written Home Exercises Provided: Patient instructed to cont prior HEP.  Exercises were reviewed and Lloyd was able to demonstrate them prior to the end of the session.  Lloyd demonstrated good  understanding of the education provided.     See EMR under Patient Instructions for exercises provided prior visit.    Assessment   Lloyd was reassessed by physical therapist today. He is s/p lumbar microdiscectomy performed on 6/13/2023 and has attended 8 treatment sessions. Noted increased discomfort to L side intermittently post removal of back brace. Able to reproduce symptoms with lumbar and L hip active movement. Pain present to L QL and L glute med and max. Discussed possible dry needling; PT messaged surgeon for approval and will wait for reply prior to initiating modality. He demonstrates limitations with side bending bilaterally with pain to L side. Continues to display weakness in lumbopelvic stability and strength and hip drop present with bridge testing. L lumbar pain limiting progression of resistance with proper technique. Will continue to progress per pt's tolerance.    Lloyd Is progressing well towards his goals.   Pt prognosis is Good.     Pt will continue to benefit from skilled outpatient physical therapy to address the deficits listed in the problem list box on initial evaluation, provide pt/family education and to maximize pt's level of independence in the home and community environment.     Pt's spiritual, cultural and educational needs considered and pt agreeable to plan of care and goals.     Anticipated barriers to physical therapy: None    Goals:   GOALS: Short Term Goals:  4 weeks  1. Report decreased in pain at worse less than  <   / =  3  /10  to increase tolerance for functional activitiesNot Met  2. Pt to tolerate being out of brace for entire day without increase in pain.MET  3. Increased BLE MMT 1/2  to increase tolerance for ADL and work activities.MET  4. Pt to  tolerate exercise for full 45 minutes without increase in symptoms.partially MET  5. Pt to tolerate HEP to improve ROM and independence with ADL'sMET  6. Pt to improve range of motion by 25% to allow for improved functional mobility to allow for improvement in IADLs. MET  7. Assess 30 sec sit to stand and TUG.MET     Long Term Goals: 8 weeks  1. Report decreased in pain at worse less than  <   / =  1  /10  to increase tolerance for functional mobility.   2. Increased BLE MMT 1 grade to increase tolerance for ADL and work activities.  3. Pt to improve range of motion by 50% to allow for improved functional mobility to allow for improvement in IADLs.   4. Pt to demonstrate proper squat and lifting technique with 15 lb weight to perform household and yard work without increase in symptoms.    Plan     Plan of care Certification: 7/28/2023 to 10/1/2023.     Outpatient Physical Therapy 2 times weekly for 8 weeks to include the following interventions: Gait Training, Manual Therapy, Moist Heat/ Ice, Neuromuscular Re-ed, Patient Education, Therapeutic Activities, and Therapeutic Exercise.        Alessandra Parisi, PT

## 2023-09-06 ENCOUNTER — CLINICAL SUPPORT (OUTPATIENT)
Dept: REHABILITATION | Facility: HOSPITAL | Age: 78
End: 2023-09-06
Payer: MEDICARE

## 2023-09-06 DIAGNOSIS — R29.898 DECREASED STRENGTH OF TRUNK AND BACK: ICD-10-CM

## 2023-09-06 DIAGNOSIS — R26.9 GAIT ABNORMALITY: ICD-10-CM

## 2023-09-06 DIAGNOSIS — M53.86 DECREASED RANGE OF MOTION OF LUMBAR SPINE: Primary | ICD-10-CM

## 2023-09-06 PROCEDURE — 97112 NEUROMUSCULAR REEDUCATION: CPT | Mod: HCNC,PO

## 2023-09-06 PROCEDURE — 97140 MANUAL THERAPY 1/> REGIONS: CPT | Mod: HCNC,PO

## 2023-09-06 PROCEDURE — 97110 THERAPEUTIC EXERCISES: CPT | Mod: HCNC,PO

## 2023-09-06 NOTE — PROGRESS NOTES
Physical Therapy Daily Treatment Note     Name: Lloyd John Jr.  Clinic Number: [id number]    Therapy Diagnosis:   Encounter Diagnoses   Name Primary?    Decreased range of motion of lumbar spine Yes    Decreased strength of trunk and back     Gait abnormality            Physician: Mike Dallas, *    Visit Date: 9/6/2023    Physician Orders: PT Eval and Treat   Medical Diagnosis from Referral: Z98.890 (ICD-10-CM) - S/P lumbar microdiscectomy  Evaluation Date: 7/28/2023  Authorization Period Expiration: 7/24/2024  Plan of Care Expiration: 10/1/2023  Visit # / Visits authorized: 8/ 20 +Eval  PN: 9/28/2023    Time In: 855 am  Time Out: 955 am  Total Billable Time: 60 minutes 1:1 855-933 38 min  TE 1 MT 1 NMR 1    Precautions: Standard and History of CA, history of cauda equina  S/p L2-3 microdiscectomy performed on 6/13/23 due to cauda equina syndrome(incomplete-hx of LLE weakness)  -He messaged his MD about progressing by bending, lifting, twisting precautions. MD told him he is clear to begin light lifting and slow and controlled bending per pt's tolerance.    Subjective     Pt reports: Minimal left sided low back pain currently, but has days where pain gets up to 6/10. Denies any specific motion or activity causing pain. Has some radicular pain continued to LLE, present prior to and following surgery.      He was compliant with home exercise program.  Response to previous treatment: min increase in soreness  Functional change: None yet    Pain: 8/10 (pointed to L SI joint across waist line to left lateral hip)  Location:  left bottom and LLE     Objective     GAIT DEVIATIONS: Lloyd displays increased base of support;decreased weight shift     Lumbar Range of Motion:     %   Flexion 75%, no pain      Extension 25%, no pain      Left Side Bending 25%,pain to L   Right Side Bending 25%, pain to L   Left rotation    75%, pain to L   Right Rotation    50%, pain to L    *= pain  Lumbar  Strength Increased  "Pain?   Flexion 4/5     Extension 4/5     Side Bending Right 4/5     Side Bending Left 4/5     *Tested with patient seated     Lower Extremity Strength     Right LE   Left LE     Hip flexion: 5/5 Hip flexion: 4+/5*   Knee extension: 5/5 Knee extension: 5/5   Knee flexion: 4+/5 Knee flexion: 4+/5   Hip extension:  4/5 Hip extension: 4-/5   Hip abduction: 4+/5 Hip abduction: 4/5*   Ankle dorsiflexion: 5/5 Ankle dorsiflexion: 4/5   Ankle plantarflexion: 4+/5 Ankle plantarflexion: 4/5      TU seconds  30 sec sit to stand: 16 reps    TREATMENT     Lloyd received therapeutic exercises to develop strength, endurance, ROM, flexibility, posture and core stabilization for 15 minutes including:    Reassessment    Treadmill 1.7 mph x 3 minutes deferred as he was struggling even with lowering speed   Nu step x 6 min level 2  Supine HS stretch 3 x 30 seconds  SKTC 10 x 5 sec holds ea-NP  LTR x10 ea side  Hip flexor katy test stretch 2 x 1 min  Seated HS stretches 3 x 30 secs  piriformis stretch 3x30" hold  Supine QL stretch 3x20" L side only    Lloyd received the following manual therapy techniques:  were applied to the:  STM iliacus superior aspect, gluteus medius and adductors lopez and longus for 15 minutes, including:  QL pin and stretch  Long axis traction gentle  Short axis traction gentle    Lloyd participated in neuromuscular re-education activities to improve: Balance, Coordination, Proprioception and Posture for  30 minutes. The following activities were included:    MET for R anterior    PPT with TA 2x10 x 5 sec  PPT + marching in supine 2# x 20 alternating  PPT+ SLR 2# 2x10 each  Dead bugs  x 10 (LE heel taps only)  Bridges with TA and hip abd with GTB 3x10  SL clamshells GTB x20 with isometric holds for 3 seconds  Sit to stands 3x10-NP  Heel raises x30  Standing hip abd UE pushing into red PB x10 ea  Standing hip ext UE pushing into red PBx10 ea-  +Semi prone hip extension into red PB with 5 sec hold x " "10 each  standing Hip Hinge w/5# dowel 2 x 10  Palloff Press RTB x15 ea  Leg press 60# 3x10    Not performed 9/6/2023 :  Supine hip add (ball squeeze) x20 5" hold-NP  SAQ's 2# ankle weight 2x10 ea-NP  Standing TA with GTB pull down 15 x 5 sec hold-NP  Rows with BTB x 20-NP         Home Exercises Provided and Patient Education Provided     Education provided:   - HEP review    Written Home Exercises Provided: Patient instructed to cont prior HEP.  Exercises were reviewed and Lloyd was able to demonstrate them prior to the end of the session.  Lloyd demonstrated good  understanding of the education provided.     See EMR under Patient Instructions for exercises provided prior visit.    Assessment   Lloyd presented with increased pain on arrival with L hip /SI pain. Pt with iliacus and QL restriction with good relief with manual during session and tolerated session without issue to include bridges but during end of session pt had difficulty with treadmill use unable to maintain speed at last recording of 1.7 mph which was reduced to 1.4 mph without improvement and deferred to use of Nu Step for safety. Pt with good reduction with pain after manual and further reduction with exercise and stretch with katy test position.    Lloyd Is progressing well towards his goals.   Pt prognosis is Good.     Pt will continue to benefit from skilled outpatient physical therapy to address the deficits listed in the problem list box on initial evaluation, provide pt/family education and to maximize pt's level of independence in the home and community environment.     Pt's spiritual, cultural and educational needs considered and pt agreeable to plan of care and goals.     Anticipated barriers to physical therapy: None    Goals:   GOALS: Short Term Goals:  4 weeks  1. Report decreased in pain at worse less than  <   / =  3  /10  to increase tolerance for functional activitiesNot Met  2. Pt to tolerate being out of brace for " entire day without increase in pain.MET  3. Increased BLE MMT 1/2  to increase tolerance for ADL and work activities.MET  4. Pt to tolerate exercise for full 45 minutes without increase in symptoms.partially MET  5. Pt to tolerate HEP to improve ROM and independence with ADL'sMET  6. Pt to improve range of motion by 25% to allow for improved functional mobility to allow for improvement in IADLs. MET  7. Assess 30 sec sit to stand and TUG.MET     Long Term Goals: 8 weeks  1. Report decreased in pain at worse less than  <   / =  1  /10  to increase tolerance for functional mobility.   2. Increased BLE MMT 1 grade to increase tolerance for ADL and work activities.  3. Pt to improve range of motion by 50% to allow for improved functional mobility to allow for improvement in IADLs.   4. Pt to demonstrate proper squat and lifting technique with 15 lb weight to perform household and yard work without increase in symptoms.    Plan     Plan of care Certification: 7/28/2023 to 10/1/2023.     Outpatient Physical Therapy 2 times weekly for 8 weeks to include the following interventions: Gait Training, Manual Therapy, Moist Heat/ Ice, Neuromuscular Re-ed, Patient Education, Therapeutic Activities, and Therapeutic Exercise.        Josiane Adams, PT

## 2023-09-08 ENCOUNTER — CLINICAL SUPPORT (OUTPATIENT)
Dept: REHABILITATION | Facility: HOSPITAL | Age: 78
End: 2023-09-08
Payer: MEDICARE

## 2023-09-08 DIAGNOSIS — M53.86 DECREASED RANGE OF MOTION OF LUMBAR SPINE: Primary | ICD-10-CM

## 2023-09-08 DIAGNOSIS — R29.898 DECREASED STRENGTH OF TRUNK AND BACK: ICD-10-CM

## 2023-09-08 DIAGNOSIS — R26.9 GAIT ABNORMALITY: ICD-10-CM

## 2023-09-08 PROCEDURE — 97112 NEUROMUSCULAR REEDUCATION: CPT | Mod: HCNC,PO,CQ

## 2023-09-08 PROCEDURE — 97140 MANUAL THERAPY 1/> REGIONS: CPT | Mod: HCNC,PO,CQ

## 2023-09-08 NOTE — PROGRESS NOTES
"Physical Therapy Daily Treatment Note     Name: Lloyd John .  Clinic Number: [id number]    Therapy Diagnosis:   Encounter Diagnoses   Name Primary?    Decreased range of motion of lumbar spine Yes    Decreased strength of trunk and back     Gait abnormality            Physician: Mike Dallas, *    Visit Date: 9/8/2023    Physician Orders: PT Eval and Treat   Medical Diagnosis from Referral: Z98.890 (ICD-10-CM) - S/P lumbar microdiscectomy  Evaluation Date: 7/28/2023  Authorization Period Expiration: 7/24/2024  Plan of Care Expiration: 10/1/2023  Visit # / Visits authorized: 11/ 20 +Eval  PN: 9/28/2023    Time In: 9:00 am  Time Out: 10:00 am  Total Billable Time: 55 minutes 27 minutes 1:1    Precautions: Standard and History of CA, history of cauda equina  S/p L2-3 microdiscectomy performed on 6/13/23 due to cauda equina syndrome(incomplete-hx of LLE weakness)  -He messaged his MD about progressing by bending, lifting, twisting precautions. MD told him he is clear to begin light lifting and slow and controlled bending per pt's tolerance.    Subjective     Pt reports: "I'm hurting this morning". He reports increased L sided lumbar/QL pain upon arrival for treatment.      He was compliant with home exercise program.  Response to previous treatment: min increase in soreness  Functional change: None yet    Pain: 8/10   Location:  left bottom and LLE     Objective     GAIT DEVIATIONS: Lloyd displays increased base of support;decreased weight shift     Lumbar Range of Motion:     %   Flexion 75%, no pain      Extension 25%, no pain      Left Side Bending 25%,pain to L   Right Side Bending 25%, pain to L   Left rotation    75%, pain to L   Right Rotation    50%, pain to L    *= pain  Lumbar  Strength Increased Pain?   Flexion 4/5     Extension 4/5     Side Bending Right 4/5     Side Bending Left 4/5     *Tested with patient seated     Lower Extremity Strength     Right LE   Left LE     Hip flexion: 5/5 Hip " "flexion: 4+/5*   Knee extension: 5/5 Knee extension: 5/5   Knee flexion: 4+/5 Knee flexion: 4+/5   Hip extension:  4/5 Hip extension: 4-/5   Hip abduction: 4+/5 Hip abduction: 4/5*   Ankle dorsiflexion: 5/5 Ankle dorsiflexion: 4/5   Ankle plantarflexion: 4+/5 Ankle plantarflexion: 4/5      TU seconds  30 sec sit to stand: 16 reps    TREATMENT     Lloyd received therapeutic exercises to develop strength, endurance, ROM, flexibility, posture and core stabilization for 15 minutes including:      Treadmill 1.7 mph x 10 minutes  Supine HS stretch 3 x 30 seconds  SKTC 10 x 5 sec holds ea-NP  LTR x10 ea side  Hip flexor katy test stretch 2 x 1 min  piriformis stretch 3x30" hold  Supine QL stretch 3x20" L side only    Lloyd received the following manual therapy techniques:  were applied to the:  for  minutes, including:      Lloyd participated in neuromuscular re-education activities to improve: Balance, Coordination, Proprioception and Posture for  30 minutes. The following activities were included:    MET for R anterior    PPT with TA 2x10 x 5 sec  PPT + marching in supine 2# x 20 alternating  PPT+ SLR 2# 2x10 each  Dead bugs  x 10 (LE heel taps only)  Bridges with TA and hip abd with GTB 3x10  SL clamshells GTB x20 with isometric holds for 3 seconds  Sit to stands 3x10-NP  Heel raises x30  Standing hip abd UE pushing into red PB x10 ea  Standing hip ext UE pushing into red PBx10 ea-  +Semi prone hip extension into red PB with 5 sec hold x 10 each  standing Hip Hinge w/5# dowel 2 x 10  Palloff Press RTB x15 ea  Leg press 60# 3x10    Not performed 2023 :  Supine hip add (ball squeeze) x20 5" hold-NP  SAQ's 2# ankle weight 2x10 ea-NP  Standing TA with GTB pull down 15 x 5 sec hold-NP  Rows with BTB x 20-NP     Lloyd received the following Manual techniques for 10 minutes:  -SL QL stretch with therapist overpressure  - STM to L sided lumbar paraspinals/QL      Home Exercises Provided and Patient Education " Provided     Education provided:   - HEP review    Written Home Exercises Provided: Patient instructed to cont prior HEP.  Exercises were reviewed and Lloyd was able to demonstrate them prior to the end of the session.  Lloyd demonstrated good  understanding of the education provided.     See EMR under Patient Instructions for exercises provided prior visit.    Assessment   Lloyd returned reporting elevated L sided lower back/QL pain levels. Treatment began with the manual techniques noted above in effort to decrease discomfort and muscular stiffness. Following manual techniques pt reported resolution of his pain.  Treatment continued with previous ex's but were not progressed in effort maintain decreased pain levels while continuing core/LE strengthening. Will continue to progress per pt's tolerance.    Lloyd Is progressing well towards his goals.   Pt prognosis is Good.     Pt will continue to benefit from skilled outpatient physical therapy to address the deficits listed in the problem list box on initial evaluation, provide pt/family education and to maximize pt's level of independence in the home and community environment.     Pt's spiritual, cultural and educational needs considered and pt agreeable to plan of care and goals.     Anticipated barriers to physical therapy: None    Goals:   GOALS: Short Term Goals:  4 weeks  1. Report decreased in pain at worse less than  <   / =  3  /10  to increase tolerance for functional activitiesNot Met  2. Pt to tolerate being out of brace for entire day without increase in pain.MET  3. Increased BLE MMT 1/2  to increase tolerance for ADL and work activities.MET  4. Pt to tolerate exercise for full 45 minutes without increase in symptoms.partially MET  5. Pt to tolerate HEP to improve ROM and independence with ADL'sMET  6. Pt to improve range of motion by 25% to allow for improved functional mobility to allow for improvement in IADLs. MET  7. Assess 30 sec sit to  stand and TUG.MET     Long Term Goals: 8 weeks  1. Report decreased in pain at worse less than  <   / =  1  /10  to increase tolerance for functional mobility.   2. Increased BLE MMT 1 grade to increase tolerance for ADL and work activities.  3. Pt to improve range of motion by 50% to allow for improved functional mobility to allow for improvement in IADLs.   4. Pt to demonstrate proper squat and lifting technique with 15 lb weight to perform household and yard work without increase in symptoms.    Plan     Plan of care Certification: 7/28/2023 to 10/1/2023.     Outpatient Physical Therapy 2 times weekly for 8 weeks to include the following interventions: Gait Training, Manual Therapy, Moist Heat/ Ice, Neuromuscular Re-ed, Patient Education, Therapeutic Activities, and Therapeutic Exercise.        Jacques Pradhan, PTA

## 2023-09-13 ENCOUNTER — CLINICAL SUPPORT (OUTPATIENT)
Dept: REHABILITATION | Facility: HOSPITAL | Age: 78
End: 2023-09-13
Payer: MEDICARE

## 2023-09-13 DIAGNOSIS — M53.86 DECREASED RANGE OF MOTION OF LUMBAR SPINE: Primary | ICD-10-CM

## 2023-09-13 DIAGNOSIS — R29.898 DECREASED STRENGTH OF TRUNK AND BACK: ICD-10-CM

## 2023-09-13 DIAGNOSIS — R26.9 GAIT ABNORMALITY: ICD-10-CM

## 2023-09-13 PROCEDURE — 97140 MANUAL THERAPY 1/> REGIONS: CPT | Mod: HCNC,PO

## 2023-09-13 PROCEDURE — 97110 THERAPEUTIC EXERCISES: CPT | Mod: HCNC,PO

## 2023-09-13 PROCEDURE — 97112 NEUROMUSCULAR REEDUCATION: CPT | Mod: HCNC,PO

## 2023-09-13 NOTE — PROGRESS NOTES
Physical Therapy Daily Treatment Note     Name: Lloyd John Jr.  Clinic Number: [id number]    Therapy Diagnosis:   Encounter Diagnoses   Name Primary?    Decreased range of motion of lumbar spine Yes    Decreased strength of trunk and back     Gait abnormality            Physician: Mike Dallas, *    Visit Date: 9/13/2023    Physician Orders: PT Eval and Treat   Medical Diagnosis from Referral: Z98.890 (ICD-10-CM) - S/P lumbar microdiscectomy  Evaluation Date: 7/28/2023  Authorization Period Expiration: 7/24/2024  Plan of Care Expiration: 10/1/2023  Visit # / Visits authorized: 11/ 20 +Eval  PN: 9/28/2023    Time In: 855 am  Time Out: 950 am  Total Billable Time: 55 minutes TE 1 MT 1 NMR 2    Precautions: Standard and History of CA, history of cauda equina  S/p L2-3 microdiscectomy performed on 6/13/23 due to cauda equina syndrome(incomplete-hx of LLE weakness)  -He messaged his MD about progressing by bending, lifting, twisting precautions. MD told him he is clear to begin light lifting and slow and controlled bending per pt's tolerance.    Subjective     Pt reports: up to 8 this weekend starting just getting better.      He was compliant with home exercise program.  Response to previous treatment: min increase in soreness  Functional change: None yet    Pain: 1/10   Location:  left bottom and LLE     Objective     GAIT DEVIATIONS: Lloyd displays increased base of support;decreased weight shift     Lumbar Range of Motion:     %   Flexion 75%, no pain      Extension 25%, no pain      Left Side Bending 25%,pain to L   Right Side Bending 25%, pain to L   Left rotation    75%, pain to L   Right Rotation    50%, pain to L    *= pain  Lumbar  Strength Increased Pain?   Flexion 4/5     Extension 4/5     Side Bending Right 4/5     Side Bending Left 4/5     *Tested with patient seated     Lower Extremity Strength     Right LE   Left LE     Hip flexion: 5/5 Hip flexion: 4+/5*   Knee extension: 5/5 Knee  "extension: 5/5   Knee flexion: 4+/5 Knee flexion: 4+/5   Hip extension:  4/5 Hip extension: 4-/5   Hip abduction: 4+/5 Hip abduction: 4/5*   Ankle dorsiflexion: 5/5 Ankle dorsiflexion: 4/5   Ankle plantarflexion: 4+/5 Ankle plantarflexion: 4/5      TU seconds  30 sec sit to stand: 16 reps    TREATMENT     Lloyd received therapeutic exercises to develop strength, endurance, ROM, flexibility, posture and core stabilization for 12 minutes including:    Nu step 8 min level 1.5  Treadmill 1.7 mph x 10 minutes  Supine HS stretch 3 x 30 seconds  SKTC 10 x 5 sec holds ea-NP  LTR x10 ea side  Hip flexor katy test stretch 2 x 1 min  piriformis stretch 3x30" hold  Supine QL stretch 3x20" L side only      Lloyd participated in neuromuscular re-education activities to improve: Balance, Coordination, Proprioception and Posture for  30 minutes. The following activities were included:    MET for R anterior    PPT with TA 2x10 x 5 sec  PPT + marching in supine 2# x 20 alternating  PPT+ SLR 2# 2x10 each  Dead bugs  x 10 (LE heel taps only)  Bridges with TA and hip abd with GTB 3x10  SL clamshells GTB x20 with isometric holds for 3 seconds  Sit to stands 3x10-NP  Heel raises x30  Standing hip abd UE pushing into red PB x10 ea  Standing hip ext UE pushing into red PBx10 ea-  +Semi prone hip extension into red PB with 5 sec hold x 10 each  standing Hip Hinge w/5# dowel 2 x 10  Palloff Press RTB x15 ea  Leg press 60# 3x10    Not performed 2023 :  Supine hip add (ball squeeze) x20 5" hold-NP  SAQ's 2# ankle weight 2x10 ea-NP  Standing TA with GTB pull down 15 x 5 sec hold-NP  Rows with BTB x 20-NP     Lloyd received the following Manual techniques for 11 minutes:  -SL QL stretch with therapist overpressure  - STM to L sided lumbar paraspinals/QL  Prone  with pillow at hips for QL pin and stretch  STM to iliacus and psoas.   Short axis traction      Home Exercises Provided and Patient Education Provided     Education " provided:   - HEP review    Written Home Exercises Provided: Patient instructed to cont prior HEP.  Exercises were reviewed and Lloyd was able to demonstrate them prior to the end of the session.  Lloyd demonstrated good  understanding of the education provided.     See EMR under Patient Instructions for exercises provided prior visit.    Assessment   Lloyd returned reporting elevated L sided lower back/QL pain levels on arrival and progressively increased workload as tolerated with small amplitude rotation and trunk flexion to tolerance in pain free range. Pt without complaints of pain with session and exercises performed.     Lloyd Is progressing well towards his goals.   Pt prognosis is Good.     Pt will continue to benefit from skilled outpatient physical therapy to address the deficits listed in the problem list box on initial evaluation, provide pt/family education and to maximize pt's level of independence in the home and community environment.     Pt's spiritual, cultural and educational needs considered and pt agreeable to plan of care and goals.     Anticipated barriers to physical therapy: None    Goals:   GOALS: Short Term Goals:  4 weeks  1. Report decreased in pain at worse less than  <   / =  3  /10  to increase tolerance for functional activitiesNot Met  2. Pt to tolerate being out of brace for entire day without increase in pain.MET  3. Increased BLE MMT 1/2  to increase tolerance for ADL and work activities.MET  4. Pt to tolerate exercise for full 45 minutes without increase in symptoms.partially MET  5. Pt to tolerate HEP to improve ROM and independence with ADL'sMET  6. Pt to improve range of motion by 25% to allow for improved functional mobility to allow for improvement in IADLs. MET  7. Assess 30 sec sit to stand and TUG.MET     Long Term Goals: 8 weeks  1. Report decreased in pain at worse less than  <   / =  1  /10  to increase tolerance for functional mobility.   2. Increased BLE  MMT 1 grade to increase tolerance for ADL and work activities.  3. Pt to improve range of motion by 50% to allow for improved functional mobility to allow for improvement in IADLs.   4. Pt to demonstrate proper squat and lifting technique with 15 lb weight to perform household and yard work without increase in symptoms.    Plan     Plan of care Certification: 7/28/2023 to 10/1/2023.     Outpatient Physical Therapy 2 times weekly for 8 weeks to include the following interventions: Gait Training, Manual Therapy, Moist Heat/ Ice, Neuromuscular Re-ed, Patient Education, Therapeutic Activities, and Therapeutic Exercise.        Josiane Adams, PT

## 2023-09-20 ENCOUNTER — CLINICAL SUPPORT (OUTPATIENT)
Dept: REHABILITATION | Facility: HOSPITAL | Age: 78
End: 2023-09-20
Payer: MEDICARE

## 2023-09-20 DIAGNOSIS — R29.898 DECREASED STRENGTH OF TRUNK AND BACK: ICD-10-CM

## 2023-09-20 DIAGNOSIS — R26.9 GAIT ABNORMALITY: ICD-10-CM

## 2023-09-20 DIAGNOSIS — M53.86 DECREASED RANGE OF MOTION OF LUMBAR SPINE: Primary | ICD-10-CM

## 2023-09-20 PROCEDURE — 97110 THERAPEUTIC EXERCISES: CPT | Mod: HCNC,PO,CQ

## 2023-09-20 PROCEDURE — 97112 NEUROMUSCULAR REEDUCATION: CPT | Mod: HCNC,PO,CQ

## 2023-09-20 NOTE — PROGRESS NOTES
Physical Therapy Daily Treatment Note     Name: Lloyd John Jr.  Clinic Number: [id number]    Therapy Diagnosis:   Encounter Diagnoses   Name Primary?    Decreased range of motion of lumbar spine Yes    Decreased strength of trunk and back     Gait abnormality            Physician: Mike Dallas, *    Visit Date: 9/20/2023    Physician Orders: PT Eval and Treat   Medical Diagnosis from Referral: Z98.890 (ICD-10-CM) - S/P lumbar microdiscectomy  Evaluation Date: 7/28/2023  Authorization Period Expiration: 7/24/2024  Plan of Care Expiration: 10/1/2023  Visit # / Visits authorized: 12/ 20 +Eval  PN: 9/28/2023    Time In: 8:55 am  Time Out: 9:50 am  Total Billable Time: 55 minutes     Precautions: Standard and History of CA, history of cauda equina  S/p L2-3 microdiscectomy performed on 6/13/23 due to cauda equina syndrome(incomplete-hx of LLE weakness)  -He messaged his MD about progressing by bending, lifting, twisting precautions. MD told him he is clear to begin light lifting and slow and controlled bending per pt's tolerance.    Subjective     Pt reports: decreased pain levels and no instances of increased lower back/QL pain since last visit.     He was compliant with home exercise program.  Response to previous treatment: min increase in soreness  Functional change: None yet    Pain: 0/10   Location:  left bottom and LLE     Objective     GAIT DEVIATIONS: Lloyd displays increased base of support;decreased weight shift     Lumbar Range of Motion:     %   Flexion 75%, no pain      Extension 25%, no pain      Left Side Bending 25%,pain to L   Right Side Bending 25%, pain to L   Left rotation    75%, pain to L   Right Rotation    50%, pain to L    *= pain  Lumbar  Strength Increased Pain?   Flexion 4/5     Extension 4/5     Side Bending Right 4/5     Side Bending Left 4/5     *Tested with patient seated     Lower Extremity Strength     Right LE   Left LE     Hip flexion: 5/5 Hip flexion: 4+/5*   Knee  "extension: 5/5 Knee extension: 5/5   Knee flexion: 4+/5 Knee flexion: 4+/5   Hip extension:  4/5 Hip extension: 4-/5   Hip abduction: 4+/5 Hip abduction: 4/5*   Ankle dorsiflexion: 5/5 Ankle dorsiflexion: 4/5   Ankle plantarflexion: 4+/5 Ankle plantarflexion: 4/5      TU seconds  30 sec sit to stand: 16 reps    TREATMENT     Lloyd received therapeutic exercises to develop strength, endurance, ROM, flexibility, posture and core stabilization for 25 minutes including:    Nu step 8 min level 1.5  Treadmill 1.7 mph x 10 minutes  Supine HS stretch 3 x 30 seconds  SKTC 10 x 5 sec holds ea-NP  LTR x10 ea side  Hip flexor katy test stretch 2 x 1 min  Piriformis stretch 3x30" hold  Supine QL stretch 3x20" L side only      Lloyd participated in neuromuscular re-education activities to improve: Balance, Coordination, Proprioception and Posture for  30 minutes. The following activities were included:    MET for R anterior    PPT with TA 2x10 x 5 sec  PPT + marching in supine 2# x 20 alternating  PPT+ SLR 2# 2x10 each  Dead bugs  x 10 (LE heel taps only)  Bridges with TA and hip abd with GTB 3x10  +Bridges with single leg kick outs 2x8 ea leg  SL clamshells GTB x20 with isometric holds for 3 seconds  Sit to stands 3x10-NP  Heel raises x30  Standing hip abd UE pushing into red PB x10 ea  Standing hip ext UE pushing into red PBx10 ea-  Semi prone hip extension into red PB with 5 sec hold x 10 each  standing Hip Hinge w/5# dowel 2 x 10  +Palloff twist RTB x15 ea  Leg press 100# 3x10    Not performed 2023 :  Supine hip add (ball squeeze) x20 5" hold-NP  SAQ's 2# ankle weight 2x10 ea-NP  Standing TA with GTB pull down 15 x 5 sec hold-NP  Rows with BTB x 20-NP     Lloyd received the following Manual techniques for 00 minutes:  -SL QL stretch with therapist overpressure  STM to L sided lumbar paraspinals/QL  Prone  with pillow at hips for QL pin and stretch  STM to iliacus and psoas.   Short axis traction      Home " Exercises Provided and Patient Education Provided     Education provided:   - HEP review    Written Home Exercises Provided: Patient instructed to cont prior HEP.  Exercises were reviewed and Lloyd was able to demonstrate them prior to the end of the session.  Lloyd demonstrated good  understanding of the education provided.     See EMR under Patient Instructions for exercises provided prior visit.    Assessment   Lloyd returned without complaints of lower back/ L QL pain since last visit. Treatment continued with lumbopelvic mobility and core/LE strengthening with increased emphasis on functional LE strengthening and small amplitude trunk rotation strengthening per pt's tolerance. He tolerated treatment well with no increase in pain and appropriate increase in muscular fatigue. Will continue to progress per pt's tolerance.    Lloyd Is progressing well towards his goals.   Pt prognosis is Good.     Pt will continue to benefit from skilled outpatient physical therapy to address the deficits listed in the problem list box on initial evaluation, provide pt/family education and to maximize pt's level of independence in the home and community environment.     Pt's spiritual, cultural and educational needs considered and pt agreeable to plan of care and goals.     Anticipated barriers to physical therapy: None    Goals:   GOALS: Short Term Goals:  4 weeks  1. Report decreased in pain at worse less than  <   / =  3  /10  to increase tolerance for functional activitiesNot Met  2. Pt to tolerate being out of brace for entire day without increase in pain.MET  3. Increased BLE MMT 1/2  to increase tolerance for ADL and work activities.MET  4. Pt to tolerate exercise for full 45 minutes without increase in symptoms.partially MET  5. Pt to tolerate HEP to improve ROM and independence with ADL'sMET  6. Pt to improve range of motion by 25% to allow for improved functional mobility to allow for improvement in IADLs.  MET  7. Assess 30 sec sit to stand and TUG.MET     Long Term Goals: 8 weeks  1. Report decreased in pain at worse less than  <   / =  1  /10  to increase tolerance for functional mobility.   2. Increased BLE MMT 1 grade to increase tolerance for ADL and work activities.  3. Pt to improve range of motion by 50% to allow for improved functional mobility to allow for improvement in IADLs.   4. Pt to demonstrate proper squat and lifting technique with 15 lb weight to perform household and yard work without increase in symptoms.    Plan     Plan of care Certification: 7/28/2023 to 10/1/2023.     Outpatient Physical Therapy 2 times weekly for 8 weeks to include the following interventions: Gait Training, Manual Therapy, Moist Heat/ Ice, Neuromuscular Re-ed, Patient Education, Therapeutic Activities, and Therapeutic Exercise.        Jacques Pradhan, PTA

## 2023-09-22 ENCOUNTER — CLINICAL SUPPORT (OUTPATIENT)
Dept: REHABILITATION | Facility: HOSPITAL | Age: 78
End: 2023-09-22
Payer: MEDICARE

## 2023-09-22 DIAGNOSIS — R29.898 DECREASED STRENGTH OF TRUNK AND BACK: ICD-10-CM

## 2023-09-22 DIAGNOSIS — M53.86 DECREASED RANGE OF MOTION OF LUMBAR SPINE: Primary | ICD-10-CM

## 2023-09-22 DIAGNOSIS — R26.9 GAIT ABNORMALITY: ICD-10-CM

## 2023-09-22 PROCEDURE — 97112 NEUROMUSCULAR REEDUCATION: CPT | Mod: HCNC,PO

## 2023-09-22 PROCEDURE — 97110 THERAPEUTIC EXERCISES: CPT | Mod: HCNC,PO

## 2023-09-22 NOTE — PROGRESS NOTES
Physical Therapy Daily Treatment Note     Name: Lloyd John Jr.  Clinic Number: [id number]    Therapy Diagnosis:   Encounter Diagnoses   Name Primary?    Decreased range of motion of lumbar spine Yes    Decreased strength of trunk and back     Gait abnormality            Physician: Mike Dallas, *    Visit Date: 9/22/2023    Physician Orders: PT Eval and Treat   Medical Diagnosis from Referral: Z98.890 (ICD-10-CM) - S/P lumbar microdiscectomy  Evaluation Date: 7/28/2023  Authorization Period Expiration: 7/24/2024  Plan of Care Expiration: 10/1/2023  Visit # / Visits authorized: 13/ 20 +Eval  PN: 9/28/2023    Time In: 8:47 am  Time Out: 9:45 am  Total Billable Time: 58 minutes     Precautions: Standard and History of CA, history of cauda equina  S/p L2-3 microdiscectomy performed on 6/13/23 due to cauda equina syndrome(incomplete-hx of LLE weakness)  -He messaged his MD about progressing by bending, lifting, twisting precautions. MD told him he is clear to begin light lifting and slow and controlled bending per pt's tolerance.    Subjective     Pt reports: decreased pain levels and no instances of increased lower back/QL pain since last week.     He was compliant with home exercise program.  Response to previous treatment: min increase in soreness  Functional change: None yet    Pain: 0/10   Location:  left bottom and LLE     Objective     GAIT DEVIATIONS: Lloyd displays increased base of support;decreased weight shift     Lumbar Range of Motion:     %   Flexion 75%, no pain      Extension 25%, no pain      Left Side Bending 25%,pain to L   Right Side Bending 25%, pain to L   Left rotation    75%, pain to L   Right Rotation    50%, pain to L    *= pain  Lumbar  Strength Increased Pain?   Flexion 4/5     Extension 4/5     Side Bending Right 4/5     Side Bending Left 4/5     *Tested with patient seated     Lower Extremity Strength     Right LE   Left LE     Hip flexion: 5/5 Hip flexion: 4+/5*   Knee  "extension: 5/5 Knee extension: 5/5   Knee flexion: 4+/5 Knee flexion: 4+/5   Hip extension:  4/5 Hip extension: 4-/5   Hip abduction: 4+/5 Hip abduction: 4/5*   Ankle dorsiflexion: 5/5 Ankle dorsiflexion: 4/5   Ankle plantarflexion: 4+/5 Ankle plantarflexion: 4/5      TU seconds  30 sec sit to stand: 16 reps    TREATMENT     Lloyd received therapeutic exercises to develop strength, endurance, ROM, flexibility, posture and core stabilization for 25 minutes including:    Nu step 8 min level 1.5-NP  Treadmill 1.7 mph x 10 minutes  Supine HS stretch 3 x 30 seconds  SKTC 10 x 5 sec holds ea-NP  LTR x10 ea side  Hip flexor katy test stretch 2 x 1 min  Piriformis stretch 3x30" hold  Supine QL stretch 3x20" L side only      Lloyd participated in neuromuscular re-education activities to improve: Balance, Coordination, Proprioception and Posture for  33 minutes. The following activities were included:      PPT with TA 2x10 x 5 sec  PPT + marching in supine 2# x 20 alternating  PPT+ SLR 2# 2x10 each  Dead bugs  x 10 (LE heel taps only)  Bridges with TA and hip abd with GTB 3x10  Bridges with single leg kick outs 2x8 ea leg  SL clamshells BTB x20 with isometric holds for 3 seconds  Sit to stands 3x10-NP  Heel raises x30  Matrix Standing hip abd with 40#, 2 x 10  Matrix Standing hip ext with 40#, 2 x 10  Semi prone hip extension into red PB with 5 sec hold x 10 each  standing Hip Hinge w/5# dowel 2 x 10  Palloff twist RTB x15 ea  Leg press 100# 3x10       Lloyd received the following Manual techniques for 00 minutes:  -SL QL stretch with therapist overpressure  STM to L sided lumbar paraspinals/QL  Prone  with pillow at hips for QL pin and stretch  STM to iliacus and psoas.   Short axis traction      Home Exercises Provided and Patient Education Provided     Education provided:   - HEP review    Written Home Exercises Provided: Patient instructed to cont prior HEP.  Exercises were reviewed and Lloyd was able to " demonstrate them prior to the end of the session.  Lloyd demonstrated good  understanding of the education provided.     See EMR under Patient Instructions for exercises provided prior visit.    Assessment   Lloyd returned without complaints of lower back/ L QL pain since last week. Continued with dynamic mobility and strength to hip and lumbar region. Controlled light resistance with trunk rotation to allow for improved tolerance to daily activities. Pt discussed PT will research possible gym locations and/or training program at [facility] in preparation for discharge from PT. Will continue to progress per pt's tolerance.    Lloyd Is progressing well towards his goals.   Pt prognosis is Good.     Pt will continue to benefit from skilled outpatient physical therapy to address the deficits listed in the problem list box on initial evaluation, provide pt/family education and to maximize pt's level of independence in the home and community environment.     Pt's spiritual, cultural and educational needs considered and pt agreeable to plan of care and goals.     Anticipated barriers to physical therapy: None    Goals:   GOALS: Short Term Goals:  4 weeks  1. Report decreased in pain at worse less than  <   / =  3  /10  to increase tolerance for functional activitiesNot Met  2. Pt to tolerate being out of brace for entire day without increase in pain.MET  3. Increased BLE MMT 1/2  to increase tolerance for ADL and work activities.MET  4. Pt to tolerate exercise for full 45 minutes without increase in symptoms.partially MET  5. Pt to tolerate HEP to improve ROM and independence with ADL'sMET  6. Pt to improve range of motion by 25% to allow for improved functional mobility to allow for improvement in IADLs. MET  7. Assess 30 sec sit to stand and TUG.MET     Long Term Goals: 8 weeks  1. Report decreased in pain at worse less than  <   / =  1  /10  to increase tolerance for functional mobility.   2. Increased BLE MMT 1  grade to increase tolerance for ADL and work activities.  3. Pt to improve range of motion by 50% to allow for improved functional mobility to allow for improvement in IADLs.   4. Pt to demonstrate proper squat and lifting technique with 15 lb weight to perform household and yard work without increase in symptoms.    Plan     Plan of care Certification: 7/28/2023 to 10/1/2023.     Outpatient Physical Therapy 2 times weekly for 8 weeks to include the following interventions: Gait Training, Manual Therapy, Moist Heat/ Ice, Neuromuscular Re-ed, Patient Education, Therapeutic Activities, and Therapeutic Exercise.        Alessandra Parisi, PT

## 2023-09-25 ENCOUNTER — TELEPHONE (OUTPATIENT)
Dept: NEUROSURGERY | Facility: CLINIC | Age: 78
End: 2023-09-25
Payer: MEDICARE

## 2023-09-25 NOTE — TELEPHONE ENCOUNTER
----- Message from Emma Suárez sent at 9/22/2023  5:34 PM CDT -----  Regarding: Pt advic  Contact: [phone]  Pt returning callback from missed call. Requesting to speak with somebody in   office. Please call.

## 2023-09-26 ENCOUNTER — OFFICE VISIT (OUTPATIENT)
Dept: NEUROSURGERY | Facility: CLINIC | Age: 78
End: 2023-09-26
Payer: MEDICARE

## 2023-09-26 VITALS
RESPIRATION RATE: 18 BRPM | SYSTOLIC BLOOD PRESSURE: 138 MMHG | WEIGHT: 208 LBS | DIASTOLIC BLOOD PRESSURE: 75 MMHG | HEART RATE: 59 BPM | BODY MASS INDEX: 29.12 KG/M2 | HEIGHT: 71 IN

## 2023-09-26 DIAGNOSIS — Z98.890 S/P LUMBAR MICRODISCECTOMY: Primary | ICD-10-CM

## 2023-09-26 PROCEDURE — 3075F SYST BP GE 130 - 139MM HG: CPT | Mod: HCNC,CPTII,S$GLB, | Performed by: PHYSICIAN ASSISTANT

## 2023-09-26 PROCEDURE — 1159F MED LIST DOCD IN RCRD: CPT | Mod: HCNC,CPTII,S$GLB, | Performed by: PHYSICIAN ASSISTANT

## 2023-09-26 PROCEDURE — 3075F PR MOST RECENT SYSTOLIC BLOOD PRESS GE 130-139MM HG: ICD-10-PCS | Mod: HCNC,CPTII,S$GLB, | Performed by: PHYSICIAN ASSISTANT

## 2023-09-26 PROCEDURE — 3078F DIAST BP <80 MM HG: CPT | Mod: HCNC,CPTII,S$GLB, | Performed by: PHYSICIAN ASSISTANT

## 2023-09-26 PROCEDURE — 1159F PR MEDICATION LIST DOCUMENTED IN MEDICAL RECORD: ICD-10-PCS | Mod: HCNC,CPTII,S$GLB, | Performed by: PHYSICIAN ASSISTANT

## 2023-09-26 PROCEDURE — 1125F AMNT PAIN NOTED PAIN PRSNT: CPT | Mod: HCNC,CPTII,S$GLB, | Performed by: PHYSICIAN ASSISTANT

## 2023-09-26 PROCEDURE — 99213 OFFICE O/P EST LOW 20 MIN: CPT | Mod: HCNC,S$GLB,, | Performed by: PHYSICIAN ASSISTANT

## 2023-09-26 PROCEDURE — 99213 PR OFFICE/OUTPT VISIT, EST, LEVL III, 20-29 MIN: ICD-10-PCS | Mod: HCNC,S$GLB,, | Performed by: PHYSICIAN ASSISTANT

## 2023-09-26 PROCEDURE — 3078F PR MOST RECENT DIASTOLIC BLOOD PRESSURE < 80 MM HG: ICD-10-PCS | Mod: HCNC,CPTII,S$GLB, | Performed by: PHYSICIAN ASSISTANT

## 2023-09-26 PROCEDURE — 1125F PR PAIN SEVERITY QUANTIFIED, PAIN PRESENT: ICD-10-PCS | Mod: HCNC,CPTII,S$GLB, | Performed by: PHYSICIAN ASSISTANT

## 2023-09-26 NOTE — PROGRESS NOTES
Neurosurgery History and Physical    Patient ID: Lloyd John Jr. is a 78 y.o. male.    Chief Complaint   Patient presents with    Post-op Evaluation     Patient present to clinic today as 3 month POV      HPI 9/26/23:  Patient is an 78 year old male who presents today for a 3 month post op visit after an MIS L2-3 microdiscectomy on 6/13/23 doing really well. Physical therapy helped with his pain and left leg weakness. He is to be discharged soon and has plans to continue going to a gym near him. He has no numbness, tingling, or weakness and has no bowel or bladder incontinence or retention. He is happy with his recovery.     HPI 7/25/23:   Patient presents today for a post op follow up 6 weeks status post L2-3 microdiscetomy. He states last week he started noticing left knee and left lower back discomfort. His pain is mainly in the anterior knee, and doesn't involve the thigh or lower leg. He hasn't really been active since surgery, but doesn't have the knee pain at rest. The knee pain is typically when he initially goes to walk after sitting, and lets up after he walks a bit. He notices knee weakness. The lower back pain is worse with his brace rubbing, but is unsure if he feels better without it on. He states he doesn't wear it religiously. The knee and back pain are different from the pain he had prior to surgery. He has no issues with bowel or bladder dysfunction. He has no numbness or tingling in the lower extremities.     Review of Systems   Constitutional:  Negative for activity change, chills and fever.   Eyes:  Negative for photophobia and visual disturbance.   Respiratory:  Negative for cough and shortness of breath.    Cardiovascular:  Negative for chest pain.   Gastrointestinal:  Negative for diarrhea and nausea.   Genitourinary:  Negative for decreased urine volume and difficulty urinating.   Musculoskeletal:  Negative for back pain and gait problem.   Neurological:  Negative for weakness and  numbness.       Past Medical History:   Diagnosis Date    Bladder cancer     Followed by Dr. Craig in [place]    Colon polyp 2006    [facility] / [facility]    Glaucoma (increased eye pressure)     HLD (hyperlipidemia)     HTN (hypertension)     Prostate cancer      Social History     Socioeconomic History    Marital status:     Number of children: 2   Occupational History    Occupation:  for law firm   Tobacco Use    Smoking status: Former     Current packs/day: 0.00     Average packs/day: 0.5 packs/day for 3.0 years (1.5 ttl pk-yrs)     Types: Cigarettes     Start date: 1994     Quit date: 1997     Years since quittin.7    Smokeless tobacco: Never   Substance and Sexual Activity    Alcohol use: Yes     Alcohol/week: 1.7 standard drinks of alcohol     Types: 2 drink(s) per week     Comment: weekends    Drug use: No     Social Determinants of Health     Financial Resource Strain: Low Risk  (2023)    Overall Financial Resource Strain (CARDIA)     Difficulty of Paying Living Expenses: Not hard at all   Food Insecurity: No Food Insecurity (2023)    Hunger Vital Sign     Worried About Running Out of Food in the Last Year: Never true     Ran Out of Food in the Last Year: Never true   Transportation Needs: No Transportation Needs (2023)    OASIS : Transportation     Lack of Transportation (Medical): No     Lack of Transportation (Non-Medical): No     Patient Unable or Declines to Respond: No   Physical Activity: Insufficiently Active (2023)    Exercise Vital Sign     Days of Exercise per Week: 2 days     Minutes of Exercise per Session: 20 min   Stress: No Stress Concern Present (2023)    [employer] [place] [employer] - Occupational Stress Questionnaire     Feeling of Stress : Not at all   Recent Concern: Stress - Stress Concern Present (2023)    [employer] [place] [employer] - Occupational Stress Questionnaire     Feeling of Stress :  To some extent   Social Connections: Feeling Socially Integrated (6/27/2023)    OASIS : Social Isolation     Frequency of experiencing loneliness or isolation: Never   Housing Stability: Low Risk  (6/18/2023)    Housing Stability Vital Sign     Unable to Pay for Housing in the Last Year: No     Number of Places Lived in the Last Year: 1     Unstable Housing in the Last Year: No     Family History   Problem Relation Age of Onset    COPD Mother         interstitial fibrosis    Coronary artery disease Father     Heart attacks under age 50 Father     Anesthesia problems Neg Hx      Review of patient's allergies indicates:   Allergen Reactions    Bactrim [sulfamethoxazole-trimethoprim] Hives and Rash    Povidone-iodine Other (See Comments)       Current Outpatient Medications:     ascorbic acid, vitamin C, (VITAMIN C) 1000 MG tablet, Take 1,000 mg by mouth nightly., Disp: , Rfl:     brimonidine 0.2% (ALPHAGAN) 0.2 % Drop, Place 1 drop into both eyes 2 (two) times a day., Disp: , Rfl:     carvediloL (COREG) 25 MG tablet, Take 1 tablet (25 mg total) by mouth 2 (two) times daily with meals., Disp: 180 tablet, Rfl: 3    ibuprofen (ADVIL,MOTRIN) 200 MG tablet, Take 800 mg by mouth every 8 (eight) hours as needed for Pain (mild)., Disp: , Rfl:     loperamide (IMODIUM A-D) 2 mg Tab, Take 4 mg by mouth daily as needed (loose stool)., Disp: , Rfl:     LUMIGAN 0.01 % Drop, Place 1 drop into both eyes every Mon, Wed, Fri. (at bedtime), Disp: , Rfl:     MULTIVITAMIN W-MINERALS/LUTEIN (CENTRUM SILVER ORAL), Take 1 tablet by mouth every evening.  , Disp: , Rfl:     olmesartan (BENICAR) 40 MG tablet, Take 1 tablet by mouth every evening. Resume taking on June 20, 2023., Disp: , Rfl:     phenazopyridine HCl (AZO STANDARD MAXIMUM STRENGTH ORAL), Take 2 tablets by mouth 3 (three) times daily., Disp: , Rfl:     polyethylene glycol (GLYCOLAX) 17 gram PwPk, Take 17 g by mouth 2 (two) times daily as needed. (Patient taking differently:  "Take 17 g by mouth 2 (two) times daily as needed (constipation).), Disp: 14 packet, Rfl: 0    rosuvastatin (CRESTOR) 10 MG tablet, Take 1 tablet (10 mg total) by mouth once daily. (Patient taking differently: Take 10 mg by mouth every evening.), Disp: 90 tablet, Rfl: 3    tadalafiL (CIALIS) 20 MG Tab, Take 1 tablet (20 mg total) by mouth daily as needed (take 1/4, 1/2, or 1 full tablet as needed for erectile dysfunction)., Disp: 10 tablet, Rfl: 11    tamsulosin (FLOMAX) 0.4 mg Cap, Take 0.4 mg by mouth once daily., Disp: , Rfl:     timolol maleate 0.5% (TIMOPTIC) 0.5 % Drop, Place 1 drop into both eyes 2 (two) times daily., Disp: , Rfl:   Blood pressure 138/75, pulse (!) 59, resp. rate 18, height 5' 11" (1.803 m), weight 94.3 kg (208 lb).      Neurologic Exam     Mental Status   Oriented to person, place, and time.   Attention: normal. Concentration: normal.   Speech: speech is normal   Level of consciousness: alert  Knowledge: good.   Normal comprehension.     Cranial Nerves     CN III, IV, VI   Extraocular motions are normal.     CN VII   Facial expression full, symmetric.     CN VIII   Hearing: intact    Motor Exam   Muscle bulk: normal  Overall muscle tone: normal    Strength   Right deltoid: 5/5  Left deltoid: 5/5  Right biceps: 5/5  Left biceps: 5/5  Right triceps: 5/5  Left triceps: 5/5  Right wrist flexion: 5/5  Left wrist flexion: 5/5  Right wrist extension: 5/5  Left wrist extension: 5/5  Right interossei: 5/5  Left interossei: 5/5  Right iliopsoas: 5/5  Left iliopsoas: 5/5  Right quadriceps: 5/5  Left quadriceps: 5/5  Right hamstrin/5  Left hamstrin/5  Right anterior tibial: 5/5  Left anterior tibial: 5/5  Right posterior tibial: 5/5  Left posterior tibial: 5/5  Right peroneal: 5/5  Left peroneal: 5/5  Right gastroc: 5/5  Left gastroc: 5/5    Sensory Exam   Light touch normal.     Gait, Coordination, and Reflexes     Gait  Gait: normal    Tremor   Resting tremor: absent  Intention tremor: " "absent  Action tremor: absent    Reflexes   Right brachioradialis: 2+  Left brachioradialis: 2+  Right biceps: 2+  Left biceps: 2+  Right triceps: 2+  Left triceps: 2+  Right patellar: 0  Left patellar: 0  Right achilles: 0  Left achilles: 0  Right plantar: normal  Left plantar: normal  Right ankle clonus: present (1 beat)  Left ankle clonus: absent      Physical Exam  Vitals and nursing note reviewed.   Eyes:      Extraocular Movements: EOM normal.   Neurological:      Mental Status: He is oriented to person, place, and time.      Gait: Gait is intact.      Deep Tendon Reflexes:      Reflex Scores:       Tricep reflexes are 2+ on the right side and 2+ on the left side.       Bicep reflexes are 2+ on the right side and 2+ on the left side.       Brachioradialis reflexes are 2+ on the right side and 2+ on the left side.       Patellar reflexes are 0 on the right side and 0 on the left side.       Achilles reflexes are 0 on the right side and 0 on the left side.  Psychiatric:         Speech: Speech normal.         Vital Signs  Pulse: (!) 59  Resp: 18  BP: 138/75  BP Location: Left arm  Pain Score:   1  Pain Loc: Back  Height and Weight  Height: 5' 11" (180.3 cm)  Weight: 94.3 kg (208 lb)  BSA (Calculated - sq m): 2.17 sq meters  BMI (Calculated): 29  Weight in (lb) to have BMI = 25: 178.9]    Provider dictation:  Patient is an 78 year old male who presents today for a 3 month post op visit after an MIS L2-3 microdiscectomy on 6/13/23 doing really well. He has no concerns or complaints at this time. He will finish out physical therapy and continue progressing his activities as tolerated. We discussed proper biomechanics for lifting in the future and he will return to clinic as needed at this time. All questions were answered.     Visit Diagnosis:  S/P lumbar microdiscectomy        "

## 2023-09-27 ENCOUNTER — CLINICAL SUPPORT (OUTPATIENT)
Dept: REHABILITATION | Facility: HOSPITAL | Age: 78
End: 2023-09-27
Payer: MEDICARE

## 2023-09-27 DIAGNOSIS — R26.9 GAIT ABNORMALITY: ICD-10-CM

## 2023-09-27 DIAGNOSIS — M53.86 DECREASED RANGE OF MOTION OF LUMBAR SPINE: Primary | ICD-10-CM

## 2023-09-27 DIAGNOSIS — R29.898 DECREASED STRENGTH OF TRUNK AND BACK: ICD-10-CM

## 2023-09-27 PROCEDURE — 97112 NEUROMUSCULAR REEDUCATION: CPT | Mod: HCNC,PO

## 2023-09-27 PROCEDURE — 97110 THERAPEUTIC EXERCISES: CPT | Mod: HCNC,PO

## 2023-09-27 PROCEDURE — 97140 MANUAL THERAPY 1/> REGIONS: CPT | Mod: HCNC,PO

## 2023-09-27 NOTE — PROGRESS NOTES
Physical Therapy Daily Treatment Note     Name: Lloyd John Jr.  Clinic Number: [id number]    Therapy Diagnosis:   Encounter Diagnoses   Name Primary?    Decreased range of motion of lumbar spine Yes    Decreased strength of trunk and back     Gait abnormality            Physician: Mike Dallas, *    Visit Date: 9/27/2023    Physician Orders: PT Eval and Treat   Medical Diagnosis from Referral: Z98.890 (ICD-10-CM) - S/P lumbar microdiscectomy  Evaluation Date: 7/28/2023  Authorization Period Expiration: 7/24/2024  Plan of Care Expiration: 10/1/2023  Visit # / Visits authorized: 13/ 20 +Eval  PN: 10/28/2023    Foto 2: 9/27/23 94%    Time In: 8:54 am  Time Out: 9:54 am  Total Billable Time: 60 minutes  NMR 2, MT 1, TE 1    Precautions: Standard and History of CA, history of cauda equina  S/p L2-3 microdiscectomy performed on 6/13/23 due to cauda equina syndrome(incomplete-hx of LLE weakness)  -He messaged his MD about progressing by bending, lifting, twisting precautions. MD told him he is clear to begin light lifting and slow and controlled bending per pt's tolerance.    Subjective     Pt reports: decreased pain levels and no instances of increased lower back/QL pain since last week.     He was compliant with home exercise program.  Response to previous treatment: min increase in soreness  Functional change: None yet    Pain: 0/10   Location:  left bottom and LLE     Objective     9/27/23  GAIT DEVIATIONS: Lloyd displays increased base of support;decreased weight shift     Lumbar Range of Motion:     %   Flexion 105 deg , no pain      Extension 18 deg no pain              Left rotation    45 deg, pain to L   Right Rotation    50 deg, pain to L    *= pain  Lumbar  Strength Increased Pain?   Flexion 4+/5     Extension 4+/5     Side Bending Right 4/+5     Side Bending Left 4/5     *Tested with patient seated     Lower Extremity Strength     Right LE   Left LE     Hip flexion: 5/5 Hip flexion: 4+/5*   Knee  "extension: 5/5 Knee extension: 5/5   Knee flexion: 4+/5 Knee flexion: 4+/5   Hip extension:  4/5 Hip extension: 4-/5   Hip abduction: 4+/5 Hip abduction: 4/5*         TREATMENT     Lloyd received therapeutic exercises to develop strength, endurance, ROM, flexibility, posture and core stabilization for 15 minutes including:    Nu step 8 min level 1.5-  Treadmill 1.7 mph x 10 minutes  Supine HS stretch 3 x 30 seconds  SKTC 10 x 5 sec holds ea-NP  LTR x10 ea side  Hip flexor katy test stretch 2 x 1 min  Piriformis stretch 3x30" hold  Supine QL stretch 3x20" L side only      Lloyd participated in neuromuscular re-education activities to improve: Balance, Coordination, Proprioception and Posture for  30 minutes. The following activities were included:      PPT with TA 2x10 x 5 sec  PPT + marching in supine 2# x 20 alternating  PPT+ SLR 2# 2x10 each  Dead bugs  x 10 (LE heel taps only)  Bridges with TA and hip abd with GTB 3x10  Bridges with single leg kick outs 2x8 ea leg  SL clamshells BTB x20 with isometric holds for 3 seconds  Sit to stands 3x10-NP  Heel raises x30  Matrix Standing hip abd with 40#, 2 x 10  Matrix Standing hip ext with 40#, 2 x 10  Semi prone hip extension into red PB with 5 sec hold x 10 each  standing Hip Hinge w/5# dowel 2 x 10  Palloff twist RTB x15 ea  Leg press # 3x10  Single LE le press 40# 2 x 10       Lloyd received the following Manual techniques for 15 minutes:  -SL QL stretch with therapist overpressure  STM to L sided lumbar paraspinals/QL  Prone  with pillow at hips for QL pin and stretch  STM to iliacus and psoas.   Short axis traction      Home Exercises Provided and Patient Education Provided     Education provided:   - HEP review    Written Home Exercises Provided: Patient instructed to cont prior HEP.  Exercises were reviewed and Lloyd was able to demonstrate them prior to the end of the session.  Lloyd demonstrated good  understanding of the education provided. "     See EMR under Patient Instructions for exercises provided prior visit.    Assessment   Lloyd returned without complaints of lower back/ L QL pain since last week but left hip was tight with mild pain. Pt continues to progress and will complete last 5 visits with emphasis on return to recreation exercise in session with use of gym related exercises    Lloyd Is progressing well towards his goals.   Pt prognosis is Good.     Pt will continue to benefit from skilled outpatient physical therapy to address the deficits listed in the problem list box on initial evaluation, provide pt/family education and to maximize pt's level of independence in the home and community environment.     Pt's spiritual, cultural and educational needs considered and pt agreeable to plan of care and goals.     Anticipated barriers to physical therapy: None    Goals:   GOALS: Short Term Goals:  4 weeks  1. Report decreased in pain at worse less than  <   / =  3  /10  to increase tolerance for functional activitiesNot Met  2. Pt to tolerate being out of brace for entire day without increase in pain.MET  3. Increased BLE MMT 1/2  to increase tolerance for ADL and work activities.MET  4. Pt to tolerate exercise for full 45 minutes without increase in symptoms.partially MET  5. Pt to tolerate HEP to improve ROM and independence with ADL'sMET  6. Pt to improve range of motion by 25% to allow for improved functional mobility to allow for improvement in IADLs. MET  7. Assess 30 sec sit to stand and TUG.MET     Long Term Goals: 8 weeks  1. Report decreased in pain at worse less than  <   / =  1  /10  to increase tolerance for functional mobility.   2. Increased BLE MMT 1 grade to increase tolerance for ADL and work activities.  3. Pt to improve range of motion by 50% to allow for improved functional mobility to allow for improvement in IADLs.   4. Pt to demonstrate proper squat and lifting technique with 15 lb weight to perform household and  yard work without increase in symptoms.    Plan     Plan of care Certification: 7/28/2023 to 10/1/2023.     Outpatient Physical Therapy 2 times weekly for 8 weeks to include the following interventions: Gait Training, Manual Therapy, Moist Heat/ Ice, Neuromuscular Re-ed, Patient Education, Therapeutic Activities, and Therapeutic Exercise.        Josiane Adams, PT

## 2023-09-29 ENCOUNTER — CLINICAL SUPPORT (OUTPATIENT)
Dept: REHABILITATION | Facility: HOSPITAL | Age: 78
End: 2023-09-29
Payer: MEDICARE

## 2023-09-29 DIAGNOSIS — R26.9 GAIT ABNORMALITY: ICD-10-CM

## 2023-09-29 DIAGNOSIS — R29.898 DECREASED STRENGTH OF TRUNK AND BACK: ICD-10-CM

## 2023-09-29 DIAGNOSIS — M53.86 DECREASED RANGE OF MOTION OF LUMBAR SPINE: Primary | ICD-10-CM

## 2023-09-29 PROCEDURE — 97140 MANUAL THERAPY 1/> REGIONS: CPT | Mod: HCNC,PO,CQ

## 2023-09-29 PROCEDURE — 97112 NEUROMUSCULAR REEDUCATION: CPT | Mod: HCNC,PO,CQ

## 2023-09-29 PROCEDURE — 97110 THERAPEUTIC EXERCISES: CPT | Mod: HCNC,PO,CQ

## 2023-09-29 NOTE — PROGRESS NOTES
"Physical Therapy Daily Treatment Note     Name: Lloyd John Jr.  Clinic Number: [id number]    Therapy Diagnosis:   Encounter Diagnoses   Name Primary?    Decreased range of motion of lumbar spine Yes    Decreased strength of trunk and back     Gait abnormality              Physician: Mike Dallas, *    Visit Date: 9/29/2023    Physician Orders: PT Eval and Treat   Medical Diagnosis from Referral: Z98.890 (ICD-10-CM) - S/P lumbar microdiscectomy  Evaluation Date: 7/28/2023  Authorization Period Expiration: 7/24/2024  Plan of Care Expiration: 10/1/2023  Visit # / Visits authorized: 14/ 20 +Eval  PN: 10/28/2023    Foto 2: 9/27/23 94%    Time In: 8:56 am  Time Out: 9:56 am  Total Billable Time: 60 minutes      Precautions: Standard and History of CA, history of cauda equina  S/p L2-3 microdiscectomy performed on 6/13/23 due to cauda equina syndrome(incomplete-hx of LLE weakness)  -He messaged his MD about progressing by bending, lifting, twisting precautions. MD told him he is clear to begin light lifting and slow and controlled bending per pt's tolerance.    Subjective     Pt reports: increased L anterior hip pain since last visit. "I was a little sore when I left here last time but it got progressively worse that night".     He was compliant with home exercise program.  Response to previous treatment: min increase in soreness  Functional change: None yet    Pain: 8/10   Location:  left bottom and LLE     Objective     9/27/23  GAIT DEVIATIONS: Lloyd displays increased base of support;decreased weight shift     Lumbar Range of Motion:     %   Flexion 105 deg , no pain      Extension 18 deg no pain              Left rotation    45 deg, pain to L   Right Rotation    50 deg, pain to L    *= pain  Lumbar  Strength Increased Pain?   Flexion 4+/5     Extension 4+/5     Side Bending Right 4/+5     Side Bending Left 4/5     *Tested with patient seated     Lower Extremity Strength     Right LE   Left LE     Hip " "flexion: 5/5 Hip flexion: 4+/5*   Knee extension: 5/5 Knee extension: 5/5   Knee flexion: 4+/5 Knee flexion: 4+/5   Hip extension:  4/5 Hip extension: 4-/5   Hip abduction: 4+/5 Hip abduction: 4/5*         TREATMENT     Lloyd received therapeutic exercises to develop strength, endurance, ROM, flexibility, posture and core stabilization for 15 minutes including:    Nu step 8 min level 1.5-  Treadmill 1.7 mph x 10 minutes  Supine HS stretch 3 x 30 seconds  SKTC 10 x 5 sec holds ea-NP  LTR x10 ea side  Hip flexor ktay test stretch 2 x 1 min  Piriformis stretch 3x30" hold  Supine QL stretch 3x20" L side only      Lloyd participated in neuromuscular re-education activities to improve: Balance, Coordination, Proprioception and Posture for  30 minutes. The following activities were included:      PPT with TA 2x10 x 5 sec  PPT + marching in supine 2# x 20 alternating  PPT+ SLR 2# 2x10 each  Dead bugs  x 10 (LE heel taps only)  Bridges with TA and hip abd with GTB 3x10  Bridges with single leg kick outs 2x8 ea leg  SL clamshells BTB x20 with isometric holds for 3 seconds  Sit to stands 3x10-NP  Heel raises x30  Matrix Standing hip abd with 40#, 2 x 10  +Matrix standing hip flex with 40# 2x10  Matrix Standing hip ext with 40#, 2 x 10  Semi prone hip extension into red PB with 5 sec hold x 10 each  standing Hip Hinge w/5# dowel 2 x 10  Palloff twist RTB x15 ea  Leg press # 3x10  Single LE le press 40# 2 x 10       Lloyd received the following Manual techniques for 15 minutes:  -SL QL stretch with therapist overpressure  STM to L sided lumbar paraspinals/QL  Prone  with pillow at hips for QL pin and stretch  STM to iliacus and psoas.   Short axis traction  LAD    Home Exercises Provided and Patient Education Provided     Education provided:   - HEP review    Written Home Exercises Provided: Patient instructed to cont prior HEP.  Exercises were reviewed and Lloyd was able to demonstrate them prior to the end of " the session.  Lloyd demonstrated good  understanding of the education provided.     See EMR under Patient Instructions for exercises provided prior visit.    Assessment   Lloyd returned reporting significant increase in L anterior hip pain since last visit. Treatment began with and spent extended time on manual therapy techniques.  Following manual techniques pt reported slight decrease in pain levels. Functional LE/core strengthening then continued without adverse effects. Will monitor and attempt to progress per pt's tolerance with increased emphasis on return to recreation/gym related exercises.    Lloyd Is progressing well towards his goals.   Pt prognosis is Good.     Pt will continue to benefit from skilled outpatient physical therapy to address the deficits listed in the problem list box on initial evaluation, provide pt/family education and to maximize pt's level of independence in the home and community environment.     Pt's spiritual, cultural and educational needs considered and pt agreeable to plan of care and goals.     Anticipated barriers to physical therapy: None    Goals:   GOALS: Short Term Goals:  4 weeks  1. Report decreased in pain at worse less than  <   / =  3  /10  to increase tolerance for functional activitiesNot Met  2. Pt to tolerate being out of brace for entire day without increase in pain.MET  3. Increased BLE MMT 1/2  to increase tolerance for ADL and work activities.MET  4. Pt to tolerate exercise for full 45 minutes without increase in symptoms.partially MET  5. Pt to tolerate HEP to improve ROM and independence with ADL'sMET  6. Pt to improve range of motion by 25% to allow for improved functional mobility to allow for improvement in IADLs. MET  7. Assess 30 sec sit to stand and TUG.MET     Long Term Goals: 8 weeks  1. Report decreased in pain at worse less than  <   / =  1  /10  to increase tolerance for functional mobility.   2. Increased BLE MMT 1 grade to increase  tolerance for ADL and work activities.  3. Pt to improve range of motion by 50% to allow for improved functional mobility to allow for improvement in IADLs.   4. Pt to demonstrate proper squat and lifting technique with 15 lb weight to perform household and yard work without increase in symptoms.    Plan     Plan of care Certification: 7/28/2023 to 10/1/2023.     Outpatient Physical Therapy 2 times weekly for 8 weeks to include the following interventions: Gait Training, Manual Therapy, Moist Heat/ Ice, Neuromuscular Re-ed, Patient Education, Therapeutic Activities, and Therapeutic Exercise.        Jacques Pradhan, PTA

## 2023-10-02 NOTE — PROGRESS NOTES
"Subjective:    Patient ID:  Lloyd John Jr. is a 78 y.o. male who presents for follow-up of Hypertension and Hyperlipidemia      Problem List Items Addressed This Visit          Cardiac/Vascular    Mixed hyperlipidemia    Primary hypertension - Primary       HPI    Patient was last seen on  01/03/2023 at which time  he was doing okay from a cardiac standpoint.    On assessment today, the patient states that he feels OK.    Doing physical therapy.    No chest pain.  No shortness of breath.    Got prostate cancer radiation.  Next day, could not walk, diagnosed with JACOBO  Got operated on almost immediately       Objective:     Vitals:    10/03/23 0931   BP: 134/72   BP Location: Left arm   Patient Position: Sitting   BP Method: Medium (Automatic)   Pulse: (!) 59   Weight: 94.4 kg (208 lb 1.8 oz)   Height: 5' 11" (1.803 m)       BP Readings from Last 5 Encounters:   10/03/23 134/72   09/26/23 138/75   07/25/23 (!) 169/79   07/25/23 (!) 180/90   06/27/23 118/64        Physical Exam  Constitutional:       Appearance: He is well-developed.   HENT:      Head: Normocephalic and atraumatic.   Neck:      Vascular: No JVD.   Cardiovascular:      Rate and Rhythm: Normal rate and regular rhythm.      Heart sounds: Normal heart sounds. No murmur heard.     No friction rub. No gallop.   Pulmonary:      Effort: Pulmonary effort is normal. No respiratory distress.      Breath sounds: Normal breath sounds. No wheezing or rales.   Abdominal:      General: Bowel sounds are normal.      Palpations: Abdomen is soft.      Tenderness: There is no abdominal tenderness. There is no guarding or rebound.   Musculoskeletal:      Cervical back: Normal range of motion and neck supple.   Skin:     General: Skin is warm and dry.   Neurological:      Mental Status: He is alert and oriented to person, place, and time.   Psychiatric:         Behavior: Behavior normal.             Current Outpatient Medications   Medication Instructions    ascorbic " acid (vitamin C) (VITAMIN C) 1,000 mg, Oral, Nightly    brimonidine 0.2% (ALPHAGAN) 0.2 % Drop 1 drop, Both Eyes, 2 times daily    carvediloL (COREG) 25 mg, Oral, 2 times daily with meals    ibuprofen (ADVIL,MOTRIN) 800 mg, Oral, Every 8 hours PRN    loperamide (IMODIUM A-D) 4 mg, Oral, Daily PRN    LUMIGAN 0.01 % Drop 1 drop, Both Eyes, Every Mon, Wed, Fri, (at bedtime)    MULTIVITAMIN W-MINERALS/LUTEIN (CENTRUM SILVER ORAL) 1 tablet, Oral, Nightly,      olmesartan (BENICAR) 40 MG tablet 1 tablet, Oral, Nightly, Resume taking on June 20, 2023.    phenazopyridine HCl (AZO STANDARD MAXIMUM STRENGTH ORAL) 2 tablets, Oral, 3 times daily    polyethylene glycol (GLYCOLAX) 17 g, Oral, 2 times daily PRN    rosuvastatin (CRESTOR) 10 mg, Oral, Daily    tadalafiL (CIALIS) 20 mg, Oral, Daily PRN    tamsulosin (FLOMAX) 0.4 mg, Oral, Daily    timolol maleate 0.5% (TIMOPTIC) 0.5 % Drop 1 drop, Both Eyes, 2 times daily       Lipid Panel:   Lab Results   Component Value Date    CHOL 121 06/20/2023    HDL 35 (L) 06/20/2023    LDLCALC 58.2 (L) 06/20/2023    TRIG 139 06/20/2023    CHOLHDL 28.9 06/20/2023       The ASCVD Risk score (Perkins DK, et al., 2019) failed to calculate for the following reasons:    The 2019 ASCVD risk score is only valid for ages 40 to 79    All pertinent labs, imaging, and EKGs reviewed.  Patient's most recent EKG tracing was personally interpreted by this provider.    Most Recent EKG Results  Results for orders placed or performed during the hospital encounter of 06/13/23   EKG 12-lead    Collection Time: 06/13/23  7:55 AM    Narrative    Test Reason : R29.6,    Vent. Rate : 074 BPM     Atrial Rate : 074 BPM     P-R Int : 194 ms          QRS Dur : 112 ms      QT Int : 408 ms       P-R-T Axes : 033 -08 063 degrees     QTc Int : 452 ms    Normal sinus rhythm  Nonspecific T wave abnormality  Abnormal ECG  When compared with ECG of 16-JUN-2022 13:32,  Premature ventricular complexes are no longer  Present  Nonspecific T wave abnormality now evident in Lateral leads  Confirmed by ROBINA HOUSER MD ([id number]) on 6/15/2023 10:08:35 AM  Also confirmed by Mando Brown MD ([id number])  on 6/15/2023 1:55:39 PM    Referred By:  KERRY           Confirmed By:Mando Brown MD       Most Recent Echocardiogram Results  Results for orders placed during the hospital encounter of 06/13/23    Echo Saline Bubble? Yes    Interpretation Summary  · The left ventricle is normal in size with mildly decreased systolic function.  · The estimated ejection fraction is 45%.  · Grade I left ventricular diastolic dysfunction.  · Normal right ventricular size with normal right ventricular systolic function.  · There is mild aortic valve stenosis.  · Aortic valve area is 1.82 cm2; peak velocity is 1.27 m/s; mean gradient is 4 mmHg.  · Mild aortic regurgitation.  · There is no evidence of intracardiac shunting.      Most Recent Nuclear Stress Test Results  No results found for this or any previous visit.      Most Recent Cardiac PET Stress Test Results  No results found for this or any previous visit.      Most Recent Cardiovascular Angiogram results  No results found for this or any previous visit.      Other Most Recent Cardiology Results  Results for orders placed during the hospital encounter of 06/13/23    CARDIAC MONITORING STRIPS        Assessment:       1. Primary hypertension    2. Mixed hyperlipidemia         Plan:     Symptoms OK today  BP/Pulse OK today  Most recent echocardiogram reviewed personally     Echocardiogram  Continue carvedilol 25 mg PO BID  Continue olmesartan 40 mg PO Daily   Continue rosuvastatin 10 mg PO Daily    Continue other cardiac medications  Mediterranean Diet/Cardiovascular Exercise Program    Patient queried and all questions were answered.    F/u in 9-12 months with an echo prior      Signed:    Rafita Hernandez MD  10/3/2023 5:42 PM

## 2023-10-03 ENCOUNTER — OFFICE VISIT (OUTPATIENT)
Dept: CARDIOLOGY | Facility: CLINIC | Age: 78
End: 2023-10-03
Payer: MEDICARE

## 2023-10-03 VITALS
HEART RATE: 59 BPM | DIASTOLIC BLOOD PRESSURE: 72 MMHG | SYSTOLIC BLOOD PRESSURE: 134 MMHG | BODY MASS INDEX: 29.14 KG/M2 | WEIGHT: 208.13 LBS | HEIGHT: 71 IN

## 2023-10-03 DIAGNOSIS — I10 PRIMARY HYPERTENSION: Primary | ICD-10-CM

## 2023-10-03 DIAGNOSIS — Z01.30 ENCOUNTER FOR EXAMINATION OF BLOOD PRESSURE WITHOUT ABNORMAL FINDINGS: ICD-10-CM

## 2023-10-03 DIAGNOSIS — E78.2 MIXED HYPERLIPIDEMIA: ICD-10-CM

## 2023-10-03 PROCEDURE — 1160F RVW MEDS BY RX/DR IN RCRD: CPT | Mod: HCNC,CPTII,S$GLB, | Performed by: INTERNAL MEDICINE

## 2023-10-03 PROCEDURE — 1159F PR MEDICATION LIST DOCUMENTED IN MEDICAL RECORD: ICD-10-PCS | Mod: HCNC,CPTII,S$GLB, | Performed by: INTERNAL MEDICINE

## 2023-10-03 PROCEDURE — 99999 PR PBB SHADOW E&M-EST. PATIENT-LVL III: CPT | Mod: PBBFAC,HCNC,, | Performed by: INTERNAL MEDICINE

## 2023-10-03 PROCEDURE — 3288F PR FALLS RISK ASSESSMENT DOCUMENTED: ICD-10-PCS | Mod: HCNC,CPTII,S$GLB, | Performed by: INTERNAL MEDICINE

## 2023-10-03 PROCEDURE — 3075F PR MOST RECENT SYSTOLIC BLOOD PRESS GE 130-139MM HG: ICD-10-PCS | Mod: HCNC,CPTII,S$GLB, | Performed by: INTERNAL MEDICINE

## 2023-10-03 PROCEDURE — 1125F AMNT PAIN NOTED PAIN PRSNT: CPT | Mod: HCNC,CPTII,S$GLB, | Performed by: INTERNAL MEDICINE

## 2023-10-03 PROCEDURE — 99214 OFFICE O/P EST MOD 30 MIN: CPT | Mod: HCNC,S$GLB,, | Performed by: INTERNAL MEDICINE

## 2023-10-03 PROCEDURE — 3078F PR MOST RECENT DIASTOLIC BLOOD PRESSURE < 80 MM HG: ICD-10-PCS | Mod: HCNC,CPTII,S$GLB, | Performed by: INTERNAL MEDICINE

## 2023-10-03 PROCEDURE — 1159F MED LIST DOCD IN RCRD: CPT | Mod: HCNC,CPTII,S$GLB, | Performed by: INTERNAL MEDICINE

## 2023-10-03 PROCEDURE — 1160F PR REVIEW ALL MEDS BY PRESCRIBER/CLIN PHARMACIST DOCUMENTED: ICD-10-PCS | Mod: HCNC,CPTII,S$GLB, | Performed by: INTERNAL MEDICINE

## 2023-10-03 PROCEDURE — 99214 PR OFFICE/OUTPT VISIT, EST, LEVL IV, 30-39 MIN: ICD-10-PCS | Mod: HCNC,S$GLB,, | Performed by: INTERNAL MEDICINE

## 2023-10-03 PROCEDURE — 3288F FALL RISK ASSESSMENT DOCD: CPT | Mod: HCNC,CPTII,S$GLB, | Performed by: INTERNAL MEDICINE

## 2023-10-03 PROCEDURE — 99999 PR PBB SHADOW E&M-EST. PATIENT-LVL III: ICD-10-PCS | Mod: PBBFAC,HCNC,, | Performed by: INTERNAL MEDICINE

## 2023-10-03 PROCEDURE — 3078F DIAST BP <80 MM HG: CPT | Mod: HCNC,CPTII,S$GLB, | Performed by: INTERNAL MEDICINE

## 2023-10-03 PROCEDURE — 1125F PR PAIN SEVERITY QUANTIFIED, PAIN PRESENT: ICD-10-PCS | Mod: HCNC,CPTII,S$GLB, | Performed by: INTERNAL MEDICINE

## 2023-10-03 PROCEDURE — 1101F PT FALLS ASSESS-DOCD LE1/YR: CPT | Mod: HCNC,CPTII,S$GLB, | Performed by: INTERNAL MEDICINE

## 2023-10-03 PROCEDURE — 1101F PR PT FALLS ASSESS DOC 0-1 FALLS W/OUT INJ PAST YR: ICD-10-PCS | Mod: HCNC,CPTII,S$GLB, | Performed by: INTERNAL MEDICINE

## 2023-10-03 PROCEDURE — 3075F SYST BP GE 130 - 139MM HG: CPT | Mod: HCNC,CPTII,S$GLB, | Performed by: INTERNAL MEDICINE

## 2023-10-06 ENCOUNTER — CLINICAL SUPPORT (OUTPATIENT)
Dept: REHABILITATION | Facility: HOSPITAL | Age: 78
End: 2023-10-06
Payer: MEDICARE

## 2023-10-06 DIAGNOSIS — R26.9 GAIT ABNORMALITY: ICD-10-CM

## 2023-10-06 DIAGNOSIS — R29.898 DECREASED STRENGTH OF TRUNK AND BACK: ICD-10-CM

## 2023-10-06 DIAGNOSIS — M53.86 DECREASED RANGE OF MOTION OF LUMBAR SPINE: Primary | ICD-10-CM

## 2023-10-06 PROCEDURE — 97110 THERAPEUTIC EXERCISES: CPT | Mod: HCNC,PO,CQ

## 2023-10-06 PROCEDURE — 97112 NEUROMUSCULAR REEDUCATION: CPT | Mod: HCNC,PO,CQ

## 2023-10-06 PROCEDURE — 97140 MANUAL THERAPY 1/> REGIONS: CPT | Mod: HCNC,PO,CQ

## 2023-10-06 NOTE — PROGRESS NOTES
Physical Therapy Daily Treatment Note     Name: Lloyd John Jr.  Clinic Number: [id number]    Therapy Diagnosis:   Encounter Diagnoses   Name Primary?    Decreased range of motion of lumbar spine Yes    Decreased strength of trunk and back     Gait abnormality        Physician: Mike Dallas, *    Visit Date: 10/6/2023    Physician Orders: PT Eval and Treat   Medical Diagnosis from Referral: Z98.890 (ICD-10-CM) - S/P lumbar microdiscectomy  Evaluation Date: 7/28/2023  Authorization Period Expiration: 7/24/2024  Plan of Care Expiration: 11/6/2023  Visit # / Visits authorized: 15/ 20 +Eval  PN: 11/06/2023    Foto 2: 9/27/23 94%    Time In: 7:50 am  Time Out: 9:00 am  Total Billable Time: 70 minutes      Precautions: Standard and History of CA, history of cauda equina  S/p L2-3 microdiscectomy performed on 6/13/23 due to cauda equina syndrome(incomplete-hx of LLE weakness)  -He messaged his MD about progressing by bending, lifting, twisting precautions. MD told him he is clear to begin light lifting and slow and controlled bending per pt's tolerance.    Subjective     Pt reports: elevated L groin pain that began last weekend. He states the pain caused him to not perform any HEP exercises this week.     He was compliant with home exercise program.  Response to previous treatment: increased   Functional change: None yet    Pain: 8/10 pre treatment 0/10 post treatment  Location:  left bottom and LLE     Objective     9/27/23  GAIT DEVIATIONS: Lloyd displays increased base of support;decreased weight shift     Lumbar Range of Motion:     %   Flexion 75% (hands to knees), no pain      Extension 25% no pain              Left rotation    75% (to knees), pain to L   Right Rotation    75% ( to knees), pain to L    *= pain  Lumbar  Strength Increased Pain?   Flexion 5/5     Extension 4+/5     Side Bending Right 4+/5     Side Bending Left 4+/5     *Tested with patient seated     Lower Extremity Strength     Right LE   " Left LE     Hip flexion: 5/5 Hip flexion: 4/5*   Knee extension: 5/5 Knee extension: 5/5   Knee flexion: 4+/5 Knee flexion: 4+/5   Hip extension:  4/5 Hip extension: 4/5   Hip abduction: 4+/5 Hip abduction: 4/5         TREATMENT     Lloyd received therapeutic exercises to develop strength, endurance, ROM, flexibility, posture and core stabilization for 15 minutes including:    Nu step 10 min level 1.5-  Treadmill 1.7 mph x 10 minutes  Supine HS stretch 3 x 30 seconds  +frog stretch 3x30"  SKTC 10 x 5 sec holds ea-NP  LTR x10 ea side  Hip flexor katy test stretch 2 x 1 min  Piriformis stretch 3x30" hold  Supine QL stretch 3x20" L side only      Lloyd participated in neuromuscular re-education activities to improve: Balance, Coordination, Proprioception and Posture for  30 minutes. The following activities were included:      PPT with TA 2x10 x 5 sec  PPT + marching in supine 2# x 20 alternating  PPT+ SLR 2# 2x10 each  Dead bugs  x 10 (LE heel taps only)  Bridges with TA and hip abd with GTB 3x10  Bridges with single leg kick outs 2x10 ea leg  SL clamshells BTB x20 with isometric holds for 3 seconds  Sit to stands 3x10-NP  Heel raises x30  Matrix Standing hip abd with 40#, 2 x 10  Matrix standing hip flex with 40# 2x10  Matrix Standing hip ext with 40#, 2 x 10  Semi prone hip extension into red PB with 5 sec hold x 10 each  standing Hip Hinge w/5# dowel 2 x 10  Palloff twist RTB x15 ea  Leg press # 3x10  Single LE le press 40# 2 x 10       Lloyd received the following Manual techniques for 25 minutes:  STM to iliacus and psoas.   Short axis traction  LAD to L LE  SL QL stretch with therapist overpressure  STM to L sided lumbar paraspinals/QL  Prone  with pillow at hips for QL pin and stretch    Home Exercises Provided and Patient Education Provided     Education provided:   - HEP review    Written Home Exercises Provided: Patient instructed to cont prior HEP.  Exercises were reviewed and [place] was " able to demonstrate them prior to the end of the session.  Lloyd demonstrated good  understanding of the education provided.     See EMR under Patient Instructions for exercises provided prior visit.    Assessment   Lloyd returned reporting increase in L groin pain since last visit. Prior to treatment with PTA, Pt was reassessed and plan of care was extended by Alessandra Parisi DPT.  Treatment began with manual therapy techniques noted above to decrease pain levels. Pt noted resolution of pain following manual techniques.  Functional LE/core strengthening then continued and progressed without adverse effects. Will continue to progress per pt's tolerance.    Pt was reassessed by Alessandra Parisi, PT, DPT today with improvement in range of motion to 75% of normal mobility with exception to extension which will be limited following surgery. Denies any radicular symptoms. Left hip flexor and glute max remains weaker on L than R, but has improved with exercise. Pt denies lumbar pain over last several weeks. Arrived today with report of L hip flexor pain the last 2-3 days which improved with manual therapy and exercise today. No limitations with walking per pt report. He would benefit from 1-2x/day for another 4 weeks to improve LLE strengthening prior to discharge.     Lloyd Is progressing well towards his goals.   Pt prognosis is Good.     Pt will continue to benefit from skilled outpatient physical therapy to address the deficits listed in the problem list box on initial evaluation, provide pt/family education and to maximize pt's level of independence in the home and community environment.     Pt's spiritual, cultural and educational needs considered and pt agreeable to plan of care and goals.     Anticipated barriers to physical therapy: None    Goals:   GOALS: Short Term Goals:  4 weeks  1. Report decreased in pain at worse less than  <   / =  3  /10  to increase tolerance for functional activitiesNot Met  2. Pt to  tolerate being out of brace for entire day without increase in pain.MET  3. Increased BLE MMT 1/2  to increase tolerance for ADL and work activities.MET  4. Pt to tolerate exercise for full 45 minutes without increase in symptoms. MET  5. Pt to tolerate HEP to improve ROM and independence with ADL'sMET  6. Pt to improve range of motion by 25% to allow for improved functional mobility to allow for improvement in IADLs. MET  7. Assess 30 sec sit to stand and TUG.MET     Long Term Goals: 8 weeks  1. Report decreased in pain at worse less than  <   / =  1  /10  to increase tolerance for functional mobility. NOT MET  2. Increased BLE MMT 1 grade to increase tolerance for ADL and work activities.MET  3. Pt to improve range of motion by 50% to allow for improved functional mobility to allow for improvement in IADLs. MET  4. Pt to demonstrate proper squat and lifting technique with 15 lb weight to perform household and yard work without increase in symptoms.NOT MET    Plan     Plan of care Certification: 7/28/2023 to 11/6/2023.     Outpatient Physical Therapy 2 times weekly for 4 weeks to include the following interventions: Gait Training, Manual Therapy, Moist Heat/ Ice, Neuromuscular Re-ed, Patient Education, Therapeutic Activities, and Therapeutic Exercise.        Jacques Pradhan, PTA   Alessandra Parisi, PT, DPT

## 2023-10-10 ENCOUNTER — CLINICAL SUPPORT (OUTPATIENT)
Dept: REHABILITATION | Facility: HOSPITAL | Age: 78
End: 2023-10-10
Payer: MEDICARE

## 2023-10-10 DIAGNOSIS — R29.898 DECREASED STRENGTH OF TRUNK AND BACK: ICD-10-CM

## 2023-10-10 DIAGNOSIS — R26.9 GAIT ABNORMALITY: ICD-10-CM

## 2023-10-10 DIAGNOSIS — M53.86 DECREASED RANGE OF MOTION OF LUMBAR SPINE: Primary | ICD-10-CM

## 2023-10-10 PROCEDURE — 97112 NEUROMUSCULAR REEDUCATION: CPT | Mod: HCNC,PO

## 2023-10-10 PROCEDURE — 97140 MANUAL THERAPY 1/> REGIONS: CPT | Mod: HCNC,PO

## 2023-10-10 PROCEDURE — 97110 THERAPEUTIC EXERCISES: CPT | Mod: HCNC,PO

## 2023-10-10 NOTE — PROGRESS NOTES
Physical Therapy Daily Treatment Note     Name: Lloyd John Jr.  Clinic Number: [id number]    Therapy Diagnosis:   No diagnosis found.      Physician: Mike Dallas, *    Visit Date: 10/10/2023    Physician Orders: PT Eval and Treat   Medical Diagnosis from Referral: Z98.890 (ICD-10-CM) - S/P lumbar microdiscectomy  Evaluation Date: 7/28/2023  Authorization Period Expiration: 7/24/2024  Plan of Care Expiration: 11/6/2023  Visit # / Visits authorized: 18/ 20 +Eval  PN: 11/06/2023    Foto 2: 9/27/23 94%    Time In: 1040 am  Time Out: 1135 am  Total Billable Time: 55 minutes      Precautions: Standard and History of CA, history of cauda equina  S/p L2-3 microdiscectomy performed on 6/13/23 due to cauda equina syndrome(incomplete-hx of LLE weakness)  -He messaged his MD about progressing by bending, lifting, twisting precautions. MD told him he is clear to begin light lifting and slow and controlled bending per pt's tolerance.    Subjective     Pt reports: better but not gone completely.     He was compliant with home exercise program.  Response to previous treatment: increased   Functional change: None yet    Pain: 5/10 pre treatment 0/10 post treatment  Location:  left bottom and LLE     Objective     9/27/23  GAIT DEVIATIONS: Lloyd displays increased base of support;decreased weight shift     Lumbar Range of Motion:     %   Flexion 75% (hands to knees), no pain      Extension 25% no pain              Left rotation    75% (to knees), pain to L   Right Rotation    75% ( to knees), pain to L    *= pain  Lumbar  Strength Increased Pain?   Flexion 5/5     Extension 4+/5     Side Bending Right 4+/5     Side Bending Left 4+/5     *Tested with patient seated     Lower Extremity Strength     Right LE   Left LE     Hip flexion: 5/5 Hip flexion: 4/5*   Knee extension: 5/5 Knee extension: 5/5   Knee flexion: 4+/5 Knee flexion: 4+/5   Hip extension:  4/5 Hip extension: 4/5   Hip abduction: 4+/5 Hip abduction: 4/5  "        TREATMENT     Lloyd received therapeutic exercises to develop strength, endurance, ROM, flexibility, posture and core stabilization for 15 minutes including:    Nu step 10 min level 1.5-  Treadmill 1.7 mph x 10 minutes  Performed stretches 2x today throughout session  Supine HS stretch 3 x 30 seconds  +frog stretch 3x30"  SKTC 10 x 5 sec holds ea-NP  LTR x10 ea side  Hip flexor katy test stretch 2 x 1 min  Piriformis stretch 3x30" hold  Supine QL stretch 3x20" L side only      Lloyd participated in neuromuscular re-education activities to improve: Balance, Coordination, Proprioception and Posture for  15 minutes. The following activities were included:      PPT with TA 2x10 x 5 sec  PPT + marching in supine 2# x 20 alternating  PPT+ SLR 2# 2x10 each  Dead bugs  x 10 (LE heel taps only)  Bridges with TA and hip abd with GTB 3x10  Bridges with single leg kick outs 2x10 ea leg  SL clamshells BTB x20 with isometric holds for 3 seconds  Reciprocal knee fallouts BTB x 20  Sit to stands 3x10-NP  Heel raises x30  Matrix Standing hip abd with 40#, 2 x 10  Matrix standing hip flex with 40# 2x10  Matrix Standing hip ext with 40#, 2 x 10  Semi prone hip extension into red PB with 5 sec hold x 10 each  standing Hip Hinge w/5# dowel 2 x 10  Palloff twist RTB x15 ea  Leg press 60# 4x10 into progressive hip flexion seat position 9 down to 7  Single LE le press 40# 2 x 10       Lloyd received the following Manual techniques for 25 minutes:  STM to iliacus and psoas.   Short axis traction  LAD to L LE  SL QL stretch with therapist overpressure  STM to L sided lumbar paraspinals/QL  Prone  with pillow at hips for QL pin and stretch  L SI mobs PPT in sidelying grade III    Home Exercises Provided and Patient Education Provided     Education provided:   - HEP review    Written Home Exercises Provided: Patient instructed to cont prior HEP.  Exercises were reviewed and Lloyd was able to demonstrate them prior to the end " of the session.  Lloyd demonstrated good  understanding of the education provided.     See EMR under Patient Instructions for exercises provided prior visit.    Assessment   Lloyd returned with some residual hip flexors and L SI hypomobility and improvement with mobilization and exercise through range. Pt with decreased compliance with walking and HEP with increased sitting and sedentary leading to less AROM functionally and less use of stretches to manage.     Lloyd Is progressing well towards his goals.   Pt prognosis is Good.     Pt will continue to benefit from skilled outpatient physical therapy to address the deficits listed in the problem list box on initial evaluation, provide pt/family education and to maximize pt's level of independence in the home and community environment.     Pt's spiritual, cultural and educational needs considered and pt agreeable to plan of care and goals.     Anticipated barriers to physical therapy: None    Goals:   GOALS: Short Term Goals:  4 weeks  1. Report decreased in pain at worse less than  <   / =  3  /10  to increase tolerance for functional activitiesNot Met  2. Pt to tolerate being out of brace for entire day without increase in pain.MET  3. Increased BLE MMT 1/2  to increase tolerance for ADL and work activities.MET  4. Pt to tolerate exercise for full 45 minutes without increase in symptoms. MET  5. Pt to tolerate HEP to improve ROM and independence with ADL'sMET  6. Pt to improve range of motion by 25% to allow for improved functional mobility to allow for improvement in IADLs. MET  7. Assess 30 sec sit to stand and TUG.MET     Long Term Goals: 8 weeks  1. Report decreased in pain at worse less than  <   / =  1  /10  to increase tolerance for functional mobility. NOT MET  2. Increased BLE MMT 1 grade to increase tolerance for ADL and work activities.MET  3. Pt to improve range of motion by 50% to allow for improved functional mobility to allow for improvement  in IADLs. MET  4. Pt to demonstrate proper squat and lifting technique with 15 lb weight to perform household and yard work without increase in symptoms.NOT MET    Plan     Plan of care Certification: 7/28/2023 to 11/6/2023.     Outpatient Physical Therapy 2 times weekly for 4 weeks to include the following interventions: Gait Training, Manual Therapy, Moist Heat/ Ice, Neuromuscular Re-ed, Patient Education, Therapeutic Activities, and Therapeutic Exercise.        Josiane Adams, PT   Alessandra Parisi, PT, DPT

## 2023-10-19 ENCOUNTER — CLINICAL SUPPORT (OUTPATIENT)
Dept: CARDIOLOGY | Facility: HOSPITAL | Age: 78
End: 2023-10-19
Attending: INTERNAL MEDICINE
Payer: MEDICARE

## 2023-10-19 VITALS — HEIGHT: 71 IN | BODY MASS INDEX: 29.12 KG/M2 | WEIGHT: 208 LBS

## 2023-10-19 DIAGNOSIS — Z01.30 ENCOUNTER FOR EXAMINATION OF BLOOD PRESSURE WITHOUT ABNORMAL FINDINGS: ICD-10-CM

## 2023-10-19 LAB
ASCENDING AORTA: 3.34 CM
AV INDEX (PROSTH): 0.71
AV MEAN GRADIENT: 4 MMHG
AV PEAK GRADIENT: 6 MMHG
AV REGURGITATION PRESSURE HALF TIME: 486.7 MS
AV VALVE AREA BY VELOCITY RATIO: 3.23 CM²
AV VALVE AREA: 3.23 CM²
AV VELOCITY RATIO: 0.72
BSA FOR ECHO PROCEDURE: 2.17 M2
CV ECHO LV RWT: 0.26 CM
DOP CALC AO PEAK VEL: 1.23 M/S
DOP CALC AO VTI: 31.1 CM
DOP CALC LVOT AREA: 4.5 CM2
DOP CALC LVOT DIAMETER: 2.4 CM
DOP CALC LVOT PEAK VEL: 0.88 M/S
DOP CALC LVOT STROKE VOLUME: 100.38 CM3
DOP CALCLVOT PEAK VEL VTI: 22.2 CM
E WAVE DECELERATION TIME: 359.99 MSEC
E/A RATIO: 0.71
E/E' RATIO: 8.73 M/S
ECHO LV POSTERIOR WALL: 0.83 CM (ref 0.6–1.1)
EJECTION FRACTION: 45 %
FRACTIONAL SHORTENING: 21 % (ref 28–44)
INTERVENTRICULAR SEPTUM: 0.88 CM (ref 0.6–1.1)
IVRT: 185.54 MSEC
LEFT ATRIUM SIZE: 4.03 CM
LEFT ATRIUM VOLUME INDEX MOD: 30.7 ML/M2
LEFT ATRIUM VOLUME MOD: 65.66 CM3
LEFT INTERNAL DIMENSION IN SYSTOLE: 4.96 CM (ref 2.1–4)
LEFT VENTRICLE DIASTOLIC VOLUME INDEX: 93.83 ML/M2
LEFT VENTRICLE DIASTOLIC VOLUME: 200.8 ML
LEFT VENTRICLE MASS INDEX: 103 G/M2
LEFT VENTRICLE SYSTOLIC VOLUME INDEX: 54.1 ML/M2
LEFT VENTRICLE SYSTOLIC VOLUME: 115.87 ML
LEFT VENTRICULAR INTERNAL DIMENSION IN DIASTOLE: 6.29 CM (ref 3.5–6)
LEFT VENTRICULAR MASS: 219.53 G
LV LATERAL E/E' RATIO: 9.6 M/S
LV SEPTAL E/E' RATIO: 8 M/S
LVOT MG: 1.8 MMHG
LVOT MV: 0.64 CM/S
MV PEAK A VEL: 0.68 M/S
MV PEAK E VEL: 0.48 M/S
MV STENOSIS PRESSURE HALF TIME: 104.4 MS
MV VALVE AREA P 1/2 METHOD: 2.11 CM2
PISA AR MAX VEL: 5.13 M/S
PISA MRMAX VEL: 5.07 M/S
PISA TR MAX VEL: 3.01 M/S
PULM VEIN S/D RATIO: 1.46
PV PEAK D VEL: 0.37 M/S
PV PEAK S VEL: 0.54 M/S
RA PRESSURE ESTIMATED: 3 MMHG
RA VOL SYS: 42.36 ML
RIGHT ATRIAL AREA: 15.1 CM2
RIGHT VENTRICULAR END-DIASTOLIC DIMENSION: 4.68 CM
RIGHT VENTRICULAR LENGTH IN DIASTOLE (APICAL 4-CHAMBER VIEW): 7.6 CM
RV MID DIAMA: 3.04 CM
RV TB RVSP: 6 MMHG
SINUS: 3.77 CM
STJ: 3.04 CM
TDI LATERAL: 0.05 M/S
TDI SEPTAL: 0.06 M/S
TDI: 0.06 M/S
TR MAX PG: 36 MMHG
TRICUSPID ANNULAR PLANE SYSTOLIC EXCURSION: 2.2 CM
TV REST PULMONARY ARTERY PRESSURE: 39 MMHG
Z-SCORE OF LEFT VENTRICULAR DIMENSION IN END DIASTOLE: -0.9
Z-SCORE OF LEFT VENTRICULAR DIMENSION IN END SYSTOLE: 1.32

## 2023-10-19 PROCEDURE — 93306 TTE W/DOPPLER COMPLETE: CPT | Mod: HCNC,PO

## 2023-10-19 PROCEDURE — 93306 TTE W/DOPPLER COMPLETE: CPT | Mod: 26,HCNC,, | Performed by: INTERNAL MEDICINE

## 2023-10-19 PROCEDURE — 93306 ECHO (CUPID ONLY): ICD-10-PCS | Mod: 26,HCNC,, | Performed by: INTERNAL MEDICINE

## 2023-11-27 ENCOUNTER — PATIENT MESSAGE (OUTPATIENT)
Dept: FAMILY MEDICINE | Facility: CLINIC | Age: 78
End: 2023-11-27
Payer: MEDICARE

## 2023-11-27 DIAGNOSIS — I10 PRIMARY HYPERTENSION: Primary | ICD-10-CM

## 2023-11-27 NOTE — TELEPHONE ENCOUNTER
No care due was identified.  Health [facility] Embedded Care Due Messages. Reference number: [id number].   11/27/2023 2:19:01 PM CST

## 2023-11-28 RX ORDER — OLMESARTAN MEDOXOMIL 40 MG/1
40 TABLET ORAL NIGHTLY
Qty: 90 TABLET | Refills: 3 | Status: SHIPPED | OUTPATIENT
Start: 2023-11-28

## 2023-11-28 NOTE — TELEPHONE ENCOUNTER
Refill Routing Note   Medication(s) are not appropriate for processing by [facility] for the following reason(s):        ED/Hospital Visit since last OV with provider  No active prescription written by provider  Required labs abnormal: hyperkalemia    [facility] action(s):  Defer               Appointments  past 12m or future 3m with PCP    Date Provider   Last Visit   12/21/2022 Daniel Avery MD   Next Visit   1/29/2024 Daniel Avery MD   ED visits in past 90 days: 0        Note composed:6:03 PM 11/27/2023

## 2023-12-04 DIAGNOSIS — E78.5 HYPERLIPIDEMIA, UNSPECIFIED HYPERLIPIDEMIA TYPE: ICD-10-CM

## 2023-12-05 RX ORDER — ROSUVASTATIN CALCIUM 10 MG/1
10 TABLET, COATED ORAL
Qty: 90 TABLET | Refills: 0 | Status: SHIPPED | OUTPATIENT
Start: 2023-12-05 | End: 2024-03-07

## 2023-12-05 NOTE — TELEPHONE ENCOUNTER
Refill Decision Note   Lloyd John  is requesting a refill authorization.  Brief Assessment and Rationale for Refill:  Approve     Medication Therapy Plan:  FOVS 1/29/23      Comments:     Note composed:9:54 AM 12/05/2023

## 2023-12-05 NOTE — TELEPHONE ENCOUNTER
No care due was identified.  Health [facility] Embedded Care Due Messages. Reference number: [id number].   12/04/2023 6:11:09 PM CST

## 2024-01-13 DIAGNOSIS — I10 PRIMARY HYPERTENSION: ICD-10-CM

## 2024-01-16 RX ORDER — CARVEDILOL 25 MG/1
25 TABLET ORAL 2 TIMES DAILY WITH MEALS
Qty: 180 TABLET | Refills: 3 | Status: SHIPPED | OUTPATIENT
Start: 2024-01-16

## 2024-01-18 ENCOUNTER — LAB VISIT (OUTPATIENT)
Dept: LAB | Facility: HOSPITAL | Age: 79
End: 2024-01-18
Attending: FAMILY MEDICINE
Payer: MEDICARE

## 2024-01-18 DIAGNOSIS — I10 PRIMARY HYPERTENSION: ICD-10-CM

## 2024-01-18 DIAGNOSIS — E78.5 HYPERLIPIDEMIA, UNSPECIFIED HYPERLIPIDEMIA TYPE: ICD-10-CM

## 2024-01-18 DIAGNOSIS — C61 PROSTATE CANCER: ICD-10-CM

## 2024-01-18 LAB
ALBUMIN SERPL BCP-MCNC: 4.3 G/DL (ref 3.5–5.2)
ALP SERPL-CCNC: 52 U/L (ref 55–135)
ALT SERPL W/O P-5'-P-CCNC: 31 U/L (ref 10–44)
ANION GAP SERPL CALC-SCNC: 8 MMOL/L (ref 8–16)
AST SERPL-CCNC: 33 U/L (ref 10–40)
BASOPHILS # BLD AUTO: 0.04 K/UL (ref 0–0.2)
BASOPHILS NFR BLD: 0.9 % (ref 0–1.9)
BILIRUB SERPL-MCNC: 1.9 MG/DL (ref 0.1–1)
BUN SERPL-MCNC: 20 MG/DL (ref 8–23)
CALCIUM SERPL-MCNC: 9.9 MG/DL (ref 8.7–10.5)
CHLORIDE SERPL-SCNC: 105 MMOL/L (ref 95–110)
CHOLEST SERPL-MCNC: 145 MG/DL (ref 120–199)
CHOLEST/HDLC SERPL: 2.8 {RATIO} (ref 2–5)
CO2 SERPL-SCNC: 24 MMOL/L (ref 23–29)
COMPLEXED PSA SERPL-MCNC: 3.6 NG/ML (ref 0–4)
CREAT SERPL-MCNC: 1.1 MG/DL (ref 0.5–1.4)
DIFFERENTIAL METHOD BLD: ABNORMAL
EOSINOPHIL # BLD AUTO: 0.1 K/UL (ref 0–0.5)
EOSINOPHIL NFR BLD: 2.4 % (ref 0–8)
ERYTHROCYTE [DISTWIDTH] IN BLOOD BY AUTOMATED COUNT: 12.6 % (ref 11.5–14.5)
EST. GFR  (NO RACE VARIABLE): >60 ML/MIN/1.73 M^2
GLUCOSE SERPL-MCNC: 99 MG/DL (ref 70–110)
HCT VFR BLD AUTO: 44.7 % (ref 40–54)
HDLC SERPL-MCNC: 51 MG/DL (ref 40–75)
HDLC SERPL: 35.2 % (ref 20–50)
HGB BLD-MCNC: 15.3 G/DL (ref 14–18)
IMM GRANULOCYTES # BLD AUTO: 0.01 K/UL (ref 0–0.04)
IMM GRANULOCYTES NFR BLD AUTO: 0.2 % (ref 0–0.5)
LDLC SERPL CALC-MCNC: 60.8 MG/DL (ref 63–159)
LYMPHOCYTES # BLD AUTO: 1.5 K/UL (ref 1–4.8)
LYMPHOCYTES NFR BLD: 32.5 % (ref 18–48)
MCH RBC QN AUTO: 34.2 PG (ref 27–31)
MCHC RBC AUTO-ENTMCNC: 34.2 G/DL (ref 32–36)
MCV RBC AUTO: 100 FL (ref 82–98)
MONOCYTES # BLD AUTO: 0.7 K/UL (ref 0.3–1)
MONOCYTES NFR BLD: 15.2 % (ref 4–15)
NEUTROPHILS # BLD AUTO: 2.3 K/UL (ref 1.8–7.7)
NEUTROPHILS NFR BLD: 48.8 % (ref 38–73)
NONHDLC SERPL-MCNC: 94 MG/DL
NRBC BLD-RTO: 0 /100 WBC
PLATELET # BLD AUTO: 183 K/UL (ref 150–450)
PMV BLD AUTO: 11.1 FL (ref 9.2–12.9)
POTASSIUM SERPL-SCNC: 4.2 MMOL/L (ref 3.5–5.1)
PROT SERPL-MCNC: 6.9 G/DL (ref 6–8.4)
RBC # BLD AUTO: 4.47 M/UL (ref 4.6–6.2)
SODIUM SERPL-SCNC: 137 MMOL/L (ref 136–145)
TRIGL SERPL-MCNC: 166 MG/DL (ref 30–150)
WBC # BLD AUTO: 4.61 K/UL (ref 3.9–12.7)

## 2024-01-18 PROCEDURE — 84153 ASSAY OF PSA TOTAL: CPT | Mod: HCNC | Performed by: RADIOLOGY

## 2024-01-18 PROCEDURE — 80061 LIPID PANEL: CPT | Mod: HCNC | Performed by: FAMILY MEDICINE

## 2024-01-18 PROCEDURE — 36415 COLL VENOUS BLD VENIPUNCTURE: CPT | Mod: HCNC,PO | Performed by: RADIOLOGY

## 2024-01-18 PROCEDURE — 80053 COMPREHEN METABOLIC PANEL: CPT | Mod: HCNC | Performed by: FAMILY MEDICINE

## 2024-01-18 PROCEDURE — 85025 COMPLETE CBC W/AUTO DIFF WBC: CPT | Mod: HCNC | Performed by: FAMILY MEDICINE

## 2024-01-26 ENCOUNTER — OFFICE VISIT (OUTPATIENT)
Dept: RADIATION ONCOLOGY | Facility: CLINIC | Age: 79
End: 2024-01-26
Payer: MEDICARE

## 2024-01-26 VITALS
HEIGHT: 71 IN | HEART RATE: 55 BPM | TEMPERATURE: 98 F | WEIGHT: 197.06 LBS | BODY MASS INDEX: 27.59 KG/M2 | DIASTOLIC BLOOD PRESSURE: 71 MMHG | OXYGEN SATURATION: 95 % | RESPIRATION RATE: 18 BRPM | SYSTOLIC BLOOD PRESSURE: 138 MMHG

## 2024-01-26 DIAGNOSIS — C61 PROSTATE CANCER: Primary | ICD-10-CM

## 2024-01-26 PROCEDURE — 3288F FALL RISK ASSESSMENT DOCD: CPT | Mod: HCNC,CPTII,S$GLB, | Performed by: RADIOLOGY

## 2024-01-26 PROCEDURE — 99999 PR PBB SHADOW E&M-EST. PATIENT-LVL IV: CPT | Mod: PBBFAC,HCNC,, | Performed by: RADIOLOGY

## 2024-01-26 PROCEDURE — 1159F MED LIST DOCD IN RCRD: CPT | Mod: HCNC,CPTII,S$GLB, | Performed by: RADIOLOGY

## 2024-01-26 PROCEDURE — 99213 OFFICE O/P EST LOW 20 MIN: CPT | Mod: HCNC,S$GLB,, | Performed by: RADIOLOGY

## 2024-01-26 PROCEDURE — 1101F PT FALLS ASSESS-DOCD LE1/YR: CPT | Mod: HCNC,CPTII,S$GLB, | Performed by: RADIOLOGY

## 2024-01-26 PROCEDURE — 3078F DIAST BP <80 MM HG: CPT | Mod: HCNC,CPTII,S$GLB, | Performed by: RADIOLOGY

## 2024-01-26 PROCEDURE — 3075F SYST BP GE 130 - 139MM HG: CPT | Mod: HCNC,CPTII,S$GLB, | Performed by: RADIOLOGY

## 2024-01-26 PROCEDURE — 1125F AMNT PAIN NOTED PAIN PRSNT: CPT | Mod: HCNC,CPTII,S$GLB, | Performed by: RADIOLOGY

## 2024-01-26 NOTE — PROGRESS NOTES
[facility] Department of Radiation Oncology  Follow Up Visit Note    Diagnosis:  Lloyd John Jr. is a 78 y.o. 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  He completed a course of radiation therapy on 6/5/23.      Oncologic History:  Oncology History   Prostate cancer   1/14/2013 Initial Diagnosis    Prostate cancer     4/11/2023 Cancer Staged    Staging form: Prostate, AJCC 7th Edition  - Clinical stage from 4/11/2023: Stage IIA (T1c, N0, M0, PSA: 10 to 19, [place] <= 6)     5/23/2023 - 6/5/2023 Radiation Therapy    Treating physician: Chrissei Rojas  Total Dose: 36.25 Gy  Fractions: 5  Treatment Site Ref. ID Energy Dose/Fx (Gy) #Fx Dose Correction (Gy) Total Dose (Gy) Start Date End Date Elapsed Days   SBRT Prostate PTV 6X 7.25 5 / 5 0 36.25 5/23/2023 6/5/2023 13           Interval History  The patient presents today for a regularly scheduled follow up visit.  He was last seen in our clinic on 7/25/23 at completion of treatment.   Since that time, interval PSA on 1/18/24 was 3.6.  Back pain worsened by PT.      Latest Reference Range & Units Most Recent 03/13/23 07:23 07/18/23 09:53 01/18/24 07:12   PSA Diagnostic 0.00 - 4.00 ng/mL 3.6  1/18/24 07:12 16.8 (H) 6.3 (H) 3.6   (H): Data is abnormally high      HPI: The patient is a 77 year old male with a diagnosis of prostate cancer.  He also has a history of bladder cancer treated with BGC and Thiotepa in 1997.  Also history of Urolift. He was noted to have an elevated PSA of 16.8 on 3/13/23.  Previous PSA history as follows:       Latest Reference Range & Units Most Recent 11/02/15 07:50 08/24/22 14:01 03/13/23 07:23   PSA Diagnostic 0.00 - 4.00 ng/mL 16.8 (H)  3/13/23 07:23   11.2 (H) 16.8 (H)   PSA Total 0.00 - 4.00 ng/mL 7.2 (H)  11/2/15 07:50 7.2 (H)       (H): Data is abnormally high     1/19/22 MRI prostate noted 2 highly concerning lesions: 1.1cm in the medial and lateral  segments of the left PZ base, PI-RADS 4; 5mm lesion lateral segment right PZ apex, PI-RADS 4.     On 9/12/22 he underwent UroNav TRUS-guided biopsy of the prostate, with pathology as follows:     Target 1: Group 1 in 32% of tissue  Target 2: Group 1 in 62% of tissue  Left mid: Group 1, 7% of tissue  Left apex: Group 1, 6% of tissue  Right mid: Group 1, 15% of tissue  Right base: Group 1, 11% of tissue  No PNI, # involved cores not given     Previous biopsies:  Biopsies  5/2016 - GG1 LA 1/2 (total cores 1/15)  10/2017 - GG2 LLM 1/1, GG1 RA 1/1 (total cores 2/12)  7/2019 - GG1 LA 1/1, RA 1/2, LM 1/1, LA 1/1 (total cores 4/14)     Decipher score: 0.14- low genomic risk     Prostate size estimated at 60cc.  He presents today to discuss the potential role of radiation therapy in his cancer care.  IPSS is 2, with no frequency, no urgency, and nocturia x 1.       History of prior irradiation: No  History of prior systemic anti-cancer therapy: No  History of collagen vascular disease: No  Implanted electronic device (pacer/defib/nerve stimulator): No     Presented to ED 6/13/23 c/o back/left leg pain and weakness, fecal incontinence. Outside MRI showed severe canal stenosis at L2-3.     6/13/23 L2-3 urgent/emergent microdiscectomy with Dr. Haines. Doing much better now. Pain relieved, return of continence. Seen earlier to day in Neurosurg clinic for follow up. Plan for PT.    Review of Systems   Review of Systems   Constitutional:  Negative for chills, fever and malaise/fatigue.   Gastrointestinal:  Negative for blood in stool, constipation, diarrhea, nausea and vomiting.   Genitourinary:  Negative for dysuria.   Musculoskeletal:  Positive for back pain (at surgical site) and joint pain (left knee pain x 1 week). Negative for myalgias.   Neurological:  Negative for dizziness, sensory change, weakness and headaches.       IPSS today is 2    Social History:  Social History     Tobacco Use    Smoking status: Former      "Current packs/day: 0.00     Average packs/day: 0.5 packs/day for 3.0 years (1.5 ttl pk-yrs)     Types: Cigarettes     Start date: 1994     Quit date: 1997     Years since quittin.0    Smokeless tobacco: Never   Substance Use Topics    Alcohol use: Yes     Alcohol/week: 1.7 standard drinks of alcohol     Types: 2 drink(s) per week     Comment: weekends    Drug use: No       Family History:  Cancer-related family history is not on file.    Exam:  Vitals:    24 0929   BP: 138/71   BP Location: Left arm   Patient Position: Sitting   Pulse: (!) 55   Resp: 18   Temp: 98 °F (36.7 °C)   SpO2: 95%   Weight: 89.4 kg (197 lb 1.5 oz)   Height: 5' 11" (1.803 m)     Constitutional: Pleasant 78 y.o. male in no acute distress.  Well nourished. Well groomed.   HEENT: Normocephalic and atraumatic   Lungs: No audible wheezing.  Normal effort.   Musculoskeletal: No gross MSK deformities. Ambulates back brace in place  Skin: No rashes appreciated.   Psych: Alert and oriented with appropriate mood and affect.  Neuro:   Grossly normal.      Assessment:  Recovering well from acute radiation therapy toxicities.  PSA responding well  ECOG: (1) Restricted in physically strenuous activity, ambulatory and able to do work of light nature    Plan:  Follow up in 6 months with PSA  Follow up with Urology, Neurosurgery, PT as directed  He was given our contact information, and he was told that he could call our clinic at anytime if he has any questions or concerns.  Follow up with other providers as directed        "

## 2024-01-29 ENCOUNTER — OFFICE VISIT (OUTPATIENT)
Dept: FAMILY MEDICINE | Facility: CLINIC | Age: 79
End: 2024-01-29
Payer: MEDICARE

## 2024-01-29 VITALS
SYSTOLIC BLOOD PRESSURE: 112 MMHG | HEIGHT: 71 IN | BODY MASS INDEX: 27.53 KG/M2 | DIASTOLIC BLOOD PRESSURE: 60 MMHG | OXYGEN SATURATION: 95 % | WEIGHT: 196.63 LBS | HEART RATE: 64 BPM | RESPIRATION RATE: 18 BRPM

## 2024-01-29 DIAGNOSIS — G47.00 INSOMNIA, UNSPECIFIED TYPE: ICD-10-CM

## 2024-01-29 DIAGNOSIS — I10 PRIMARY HYPERTENSION: Primary | ICD-10-CM

## 2024-01-29 DIAGNOSIS — E78.5 HYPERLIPIDEMIA, UNSPECIFIED HYPERLIPIDEMIA TYPE: ICD-10-CM

## 2024-01-29 DIAGNOSIS — C61 PROSTATE CANCER: ICD-10-CM

## 2024-01-29 DIAGNOSIS — M54.16 LUMBAR RADICULOPATHY: ICD-10-CM

## 2024-01-29 PROCEDURE — 3074F SYST BP LT 130 MM HG: CPT | Mod: HCNC,CPTII,S$GLB, | Performed by: FAMILY MEDICINE

## 2024-01-29 PROCEDURE — 99214 OFFICE O/P EST MOD 30 MIN: CPT | Mod: HCNC,S$GLB,, | Performed by: FAMILY MEDICINE

## 2024-01-29 PROCEDURE — 1101F PT FALLS ASSESS-DOCD LE1/YR: CPT | Mod: HCNC,CPTII,S$GLB, | Performed by: FAMILY MEDICINE

## 2024-01-29 PROCEDURE — 3288F FALL RISK ASSESSMENT DOCD: CPT | Mod: HCNC,CPTII,S$GLB, | Performed by: FAMILY MEDICINE

## 2024-01-29 PROCEDURE — 1125F AMNT PAIN NOTED PAIN PRSNT: CPT | Mod: HCNC,CPTII,S$GLB, | Performed by: FAMILY MEDICINE

## 2024-01-29 PROCEDURE — 99999 PR PBB SHADOW E&M-EST. PATIENT-LVL III: CPT | Mod: PBBFAC,HCNC,, | Performed by: FAMILY MEDICINE

## 2024-01-29 PROCEDURE — 3078F DIAST BP <80 MM HG: CPT | Mod: HCNC,CPTII,S$GLB, | Performed by: FAMILY MEDICINE

## 2024-01-29 PROCEDURE — 1159F MED LIST DOCD IN RCRD: CPT | Mod: HCNC,CPTII,S$GLB, | Performed by: FAMILY MEDICINE

## 2024-01-29 RX ORDER — SOLIFENACIN SUCCINATE 5 MG/1
TABLET, FILM COATED ORAL
COMMUNITY
Start: 2023-11-27 | End: 2024-05-17

## 2024-01-29 NOTE — Clinical Note
During PT he developed left lower back with radiculopathy to the left upper thigh which has been constant since October. He stopped PT, but the pain persists and is constant and can be 9/10. He would like a follow-up with you.

## 2024-01-30 ENCOUNTER — TELEPHONE (OUTPATIENT)
Dept: NEUROSURGERY | Facility: CLINIC | Age: 79
End: 2024-01-30
Payer: MEDICARE

## 2024-01-30 NOTE — TELEPHONE ENCOUNTER
Daniel Avery MD P Caralopoulos [facility] N Staff  During PT he developed left lower back with radiculopathy to the left upper thigh which has been constant since October. He stopped PT, but the pain persists and is constant and can be 9/10. He would like a follow-up with you.

## 2024-02-02 ENCOUNTER — OFFICE VISIT (OUTPATIENT)
Dept: NEUROSURGERY | Facility: CLINIC | Age: 79
End: 2024-02-02
Payer: MEDICARE

## 2024-02-02 VITALS
BODY MASS INDEX: 27.43 KG/M2 | DIASTOLIC BLOOD PRESSURE: 88 MMHG | HEART RATE: 65 BPM | HEIGHT: 71 IN | SYSTOLIC BLOOD PRESSURE: 161 MMHG

## 2024-02-02 DIAGNOSIS — M54.16 LUMBAR RADICULOPATHY: Primary | ICD-10-CM

## 2024-02-02 PROCEDURE — 3288F FALL RISK ASSESSMENT DOCD: CPT | Mod: HCNC,CPTII,S$GLB, | Performed by: PHYSICIAN ASSISTANT

## 2024-02-02 PROCEDURE — 1159F MED LIST DOCD IN RCRD: CPT | Mod: HCNC,CPTII,S$GLB, | Performed by: PHYSICIAN ASSISTANT

## 2024-02-02 PROCEDURE — 3079F DIAST BP 80-89 MM HG: CPT | Mod: HCNC,CPTII,S$GLB, | Performed by: PHYSICIAN ASSISTANT

## 2024-02-02 PROCEDURE — 99213 OFFICE O/P EST LOW 20 MIN: CPT | Mod: HCNC,S$GLB,, | Performed by: PHYSICIAN ASSISTANT

## 2024-02-02 PROCEDURE — 1101F PT FALLS ASSESS-DOCD LE1/YR: CPT | Mod: HCNC,CPTII,S$GLB, | Performed by: PHYSICIAN ASSISTANT

## 2024-02-02 PROCEDURE — 3077F SYST BP >= 140 MM HG: CPT | Mod: HCNC,CPTII,S$GLB, | Performed by: PHYSICIAN ASSISTANT

## 2024-02-02 PROCEDURE — 1125F AMNT PAIN NOTED PAIN PRSNT: CPT | Mod: HCNC,CPTII,S$GLB, | Performed by: PHYSICIAN ASSISTANT

## 2024-02-02 NOTE — PROGRESS NOTES
Neurosurgery History and Physical    Patient ID: Lloyd John Jr. is a 78 y.o. male.    Chief Complaint   Patient presents with    Follow-up    Back Pain     HPI 2/2/24:  Patient is a 78 year old male who returns to [facility] now 8 months status post L2-3 microdiscectomy with a return of left leg and back pain. He was going to PT and they began to work on his quadratus lumborum as they believed this was the issue, and he began to have pain again. This started in October 2023 and has persisted. He thinks it's related to too much weight on the leg press machine. He states the pain is left sided, radiates from his mid back around the front of his thigh and almost to the anterior knee. It doesn't cross the knee. He has no numbness or tingling, but the pain is burning in nature. He states sometimes he cannot lift his leg due to pain, but this is significantly improved by 800mg of advil. He has no saddle anesthesia, no bowel or bladder incontinence or retention. He has had no falls or injuries. He is still able to ride his bike, walk as far as he needs to and stand for prolonged periods of time.     HPI 9/26/23:  Patient is an 78 year old male who presents today for a 3 month post op visit after an MIS L2-3 microdiscectomy on 6/13/23 doing really well. Physical therapy helped with his pain and left leg weakness. He is to be discharged soon and has plans to continue going to a gym near him. He has no numbness, tingling, or weakness and has no bowel or bladder incontinence or retention. He is happy with his recovery.     HPI 7/25/23:   Patient presents today for a post op follow up 6 weeks status post L2-3 microdiscetomy. He states last week he started noticing left knee and left lower back discomfort. His pain is mainly in the anterior knee, and doesn't involve the thigh or lower leg. He hasn't really been active since surgery, but doesn't have the knee pain at rest. The knee pain is typically when he initially goes  to walk after sitting, and lets up after he walks a bit. He notices knee weakness. The lower back pain is worse with his brace rubbing, but is unsure if he feels better without it on. He states he doesn't wear it religiously. The knee and back pain are different from the pain he had prior to surgery. He has no issues with bowel or bladder dysfunction. He has no numbness or tingling in the lower extremities.     Review of Systems   Genitourinary:  Negative for decreased urine volume and difficulty urinating.   Musculoskeletal:  Positive for back pain and gait problem.   Neurological:  Negative for weakness and numbness.       Past Medical History:   Diagnosis Date    Bladder cancer     Followed by Dr. Craig in [place]    Colon polyp 2006    [facility]    Glaucoma (increased eye pressure)     HLD (hyperlipidemia)     HTN (hypertension)     Prostate cancer      Social History     Socioeconomic History    Marital status:     Number of children: 2   Occupational History    Occupation:  for law firm   Tobacco Use    Smoking status: Former     Current packs/day: 0.00     Average packs/day: 0.5 packs/day for 3.0 years (1.5 ttl pk-yrs)     Types: Cigarettes     Start date: 1994     Quit date: 1997     Years since quittin.1    Smokeless tobacco: Never   Substance and Sexual Activity    Alcohol use: Yes     Alcohol/week: 1.7 standard drinks of alcohol     Types: 2 drink(s) per week     Comment: weekends    Drug use: No     Social Determinants of Health     Financial Resource Strain: Low Risk  (2024)    Overall Financial Resource Strain (CARDIA)     Difficulty of Paying Living Expenses: Not hard at all   Food Insecurity: No Food Insecurity (2024)    Hunger Vital Sign     Worried About Running Out of Food in the Last Year: Never true     Ran Out of Food in the Last Year: Never true   Transportation Needs: No Transportation Needs (2024)    PRAPARE - Transportation      Lack of Transportation (Medical): No     Lack of Transportation (Non-Medical): No   Physical Activity: Insufficiently Active (1/23/2024)    Exercise Vital Sign     Days of Exercise per Week: 2 days     Minutes of Exercise per Session: 20 min   Stress: No Stress Concern Present (1/23/2024)    [employer] - Occupational Stress Questionnaire     Feeling of Stress : Only a little   Social Connections: Unknown (1/23/2024)    Social Connection and Isolation Panel [NHANES]     Frequency of Communication with Friends and Family: More than three times a week     Frequency of Social Gatherings with Friends and Family: More than three times a week     Active Member of Clubs or Organizations: Yes     Attends Club or Organization Meetings: More than 4 times per year     Marital Status:    Housing Stability: Low Risk  (1/23/2024)    Housing Stability Vital Sign     Unable to Pay for Housing in the Last Year: No     Number of Places Lived in the Last Year: 1     Unstable Housing in the Last Year: No     Family History   Problem Relation Age of Onset    COPD Mother         interstitial fibrosis    Coronary artery disease Father     Heart attacks under age 50 Father     Anesthesia problems Neg Hx      Review of patient's allergies indicates:   Allergen Reactions    Bactrim [sulfamethoxazole-trimethoprim] Hives and Rash    Povidone-iodine Other (See Comments)       Current Outpatient Medications:     ascorbic acid, vitamin C, (VITAMIN C) 1000 MG tablet, Take 1,000 mg by mouth nightly., Disp: , Rfl:     brimonidine 0.2% (ALPHAGAN) 0.2 % Drop, Place 1 drop into both eyes 2 (two) times a day., Disp: , Rfl:     carvediloL (COREG) 25 MG tablet, TAKE 1 TABLET TWICE DAILY WITH MEALS, Disp: 180 tablet, Rfl: 3    LUMIGAN 0.01 % Drop, Place 1 drop into both eyes every Mon, Wed, Fri. (at bedtime), Disp: , Rfl:     MULTIVITAMIN W-MINERALS/LUTEIN (CENTRUM SILVER ORAL), Take 1 tablet by mouth every evening.  ,  "Disp: , Rfl:     olmesartan (BENICAR) 40 MG tablet, Take 1 tablet (40 mg total) by mouth every evening. Resume taking on June 20, 2023., Disp: 90 tablet, Rfl: 3    rosuvastatin (CRESTOR) 10 MG tablet, TAKE 1 TABLET ONE TIME DAILY, Disp: 90 tablet, Rfl: 0    solifenacin (VESICARE) 5 MG tablet, , Disp: , Rfl:     timolol maleate 0.5% (TIMOPTIC) 0.5 % Drop, Place 1 drop into both eyes 2 (two) times daily., Disp: , Rfl:     FLUAD QUAD 2023-24,65Y UP,,PF, 60 mcg (15 mcg x 4)/0.5 mL Syrg, , Disp: , Rfl:     ibuprofen (ADVIL,MOTRIN) 200 MG tablet, Take 800 mg by mouth every 8 (eight) hours as needed for Pain (mild)., Disp: , Rfl:     loperamide (IMODIUM A-D) 2 mg Tab, Take 4 mg by mouth daily as needed (loose stool)., Disp: , Rfl:     ondansetron (ZOFRAN-ODT) 4 MG TbDL, Take 1 tablet (4 mg total) by mouth every 6 (six) hours as needed (nausea). (Patient not taking: Reported on 2/2/2024), Disp: 60 tablet, Rfl: 5    polyethylene glycol (GLYCOLAX) 17 gram PwPk, Take 17 g by mouth 2 (two) times daily as needed. (Patient not taking: Reported on 2/2/2024), Disp: 14 packet, Rfl: 0    tadalafiL (CIALIS) 20 MG Tab, Take 1 tablet (20 mg total) by mouth daily as needed (take 1/4, 1/2, or 1 full tablet as needed for erectile dysfunction). (Patient not taking: Reported on 2/2/2024), Disp: 10 tablet, Rfl: 11    tamsulosin (FLOMAX) 0.4 mg Cap, Take 0.4 mg by mouth once daily., Disp: , Rfl:   Blood pressure (!) 161/88, pulse 65, height 5' 11" (1.803 m).      Neurologic Exam     Mental Status   Oriented to person, place, and time.   Attention: normal. Concentration: normal.   Speech: speech is normal   Level of consciousness: alert  Knowledge: good.   Normal comprehension.     Cranial Nerves     CN III, IV, VI   Extraocular motions are normal.     CN VII   Facial expression full, symmetric.     CN VIII   Hearing: intact    Motor Exam   Muscle bulk: normal  Overall muscle tone: normal    Strength   Right deltoid: 5/5  Left deltoid: " "5/5  Right biceps: 5/5  Left biceps: 5/5  Right triceps: 5/5  Left triceps: 5/5  Right wrist flexion: 5/5  Left wrist flexion: 5/5  Right wrist extension: 5/5  Left wrist extension: 5/5  Right interossei: 5/5  Left interossei: 5/5  Right iliopsoas: 5/5  Left iliopsoas: 4/5  Right quadriceps: 5/5  Left quadriceps: 5/5  Right hamstrin/5  Left hamstrin/5  Right anterior tibial: 5/5  Left anterior tibial: 5/5  Right posterior tibial: 5/5  Left posterior tibial: 5/5  Right peroneal: 5/5  Left peroneal: 5/5  Right gastroc: 5/5  Left gastroc: 5/5    Sensory Exam   Light touch normal.     Gait, Coordination, and Reflexes     Gait  Gait: normal    Tremor   Resting tremor: absent  Action tremor: absent    Reflexes   Right brachioradialis: 2+  Left brachioradialis: 2+  Right biceps: 2+  Left biceps: 2+  Right triceps: 2+  Left triceps: 2+  Right patellar: 1+  Left patellar: 0  Right achilles: 0  Left achilles: 0  Right plantar: normal  Left plantar: normal  Right ankle clonus: absent  Left ankle clonus: absent      Physical Exam  Vitals and nursing note reviewed.   Eyes:      Extraocular Movements: EOM normal.   Neurological:      Mental Status: He is oriented to person, place, and time.      Gait: Gait is intact.      Deep Tendon Reflexes:      Reflex Scores:       Tricep reflexes are 2+ on the right side and 2+ on the left side.       Bicep reflexes are 2+ on the right side and 2+ on the left side.       Brachioradialis reflexes are 2+ on the right side and 2+ on the left side.       Patellar reflexes are 1+ on the right side and 0 on the left side.       Achilles reflexes are 0 on the right side and 0 on the left side.  Psychiatric:         Speech: Speech normal.         Vital Signs  Pulse: 65  BP: (!) 161/88  Pain Score:   6  Pain Loc: Back  Height and Weight  Height: 5' 11" (180.3 cm)  Weight in (lb) to have BMI = 25: 178.9]    Provider dictation:  Patient has a recurrence of lumbar radiculopathy and has weakness " in the left hip flexor. We will update his XR with flex.ext and MRI lumbar spine and refer him to pain management, he would like to see Dr. Pal. Discussed trial of injections is likely next step, will call with imaging results. Discussed ER precautions and acute cauda equina. All questions were answered. Offered alternative to advil, patient declined and would like to stick with his OTC advil prn.     Visit Diagnosis:  Lumbar radiculopathy  -     MRI Lumbar Spine Without Contrast; Future; Expected date: 02/02/2024  -     X-Ray Lumbar Complete Including Flex And Ext; Future; Expected date: 02/02/2024  -     Ambulatory referral/consult to Pain Clinic; Future; Expected date: 02/09/2024

## 2024-02-08 ENCOUNTER — TELEPHONE (OUTPATIENT)
Dept: NEUROSURGERY | Facility: CLINIC | Age: 79
End: 2024-02-08
Payer: MEDICARE

## 2024-02-08 RX ORDER — GABAPENTIN 300 MG/1
300 CAPSULE ORAL 3 TIMES DAILY
Qty: 90 CAPSULE | Refills: 2 | Status: SHIPPED | OUTPATIENT
Start: 2024-02-08 | End: 2024-04-29

## 2024-02-08 NOTE — TELEPHONE ENCOUNTER
Called to confirm pharmacy. Patient tolerates gabapentin in the past, says it helps with his back and leg pain. Will refill for now. Imaging scheduled for tomorrow, will call him back once reviewed with Dr. Dallas and decide on pain management vs FU apt in office.

## 2024-02-08 NOTE — TELEPHONE ENCOUNTER
----- Message from Sheyla Trinidad LPN sent at 2/8/2024  2:26 PM CST -----  Contact: Pt    ----- Message -----  From: Kris Burch, Patient Care Assistant  Sent: 2/8/2024   1:22 PM CST  To: Triston Moore Staff    Type:  RX Refill Request    Who Called:  Pt  Refill or New Rx:  New Rx  RX Name and Strength:  Gabapentin 300 mg  How is the patient currently taking it? (ex. 1XDay):  As Directed  Is this a 30 day or 90 day RX:  30  Preferred Pharmacy with phone number:    [id number] [place] [address] & [address] [address]  Phone: [phone] Fax: [phone]  Local or Mail Order:  local  Ordering Provider:  Triston Mathews Call Back Number:  [phone]  Additional Information:  Please contact pt upon completion-Thank you~      Pt also received a call from pain management and wanted to know if he should be scheduling with them. Please advise

## 2024-02-09 ENCOUNTER — HOSPITAL ENCOUNTER (OUTPATIENT)
Dept: RADIOLOGY | Facility: HOSPITAL | Age: 79
Discharge: HOME | End: 2024-02-09
Attending: PHYSICIAN ASSISTANT
Payer: MEDICARE

## 2024-02-09 ENCOUNTER — TELEPHONE (OUTPATIENT)
Dept: PAIN MEDICINE | Facility: CLINIC | Age: 79
End: 2024-02-09
Payer: MEDICARE

## 2024-02-09 ENCOUNTER — TELEPHONE (OUTPATIENT)
Dept: NEUROSURGERY | Facility: CLINIC | Age: 79
End: 2024-02-09
Payer: MEDICARE

## 2024-02-09 DIAGNOSIS — M54.16 LUMBAR RADICULOPATHY: ICD-10-CM

## 2024-02-09 DIAGNOSIS — Z98.890 S/P LUMBAR MICRODISCECTOMY: Primary | ICD-10-CM

## 2024-02-09 PROCEDURE — 72114 X-RAY EXAM L-S SPINE BENDING: CPT | Mod: TC,HCNC,FY,PO

## 2024-02-09 PROCEDURE — 72148 MRI LUMBAR SPINE W/O DYE: CPT | Mod: 26,HCNC,, | Performed by: RADIOLOGY

## 2024-02-09 PROCEDURE — 72148 MRI LUMBAR SPINE W/O DYE: CPT | Mod: TC,HCNC,PO

## 2024-02-09 PROCEDURE — 72114 X-RAY EXAM L-S SPINE BENDING: CPT | Mod: 26,HCNC,, | Performed by: RADIOLOGY

## 2024-02-09 NOTE — TELEPHONE ENCOUNTER
Physician - Dr Pal    Type of Procedure/Injection - Lumbar Transforaminal Epidural  L2/3           Laterality - Left      Anxiolysis- Local      Need to hold medication - Yes      NSAIDs for 2 days          Clearance needed - No      Follow up - 3 week

## 2024-02-09 NOTE — TELEPHONE ENCOUNTER
Reviewed imaging with Dr. Dallas. Spoke with patient regarding disc herniation at L2-3. Will order direct to procedure transforaminal KORINA L2-3 and have patient follow up in 2-3 weeks after injection to assess response. Patient understands.

## 2024-02-12 DIAGNOSIS — M54.16 LUMBAR RADICULOPATHY: Primary | ICD-10-CM

## 2024-02-12 RX ORDER — ALPRAZOLAM 1 MG/1
1 TABLET, ORALLY DISINTEGRATING ORAL ONCE AS NEEDED
Status: CANCELLED | OUTPATIENT
Start: 2024-02-12 | End: 2035-07-11

## 2024-02-12 NOTE — TELEPHONE ENCOUNTER
Spoke with patient to schedule. Advised to hold NSAIDS x 2 days prior.Pre op information given and follow up appointment scheduled.

## 2024-02-14 ENCOUNTER — TELEPHONE (OUTPATIENT)
Dept: NEUROSURGERY | Facility: CLINIC | Age: 79
End: 2024-02-14

## 2024-02-14 NOTE — TELEPHONE ENCOUNTER
Patient reports that he would like to consider surgery in the next 2 to 3 weeks. Patient reports that he celi like to go on vacation with his entire family in April and if surgery can be done within the next few weeks. Please contact patient to discuss.

## 2024-02-19 ENCOUNTER — OFFICE VISIT (OUTPATIENT)
Dept: NEUROSURGERY | Facility: CLINIC | Age: 79
End: 2024-02-19
Payer: MEDICARE

## 2024-02-19 VITALS
HEART RATE: 61 BPM | TEMPERATURE: 98 F | SYSTOLIC BLOOD PRESSURE: 164 MMHG | BODY MASS INDEX: 27.53 KG/M2 | HEIGHT: 71 IN | RESPIRATION RATE: 18 BRPM | WEIGHT: 196.63 LBS | DIASTOLIC BLOOD PRESSURE: 81 MMHG

## 2024-02-19 DIAGNOSIS — M54.16 LUMBAR RADICULOPATHY: Primary | ICD-10-CM

## 2024-02-19 PROCEDURE — 99214 OFFICE O/P EST MOD 30 MIN: CPT | Mod: S$GLB,,, | Performed by: NEUROLOGICAL SURGERY

## 2024-02-19 PROCEDURE — 3079F DIAST BP 80-89 MM HG: CPT | Mod: CPTII,S$GLB,, | Performed by: NEUROLOGICAL SURGERY

## 2024-02-19 PROCEDURE — 1101F PT FALLS ASSESS-DOCD LE1/YR: CPT | Mod: CPTII,S$GLB,, | Performed by: NEUROLOGICAL SURGERY

## 2024-02-19 PROCEDURE — 3077F SYST BP >= 140 MM HG: CPT | Mod: CPTII,S$GLB,, | Performed by: NEUROLOGICAL SURGERY

## 2024-02-19 PROCEDURE — 1159F MED LIST DOCD IN RCRD: CPT | Mod: CPTII,S$GLB,, | Performed by: NEUROLOGICAL SURGERY

## 2024-02-19 PROCEDURE — 3288F FALL RISK ASSESSMENT DOCD: CPT | Mod: CPTII,S$GLB,, | Performed by: NEUROLOGICAL SURGERY

## 2024-02-19 PROCEDURE — 1125F AMNT PAIN NOTED PAIN PRSNT: CPT | Mod: CPTII,S$GLB,, | Performed by: NEUROLOGICAL SURGERY

## 2024-02-19 NOTE — H&P (VIEW-ONLY)
Neurosurgery History and Physical    Patient ID: Lloyd John Jr. is a 78 y.o. male.    Chief Complaint   Patient presents with    Follow-up     Pt was hurting him and pain has become worse, MRI and xray have been completed with results read.     HPI 2/19/24:  Patient is a 78 year old male who returns to clinic to discuss imaging and next steps regarding his low back and leg pain. He is status post L2-3 microdiscectomy June 2023. He denies feelings of weakness. He reports pain shooting from the left low back to the anterior thigh and knee with numbess in the same distribution. He is wanting to attend a family vacation in April and wanted to discuss all options for correction rather than just pain management. His pain does reach a 10/10 with sometimes it being the back and sometimes the leg. He feels like he is able to walk, but pain limits this. He has no loss of bowel or bladder control.     HPI 2/2/24:  Patient is a 78 year old male who returns to [facility] now 8 months status post L2-3 microdiscectomy with a return of left leg and back pain. He was going to PT and they began to work on his quadratus lumborum as they believed this was the issue, and he began to have pain again. This started in October 2023 and has persisted. He thinks it's related to too much weight on the leg press machine. He states the pain is left sided, radiates from his mid back around the front of his thigh and almost to the anterior knee. It doesn't cross the knee. He has no numbness or tingling, but the pain is burning in nature. He states sometimes he cannot lift his leg due to pain, but this is significantly improved by 800mg of advil. He has no saddle anesthesia, no bowel or bladder incontinence or retention. He has had no falls or injuries. He is still able to ride his bike, walk as far as he needs to and stand for prolonged periods of time.     HPI 9/26/23:  Patient is an 78 year old male who presents today for a 3 month post  op visit after an MIS L2-3 microdiscectomy on 6/13/23 doing really well. Physical therapy helped with his pain and left leg weakness. He is to be discharged soon and has plans to continue going to a gym near him. He has no numbness, tingling, or weakness and has no bowel or bladder incontinence or retention. He is happy with his recovery.     HPI 7/25/23:   Patient presents today for a post op follow up 6 weeks status post L2-3 microdiscetomy. He states last week he started noticing left knee and left lower back discomfort. His pain is mainly in the anterior knee, and doesn't involve the thigh or lower leg. He hasn't really been active since surgery, but doesn't have the knee pain at rest. The knee pain is typically when he initially goes to walk after sitting, and lets up after he walks a bit. He notices knee weakness. The lower back pain is worse with his brace rubbing, but is unsure if he feels better without it on. He states he doesn't wear it religiously. The knee and back pain are different from the pain he had prior to surgery. He has no issues with bowel or bladder dysfunction. He has no numbness or tingling in the lower extremities.     Review of Systems   Genitourinary:  Negative for decreased urine volume and difficulty urinating.   Musculoskeletal:  Positive for back pain and gait problem.   Neurological:  Negative for weakness and numbness.       Past Medical History:   Diagnosis Date    Bladder cancer     Followed by Dr. Craig in [facility] polyp 2006    [facility] / [facility]    Glaucoma (increased eye pressure)     HLD (hyperlipidemia)     HTN (hypertension)     Prostate cancer      Social History     Socioeconomic History    Marital status:     Number of children: 2   Occupational History    Occupation:  for law firm   Tobacco Use    Smoking status: Former     Current packs/day: 0.00     Average packs/day: 0.5 packs/day for 3.0 years (1.5 ttl pk-yrs)     Types: Cigarettes      Start date: 1994     Quit date: 1997     Years since quittin.1    Smokeless tobacco: Never   Substance and Sexual Activity    Alcohol use: Yes     Alcohol/week: 1.7 standard drinks of alcohol     Types: 2 drink(s) per week     Comment: weekends    Drug use: No     Social Determinants of Health     Financial Resource Strain: Low Risk  (2024)    Overall Financial Resource Strain (CARDIA)     Difficulty of Paying Living Expenses: Not hard at all   Food Insecurity: No Food Insecurity (2024)    Hunger Vital Sign     Worried About Running Out of Food in the Last Year: Never true     Ran Out of Food in the Last Year: Never true   Transportation Needs: No Transportation Needs (2024)    PRAPARE - Transportation     Lack of Transportation (Medical): No     Lack of Transportation (Non-Medical): No   Physical Activity: Insufficiently Active (2024)    Exercise Vital Sign     Days of Exercise per Week: 2 days     Minutes of Exercise per Session: 20 min   Stress: No Stress Concern Present (2024)    [employer] - Occupational Stress Questionnaire     Feeling of Stress : Only a little   Social Connections: Unknown (2024)    Social Connection and Isolation Panel [NHANES]     Frequency of Communication with Friends and Family: More than three times a week     Frequency of Social Gatherings with Friends and Family: More than three times a week     Active Member of Clubs or Organizations: Yes     Attends Club or Organization Meetings: More than 4 times per year     Marital Status:    Housing Stability: Low Risk  (2024)    Housing Stability Vital Sign     Unable to Pay for Housing in the Last Year: No     Number of Places Lived in the Last Year: 1     Unstable Housing in the Last Year: No     Family History   Problem Relation Age of Onset    COPD Mother         interstitial fibrosis    Coronary artery disease Father     Heart attacks under age 50 Father      Anesthesia problems Neg Hx      Review of patient's allergies indicates:   Allergen Reactions    Bactrim [sulfamethoxazole-trimethoprim] Hives and Rash    Povidone-iodine Other (See Comments)       Current Outpatient Medications:     ascorbic acid, vitamin C, (VITAMIN C) 1000 MG tablet, Take 1,000 mg by mouth nightly., Disp: , Rfl:     brimonidine 0.2% (ALPHAGAN) 0.2 % Drop, Place 1 drop into both eyes 2 (two) times a day., Disp: , Rfl:     carvediloL (COREG) 25 MG tablet, TAKE 1 TABLET TWICE DAILY WITH MEALS, Disp: 180 tablet, Rfl: 3    FLUAD QUAD 2023-24,65Y UP,,PF, 60 mcg (15 mcg x 4)/0.5 mL Syrg, , Disp: , Rfl:     gabapentin (NEURONTIN) 300 MG capsule, Take 1 capsule (300 mg total) by mouth 3 (three) times daily., Disp: 90 capsule, Rfl: 2    ibuprofen (ADVIL,MOTRIN) 200 MG tablet, Take 800 mg by mouth every 8 (eight) hours as needed for Pain (mild)., Disp: , Rfl:     loperamide (IMODIUM A-D) 2 mg Tab, Take 4 mg by mouth daily as needed (loose stool)., Disp: , Rfl:     LUMIGAN 0.01 % Drop, Place 1 drop into both eyes every Mon, Wed, Fri. (at bedtime), Disp: , Rfl:     MULTIVITAMIN W-MINERALS/LUTEIN (CENTRUM SILVER ORAL), Take 1 tablet by mouth every evening.  , Disp: , Rfl:     olmesartan (BENICAR) 40 MG tablet, Take 1 tablet (40 mg total) by mouth every evening. Resume taking on June 20, 2023., Disp: 90 tablet, Rfl: 3    ondansetron (ZOFRAN-ODT) 4 MG TbDL, Take 1 tablet (4 mg total) by mouth every 6 (six) hours as needed (nausea)., Disp: 60 tablet, Rfl: 5    polyethylene glycol (GLYCOLAX) 17 gram PwPk, Take 17 g by mouth 2 (two) times daily as needed., Disp: 14 packet, Rfl: 0    rosuvastatin (CRESTOR) 10 MG tablet, TAKE 1 TABLET ONE TIME DAILY, Disp: 90 tablet, Rfl: 0    solifenacin (VESICARE) 5 MG tablet, , Disp: , Rfl:     tadalafiL (CIALIS) 20 MG Tab, Take 1 tablet (20 mg total) by mouth daily as needed (take 1/4, 1/2, or 1 full tablet as needed for erectile dysfunction)., Disp: 10 tablet, Rfl: 11     "tamsulosin (FLOMAX) 0.4 mg Cap, Take 0.4 mg by mouth once daily., Disp: , Rfl:     timolol maleate 0.5% (TIMOPTIC) 0.5 % Drop, Place 1 drop into both eyes 2 (two) times daily., Disp: , Rfl:   Blood pressure (!) 164/81, pulse 61, temperature 97.8 °F (36.6 °C), resp. rate 18, height 5' 11" (1.803 m), weight 89.2 kg (196 lb 10.4 oz).      Neurologic Exam     Mental Status   Oriented to person, place, and time.   Attention: normal. Concentration: normal.   Speech: speech is normal   Level of consciousness: alert  Knowledge: good.   Normal comprehension.     Cranial Nerves     CN III, IV, VI   Extraocular motions are normal.     CN VII   Facial expression full, symmetric.     CN VIII   Hearing: intact    Motor Exam   Muscle bulk: normal  Overall muscle tone: normal    Strength   Right deltoid: 5/5  Left deltoid: 5/5  Right biceps: 5/5  Left biceps: 5/5  Right triceps: 5/5  Left triceps: 5/5  Right wrist flexion: 5/5  Left wrist flexion: 5/5  Right wrist extension: 5/5  Left wrist extension: 5/5  Right interossei: 5/5  Left interossei: 5/5  Right iliopsoas: 5/5  Right quadriceps: 5/5  Left quadriceps: 5/5  Right hamstrin/5  Left hamstrin/5  Right anterior tibial: 5/5  Left anterior tibial: 5/5  Right posterior tibial: 5/5  Left posterior tibial: 5/5  Right peroneal: 5/5  Left peroneal: 5/5  Right gastroc: 5/5  Left gastroc: 5/5  Left hip flexion 4+/5       Sensory Exam   Light touch normal.     Gait, Coordination, and Reflexes     Gait  Gait: normal    Tremor   Resting tremor: absent  Action tremor: absent    Reflexes   Right brachioradialis: 1+  Left brachioradialis: 1+  Right biceps: 1+  Left biceps: 1+  Right triceps: 1+  Left triceps: 1+  Right patellar: 0  Left patellar: 0  Right achilles: 0  Left achilles: 0  Right plantar: normal  Left plantar: normal  Right ankle clonus: absent  Left ankle clonus: absent      Physical Exam  Vitals and nursing note reviewed.   Eyes:      Extraocular Movements: EOM normal. " "  Neurological:      Mental Status: He is oriented to person, place, and time.      Gait: Gait is intact.      Deep Tendon Reflexes:      Reflex Scores:       Tricep reflexes are 1+ on the right side and 1+ on the left side.       Bicep reflexes are 1+ on the right side and 1+ on the left side.       Brachioradialis reflexes are 1+ on the right side and 1+ on the left side.       Patellar reflexes are 0 on the right side and 0 on the left side.       Achilles reflexes are 0 on the right side and 0 on the left side.  Psychiatric:         Speech: Speech normal.         Vital Signs  Temp: 97.8 °F (36.6 °C)  Pulse: 61  Resp: 18  BP: (!) 164/81  BP Location: Left arm  Patient Position: Sitting  Pain Score:   8  Pain Loc: Back  Height and Weight  Height: 5' 11" (180.3 cm)  Weight: 89.2 kg (196 lb 10.4 oz)  BSA (Calculated - sq m): 2.11 sq meters  BMI (Calculated): 27.4  Weight in (lb) to have BMI = 25: 178.9]    Provider dictation:  MRI lumbar spine dated 2/9/24 is reviewed by Dr. Dallas and discussed with the patient. There is a disc herniation at L2-3, left sided. There is more severe right sided facet arthropathy at L4-5, likely not symptomatic at this time.     We discussed his post operative limitations that may affect his planned vacation, but also his current limitations would affect it too. He has mild left hip flexion weakness.     We are recommending an L2-3 lateral interbody lumbar fusion with revision laminectomy and microdiscectomy. He understands this would be a larger surgery than his previous microdiscectomy with a longer recovery period.     It is safe at this point to proceed with his KORINA as scheduled and wait until after his trip.     If he worsens acutely, he will return sooner. He will monitor for worsening weakness, numbness, loss of bowel or bladder control. He will follow up as scheduled in March after his KORINA.     Visit Diagnosis:  Lumbar radiculopathy        "

## 2024-02-19 NOTE — PROGRESS NOTES
Neurosurgery History and Physical    Patient ID: Lloyd John Jr. is a 78 y.o. male.    Chief Complaint   Patient presents with    Follow-up     Pt was hurting him and pain has become worse, MRI and xray have been completed with results read.     HPI 2/19/24:  Patient is a 78 year old male who returns to clinic to discuss imaging and next steps regarding his low back and leg pain. He is status post L2-3 microdiscectomy June 2023. He denies feelings of weakness. He reports pain shooting from the left low back to the anterior thigh and knee with numbess in the same distribution. He is wanting to attend a family vacation in April and wanted to discuss all options for correction rather than just pain management. His pain does reach a 10/10 with sometimes it being the back and sometimes the leg. He feels like he is able to walk, but pain limits this. He has no loss of bowel or bladder control.     HPI 2/2/24:  Patient is a 78 year old male who returns to [facility] now 8 months status post L2-3 microdiscectomy with a return of left leg and back pain. He was going to PT and they began to work on his quadratus lumborum as they believed this was the issue, and he began to have pain again. This started in October 2023 and has persisted. He thinks it's related to too much weight on the leg press machine. He states the pain is left sided, radiates from his mid back around the front of his thigh and almost to the anterior knee. It doesn't cross the knee. He has no numbness or tingling, but the pain is burning in nature. He states sometimes he cannot lift his leg due to pain, but this is significantly improved by 800mg of advil. He has no saddle anesthesia, no bowel or bladder incontinence or retention. He has had no falls or injuries. He is still able to ride his bike, walk as far as he needs to and stand for prolonged periods of time.     HPI 9/26/23:  Patient is an 78 year old male who presents today for a 3 month post  op visit after an MIS L2-3 microdiscectomy on 6/13/23 doing really well. Physical therapy helped with his pain and left leg weakness. He is to be discharged soon and has plans to continue going to a gym near him. He has no numbness, tingling, or weakness and has no bowel or bladder incontinence or retention. He is happy with his recovery.     HPI 7/25/23:   Patient presents today for a post op follow up 6 weeks status post L2-3 microdiscetomy. He states last week he started noticing left knee and left lower back discomfort. His pain is mainly in the anterior knee, and doesn't involve the thigh or lower leg. He hasn't really been active since surgery, but doesn't have the knee pain at rest. The knee pain is typically when he initially goes to walk after sitting, and lets up after he walks a bit. He notices knee weakness. The lower back pain is worse with his brace rubbing, but is unsure if he feels better without it on. He states he doesn't wear it religiously. The knee and back pain are different from the pain he had prior to surgery. He has no issues with bowel or bladder dysfunction. He has no numbness or tingling in the lower extremities.     Review of Systems   Genitourinary:  Negative for decreased urine volume and difficulty urinating.   Musculoskeletal:  Positive for back pain and gait problem.   Neurological:  Negative for weakness and numbness.       Past Medical History:   Diagnosis Date    Bladder cancer     Followed by Dr. Craig in [facility] polyp 2006    [facility] / [facility]    Glaucoma (increased eye pressure)     HLD (hyperlipidemia)     HTN (hypertension)     Prostate cancer      Social History     Socioeconomic History    Marital status:     Number of children: 2   Occupational History    Occupation:  for law firm   Tobacco Use    Smoking status: Former     Current packs/day: 0.00     Average packs/day: 0.5 packs/day for 3.0 years (1.5 ttl pk-yrs)     Types: Cigarettes      Start date: 1994     Quit date: 1997     Years since quittin.1    Smokeless tobacco: Never   Substance and Sexual Activity    Alcohol use: Yes     Alcohol/week: 1.7 standard drinks of alcohol     Types: 2 drink(s) per week     Comment: weekends    Drug use: No     Social Determinants of Health     Financial Resource Strain: Low Risk  (2024)    Overall Financial Resource Strain (CARDIA)     Difficulty of Paying Living Expenses: Not hard at all   Food Insecurity: No Food Insecurity (2024)    Hunger Vital Sign     Worried About Running Out of Food in the Last Year: Never true     Ran Out of Food in the Last Year: Never true   Transportation Needs: No Transportation Needs (2024)    PRAPARE - Transportation     Lack of Transportation (Medical): No     Lack of Transportation (Non-Medical): No   Physical Activity: Insufficiently Active (2024)    Exercise Vital Sign     Days of Exercise per Week: 2 days     Minutes of Exercise per Session: 20 min   Stress: No Stress Concern Present (2024)    [employer] - Occupational Stress Questionnaire     Feeling of Stress : Only a little   Social Connections: Unknown (2024)    Social Connection and Isolation Panel [NHANES]     Frequency of Communication with Friends and Family: More than three times a week     Frequency of Social Gatherings with Friends and Family: More than three times a week     Active Member of Clubs or Organizations: Yes     Attends Club or Organization Meetings: More than 4 times per year     Marital Status:    Housing Stability: Low Risk  (2024)    Housing Stability Vital Sign     Unable to Pay for Housing in the Last Year: No     Number of Places Lived in the Last Year: 1     Unstable Housing in the Last Year: No     Family History   Problem Relation Age of Onset    COPD Mother         interstitial fibrosis    Coronary artery disease Father     Heart attacks under age 50 Father      Anesthesia problems Neg Hx      Review of patient's allergies indicates:   Allergen Reactions    Bactrim [sulfamethoxazole-trimethoprim] Hives and Rash    Povidone-iodine Other (See Comments)       Current Outpatient Medications:     ascorbic acid, vitamin C, (VITAMIN C) 1000 MG tablet, Take 1,000 mg by mouth nightly., Disp: , Rfl:     brimonidine 0.2% (ALPHAGAN) 0.2 % Drop, Place 1 drop into both eyes 2 (two) times a day., Disp: , Rfl:     carvediloL (COREG) 25 MG tablet, TAKE 1 TABLET TWICE DAILY WITH MEALS, Disp: 180 tablet, Rfl: 3    FLUAD QUAD 2023-24,65Y UP,,PF, 60 mcg (15 mcg x 4)/0.5 mL Syrg, , Disp: , Rfl:     gabapentin (NEURONTIN) 300 MG capsule, Take 1 capsule (300 mg total) by mouth 3 (three) times daily., Disp: 90 capsule, Rfl: 2    ibuprofen (ADVIL,MOTRIN) 200 MG tablet, Take 800 mg by mouth every 8 (eight) hours as needed for Pain (mild)., Disp: , Rfl:     loperamide (IMODIUM A-D) 2 mg Tab, Take 4 mg by mouth daily as needed (loose stool)., Disp: , Rfl:     LUMIGAN 0.01 % Drop, Place 1 drop into both eyes every Mon, Wed, Fri. (at bedtime), Disp: , Rfl:     MULTIVITAMIN W-MINERALS/LUTEIN (CENTRUM SILVER ORAL), Take 1 tablet by mouth every evening.  , Disp: , Rfl:     olmesartan (BENICAR) 40 MG tablet, Take 1 tablet (40 mg total) by mouth every evening. Resume taking on June 20, 2023., Disp: 90 tablet, Rfl: 3    ondansetron (ZOFRAN-ODT) 4 MG TbDL, Take 1 tablet (4 mg total) by mouth every 6 (six) hours as needed (nausea)., Disp: 60 tablet, Rfl: 5    polyethylene glycol (GLYCOLAX) 17 gram PwPk, Take 17 g by mouth 2 (two) times daily as needed., Disp: 14 packet, Rfl: 0    rosuvastatin (CRESTOR) 10 MG tablet, TAKE 1 TABLET ONE TIME DAILY, Disp: 90 tablet, Rfl: 0    solifenacin (VESICARE) 5 MG tablet, , Disp: , Rfl:     tadalafiL (CIALIS) 20 MG Tab, Take 1 tablet (20 mg total) by mouth daily as needed (take 1/4, 1/2, or 1 full tablet as needed for erectile dysfunction)., Disp: 10 tablet, Rfl: 11     "tamsulosin (FLOMAX) 0.4 mg Cap, Take 0.4 mg by mouth once daily., Disp: , Rfl:     timolol maleate 0.5% (TIMOPTIC) 0.5 % Drop, Place 1 drop into both eyes 2 (two) times daily., Disp: , Rfl:   Blood pressure (!) 164/81, pulse 61, temperature 97.8 °F (36.6 °C), resp. rate 18, height 5' 11" (1.803 m), weight 89.2 kg (196 lb 10.4 oz).      Neurologic Exam     Mental Status   Oriented to person, place, and time.   Attention: normal. Concentration: normal.   Speech: speech is normal   Level of consciousness: alert  Knowledge: good.   Normal comprehension.     Cranial Nerves     CN III, IV, VI   Extraocular motions are normal.     CN VII   Facial expression full, symmetric.     CN VIII   Hearing: intact    Motor Exam   Muscle bulk: normal  Overall muscle tone: normal    Strength   Right deltoid: 5/5  Left deltoid: 5/5  Right biceps: 5/5  Left biceps: 5/5  Right triceps: 5/5  Left triceps: 5/5  Right wrist flexion: 5/5  Left wrist flexion: 5/5  Right wrist extension: 5/5  Left wrist extension: 5/5  Right interossei: 5/5  Left interossei: 5/5  Right iliopsoas: 5/5  Right quadriceps: 5/5  Left quadriceps: 5/5  Right hamstrin/5  Left hamstrin/5  Right anterior tibial: 5/5  Left anterior tibial: 5/5  Right posterior tibial: 5/5  Left posterior tibial: 5/5  Right peroneal: 5/5  Left peroneal: 5/5  Right gastroc: 5/5  Left gastroc: 5/5  Left hip flexion 4+/5       Sensory Exam   Light touch normal.     Gait, Coordination, and Reflexes     Gait  Gait: normal    Tremor   Resting tremor: absent  Action tremor: absent    Reflexes   Right brachioradialis: 1+  Left brachioradialis: 1+  Right biceps: 1+  Left biceps: 1+  Right triceps: 1+  Left triceps: 1+  Right patellar: 0  Left patellar: 0  Right achilles: 0  Left achilles: 0  Right plantar: normal  Left plantar: normal  Right ankle clonus: absent  Left ankle clonus: absent      Physical Exam  Vitals and nursing note reviewed.   Eyes:      Extraocular Movements: EOM normal. " "  Neurological:      Mental Status: He is oriented to person, place, and time.      Gait: Gait is intact.      Deep Tendon Reflexes:      Reflex Scores:       Tricep reflexes are 1+ on the right side and 1+ on the left side.       Bicep reflexes are 1+ on the right side and 1+ on the left side.       Brachioradialis reflexes are 1+ on the right side and 1+ on the left side.       Patellar reflexes are 0 on the right side and 0 on the left side.       Achilles reflexes are 0 on the right side and 0 on the left side.  Psychiatric:         Speech: Speech normal.         Vital Signs  Temp: 97.8 °F (36.6 °C)  Pulse: 61  Resp: 18  BP: (!) 164/81  BP Location: Left arm  Patient Position: Sitting  Pain Score:   8  Pain Loc: Back  Height and Weight  Height: 5' 11" (180.3 cm)  Weight: 89.2 kg (196 lb 10.4 oz)  BSA (Calculated - sq m): 2.11 sq meters  BMI (Calculated): 27.4  Weight in (lb) to have BMI = 25: 178.9]    Provider dictation:  MRI lumbar spine dated 2/9/24 is reviewed by Dr. Dallas and discussed with the patient. There is a disc herniation at L2-3, left sided. There is more severe right sided facet arthropathy at L4-5, likely not symptomatic at this time.     We discussed his post operative limitations that may affect his planned vacation, but also his current limitations would affect it too. He has mild left hip flexion weakness.     We are recommending an L2-3 lateral interbody lumbar fusion with revision laminectomy and microdiscectomy. He understands this would be a larger surgery than his previous microdiscectomy with a longer recovery period.     It is safe at this point to proceed with his KORINA as scheduled and wait until after his trip.     If he worsens acutely, he will return sooner. He will monitor for worsening weakness, numbness, loss of bowel or bladder control. He will follow up as scheduled in March after his KORINA.     Visit Diagnosis:  Lumbar radiculopathy        "

## 2024-02-28 ENCOUNTER — TELEPHONE (OUTPATIENT)
Dept: SURGERY | Facility: HOSPITAL | Age: 79
End: 2024-02-28
Payer: MEDICARE

## 2024-02-28 NOTE — TELEPHONE ENCOUNTER
Patient is scheduled for epidural transforaminal approach L2/3 steroid injection, TOMORROW, 2/29/2024. Pt states he took Ibuprofen 200mg this morning on 2/28/2024. Please advise pt if ok to proceed or if they will need to reschedule. Thank you.

## 2024-02-29 ENCOUNTER — HOSPITAL ENCOUNTER (OUTPATIENT)
Dept: RADIOLOGY | Facility: HOSPITAL | Age: 79
Discharge: HOME | End: 2024-02-29
Attending: ANESTHESIOLOGY | Admitting: ANESTHESIOLOGY
Payer: MEDICARE

## 2024-02-29 ENCOUNTER — HOSPITAL ENCOUNTER (OUTPATIENT)
Facility: HOSPITAL | Age: 79
Discharge: HOME | End: 2024-02-29
Attending: ANESTHESIOLOGY | Admitting: ANESTHESIOLOGY
Payer: MEDICARE

## 2024-02-29 DIAGNOSIS — M54.50 LOWER BACK PAIN: ICD-10-CM

## 2024-02-29 DIAGNOSIS — M54.16 LUMBAR RADICULOPATHY: ICD-10-CM

## 2024-02-29 PROCEDURE — 64483 NJX AA&/STRD TFRM EPI L/S 1: CPT | Mod: PO,LT | Performed by: ANESTHESIOLOGY

## 2024-02-29 PROCEDURE — 63600175 PHARM REV CODE 636 W HCPCS: Mod: JZ,JG,PO | Performed by: ANESTHESIOLOGY

## 2024-02-29 PROCEDURE — 25000003 PHARM REV CODE 250: Mod: PO | Performed by: ANESTHESIOLOGY

## 2024-02-29 PROCEDURE — 76000 FLUOROSCOPY <1 HR PHYS/QHP: CPT | Mod: TC,PO

## 2024-02-29 PROCEDURE — 64483 NJX AA&/STRD TFRM EPI L/S 1: CPT | Mod: LT,,, | Performed by: ANESTHESIOLOGY

## 2024-02-29 RX ORDER — BUPIVACAINE HYDROCHLORIDE 2.5 MG/ML
INJECTION, SOLUTION EPIDURAL; INFILTRATION; INTRACAUDAL
Status: DISCONTINUED | OUTPATIENT
Start: 2024-02-29 | End: 2024-02-29 | Stop reason: HOSPADM

## 2024-02-29 RX ORDER — METHYLPREDNISOLONE ACETATE 40 MG/ML
INJECTION, SUSPENSION INTRA-ARTICULAR; INTRALESIONAL; INTRAMUSCULAR; SOFT TISSUE
Status: DISCONTINUED | OUTPATIENT
Start: 2024-02-29 | End: 2024-02-29 | Stop reason: HOSPADM

## 2024-02-29 RX ORDER — LIDOCAINE HYDROCHLORIDE 10 MG/ML
INJECTION, SOLUTION EPIDURAL; INFILTRATION; INTRACAUDAL; PERINEURAL
Status: DISCONTINUED | OUTPATIENT
Start: 2024-02-29 | End: 2024-02-29 | Stop reason: HOSPADM

## 2024-02-29 RX ORDER — ALPRAZOLAM 0.5 MG/1
1 TABLET, ORALLY DISINTEGRATING ORAL ONCE AS NEEDED
Status: COMPLETED | OUTPATIENT
Start: 2024-02-29 | End: 2024-02-29

## 2024-02-29 RX ADMIN — ALPRAZOLAM 1 MG: 0.5 TABLET, ORALLY DISINTEGRATING ORAL at 03:02

## 2024-02-29 NOTE — PROGRESS NOTES
Patient states he has white coat syndrome and his blood pressure is always higher here than it is at home. Patient asymptomatic, will continue to monitor.

## 2024-02-29 NOTE — DISCHARGE SUMMARY
[facility] - Surgery  Discharge Note  Short Stay    Procedure(s) (LRB):  Injection,steroid,epidural,transforaminal approach   L2/3 (Left)      OUTCOME: Patient tolerated treatment/procedure well without complication and is now ready for discharge.    DISPOSITION: Home or Self Care    FINAL DIAGNOSIS:  Lumbar radiculopathy    FOLLOWUP: In clinic    DISCHARGE INSTRUCTIONS:    Discharge Procedure Orders   Diet Adult Regular     No dressing needed     Notify your health care provider if you experience any of the following:  temperature >100.4     Activity as tolerated

## 2024-02-29 NOTE — OP NOTE
PROCEDURE DATE: 2/29/2024    PROCEDURE: Left L2/3 transforaminal epidural steroid injection under fluoroscopy    DIAGNOSIS: Lumbar  Radiculopathy    Post op diagnosis: Same    PHYSICIAN: Fritz Pal MD    MEDICATIONS INJECTED:  Methylprednisolone 40mg (1ml) and 1ml 0.25% bupivicaine at each nerve root.     LOCAL ANESTHETIC INJECTED:  Lidocaine 1%. 3 ml per site.    SEDATION MEDICATIONS: none    ESTIMATED BLOOD LOSS:  none    COMPLICATIONS:  none    TECHNIQUE:   A time-out was taken to identify patient and procedure side prior to starting the procedure. The patient was placed in a prone position, prepped and draped in the usual sterile fashion using ChloraPrep and sterile towels.  The area to be injected was determined under fluoroscopic guidance in AP and oblique view.  Local anesthetic was given by raising a wheal and going down to the hub of a 25-gauge 1.5 inch needle.  In oblique view, a 3.5 inch 22-gauge bent-tip spinal needle was introduced towards 6 oclock position of the pedicle of each above named nerve root level.  The needle was walked medially then hinged into the neural foramen and position was confirmed in AP and lateral views.  No contrast dye was injected due to allergy. After negative aspiration for blood or CSF, the medication was then injected. This was performed at the left L2/3 level(s). The patient tolerated the procedure well.    The patient was monitored after the procedure.  Patient was given post procedure and discharge instructions to follow at home. The patient was discharged in a stable condition.

## 2024-02-29 NOTE — PLAN OF CARE
VSS, all questions answered. Denies recent fever or illness. Pt states ready for procedure. Pt took 400mg of ibuprofen yesterday. MD aware. OK to proceed per MD order. MD aware of pt's elevated BP. Ok to proceed.

## 2024-03-01 VITALS
TEMPERATURE: 97 F | HEART RATE: 67 BPM | SYSTOLIC BLOOD PRESSURE: 180 MMHG | OXYGEN SATURATION: 95 % | RESPIRATION RATE: 18 BRPM | DIASTOLIC BLOOD PRESSURE: 83 MMHG | WEIGHT: 200 LBS | HEIGHT: 71 IN | BODY MASS INDEX: 28 KG/M2

## 2024-03-05 ENCOUNTER — OFFICE VISIT (OUTPATIENT)
Dept: NEUROSURGERY | Facility: CLINIC | Age: 79
End: 2024-03-05
Payer: MEDICARE

## 2024-03-05 VITALS
RESPIRATION RATE: 18 BRPM | SYSTOLIC BLOOD PRESSURE: 166 MMHG | WEIGHT: 200 LBS | HEIGHT: 71 IN | DIASTOLIC BLOOD PRESSURE: 91 MMHG | HEART RATE: 72 BPM | BODY MASS INDEX: 28 KG/M2

## 2024-03-05 DIAGNOSIS — Z98.890 S/P LUMBAR MICRODISCECTOMY: ICD-10-CM

## 2024-03-05 DIAGNOSIS — M54.16 LUMBAR RADICULOPATHY: Primary | ICD-10-CM

## 2024-03-05 PROCEDURE — 3080F DIAST BP >= 90 MM HG: CPT | Mod: CPTII,S$GLB,, | Performed by: NEUROLOGICAL SURGERY

## 2024-03-05 PROCEDURE — 1125F AMNT PAIN NOTED PAIN PRSNT: CPT | Mod: CPTII,S$GLB,, | Performed by: NEUROLOGICAL SURGERY

## 2024-03-05 PROCEDURE — 3288F FALL RISK ASSESSMENT DOCD: CPT | Mod: CPTII,S$GLB,, | Performed by: NEUROLOGICAL SURGERY

## 2024-03-05 PROCEDURE — 3077F SYST BP >= 140 MM HG: CPT | Mod: CPTII,S$GLB,, | Performed by: NEUROLOGICAL SURGERY

## 2024-03-05 PROCEDURE — 1101F PT FALLS ASSESS-DOCD LE1/YR: CPT | Mod: CPTII,S$GLB,, | Performed by: NEUROLOGICAL SURGERY

## 2024-03-05 PROCEDURE — 1159F MED LIST DOCD IN RCRD: CPT | Mod: CPTII,S$GLB,, | Performed by: NEUROLOGICAL SURGERY

## 2024-03-05 PROCEDURE — 99214 OFFICE O/P EST MOD 30 MIN: CPT | Mod: S$GLB,,, | Performed by: NEUROLOGICAL SURGERY

## 2024-03-05 NOTE — PROGRESS NOTES
Neurosurgery History and Physical    Patient ID: Lloyd John Jr. is a 78 y.o. male.    Chief Complaint   Patient presents with    Follow-up     PM follow up      HPI 3/5/24:  Patient presents today for a follow up after his KORINA with Dr. Pal. He feels like the shot has helped his back pain significantly. He still has some leg pain, but it is manageable at this level. He still feels strong and can go up steps and stairs without issues.    HPI 2/19/24:  Patient is a 78 year old male who returns to clinic to discuss imaging and next steps regarding his low back and leg pain. He is status post L2-3 microdiscectomy June 2023. He denies feelings of weakness. He reports pain shooting from the left low back to the anterior thigh and knee with numbess in the same distribution. He is wanting to attend a family vacation in April and wanted to discuss all options for correction rather than just pain management. His pain does reach a 10/10 with sometimes it being the back and sometimes the leg. He feels like he is able to walk, but pain limits this. He has no loss of bowel or bladder control.     HPI 2/2/24:  Patient is a 78 year old male who returns to [facility] now 8 months status post L2-3 microdiscectomy with a return of left leg and back pain. He was going to PT and they began to work on his quadratus lumborum as they believed this was the issue, and he began to have pain again. This started in October 2023 and has persisted. He thinks it's related to too much weight on the leg press machine. He states the pain is left sided, radiates from his mid back around the front of his thigh and almost to the anterior knee. It doesn't cross the knee. He has no numbness or tingling, but the pain is burning in nature. He states sometimes he cannot lift his leg due to pain, but this is significantly improved by 800mg of advil. He has no saddle anesthesia, no bowel or bladder incontinence or retention. He has had no falls or  injuries. He is still able to ride his bike, walk as far as he needs to and stand for prolonged periods of time.     Review of Systems   Genitourinary:  Negative for decreased urine volume and difficulty urinating.   Musculoskeletal:  Positive for back pain and gait problem.   Neurological:  Negative for weakness and numbness.       Past Medical History:   Diagnosis Date    Bladder cancer     Followed by Dr. Craig in [place]    Colon polyp 2006    [facility] / [facility]    Glaucoma (increased eye pressure)     HLD (hyperlipidemia)     HTN (hypertension)     Prostate cancer      Social History     Socioeconomic History    Marital status:     Number of children: 2   Occupational History    Occupation:  for law firm   Tobacco Use    Smoking status: Former     Current packs/day: 0.00     Average packs/day: 0.5 packs/day for 3.0 years (1.5 ttl pk-yrs)     Types: Cigarettes     Start date: 1994     Quit date: 1997     Years since quittin.1    Smokeless tobacco: Never   Substance and Sexual Activity    Alcohol use: Yes     Alcohol/week: 1.7 standard drinks of alcohol     Types: 2 drink(s) per week     Comment: weekends    Drug use: No     Social Determinants of Health     Financial Resource Strain: Low Risk  (2024)    Overall Financial Resource Strain (CARDIA)     Difficulty of Paying Living Expenses: Not hard at all   Food Insecurity: No Food Insecurity (2024)    Hunger Vital Sign     Worried About Running Out of Food in the Last Year: Never true     Ran Out of Food in the Last Year: Never true   Transportation Needs: No Transportation Needs (2024)    PRAPARE - Transportation     Lack of Transportation (Medical): No     Lack of Transportation (Non-Medical): No   Physical Activity: Insufficiently Active (2024)    Exercise Vital Sign     Days of Exercise per Week: 2 days     Minutes of Exercise per Session: 20 min   Stress: No Stress Concern Present (2024)    [place]  [place] [employer] - Occupational Stress Questionnaire     Feeling of Stress : Only a little   Social Connections: Unknown (1/23/2024)    Social Connection and Isolation Panel [NHANES]     Frequency of Communication with Friends and Family: More than three times a week     Frequency of Social Gatherings with Friends and Family: More than three times a week     Active Member of Clubs or Organizations: Yes     Attends Club or Organization Meetings: More than 4 times per year     Marital Status:    Housing Stability: Low Risk  (1/23/2024)    Housing Stability Vital Sign     Unable to Pay for Housing in the Last Year: No     Number of Places Lived in the Last Year: 1     Unstable Housing in the Last Year: No     Family History   Problem Relation Age of Onset    COPD Mother         interstitial fibrosis    Coronary artery disease Father     Heart attacks under age 50 Father     Anesthesia problems Neg Hx      Review of patient's allergies indicates:   Allergen Reactions    Bactrim [sulfamethoxazole-trimethoprim] Hives and Rash    Povidone-iodine Other (See Comments)       Current Outpatient Medications:     ascorbic acid, vitamin C, (VITAMIN C) 1000 MG tablet, Take 1,000 mg by mouth nightly., Disp: , Rfl:     brimonidine 0.2% (ALPHAGAN) 0.2 % Drop, Place 1 drop into both eyes 2 (two) times a day., Disp: , Rfl:     carvediloL (COREG) 25 MG tablet, TAKE 1 TABLET TWICE DAILY WITH MEALS, Disp: 180 tablet, Rfl: 3    FLUAD QUAD 2023-24,65Y UP,,PF, 60 mcg (15 mcg x 4)/0.5 mL Syrg, , Disp: , Rfl:     gabapentin (NEURONTIN) 300 MG capsule, Take 1 capsule (300 mg total) by mouth 3 (three) times daily., Disp: 90 capsule, Rfl: 2    ibuprofen (ADVIL,MOTRIN) 200 MG tablet, Take 800 mg by mouth every 8 (eight) hours as needed for Pain (mild)., Disp: , Rfl:     loperamide (IMODIUM A-D) 2 mg Tab, Take 4 mg by mouth daily as needed (loose stool)., Disp: , Rfl:     LUMIGAN 0.01 % Drop, Place 1 drop into both eyes every  "Mon, Wed, Fri. (at bedtime), Disp: , Rfl:     MULTIVITAMIN W-MINERALS/LUTEIN (CENTRUM SILVER ORAL), Take 1 tablet by mouth every evening.  , Disp: , Rfl:     olmesartan (BENICAR) 40 MG tablet, Take 1 tablet (40 mg total) by mouth every evening. Resume taking on June 20, 2023., Disp: 90 tablet, Rfl: 3    ondansetron (ZOFRAN-ODT) 4 MG TbDL, Take 1 tablet (4 mg total) by mouth every 6 (six) hours as needed (nausea)., Disp: 60 tablet, Rfl: 5    polyethylene glycol (GLYCOLAX) 17 gram PwPk, Take 17 g by mouth 2 (two) times daily as needed., Disp: 14 packet, Rfl: 0    rosuvastatin (CRESTOR) 10 MG tablet, TAKE 1 TABLET ONE TIME DAILY, Disp: 90 tablet, Rfl: 0    solifenacin (VESICARE) 5 MG tablet, , Disp: , Rfl:     tadalafiL (CIALIS) 20 MG Tab, Take 1 tablet (20 mg total) by mouth daily as needed (take 1/4, 1/2, or 1 full tablet as needed for erectile dysfunction)., Disp: 10 tablet, Rfl: 11    timolol maleate 0.5% (TIMOPTIC) 0.5 % Drop, Place 1 drop into both eyes 2 (two) times daily., Disp: , Rfl:   Blood pressure (!) 166/91, pulse 72, resp. rate 18, height 5' 11" (1.803 m), weight 90.7 kg (200 lb).      Neurologic Exam     Mental Status   Oriented to person, place, and time.   Attention: normal. Concentration: normal.   Speech: speech is normal   Level of consciousness: alert  Knowledge: good.   Normal comprehension.     Cranial Nerves     CN III, IV, VI   Extraocular motions are normal.     CN VII   Facial expression full, symmetric.     CN VIII   Hearing: intact    Motor Exam   Muscle bulk: normal  Overall muscle tone: normal    Strength   Right iliopsoas: 5/5  Left iliopsoas: 5/5  Right quadriceps: 5/5  Left quadriceps: 5/5  Right anterior tibial: 5/5  Left anterior tibial: 5/5  Right posterior tibial: 5/5  Left posterior tibial: 5/5  Right peroneal: 5/5  Left peroneal: 5/5  Right gastroc: 5/5  Left gastroc: 5/5    Sensory Exam   Right leg light touch: normal  Left leg light touch: normal    Gait, Coordination, and " "Reflexes     Gait  Gait: normal    Tremor   Resting tremor: absent  Action tremor: absent    Reflexes   Right patellar: 0  Left patellar: 0  Right achilles: 0  Left achilles: 0      Physical Exam  Vitals and nursing note reviewed.   Eyes:      Extraocular Movements: EOM normal.   Neurological:      Mental Status: He is oriented to person, place, and time.      Gait: Gait is intact.      Deep Tendon Reflexes:      Reflex Scores:       Patellar reflexes are 0 on the right side and 0 on the left side.       Achilles reflexes are 0 on the right side and 0 on the left side.  Psychiatric:         Speech: Speech normal.         Vital Signs  Pulse: 72  Resp: 18  BP: (!) 166/91  BP Location: Left arm  Patient Position: Sitting  Pain Score:   2  Pain Loc: Back  Height and Weight  Height: 5' 11" (180.3 cm)  Weight: 90.7 kg (200 lb)  BSA (Calculated - sq m): 2.13 sq meters  BMI (Calculated): 27.9  Weight in (lb) to have BMI = 25: 178.9]    Provider dictation:  At this point it is safe for him to go on his family vacation in April with caution with lifting and carrying. He will continue to monitor his symptoms, especially for weakness. He has an appointment with Dr. Pal to follow up at the end of this month as well.     His strength and sensation have improved with the KORINA, will continue to monitor. If any acute changes occur, he will call us. Otherwise he may continue with pain management and conservative treatment.     Visit Diagnosis:  Lumbar radiculopathy    S/P lumbar microdiscectomy        "

## 2024-03-06 DIAGNOSIS — E78.5 HYPERLIPIDEMIA, UNSPECIFIED HYPERLIPIDEMIA TYPE: ICD-10-CM

## 2024-03-06 NOTE — TELEPHONE ENCOUNTER
No care due was identified.  [facility] Embedded Care Due Messages. Reference number: [id number].   3/06/2024 4:10:22 PM CST

## 2024-03-07 RX ORDER — ROSUVASTATIN CALCIUM 10 MG/1
10 TABLET, COATED ORAL
Qty: 90 TABLET | Refills: 3 | Status: SHIPPED | OUTPATIENT
Start: 2024-03-07

## 2024-03-07 NOTE — TELEPHONE ENCOUNTER
Refill Routing Note   Medication(s) are not appropriate for processing by [facility] for the following reason(s):        Allergy or intolerance    [facility] action(s):  Defer        Medication Therapy Plan: Allergy/Contraindication: rosuvastatin      Appointments  past 12m or future 3m with PCP    Date Provider   Last Visit   1/29/2024 Daniel Avery MD   Next Visit   7/30/2024 Daniel Avery MD   ED visits in past 90 days: 0        Note composed:9:23 PM 03/06/2024

## 2024-03-25 ENCOUNTER — OFFICE VISIT (OUTPATIENT)
Dept: PAIN MEDICINE | Facility: CLINIC | Age: 79
End: 2024-03-25
Payer: MEDICARE

## 2024-03-25 VITALS
HEART RATE: 63 BPM | SYSTOLIC BLOOD PRESSURE: 160 MMHG | HEIGHT: 71 IN | DIASTOLIC BLOOD PRESSURE: 77 MMHG | WEIGHT: 197.88 LBS | BODY MASS INDEX: 27.7 KG/M2

## 2024-03-25 DIAGNOSIS — M51.36 DDD (DEGENERATIVE DISC DISEASE), LUMBAR: ICD-10-CM

## 2024-03-25 DIAGNOSIS — M54.16 LUMBAR RADICULOPATHY: Primary | ICD-10-CM

## 2024-03-25 PROCEDURE — 3078F DIAST BP <80 MM HG: CPT | Mod: HCNC,CPTII,S$GLB, | Performed by: ANESTHESIOLOGY

## 2024-03-25 PROCEDURE — 1159F MED LIST DOCD IN RCRD: CPT | Mod: HCNC,CPTII,S$GLB, | Performed by: ANESTHESIOLOGY

## 2024-03-25 PROCEDURE — 3077F SYST BP >= 140 MM HG: CPT | Mod: HCNC,CPTII,S$GLB, | Performed by: ANESTHESIOLOGY

## 2024-03-25 PROCEDURE — 1101F PT FALLS ASSESS-DOCD LE1/YR: CPT | Mod: HCNC,CPTII,S$GLB, | Performed by: ANESTHESIOLOGY

## 2024-03-25 PROCEDURE — 99999 PR PBB SHADOW E&M-EST. PATIENT-LVL III: CPT | Mod: PBBFAC,HCNC,, | Performed by: ANESTHESIOLOGY

## 2024-03-25 PROCEDURE — 99214 OFFICE O/P EST MOD 30 MIN: CPT | Mod: HCNC,S$GLB,, | Performed by: ANESTHESIOLOGY

## 2024-03-25 PROCEDURE — 1125F AMNT PAIN NOTED PAIN PRSNT: CPT | Mod: HCNC,CPTII,S$GLB, | Performed by: ANESTHESIOLOGY

## 2024-03-25 PROCEDURE — 3288F FALL RISK ASSESSMENT DOCD: CPT | Mod: HCNC,CPTII,S$GLB, | Performed by: ANESTHESIOLOGY

## 2024-03-25 NOTE — PROGRESS NOTES
"This note was completed with dictation software and grammatical errors may exist.    Chief Complaint   Patient presents with    Low-back Pain    Leg Pain     Left leg        HPI: Lloyd John Jr. is a 78 y.o. year old male patient who has a past medical history of Bladder cancer, Colon polyp, Glaucoma (increased eye pressure), HLD (hyperlipidemia), HTN (hypertension), and Prostate cancer. He presents in referral from No ref. provider found for back and left leg pain. He is status post left L2/3 transforaminal injection on 02/29/2024 with 80% improvement.  He reports that the pain is no longer constant, comes and goes depending on activity.  He states that for the most part he is able to do all the activities that he wants to needs to be able to do.  He does not feel any weakness in the left leg.  He had followed up with Dr. Dallas who had noted improvement in strength in the left leg.  They had discussed possible laminectomy and fusion if he develops weakness.    Pain intervention history:He is status post left L2/3 transforaminal injection on 02/29/2024 with 80% improvement.     Spine surgeries:  L2/3 microdiskectomy    Antineuropathics:  Gabapentin 300 3 times daily, is taking this more p.r.n. now  NSAIDs:  Ibuprofen  Physical therapy:  Antidepressants:  Muscle relaxers:  Opioids:  Antiplatelets/Anticoagulants:        ROS:  He reports back pain, joint stiffness, joint pain.  Balance of review of systems is negative    No results found for: "LABA1C", "HGBA1C"    Lab Results   Component Value Date    WBC 4.61 01/18/2024    HGB 15.3 01/18/2024    HCT 44.7 01/18/2024     (H) 01/18/2024     01/18/2024             Past Medical History:   Diagnosis Date    Bladder cancer     Followed by Dr. Craig in [place]    Colon polyp 2006    [facility] / [facility]    Glaucoma (increased eye pressure)     HLD (hyperlipidemia)     HTN (hypertension)     Prostate cancer        Past Surgical History:   Procedure " Laterality Date    BACK SURGERY  08/2019    bone spur excision for sciatica tx    CATARACT EXTRACTION W/  INTRAOCULAR LENS IMPLANT Bilateral     COLONOSCOPY  2006  [facility]    Polyps    COLONOSCOPY  4/5/2012  [facility]    Diverticulosis and spasms.  No polyps.    FLEXIBLE CYSTOSCOPY N/A 9/12/2022    Procedure: CYSTOSCOPY, FLEXIBLE;  Surgeon: Justo Luciano II, MD;  Location: [facility];  Service: Urology;  Laterality: N/A;    LAMINECTOMY USING MINIMALLY INVASIVE TECHNIQUE Left 6/13/2023    Procedure: LAMINECTOMY,MICRODISCECTOMY SPINE, MINIMALLY INVASIVE L2-L3;  Surgeon: Mike Dallas MD;  Location: [facility];  Service: Neurosurgery;  Laterality: Left;    TRANSFORAMINAL EPIDURAL INJECTION OF STEROID Left 2/29/2024    Procedure: Injection,steroid,epidural,transforaminal approach   L2/3;  Surgeon: Fritz Pal MD;  Location: [facility] OR;  Service: Pain Management;  Laterality: Left;    TRANSPERINEAL INSERTION OF PERIPROSTATIC GEL  5/8/2023    Procedure: TRANSPERINEAL INSERTION OF PERIPROSTATIC GEL;  Surgeon: Paulo Spaulding MD;  [address];  Service: Urology;;    TRANSRECTAL BIOPSY OF PROSTATE WITH ULTRASOUND GUIDANCE N/A 9/12/2022    Procedure: BIOPSY, PROSTATE, RECTAL APPROACH, WITH US GUIDANCE;  Surgeon: Justo Luciano II, MD;  Location: [facility];  Service: Urology;  Laterality: N/A;    TRANSRECTAL ULTRASOUND EXAMINATION  5/8/2023    Procedure: ULTRASOUND, RECTAL APPROACH;  Surgeon: Paulo Spaulding MD;  Location: [facility];  Service: Urology;;    TRANSURETHRAL RESECTION OF BLADDER TUMOR      15 surgeries  (1997- current)    Urolift implant  03/08/2017       Social History     Socioeconomic History    Marital status:     Number of children: 2   Occupational History    Occupation:  for law firm   Tobacco Use    Smoking status: Former     Current packs/day: 0.00     Average packs/day: 0.5 packs/day for 3.0 years (1.5 ttl pk-yrs)     Types: Cigarettes     Start  "date: 1994     Quit date: 1997     Years since quittin.2    Smokeless tobacco: Never   Substance and Sexual Activity    Alcohol use: Yes     Alcohol/week: 1.7 standard drinks of alcohol     Types: 2 drink(s) per week     Comment: weekends    Drug use: No     Social Determinants of Health     Financial Resource Strain: Low Risk  (2024)    Overall Financial Resource Strain (CARDIA)     Difficulty of Paying Living Expenses: Not hard at all   Food Insecurity: No Food Insecurity (2024)    Hunger Vital Sign     Worried About Running Out of Food in the Last Year: Never true     Ran Out of Food in the Last Year: Never true   Transportation Needs: No Transportation Needs (2024)    PRAPARE - Transportation     Lack of Transportation (Medical): No     Lack of Transportation (Non-Medical): No   Physical Activity: Insufficiently Active (2024)    Exercise Vital Sign     Days of Exercise per Week: 2 days     Minutes of Exercise per Session: 20 min   Stress: No Stress Concern Present (2024)    [employer] [employer] - Occupational Stress Questionnaire     Feeling of Stress : Only a little   Social Connections: Unknown (2024)    Social Connection and Isolation Panel [NHANES]     Frequency of Communication with Friends and Family: More than three times a week     Frequency of Social Gatherings with Friends and Family: More than three times a week     Active Member of Clubs or Organizations: Yes     Attends Club or Organization Meetings: More than 4 times per year     Marital Status:    Housing Stability: Low Risk  (2024)    Housing Stability Vital Sign     Unable to Pay for Housing in the Last Year: No     Number of Places Lived in the Last Year: 1     Unstable Housing in the Last Year: No         Medications/Allergies: See med card    Vitals:    24 0839   BP: (!) 160/77   Pulse: 63   Weight: 89.8 kg (197 lb 13.8 oz)   Height: 5' 11" (1.803 m)   PainSc:   " 4   PainLoc: Back     Body mass index is 27.6 kg/m².      3/25/2024     8:38 AM 2021     8:27 AM   Last 3 PDI Scores   Pain Disability Index (PDI) 19 22     Physical exam:    Gen: A and O x3, pleasant, well-groomed  Skin: No rashes or obvious lesions  HEENT: PERRLA, no obvious deformities on ears or in canals. Trachea midline.  CVS: Regular rate and rhythm, normal palpable pulses.  Resp:No increased work of breathing, symmetrical chest rise.  Abdomen: Soft, NT/ND.  Musculoskeletal: Able to heel walk, toe walk. No antalgic gait.           Neuro:    Iliopsoas Quadriceps Knee  Flexion Tibialis  anterior Gastro- cnemius EHL   Lower: R 5/5 5/5 5/5 5/5 5/5 5/5    L 5/5 5/5 5/5 5/5 5/5 5/5      Left  Right    Patellar DTR 0+ 0+   Achilles DTR 0+ 0+   Trevino Absent  Absent   Clonus Absent Absent   Babinski Absent Absent     Intact and symmetrical to light touch and pinprick in L1-S1 dermatomes bilaterally.    Lumbar spine:  Lumbar spine: ROM is moderately reduced with flexion extension and oblique extension with no increased pain.    Armand's test causes no increased pain on either side.    Supine straight leg raise is negative bilaterally.    Internal and external rotation of the hip causes no increased pain on either side.  Myofascial exam: No tenderness to palpation across lumbar paraspinous muscles.    Imagin24 MRI L-spine:  Morphology: Prominent new endplate centered marrow edema asymmetric to the left at L2-L3 in the setting of advanced degenerative disc height loss.  Associated Schmorl's nodes but no apparent fractures.  Mild endplate centered marrow edema asymmetric to the right at L5-S1.  No apparent fractures.  Diffuse fatty marrow replacement.   Alignment: Sagittal alignment is within normal limits.  Mild broad dextrocurvature similar to the prior exam.   Cord: Normal in contour, caliber, and signal intensity.  Conus positioning is within normal limits.   Disc levels:   T12-L1: Shallow disc bulging  and mild bilateral facet arthrosis.  The spinal canal and foramina remain patent.   L1-L2: Mild to moderate degenerative disc height loss with shallow disc bulging and mild bilateral facet arthrosis mildly narrowing the spinal canal and subarticular recesses, left greater than right as well as mild left foraminal narrowing.  The right foramen is patent.   L2-L3: Interval decompressive laminectomy and discectomy changes.  Presumed large recurrent left lateralizing disc extrusion with mild cranial migration severely narrowing the thecal sac with mass effect on multiple traversing nerve roots most notably the left subarticular L3 distribution.  Severe facet arthrosis.  There is progressive severe left and mild right foraminal narrowing.  There may be a component of regional scar tissue within the laminectomy bed although nonspecific on this noncontrast MRI exam.   L3-L4: Moderate degenerative disc height loss and desiccation with disc bulging and moderate facet arthrosis with ligamentum flavum thickening producing moderate narrowing of the spinal canal and left foramen.  Mild right foraminal narrowing.  This is similar to the prior exam.   L4-L5: Moderate degenerative disc height loss and desiccation with prominent disc bulging and moderate to severe bilateral facet arthrosis with ligamentum flavum thickening severely narrowing the spinal canal and subarticular recesses right greater than left and contributing to moderate right and mild left foraminal narrowing similar to the prior exam.   L5-S1: Mild degenerative disc height loss and desiccation with disc bulging and severe left/moderate right-sided facet arthrosis and ligamentum flavum thickening producing mild to moderate narrowing of the spinal canal and encroachment of both subarticular recesses as well as mild to moderate left and mild right foraminal narrowing.       Assessment:  Lloyd John Jr. is a 78 y.o. year old male patient who has a past medical  history of Bladder cancer, Colon polyp, Glaucoma (increased eye pressure), HLD (hyperlipidemia), HTN (hypertension), and Prostate cancer. He presents in referral from No ref. provider found for back and left leg pain.     1. Lumbar radiculopathy        2. DDD (degenerative disc disease), lumbar            Plan:  1.  We reviewed his symptoms, reviewed his exam and lumbar spine MRI.  I educated him on anatomy.  At this point, he has not having any weakness and is tolerating his symptoms well.  He is going to follow up as needed for now, we discussed signs to look for in terms of worsening pathology.  He will contact me if the pain worsens, we can repeat the injection if it has significant duration.    Thank you for referring this interesting patient, and I look forward to continuing to collaborate in his care.

## 2024-04-29 RX ORDER — GABAPENTIN 300 MG/1
300 CAPSULE ORAL 3 TIMES DAILY
Qty: 90 CAPSULE | Refills: 2 | Status: SHIPPED | OUTPATIENT
Start: 2024-04-29

## 2024-05-14 ENCOUNTER — PATIENT MESSAGE (OUTPATIENT)
Dept: PAIN MEDICINE | Facility: CLINIC | Age: 79
End: 2024-05-14
Payer: MEDICARE

## 2024-05-16 ENCOUNTER — OFFICE VISIT (OUTPATIENT)
Dept: PAIN MEDICINE | Facility: CLINIC | Age: 79
End: 2024-05-16
Payer: MEDICARE

## 2024-05-16 VITALS
SYSTOLIC BLOOD PRESSURE: 166 MMHG | DIASTOLIC BLOOD PRESSURE: 80 MMHG | WEIGHT: 201.63 LBS | HEIGHT: 71 IN | BODY MASS INDEX: 28.23 KG/M2 | HEART RATE: 59 BPM

## 2024-05-16 DIAGNOSIS — M51.36 DDD (DEGENERATIVE DISC DISEASE), LUMBAR: ICD-10-CM

## 2024-05-16 DIAGNOSIS — M54.16 LUMBAR RADICULOPATHY: Primary | ICD-10-CM

## 2024-05-16 PROCEDURE — 1125F AMNT PAIN NOTED PAIN PRSNT: CPT | Mod: HCNC,CPTII,S$GLB, | Performed by: ANESTHESIOLOGY

## 2024-05-16 PROCEDURE — 3288F FALL RISK ASSESSMENT DOCD: CPT | Mod: HCNC,CPTII,S$GLB, | Performed by: ANESTHESIOLOGY

## 2024-05-16 PROCEDURE — 3079F DIAST BP 80-89 MM HG: CPT | Mod: HCNC,CPTII,S$GLB, | Performed by: ANESTHESIOLOGY

## 2024-05-16 PROCEDURE — 1159F MED LIST DOCD IN RCRD: CPT | Mod: HCNC,CPTII,S$GLB, | Performed by: ANESTHESIOLOGY

## 2024-05-16 PROCEDURE — 1101F PT FALLS ASSESS-DOCD LE1/YR: CPT | Mod: HCNC,CPTII,S$GLB, | Performed by: ANESTHESIOLOGY

## 2024-05-16 PROCEDURE — 99214 OFFICE O/P EST MOD 30 MIN: CPT | Mod: HCNC,S$GLB,, | Performed by: ANESTHESIOLOGY

## 2024-05-16 PROCEDURE — 3077F SYST BP >= 140 MM HG: CPT | Mod: HCNC,CPTII,S$GLB, | Performed by: ANESTHESIOLOGY

## 2024-05-16 PROCEDURE — 99999 PR PBB SHADOW E&M-EST. PATIENT-LVL III: CPT | Mod: PBBFAC,HCNC,, | Performed by: ANESTHESIOLOGY

## 2024-05-16 NOTE — PROGRESS NOTES
This note was completed with dictation software and grammatical errors may exist.    Chief Complaint   Patient presents with    Leg Pain        HPI: Lloyd John Jr. is a 78 y.o. year old male patient who has a past medical history of Bladder cancer, Colon polyp, Glaucoma (increased eye pressure), HLD (hyperlipidemia), HTN (hypertension), and Prostate cancer. He presents in referral from No ref. provider found for back and left leg pain.  The patient returns in follow-up today for left back and left anterior thigh pain.  The patient had previously undergone an L2/3 microdiskectomy but then later had severe left back pain and left leg pain and an MRI demonstrated a new extrusion to the left side at that level.  We had done an injection and he did very well for about 2 months and the pain has gradually returned but is not as severe as previously.  Nonetheless he is taking gabapentin twice daily, was taking 3 times a day but it was causing drowsiness so he discontinued taking so often.  He reports that he has problems with standing and walking due to the pain, worse with sitting too long as well.  He denies any bowel or bladder incontinence, denies any migue weakness but has difficulty going up steps.      Previous history:  He is status post left L2/3 transforaminal injection on 02/29/2024 with 80% improvement.  He reports that the pain is no longer constant, comes and goes depending on activity.  He states that for the most part he is able to do all the activities that he wants to needs to be able to do.  He does not feel any weakness in the left leg.  He had followed up with Dr. Dallas who had noted improvement in strength in the left leg.  They had discussed possible laminectomy and fusion if he develops weakness.    Pain intervention history:He is status post left L2/3 transforaminal injection on 02/29/2024 with 80% improvement.     Spine surgeries:  L2/3 microdiskectomy    Antineuropathics:  Gabapentin  "300 3 times daily, is taking this more p.r.n. now  NSAIDs:  Ibuprofen  Physical therapy:  Antidepressants:  Muscle relaxers:  Opioids:  Antiplatelets/Anticoagulants:        ROS:  He reports back pain, joint stiffness, joint pain.  Balance of review of systems is negative    No results found for: "LABA1C", "HGBA1C"    Lab Results   Component Value Date    WBC 4.61 01/18/2024    HGB 15.3 01/18/2024    HCT 44.7 01/18/2024     (H) 01/18/2024     01/18/2024             Past Medical History:   Diagnosis Date    Bladder cancer     Followed by Dr. Craig in [place]    Colon polyp 2006    [facility] / [facility]    Glaucoma (increased eye pressure)     HLD (hyperlipidemia)     HTN (hypertension)     Prostate cancer        Past Surgical History:   Procedure Laterality Date    BACK SURGERY  08/2019    bone spur excision for sciatica tx    CATARACT EXTRACTION W/  INTRAOCULAR LENS IMPLANT Bilateral     COLONOSCOPY  2006  [facility]/    Polyps    COLONOSCOPY  4/5/2012  [facility]    Diverticulosis and spasms.  No polyps.    FLEXIBLE CYSTOSCOPY N/A 9/12/2022    Procedure: CYSTOSCOPY, FLEXIBLE;  Surgeon: Justo Luciano II, MD;  Location: [facility];  Service: Urology;  Laterality: N/A;    LAMINECTOMY USING MINIMALLY INVASIVE TECHNIQUE Left 6/13/2023    Procedure: LAMINECTOMY,MICRODISCECTOMY SPINE, MINIMALLY INVASIVE L2-L3;  Surgeon: Mike Dallas MD;  Location: [facility];  Service: Neurosurgery;  Laterality: Left;    TRANSFORAMINAL EPIDURAL INJECTION OF STEROID Left 2/29/2024    Procedure: Injection,steroid,epidural,transforaminal approach   L2/3;  Surgeon: Fritz Pal MD;  Location: [facility];  Service: Pain Management;  Laterality: Left;    TRANSPERINEAL INSERTION OF PERIPROSTATIC GEL  5/8/2023    Procedure: TRANSPERINEAL INSERTION OF PERIPROSTATIC GEL;  Surgeon: Paulo Spaulding MD;  Location: [address];  Service: Urology;;    TRANSRECTAL BIOPSY OF PROSTATE WITH ULTRASOUND GUIDANCE N/A 9/12/2022    " Procedure: BIOPSY, PROSTATE, RECTAL APPROACH, WITH US GUIDANCE;  Surgeon: Justo Luciano II, MD;  Location: [facility];  Service: Urology;  Laterality: N/A;    TRANSRECTAL ULTRASOUND EXAMINATION  2023    Procedure: ULTRASOUND, RECTAL APPROACH;  Surgeon: Paulo Spaulding MD;  Location: [facility] OR [address] [place] [address];  Service: Urology;;    TRANSURETHRAL RESECTION OF BLADDER TUMOR      15 surgeries  (- current)    Urolift implant  2017       Social History     Socioeconomic History    Marital status:     Number of children: 2   Occupational History    Occupation:  for law firm   Tobacco Use    Smoking status: Former     Current packs/day: 0.00     Average packs/day: 0.5 packs/day for 3.0 years (1.5 ttl pk-yrs)     Types: Cigarettes     Start date: 1994     Quit date: 1997     Years since quittin.3    Smokeless tobacco: Never   Substance and Sexual Activity    Alcohol use: Yes     Alcohol/week: 1.7 standard drinks of alcohol     Types: 2 drink(s) per week     Comment: weekends    Drug use: No     Social Determinants of Health     Financial Resource Strain: Low Risk  (2024)    Overall Financial Resource Strain (CARDIA)     Difficulty of Paying Living Expenses: Not hard at all   Food Insecurity: No Food Insecurity (2024)    Hunger Vital Sign     Worried About Running Out of Food in the Last Year: Never true     Ran Out of Food in the Last Year: Never true   Transportation Needs: No Transportation Needs (2024)    PRAPARE - Transportation     Lack of Transportation (Medical): No     Lack of Transportation (Non-Medical): No   Physical Activity: Insufficiently Active (2024)    Exercise Vital Sign     Days of Exercise per Week: 2 days     Minutes of Exercise per Session: 20 min   Stress: No Stress Concern Present (2024)    [employer] [place] [employer] - Occupational Stress Questionnaire     Feeling of Stress : Only a little   Housing Stability: Low Risk   "(2024)    Housing Stability Vital Sign     Unable to Pay for Housing in the Last Year: No     Number of Places Lived in the Last Year: 1     Unstable Housing in the Last Year: No         Medications/Allergies: See med card    Vitals:    24 1027   BP: (!) 166/80   Pulse: (!) 59   Weight: 91.4 kg (201 lb 9.8 oz)   Height: 5' 11" (1.803 m)   PainSc:   6   PainLoc: Leg     Body mass index is 28.12 kg/m².      2024    10:28 AM 3/25/2024     8:38 AM 2021     8:27 AM   Last 3 PDI Scores   Pain Disability Index (PDI) 56 19 22     Physical exam:    Gen: A and O x3, pleasant, well-groomed  Skin: No rashes or obvious lesions  HEENT: PERRLA, no obvious deformities on ears or in canals. Trachea midline.  CVS: Regular rate and rhythm, normal palpable pulses.  Resp:No increased work of breathing, symmetrical chest rise.  Abdomen: Soft, NT/ND.  Musculoskeletal: Able to heel walk, toe walk. No antalgic gait.           Neuro:    Iliopsoas Quadriceps Knee  Flexion Tibialis  anterior Gastro- cnemius EHL   Lower: R 5/5 5/5 5/5 5/5 5/5 5/5    L 5/5 4/5 5/5 5/5 5/5 5/5      Left  Right    Patellar DTR 0+ 0+   Achilles DTR 0+ 0+   Trevino Absent  Absent   Clonus Absent Absent   Babinski Absent Absent     Intact and symmetrical to light touch and pinprick in L1-S1 dermatomes bilaterally.    Lumbar spine:  Lumbar spine: ROM is moderately reduced with flexion extension and oblique extension with some increased pain in the back and left anterior thigh.    Armand's test causes no increased pain on either side.    Supine straight leg raise is negative bilaterally.    Internal and external rotation of the hip causes no increased pain on either side.  Myofascial exam: No tenderness to palpation across lumbar paraspinous muscles.    Imagin24 MRI L-spine:  Morphology: Prominent new endplate centered marrow edema asymmetric to the left at L2-L3 in the setting of advanced degenerative disc height loss.  Associated " Schmorl's nodes but no apparent fractures.  Mild endplate centered marrow edema asymmetric to the right at L5-S1.  No apparent fractures.  Diffuse fatty marrow replacement.   Alignment: Sagittal alignment is within normal limits.  Mild broad dextrocurvature similar to the prior exam.   Cord: Normal in contour, caliber, and signal intensity.  Conus positioning is within normal limits.   Disc levels:   T12-L1: Shallow disc bulging and mild bilateral facet arthrosis.  The spinal canal and foramina remain patent.   L1-L2: Mild to moderate degenerative disc height loss with shallow disc bulging and mild bilateral facet arthrosis mildly narrowing the spinal canal and subarticular recesses, left greater than right as well as mild left foraminal narrowing.  The right foramen is patent.   L2-L3: Interval decompressive laminectomy and discectomy changes.  Presumed large recurrent left lateralizing disc extrusion with mild cranial migration severely narrowing the thecal sac with mass effect on multiple traversing nerve roots most notably the left subarticular L3 distribution.  Severe facet arthrosis.  There is progressive severe left and mild right foraminal narrowing.  There may be a component of regional scar tissue within the laminectomy bed although nonspecific on this noncontrast MRI exam.   L3-L4: Moderate degenerative disc height loss and desiccation with disc bulging and moderate facet arthrosis with ligamentum flavum thickening producing moderate narrowing of the spinal canal and left foramen.  Mild right foraminal narrowing.  This is similar to the prior exam.   L4-L5: Moderate degenerative disc height loss and desiccation with prominent disc bulging and moderate to severe bilateral facet arthrosis with ligamentum flavum thickening severely narrowing the spinal canal and subarticular recesses right greater than left and contributing to moderate right and mild left foraminal narrowing similar to the prior exam.    L5-S1: Mild degenerative disc height loss and desiccation with disc bulging and severe left/moderate right-sided facet arthrosis and ligamentum flavum thickening producing mild to moderate narrowing of the spinal canal and encroachment of both subarticular recesses as well as mild to moderate left and mild right foraminal narrowing.       Assessment:  Lloyd John Jr. is a 78 y.o. year old male patient who has a past medical history of Bladder cancer, Colon polyp, Glaucoma (increased eye pressure), HLD (hyperlipidemia), HTN (hypertension), and Prostate cancer. He presents in referral from No ref. provider found for back and left leg pain.     No diagnosis found.      Plan:  1.  We discussed that he does have some slight weakness in his quadriceps, we reviewed his MRI that shows L3 nerve root impingement.  I am going to get him set up for another left L2/3 transforaminal injection.  We discussed that if this helps long-term, we can repeat this as necessary.  If he is developing further weakness or he is not having improvement I would have him follow-up to see Dr. Dallas.

## 2024-05-17 RX ORDER — SOLIFENACIN SUCCINATE 5 MG/1
5 TABLET, FILM COATED ORAL
Qty: 30 TABLET | Refills: 11 | Status: SHIPPED | OUTPATIENT
Start: 2024-05-17 | End: 2024-06-18

## 2024-05-28 ENCOUNTER — PATIENT MESSAGE (OUTPATIENT)
Dept: PAIN MEDICINE | Facility: CLINIC | Age: 79
End: 2024-05-28
Payer: MEDICARE

## 2024-05-28 NOTE — TELEPHONE ENCOUNTER
Physician - Dr Pal    Type of Procedure/Injection - Lumbar Transforaminal Epidural  L2/3           Laterality - Left      Anxiolysis- Local      Need to hold medication - Yes      NSAIDs for 2 days      Clearance needed - No      Follow up - 4 week

## 2024-05-29 DIAGNOSIS — M54.16 LUMBAR RADICULOPATHY: Primary | ICD-10-CM

## 2024-05-29 RX ORDER — ALPRAZOLAM 1 MG/1
1 TABLET, ORALLY DISINTEGRATING ORAL ONCE AS NEEDED
Status: CANCELLED | OUTPATIENT
Start: 2024-05-29 | End: 2035-10-26

## 2024-05-29 NOTE — TELEPHONE ENCOUNTER
Spoke with patient to schedule. Advised to hold NSAIDS x 2 days prior. Pre op information given and follow up appointment scheduled.

## 2024-06-12 ENCOUNTER — HOSPITAL ENCOUNTER (OUTPATIENT)
Facility: HOSPITAL | Age: 79
Discharge: HOME | End: 2024-06-12
Attending: ANESTHESIOLOGY | Admitting: ANESTHESIOLOGY
Payer: MEDICARE

## 2024-06-12 ENCOUNTER — HOSPITAL ENCOUNTER (OUTPATIENT)
Dept: RADIOLOGY | Facility: HOSPITAL | Age: 79
Discharge: HOME | End: 2024-06-12
Attending: ANESTHESIOLOGY | Admitting: ANESTHESIOLOGY
Payer: MEDICARE

## 2024-06-12 VITALS
HEART RATE: 76 BPM | DIASTOLIC BLOOD PRESSURE: 96 MMHG | SYSTOLIC BLOOD PRESSURE: 201 MMHG | WEIGHT: 201 LBS | TEMPERATURE: 97 F | OXYGEN SATURATION: 97 % | RESPIRATION RATE: 16 BRPM | HEIGHT: 71 IN | BODY MASS INDEX: 28.14 KG/M2

## 2024-06-12 DIAGNOSIS — M54.16 LUMBAR RADICULOPATHY: ICD-10-CM

## 2024-06-12 DIAGNOSIS — M54.50 LOWER BACK PAIN: ICD-10-CM

## 2024-06-12 PROCEDURE — 64483 NJX AA&/STRD TFRM EPI L/S 1: CPT | Mod: HCNC,LT,, | Performed by: ANESTHESIOLOGY

## 2024-06-12 PROCEDURE — 25000003 PHARM REV CODE 250: Mod: HCNC,PO | Performed by: ANESTHESIOLOGY

## 2024-06-12 PROCEDURE — 64483 NJX AA&/STRD TFRM EPI L/S 1: CPT | Mod: HCNC,PO,LT | Performed by: ANESTHESIOLOGY

## 2024-06-12 PROCEDURE — 76000 FLUOROSCOPY <1 HR PHYS/QHP: CPT | Mod: TC,HCNC,PO

## 2024-06-12 PROCEDURE — 63600175 PHARM REV CODE 636 W HCPCS: Mod: HCNC,PO | Performed by: ANESTHESIOLOGY

## 2024-06-12 RX ORDER — BUPIVACAINE HYDROCHLORIDE 2.5 MG/ML
INJECTION, SOLUTION EPIDURAL; INFILTRATION; INTRACAUDAL
Status: DISCONTINUED | OUTPATIENT
Start: 2024-06-12 | End: 2024-06-12 | Stop reason: HOSPADM

## 2024-06-12 RX ORDER — LIDOCAINE HYDROCHLORIDE 10 MG/ML
INJECTION, SOLUTION EPIDURAL; INFILTRATION; INTRACAUDAL; PERINEURAL
Status: DISCONTINUED | OUTPATIENT
Start: 2024-06-12 | End: 2024-06-12 | Stop reason: HOSPADM

## 2024-06-12 RX ORDER — ALPRAZOLAM 0.5 MG/1
1 TABLET, ORALLY DISINTEGRATING ORAL ONCE AS NEEDED
Status: COMPLETED | OUTPATIENT
Start: 2024-06-12 | End: 2024-06-12

## 2024-06-12 RX ORDER — METHYLPREDNISOLONE ACETATE 40 MG/ML
INJECTION, SUSPENSION INTRA-ARTICULAR; INTRALESIONAL; INTRAMUSCULAR; SOFT TISSUE
Status: DISCONTINUED | OUTPATIENT
Start: 2024-06-12 | End: 2024-06-12 | Stop reason: HOSPADM

## 2024-06-12 RX ADMIN — ALPRAZOLAM 0.5 MG: 0.5 TABLET, ORALLY DISINTEGRATING ORAL at 03:06

## 2024-06-12 NOTE — OP NOTE
PROCEDURE DATE: 6/12/2024    PROCEDURE: Left L2/3 transforaminal epidural steroid injection under fluoroscopy    DIAGNOSIS: Lumbar  Radiculopathy    Post op diagnosis: Same    PHYSICIAN: Fritz Pal MD    MEDICATIONS INJECTED:  Methylprednisolone 40mg (1ml) and 1ml 0.25% bupivicaine at each nerve root.     LOCAL ANESTHETIC INJECTED:  Lidocaine 1%. 4 ml per site.    SEDATION MEDICATIONS: none    ESTIMATED BLOOD LOSS:  none    COMPLICATIONS:  none    TECHNIQUE:   A time-out was taken to identify patient and procedure side prior to starting the procedure. The patient was placed in a prone position, prepped and draped in the usual sterile fashion using ChloraPrep and sterile towels.  The area to be injected was determined under fluoroscopic guidance in AP and oblique view.  Local anesthetic was given by raising a wheal and going down to the hub of a 25-gauge 1.5 inch needle.  In oblique view, a 3.5 inch 22-gauge bent-tip spinal needle was introduced towards 6 oclock position of the pedicle of each above named nerve root level.  The needle was walked medially then hinged into the neural foramen and position was confirmed in AP and lateral views.  No contrast dye used due to injection.  After negative aspiration for blood or CSF, the medication was then injected. This was performed at the left L2/3 level(s). The patient tolerated the procedure well.    The patient was monitored after the procedure.  Patient was given post procedure and discharge instructions to follow at home. The patient was discharged in a stable condition.

## 2024-06-18 RX ORDER — SOLIFENACIN SUCCINATE 5 MG/1
5 TABLET, FILM COATED ORAL
Qty: 30 TABLET | Refills: 11 | Status: SHIPPED | OUTPATIENT
Start: 2024-06-18

## 2024-06-19 ENCOUNTER — TELEPHONE (OUTPATIENT)
Dept: CARDIOLOGY | Facility: CLINIC | Age: 79
End: 2024-06-19
Payer: MEDICARE

## 2024-06-20 ENCOUNTER — PATIENT MESSAGE (OUTPATIENT)
Dept: FAMILY MEDICINE | Facility: CLINIC | Age: 79
End: 2024-06-20
Payer: MEDICARE

## 2024-07-03 ENCOUNTER — OFFICE VISIT (OUTPATIENT)
Dept: CARDIOLOGY | Facility: CLINIC | Age: 79
End: 2024-07-03
Payer: MEDICARE

## 2024-07-03 VITALS
WEIGHT: 199.31 LBS | BODY MASS INDEX: 27.9 KG/M2 | SYSTOLIC BLOOD PRESSURE: 166 MMHG | HEIGHT: 71 IN | HEART RATE: 62 BPM | DIASTOLIC BLOOD PRESSURE: 86 MMHG

## 2024-07-03 DIAGNOSIS — E78.2 MIXED HYPERLIPIDEMIA: ICD-10-CM

## 2024-07-03 DIAGNOSIS — I35.1 AORTIC VALVE INSUFFICIENCY, ETIOLOGY OF CARDIAC VALVE DISEASE UNSPECIFIED: ICD-10-CM

## 2024-07-03 DIAGNOSIS — Z13.6 ENCOUNTER FOR SCREENING FOR CARDIOVASCULAR DISORDERS: Primary | ICD-10-CM

## 2024-07-03 DIAGNOSIS — I10 PRIMARY HYPERTENSION: ICD-10-CM

## 2024-07-03 PROCEDURE — 99999 PR PBB SHADOW E&M-EST. PATIENT-LVL III: CPT | Mod: PBBFAC,HCNC,,

## 2024-07-03 NOTE — PROGRESS NOTES
[facility]  Date: 7/3/24    Patient: Lloyd John Jr., 1945, [id number]  Primary Care Provider: Daniel Avery MD     Chief Complaint: Follow up     Subjective:       Lloyd oJhn Jr. is a 78 y.o. male who presents for follow up. They follow with Dr. Hernandez.    CBC, CMP, lipid panel stable. . Average -130 at home. Denies chest discomfort, dyspnea, palpitations, dizziness, syncope, orthopnea, PND, edema, HA, vision changes.    Focused Past History includes:  Hypertension  TTE 5/2022: LVEF 65%. Mild TR. PASP 31.   TTE 6/2023: LVEF 45%. Mild AR, mild AS (Vmax 1.27, MG 4, LORI 1.82). PASP 25.  TTE 10/2023: LVEF 45-50%. Mod AR, mild MR, mild TR. PASP 39.   Hyperlipidemia  Patient reports normal stress test > 30 years ago  Obstructive sleep apnea on CPAP  History of prostate cancer   History of bladder cancer   Family history of heart   Father with MI at age 50   Two paternal uncles with MI at age < 55 y.o     Current Outpatient Medications   Medication Sig    ascorbic acid, vitamin C, (VITAMIN C) 1000 MG tablet Take 1,000 mg by mouth nightly.    brimonidine 0.2% (ALPHAGAN) 0.2 % Drop Place 1 drop into both eyes 2 (two) times a day.    carvediloL (COREG) 25 MG tablet TAKE 1 TABLET TWICE DAILY WITH MEALS    gabapentin (NEURONTIN) 300 MG capsule TAKE 1 CAPSULE(300 MG) BY MOUTH THREE TIMES DAILY    ibuprofen (ADVIL,MOTRIN) 200 MG tablet Take 800 mg by mouth every 8 (eight) hours as needed for Pain (mild).    loperamide (IMODIUM A-D) 2 mg Tab Take 4 mg by mouth daily as needed (loose stool).    LUMIGAN 0.01 % Drop Place 1 drop into both eyes every Mon, Wed, Fri. (at bedtime)    MULTIVITAMIN W-MINERALS/LUTEIN (CENTRUM SILVER ORAL) Take 1 tablet by mouth every evening.      olmesartan (BENICAR) 40 MG tablet Take 1 tablet (40 mg total) by mouth every evening. Resume taking on June 20, 2023.    ondansetron (ZOFRAN-ODT) 4 MG TbDL Take 1 tablet (4 mg total) by mouth every  6 (six) hours as needed (nausea).    rosuvastatin (CRESTOR) 10 MG tablet TAKE 1 TABLET EVERY DAY    solifenacin (VESICARE) 5 MG tablet TAKE 1 TABLET(5 MG) BY MOUTH EVERY DAY    tadalafiL (CIALIS) 20 MG Tab Take 1 tablet (20 mg total) by mouth daily as needed (take 1/4, 1/2, or 1 full tablet as needed for erectile dysfunction).    timolol maleate 0.5% (TIMOPTIC) 0.5 % Drop Place 1 drop into both eyes 2 (two) times daily.    FLUAD QUAD 2023-24,65Y UP,,PF, 60 mcg (15 mcg x 4)/0.5 mL Syrg  (Patient not taking: Reported on 7/3/2024)    polyethylene glycol (GLYCOLAX) 17 gram PwPk Take 17 g by mouth 2 (two) times daily as needed. (Patient not taking: Reported on 7/3/2024)     No current facility-administered medications for this visit.            Objective       Review of Systems  Constitutional: negative for fevers, night sweats, and weight loss  Eyes: negative for visual disturbance, diplopia  Respiratory: negative for cough, hemoptysis, sputum, and wheezing  Cardiovascular: see HPI  Gastrointestinal: negative for abdominal pain, bright red blood per rectum, change in bowel habits, dysphagia, melena, and reflux symptoms  Genitourinary:negative for dysuria, frequency, and hematuria  Hematologic/lymphatic: negative for bleeding, easy bruising, and lymphadenopathy  Musculoskeletal:negative for arthralgias, back pain, and myalgias  Neurological: negative for gait problems, paresthesia, speech problems, vertigo, and weakness  Behavioral/Psych: negative for excessive alcohol consumption, illegal drug usage, and sleep disturbance    -------------------------------------    Bladder cancer    Followed by Dr. Craig in [place]    Colon polyp    [facility] Payton / [facility]    Glaucoma (increased eye pressure)    HLD (hyperlipidemia)    HTN (hypertension)    Prostate cancer     ----------------------------    Back surgery    bone spur excision for sciatica tx    Cataract extraction w/  intraocular lens implant    Colonoscopy    Polyps     Colonoscopy    Diverticulosis and spasms.  No polyps.    Flexible cystoscopy    Procedure: CYSTOSCOPY, FLEXIBLE;  Surgeon: Justo Luciano II, MD;  Location: [facility];  Service: Urology;  Laterality: N/A;    Laminectomy using minimally invasive technique    Procedure: LAMINECTOMY,MICRODISCECTOMY SPINE, MINIMALLY INVASIVE L2-L3;  Surgeon: Mike Dallas MD;  Location: [facility];  Service: Neurosurgery;  Laterality: Left;    Transforaminal epidural injection of steroid    Procedure: Injection,steroid,epidural,transforaminal approach   L2/3;  Surgeon: Fritz Pal MD;  Location: [facility] OR;  Service: Pain Management;  Laterality: Left;    Transforaminal epidural injection of steroid    Procedure: Injection,steroid,epidural,transforaminal approach  L2/3;  Surgeon: Fritz Pal MD;  Location: [facility] OR;  Service: Pain Management;  Laterality: Left;    Transperineal insertion of periprostatic gel    Procedure: TRANSPERINEAL INSERTION OF PERIPROSTATIC GEL;  Surgeon: Paulo Spaulding MD;  Location: [facility] OR [address] [place] [address];  Service: Urology;;    Transrectal biopsy of prostate with ultrasound guidance    Procedure: BIOPSY, PROSTATE, RECTAL APPROACH, WITH US GUIDANCE;  Surgeon: Justo Luciano II, MD;  Location: [facility];  Service: Urology;  Laterality: N/A;    Transrectal ultrasound examination    Procedure: ULTRASOUND, RECTAL APPROACH;  Surgeon: Paulo Spaulding MD;  Location: [address];  Service: Urology;;    Transurethral resection of bladder tumor    15 surgeries  (1997- current)    Urolift implant        Family History   Problem Relation Name Age of Onset    COPD Mother          interstitial fibrosis    Coronary artery disease Father      Heart attacks under age 50 Father      Anesthesia problems Neg Hx       Social History     Tobacco Use    Smoking status: Former     Current packs/day: 0.00     Average packs/day: 0.5 packs/day for 3.0 years (1.5 ttl pk-yrs)     Types: Cigarettes     Start date:  "1994     Quit date: 1997     Years since quittin.5    Smokeless tobacco: Never   Substance Use Topics    Alcohol use: Yes     Alcohol/week: 1.7 standard drinks of alcohol     Types: 2 drink(s) per week     Comment: weekends    Drug use: No       Physical Exam  BP (!) 166/86   Pulse 62   Ht 5' 11" (1.803 m)   Wt 90.4 kg (199 lb 4.7 oz)   BMI 27.80 kg/m²   Body surface area is 2.13 meters squared.  Body mass index is 27.8 kg/m².    General appearance: alert, appears stated age, cooperative, and no distress  Head: Normocephalic, without obvious abnormality, atraumatic  Neck: no carotid bruit, no JVD, and supple, symmetrical, trachea midline  Lungs: clear to auscultation bilaterally  Heart: regular rate and rhythm; S1, S2 normal, no murmur, click, rub or gallop  Abdomen: soft, non-tender, no distended  Extremities: extremities atraumatic, no pitting edema  Skin: warm, no cyanosis, no pathologic ecchymosis in exposed portions  Neurologic: Grossly normal. A&O x3      Lab Review   Lab Results   Component Value Date    WBC 4.61 2024    HGB 15.3 2024    HCT 44.7 2024     (H) 2024     2024         BMP  Lab Results   Component Value Date     2024    K 4.2 2024     2024    CO2 24 2024    BUN 20 2024    CREATININE 1.1 2024    CALCIUM 9.9 2024    ANIONGAP 8 2024    ESTGFRAFRICA >60.0 2022    EGFRNONAA >60.0 2022       Lab Results   Component Value Date    ALBUMIN 4.3 2024       Lab Results   Component Value Date    ALT 31 2024    AST 33 2024    ALKPHOS 52 (L) 2024    BILITOT 1.9 (H) 2024       Lab Results   Component Value Date    TSH 2.092 2022       Lab Results   Component Value Date    CHOL 145 2024    CHOL 121 2023    CHOL 160 2022     Lab Results   Component Value Date    HDL 51 2024    HDL 35 (L) 2023    HDL 49 2022 "     Lab Results   Component Value Date    LDLCALC 60.8 (L) 01/18/2024    LDLCALC 58.2 (L) 06/20/2023    LDLCALC 78.0 12/14/2022     Lab Results   Component Value Date    TRIG 166 (H) 01/18/2024    TRIG 139 06/20/2023    TRIG 165 (H) 12/14/2022     Lab Results   Component Value Date    CHOLHDL 35.2 01/18/2024    CHOLHDL 28.9 06/20/2023    CHOLHDL 30.6 12/14/2022                Assessment & Plan:     This is a 78 y.o. male with HTN, HLD. No active cardiac complaints.     1. Encounter for cardiovascular disease screening  - No anginal equivalents  - Family history of MI on father's side   - Recent reduction in EF on ECHO (65% in 5/2022 --> 45% in 10/2023)  - Coronary artery calcium CT scan   - If significantly elevated CAC score, consider ischemic workup with nuclear stress test      2. Aortic valve regurgitation  - TTE 10/2023: Mod AR  - Repeat TTE prior to next visit     3. Primary hypertension  - Controlled at home per patient report  - Continue coreg 25 mg BID  - Continue olmesartan 40 mg qd   - Maintain BP log at home- if persistently >140/90, may consider adding another antihypertensive      4. Mixed hyperlipidemia  - Continue crestor 10 mg qd        Emphasized the importance of modifying lifestyle related risk factors including limiting alcohol intake, exercise, diet most resembling a Mediterranean diet.    Please follow up in 9 months with repeat ECHO. Will contact patient with CAC results.       Miesha Geronimo PA-C  [facility]  Office: [phone]

## 2024-07-08 ENCOUNTER — LAB VISIT (OUTPATIENT)
Dept: LAB | Facility: HOSPITAL | Age: 79
End: 2024-07-08
Attending: RADIOLOGY
Payer: MEDICARE

## 2024-07-08 DIAGNOSIS — C61 PROSTATE CANCER: ICD-10-CM

## 2024-07-08 DIAGNOSIS — E78.5 HYPERLIPIDEMIA, UNSPECIFIED HYPERLIPIDEMIA TYPE: ICD-10-CM

## 2024-07-08 DIAGNOSIS — I10 PRIMARY HYPERTENSION: ICD-10-CM

## 2024-07-08 LAB
ALBUMIN SERPL BCP-MCNC: 4.2 G/DL (ref 3.5–5.2)
ALP SERPL-CCNC: 53 U/L (ref 55–135)
ALT SERPL W/O P-5'-P-CCNC: 29 U/L (ref 10–44)
ANION GAP SERPL CALC-SCNC: 10 MMOL/L (ref 8–16)
AST SERPL-CCNC: 30 U/L (ref 10–40)
BASOPHILS # BLD AUTO: 0.04 K/UL (ref 0–0.2)
BASOPHILS NFR BLD: 0.9 % (ref 0–1.9)
BILIRUB SERPL-MCNC: 1.2 MG/DL (ref 0.1–1)
BUN SERPL-MCNC: 23 MG/DL (ref 8–23)
CALCIUM SERPL-MCNC: 9.6 MG/DL (ref 8.7–10.5)
CHLORIDE SERPL-SCNC: 102 MMOL/L (ref 95–110)
CHOLEST SERPL-MCNC: 146 MG/DL (ref 120–199)
CHOLEST/HDLC SERPL: 2.8 {RATIO} (ref 2–5)
CO2 SERPL-SCNC: 25 MMOL/L (ref 23–29)
COMPLEXED PSA SERPL-MCNC: 1.7 NG/ML (ref 0–4)
CREAT SERPL-MCNC: 1.2 MG/DL (ref 0.5–1.4)
DIFFERENTIAL METHOD BLD: ABNORMAL
EOSINOPHIL # BLD AUTO: 0.1 K/UL (ref 0–0.5)
EOSINOPHIL NFR BLD: 2.4 % (ref 0–8)
ERYTHROCYTE [DISTWIDTH] IN BLOOD BY AUTOMATED COUNT: 12.3 % (ref 11.5–14.5)
EST. GFR  (NO RACE VARIABLE): >60 ML/MIN/1.73 M^2
GLUCOSE SERPL-MCNC: 114 MG/DL (ref 70–110)
HCT VFR BLD AUTO: 40.3 % (ref 40–54)
HDLC SERPL-MCNC: 53 MG/DL (ref 40–75)
HDLC SERPL: 36.3 % (ref 20–50)
HGB BLD-MCNC: 14.7 G/DL (ref 14–18)
IMM GRANULOCYTES # BLD AUTO: 0.01 K/UL (ref 0–0.04)
IMM GRANULOCYTES NFR BLD AUTO: 0.2 % (ref 0–0.5)
LDLC SERPL CALC-MCNC: 55.4 MG/DL (ref 63–159)
LYMPHOCYTES # BLD AUTO: 1.2 K/UL (ref 1–4.8)
LYMPHOCYTES NFR BLD: 25.4 % (ref 18–48)
MCH RBC QN AUTO: 34.8 PG (ref 27–31)
MCHC RBC AUTO-ENTMCNC: 36.5 G/DL (ref 32–36)
MCV RBC AUTO: 95 FL (ref 82–98)
MONOCYTES # BLD AUTO: 0.8 K/UL (ref 0.3–1)
MONOCYTES NFR BLD: 16.7 % (ref 4–15)
NEUTROPHILS # BLD AUTO: 2.6 K/UL (ref 1.8–7.7)
NEUTROPHILS NFR BLD: 54.4 % (ref 38–73)
NONHDLC SERPL-MCNC: 93 MG/DL
NRBC BLD-RTO: 0 /100 WBC
PLATELET # BLD AUTO: 171 K/UL (ref 150–450)
PMV BLD AUTO: 9.9 FL (ref 9.2–12.9)
POTASSIUM SERPL-SCNC: 4.1 MMOL/L (ref 3.5–5.1)
PROT SERPL-MCNC: 6.8 G/DL (ref 6–8.4)
RBC # BLD AUTO: 4.23 M/UL (ref 4.6–6.2)
SODIUM SERPL-SCNC: 137 MMOL/L (ref 136–145)
TRIGL SERPL-MCNC: 188 MG/DL (ref 30–150)
WBC # BLD AUTO: 4.68 K/UL (ref 3.9–12.7)

## 2024-07-08 PROCEDURE — 85025 COMPLETE CBC W/AUTO DIFF WBC: CPT | Mod: HCNC,PN | Performed by: FAMILY MEDICINE

## 2024-07-08 PROCEDURE — 84153 ASSAY OF PSA TOTAL: CPT | Mod: HCNC | Performed by: RADIOLOGY

## 2024-07-08 PROCEDURE — 80053 COMPREHEN METABOLIC PANEL: CPT | Mod: HCNC,PN | Performed by: FAMILY MEDICINE

## 2024-07-08 PROCEDURE — 36415 COLL VENOUS BLD VENIPUNCTURE: CPT | Mod: HCNC,PN | Performed by: RADIOLOGY

## 2024-07-08 PROCEDURE — 80061 LIPID PANEL: CPT | Mod: HCNC | Performed by: FAMILY MEDICINE

## 2024-07-09 ENCOUNTER — OFFICE VISIT (OUTPATIENT)
Dept: RADIATION ONCOLOGY | Facility: CLINIC | Age: 79
End: 2024-07-09
Payer: MEDICARE

## 2024-07-09 VITALS
OXYGEN SATURATION: 96 % | RESPIRATION RATE: 16 BRPM | DIASTOLIC BLOOD PRESSURE: 82 MMHG | TEMPERATURE: 98 F | HEART RATE: 61 BPM | BODY MASS INDEX: 27.52 KG/M2 | WEIGHT: 197.31 LBS | SYSTOLIC BLOOD PRESSURE: 174 MMHG

## 2024-07-09 DIAGNOSIS — C61 PROSTATE CANCER: Primary | ICD-10-CM

## 2024-07-09 PROCEDURE — 99999 PR PBB SHADOW E&M-EST. PATIENT-LVL III: CPT | Mod: PBBFAC,HCNC,, | Performed by: RADIOLOGY

## 2024-07-09 NOTE — PROGRESS NOTES
[facility] Department of Radiation Oncology  Follow Up Visit Note    Diagnosis:  Lloyd John Jr. is a 78 y.o. 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  He completed a course of radiation therapy on 6/5/23.      Oncologic History:  Oncology History   Prostate cancer   1/14/2013 Initial Diagnosis    Prostate cancer     4/11/2023 Cancer Staged    Staging form: Prostate, AJCC 7th Edition  - Clinical stage from 4/11/2023: Stage IIA (T1c, N0, M0, PSA: 10 to 19, Victor <= 6)     5/23/2023 - 6/5/2023 Radiation Therapy    Treating physician: Chrissie Rojas  Total Dose: 36.25 Gy  Fractions: 5  Treatment Site Ref. ID Energy Dose/Fx (Gy) #Fx Dose Correction (Gy) Total Dose (Gy) Start Date End Date Elapsed Days   SBRT Prostate PTV 6X 7.25 5 / 5 0 36.25 5/23/2023 6/5/2023 13             Interval History  The patient presents today for a regularly scheduled follow up visit.  He was last seen in our clinic on 1/26/24.   Since that time, interval PSA on 7/8/24 was 1.7.  Reports back is doing well- follows with pain management.  Recent mechanical fall.      PSA Diagnostic   Latest Ref Rng 0.00 - 4.00 ng/mL   8/24/2022 11.2 (H)    3/13/2023 16.8 (H)    7/18/2023 6.3 (H)    1/18/2024 3.6    7/8/2024 1.7       Legend:  (H) High      HPI: The patient is a 77 year old male with a diagnosis of prostate cancer.  He also has a history of bladder cancer treated with BGC and Thiotepa in 1997.  Also history of Urolift. He was noted to have an elevated PSA of 16.8 on 3/13/23.  Previous PSA history as follows:       Latest Reference Range & Units Most Recent 11/02/15 07:50 08/24/22 14:01 03/13/23 07:23   PSA Diagnostic 0.00 - 4.00 ng/mL 16.8 (H)  3/13/23 07:23   11.2 (H) 16.8 (H)   PSA Total 0.00 - 4.00 ng/mL 7.2 (H)  11/2/15 07:50 7.2 (H)       (H): Data is abnormally high     1/19/22 MRI prostate noted 2 highly concerning lesions: 1.1cm in the medial and  lateral segments of the left PZ base, PI-RADS 4; 5mm lesion lateral segment right PZ apex, PI-RADS 4.     On 9/12/22 he underwent UroNav TRUS-guided biopsy of the prostate, with pathology as follows:     Target 1: Group 1 in 32% of tissue  Target 2: Group 1 in 62% of tissue  Left mid: Group 1, 7% of tissue  Left apex: Group 1, 6% of tissue  Right mid: Group 1, 15% of tissue  Right base: Group 1, 11% of tissue  No PNI, # involved cores not given     Previous biopsies:  Biopsies  5/2016 - GG1 LA 1/2 (total cores 1/15)  10/2017 - GG2 LLM 1/1, GG1 RA 1/1 (total cores 2/12)  7/2019 - GG1 LA 1/1, RA 1/2, LM 1/1, LA 1/1 (total cores 4/14)     Decipher score: 0.14- low genomic risk     Prostate size estimated at 60cc.  He presents today to discuss the potential role of radiation therapy in his cancer care.  IPSS is 2, with no frequency, no urgency, and nocturia x 1.       History of prior irradiation: No  History of prior systemic anti-cancer therapy: No  History of collagen vascular disease: No  Implanted electronic device (pacer/defib/nerve stimulator): No     Presented to ED 6/13/23 c/o back/left leg pain and weakness, fecal incontinence. Outside MRI showed severe canal stenosis at L2-3.     6/13/23 L2-3 urgent/emergent microdiscectomy with Dr. Haines. Doing much better now. Pain relieved, return of continence. Seen earlier to day in Neurosurg clinic for follow up. Plan for PT.    Review of Systems   Review of Systems   Constitutional:  Negative for chills, fever and malaise/fatigue.   Gastrointestinal:  Negative for blood in stool, constipation, diarrhea, nausea and vomiting.   Genitourinary:  Negative for dysuria.   Musculoskeletal:  Positive for back pain (at surgical site) and joint pain (left knee pain x 1 week). Negative for myalgias.   Neurological:  Negative for dizziness, sensory change, weakness and headaches.       IPSS today is 2 (2 at last visit)    Social History:  Social History     Tobacco Use     Smoking status: Former     Current packs/day: 0.00     Average packs/day: 0.5 packs/day for 3.0 years (1.5 ttl pk-yrs)     Types: Cigarettes     Start date: 1994     Quit date: 1997     Years since quittin.5    Smokeless tobacco: Never   Substance Use Topics    Alcohol use: Yes     Alcohol/week: 1.7 standard drinks of alcohol     Types: 2 drink(s) per week     Comment: weekends    Drug use: No       Family History:  Cancer-related family history is not on file.    Exam:  Vitals:    24 1050   BP: (!) 174/82   Pulse: 61   Resp: 16   Temp: 98.2 °F (36.8 °C)   SpO2: 96%   Weight: 89.5 kg (197 lb 5 oz)     Constitutional: Pleasant 78 y.o. male in no acute distress.  Well nourished. Well groomed.   HEENT: Normocephalic and atraumatic   Lungs: No audible wheezing.  Normal effort.   Musculoskeletal: No gross MSK deformities. Ambulates back brace in place  Skin: No rashes appreciated.   Psych: Alert and oriented with appropriate mood and affect.  Neuro:   Grossly normal.      Assessment:  Recovered well from acute radiation therapy toxicities.  PSA continues to respond well  No clinical or biochemical evidence of disease persistence or progression  ECOG: (1) Restricted in physically strenuous activity, ambulatory and able to do work of light nature    Plan:  Follow up in 6 months with PSA  Follow up with Urology, Neurosurgery, Cardiology as directed  He was given our contact information, and he was told that he could call our clinic at anytime if he has any questions or concerns.  Follow up with other providers as directed

## 2024-07-15 ENCOUNTER — OFFICE VISIT (OUTPATIENT)
Dept: PAIN MEDICINE | Facility: CLINIC | Age: 79
End: 2024-07-15
Payer: MEDICARE

## 2024-07-15 ENCOUNTER — PATIENT MESSAGE (OUTPATIENT)
Dept: PAIN MEDICINE | Facility: CLINIC | Age: 79
End: 2024-07-15

## 2024-07-15 VITALS
WEIGHT: 201.06 LBS | DIASTOLIC BLOOD PRESSURE: 80 MMHG | SYSTOLIC BLOOD PRESSURE: 190 MMHG | BODY MASS INDEX: 28.15 KG/M2 | HEART RATE: 64 BPM | HEIGHT: 71 IN

## 2024-07-15 DIAGNOSIS — M54.16 LUMBAR RADICULOPATHY: Primary | ICD-10-CM

## 2024-07-15 PROCEDURE — 1125F AMNT PAIN NOTED PAIN PRSNT: CPT | Mod: HCNC,CPTII,S$GLB, | Performed by: ANESTHESIOLOGY

## 2024-07-15 PROCEDURE — 1159F MED LIST DOCD IN RCRD: CPT | Mod: HCNC,CPTII,S$GLB, | Performed by: ANESTHESIOLOGY

## 2024-07-15 PROCEDURE — 3077F SYST BP >= 140 MM HG: CPT | Mod: HCNC,CPTII,S$GLB, | Performed by: ANESTHESIOLOGY

## 2024-07-15 PROCEDURE — 99213 OFFICE O/P EST LOW 20 MIN: CPT | Mod: HCNC,S$GLB,, | Performed by: ANESTHESIOLOGY

## 2024-07-15 PROCEDURE — 3079F DIAST BP 80-89 MM HG: CPT | Mod: HCNC,CPTII,S$GLB, | Performed by: ANESTHESIOLOGY

## 2024-07-15 PROCEDURE — 99999 PR PBB SHADOW E&M-EST. PATIENT-LVL II: CPT | Mod: PBBFAC,HCNC,, | Performed by: ANESTHESIOLOGY

## 2024-07-15 RX ORDER — CELECOXIB 200 MG/1
200 CAPSULE ORAL 2 TIMES DAILY PRN
Qty: 60 CAPSULE | Refills: 0 | Status: SHIPPED | OUTPATIENT
Start: 2024-07-15

## 2024-07-15 NOTE — PROGRESS NOTES
This note was completed with dictation software and grammatical errors may exist.    Chief Complaint   Patient presents with    Leg Pain     Lower back pain   F/u KORINA        HPI: Lloyd John Jr. is a 78 y.o. year old male patient who has a past medical history of Bladder cancer, Colon polyp, Glaucoma (increased eye pressure), HLD (hyperlipidemia), HTN (hypertension), and Prostate cancer. He presents in referral from No ref. provider found for back and left leg pain.  He is status post left L2/3 transforaminal injection on 06/12/2024 with greater than 80% relief.  He states that he still has some pain especially if flexing and reaching down, such as working in his garden.  He has now hire somebody to do his yard work.  Takes 4 tablets of Advil occasionally, denies any stomach issues with this.  He does not need to take this every day.  Otherwise he feels that he is doing well, able to do activities of daily living without any major issues.          Previous history:  He is status post left L2/3 transforaminal injection on 02/29/2024 with 80% improvement.  He reports that the pain is no longer constant, comes and goes depending on activity.  He states that for the most part he is able to do all the activities that he wants to needs to be able to do.  He does not feel any weakness in the left leg.  He had followed up with Dr. Dallas who had noted improvement in strength in the left leg.  They had discussed possible laminectomy and fusion if he develops weakness.  The patient had previously undergone an L2/3 microdiskectomy but then later had severe left back pain and left leg pain and an MRI demonstrated a new extrusion to the left side at that level.  We had done an injection and he did very well for about 2 months and the pain has gradually returned but is not as severe as previously.  Nonetheless he is taking gabapentin twice daily, was taking 3 times a day but it was causing drowsiness so he  "discontinued taking so often.  He reports that he has problems with standing and walking due to the pain, worse with sitting too long as well.  He denies any bowel or bladder incontinence, denies any migue weakness but has difficulty going up steps.    Pain intervention history:He is status post left L2/3 transforaminal injection on 02/29/2024 with 80% improvement. He is status post left L2/3 transforaminal injection on 06/12/2024 with greater than 80% relief.     Spine surgeries:  L2/3 microdiskectomy    Antineuropathics:  Gabapentin 300 3 times daily, is taking this more p.r.n. now  NSAIDs:  Ibuprofen  Physical therapy:  Antidepressants:  Muscle relaxers:  Opioids:  Antiplatelets/Anticoagulants:        ROS:  He reports back pain, joint stiffness, joint pain.  Balance of review of systems is negative    No results found for: "LABA1C", "HGBA1C"    Lab Results   Component Value Date    WBC 4.68 07/08/2024    HGB 14.7 07/08/2024    HCT 40.3 07/08/2024    MCV 95 07/08/2024     07/08/2024             Past Medical History:   Diagnosis Date    Bladder cancer     Followed by Dr. Craig in [place]    Colon polyp 2006    [facility] Payton / [facility]    Glaucoma (increased eye pressure)     HLD (hyperlipidemia)     HTN (hypertension)     Prostate cancer        Past Surgical History:   Procedure Laterality Date    BACK SURGERY  08/2019    bone spur excision for sciatica tx    CATARACT EXTRACTION W/  INTRAOCULAR LENS IMPLANT Bilateral     COLONOSCOPY  2006  [facility]    Polyps    COLONOSCOPY  4/5/2012  [facility]    Diverticulosis and spasms.  No polyps.    FLEXIBLE CYSTOSCOPY N/A 9/12/2022    Procedure: CYSTOSCOPY, FLEXIBLE;  Surgeon: Justo Luciano II, MD;  Location: [facility];  Service: Urology;  Laterality: N/A;    LAMINECTOMY USING MINIMALLY INVASIVE TECHNIQUE Left 6/13/2023    Procedure: LAMINECTOMY,MICRODISCECTOMY SPINE, MINIMALLY INVASIVE L2-L3;  Surgeon: Mike Dallas MD;  Location: [facility];  Service: " Neurosurgery;  Laterality: Left;    TRANSFORAMINAL EPIDURAL INJECTION OF STEROID Left 2024    Procedure: Injection,steroid,epidural,transforaminal approach   L2/3;  Surgeon: Fritz Pal MD;  Location: [facility] OR;  Service: Pain Management;  Laterality: Left;    TRANSFORAMINAL EPIDURAL INJECTION OF STEROID Left 2024    Procedure: Injection,steroid,epidural,transforaminal approach  L2/3;  Surgeon: Fritz Pal MD;  Location: [facility] OR;  Service: Pain Management;  Laterality: Left;    TRANSPERINEAL INSERTION OF PERIPROSTATIC GEL  2023    Procedure: TRANSPERINEAL INSERTION OF PERIPROSTATIC GEL;  Surgeon: Paulo Spaulding MD;  Location: [facility] OR [address] [place] [address];  Service: Urology;;    TRANSRECTAL BIOPSY OF PROSTATE WITH ULTRASOUND GUIDANCE N/A 2022    Procedure: BIOPSY, PROSTATE, RECTAL APPROACH, WITH US GUIDANCE;  Surgeon: Justo Luciano II, MD;  Location: [facility];  Service: Urology;  Laterality: N/A;    TRANSRECTAL ULTRASOUND EXAMINATION  2023    Procedure: ULTRASOUND, RECTAL APPROACH;  Surgeon: Paulo Spaulding MD;  Location: [facility] OR 1ST FLR;  Service: Urology;;    TRANSURETHRAL RESECTION OF BLADDER TUMOR      15 surgeries  (- current)    Urolift implant  2017       Social History     Socioeconomic History    Marital status:     Number of children: 2   Occupational History    Occupation:  for law firm   Tobacco Use    Smoking status: Former     Current packs/day: 0.00     Average packs/day: 0.5 packs/day for 3.0 years (1.5 ttl pk-yrs)     Types: Cigarettes     Start date: 1994     Quit date: 1997     Years since quittin.5    Smokeless tobacco: Never   Substance and Sexual Activity    Alcohol use: Yes     Alcohol/week: 1.7 standard drinks of alcohol     Types: 2 drink(s) per week     Comment: weekends    Drug use: No     Social Determinants of Health     Financial Resource Strain: Low Risk  (2024)    Overall Financial Resource Strain  "(CARDIA)     Difficulty of Paying Living Expenses: Not hard at all   Food Insecurity: No Food Insecurity (1/23/2024)    Hunger Vital Sign     Worried About Running Out of Food in the Last Year: Never true     Ran Out of Food in the Last Year: Never true   Transportation Needs: No Transportation Needs (1/23/2024)    PRAPARE - Transportation     Lack of Transportation (Medical): No     Lack of Transportation (Non-Medical): No   Physical Activity: Insufficiently Active (1/23/2024)    Exercise Vital Sign     Days of Exercise per Week: 2 days     Minutes of Exercise per Session: 20 min   Stress: No Stress Concern Present (1/23/2024)    [employer] - Occupational Stress Questionnaire     Feeling of Stress : Only a little   Housing Stability: Low Risk  (1/23/2024)    Housing Stability Vital Sign     Unable to Pay for Housing in the Last Year: No     Number of Places Lived in the Last Year: 1     Unstable Housing in the Last Year: No         Medications/Allergies: See med card    Vitals:    07/15/24 0842   BP: (!) 190/80   Pulse: 64   Weight: 91.2 kg (201 lb 1 oz)   Height: 5' 11" (1.803 m)   PainSc:   2   PainLoc: Back     Body mass index is 28.04 kg/m².      7/15/2024     8:44 AM 5/16/2024    10:28 AM 3/25/2024     8:38 AM   Last 3 PDI Scores   Pain Disability Index (PDI) 41 [phone] 19     Physical exam:    Gen: A and O x3, pleasant, well-groomed  Skin: No rashes or obvious lesions  HEENT: PERRLA, no obvious deformities on ears or in canals. Trachea midline.  CVS: Regular rate and rhythm, normal palpable pulses.  Resp:No increased work of breathing, symmetrical chest rise.  Abdomen: Soft, NT/ND.  Musculoskeletal:  No antalgic gait.           Neuro:    Iliopsoas Quadriceps Knee  Flexion Tibialis  anterior Gastro- cnemius EHL   Lower: R 5/5 5/5 5/5 5/5 5/5 5/5    L 5/5 4/5 5/5 5/5 5/5 5/5      Left  Right    Patellar DTR 0+ 0+   Achilles DTR 0+ 0+   Trevino Absent  Absent   Clonus Absent Absent "   Babinski Absent Absent     Intact and symmetrical to light touch and pinprick in L1-S1 dermatomes bilaterally.    Lumbar spine:  Lumbar spine: ROM is moderately reduced with flexion extension and oblique extension with some increased pain in the back and left anterior thigh.    Armand's test causes no increased pain on either side.    Supine straight leg raise is negative bilaterally.    Internal and external rotation of the hip causes no increased pain on either side.  Myofascial exam: No tenderness to palpation across lumbar paraspinous muscles.    Imagin24 MRI L-spine:  Morphology: Prominent new endplate centered marrow edema asymmetric to the left at L2-L3 in the setting of advanced degenerative disc height loss.  Associated Schmorl's nodes but no apparent fractures.  Mild endplate centered marrow edema asymmetric to the right at L5-S1.  No apparent fractures.  Diffuse fatty marrow replacement.   Alignment: Sagittal alignment is within normal limits.  Mild broad dextrocurvature similar to the prior exam.   Cord: Normal in contour, caliber, and signal intensity.  Conus positioning is within normal limits.   Disc levels:   T12-L1: Shallow disc bulging and mild bilateral facet arthrosis.  The spinal canal and foramina remain patent.   L1-L2: Mild to moderate degenerative disc height loss with shallow disc bulging and mild bilateral facet arthrosis mildly narrowing the spinal canal and subarticular recesses, left greater than right as well as mild left foraminal narrowing.  The right foramen is patent.   L2-L3: Interval decompressive laminectomy and discectomy changes.  Presumed large recurrent left lateralizing disc extrusion with mild cranial migration severely narrowing the thecal sac with mass effect on multiple traversing nerve roots most notably the left subarticular L3 distribution.  Severe facet arthrosis.  There is progressive severe left and mild right foraminal narrowing.  There may be a  component of regional scar tissue within the laminectomy bed although nonspecific on this noncontrast MRI exam.   L3-L4: Moderate degenerative disc height loss and desiccation with disc bulging and moderate facet arthrosis with ligamentum flavum thickening producing moderate narrowing of the spinal canal and left foramen.  Mild right foraminal narrowing.  This is similar to the prior exam.   L4-L5: Moderate degenerative disc height loss and desiccation with prominent disc bulging and moderate to severe bilateral facet arthrosis with ligamentum flavum thickening severely narrowing the spinal canal and subarticular recesses right greater than left and contributing to moderate right and mild left foraminal narrowing similar to the prior exam.   L5-S1: Mild degenerative disc height loss and desiccation with disc bulging and severe left/moderate right-sided facet arthrosis and ligamentum flavum thickening producing mild to moderate narrowing of the spinal canal and encroachment of both subarticular recesses as well as mild to moderate left and mild right foraminal narrowing.       Assessment:  Lloyd John Jr. is a 78 y.o. year old male patient who has a past medical history of Bladder cancer, Colon polyp, Glaucoma (increased eye pressure), HLD (hyperlipidemia), HTN (hypertension), and Prostate cancer. He presents in referral from No ref. provider found for back and left leg pain.     1. Lumbar radiculopathy              Plan:  1.  Since he is doing well, I encouraged him to continue with his normal activities.  He is taking Advil but not on a daily basis, states that the gabapentin did not help much.  He is going to continue with his regular activities but if his pain increases he will return and see us.  We discussed trying to avoid surgery if possible.

## 2024-07-15 NOTE — TELEPHONE ENCOUNTER
Yes, you could try Celebrex as it may be easier on the stomach but at this point, I am not sure if any of your stomach issues are actually coming from the Advil.  I have sent in the Celebrex, make sure you are not taking this on a daily basis.

## 2024-07-30 ENCOUNTER — OFFICE VISIT (OUTPATIENT)
Dept: FAMILY MEDICINE | Facility: CLINIC | Age: 79
End: 2024-07-30
Payer: MEDICARE

## 2024-07-30 ENCOUNTER — HOSPITAL ENCOUNTER (OUTPATIENT)
Dept: RADIOLOGY | Facility: HOSPITAL | Age: 79
Discharge: HOME | End: 2024-07-30
Payer: MEDICARE

## 2024-07-30 VITALS
HEIGHT: 71 IN | DIASTOLIC BLOOD PRESSURE: 72 MMHG | OXYGEN SATURATION: 97 % | RESPIRATION RATE: 18 BRPM | BODY MASS INDEX: 27.56 KG/M2 | SYSTOLIC BLOOD PRESSURE: 128 MMHG | WEIGHT: 196.88 LBS | HEART RATE: 70 BPM

## 2024-07-30 DIAGNOSIS — C67.9 MALIGNANT NEOPLASM OF URINARY BLADDER, UNSPECIFIED SITE: ICD-10-CM

## 2024-07-30 DIAGNOSIS — E78.5 HYPERLIPIDEMIA, UNSPECIFIED HYPERLIPIDEMIA TYPE: Primary | ICD-10-CM

## 2024-07-30 DIAGNOSIS — G47.00 INSOMNIA, UNSPECIFIED TYPE: ICD-10-CM

## 2024-07-30 DIAGNOSIS — C61 PROSTATE CANCER: ICD-10-CM

## 2024-07-30 DIAGNOSIS — I10 PRIMARY HYPERTENSION: ICD-10-CM

## 2024-07-30 DIAGNOSIS — Z13.6 ENCOUNTER FOR SCREENING FOR CARDIOVASCULAR DISORDERS: ICD-10-CM

## 2024-07-30 PROBLEM — G83.4 CAUDA EQUINA SYNDROME: Status: RESOLVED | Noted: 2023-06-13 | Resolved: 2024-07-30

## 2024-07-30 PROCEDURE — 99999 PR PBB SHADOW E&M-EST. PATIENT-LVL III: CPT | Mod: PBBFAC,HCNC,, | Performed by: FAMILY MEDICINE

## 2024-07-30 PROCEDURE — 3078F DIAST BP <80 MM HG: CPT | Mod: HCNC,CPTII,S$GLB, | Performed by: FAMILY MEDICINE

## 2024-07-30 PROCEDURE — 3074F SYST BP LT 130 MM HG: CPT | Mod: HCNC,CPTII,S$GLB, | Performed by: FAMILY MEDICINE

## 2024-07-30 PROCEDURE — G2211 COMPLEX E/M VISIT ADD ON: HCPCS | Mod: HCNC,S$GLB,, | Performed by: FAMILY MEDICINE

## 2024-07-30 PROCEDURE — 1101F PT FALLS ASSESS-DOCD LE1/YR: CPT | Mod: HCNC,CPTII,S$GLB, | Performed by: FAMILY MEDICINE

## 2024-07-30 PROCEDURE — 99214 OFFICE O/P EST MOD 30 MIN: CPT | Mod: HCNC,S$GLB,, | Performed by: FAMILY MEDICINE

## 2024-07-30 PROCEDURE — 1125F AMNT PAIN NOTED PAIN PRSNT: CPT | Mod: HCNC,CPTII,S$GLB, | Performed by: FAMILY MEDICINE

## 2024-07-30 PROCEDURE — 3288F FALL RISK ASSESSMENT DOCD: CPT | Mod: HCNC,CPTII,S$GLB, | Performed by: FAMILY MEDICINE

## 2024-07-30 PROCEDURE — 75571 CT HRT W/O DYE W/CA TEST: CPT | Mod: TC,HCNC,PO

## 2024-07-30 PROCEDURE — 75571 CT HRT W/O DYE W/CA TEST: CPT | Mod: 26,HCNC,, | Performed by: STUDENT IN AN ORGANIZED HEALTH CARE EDUCATION/TRAINING PROGRAM

## 2024-07-30 PROCEDURE — 1159F MED LIST DOCD IN RCRD: CPT | Mod: HCNC,CPTII,S$GLB, | Performed by: FAMILY MEDICINE

## 2024-07-30 RX ORDER — ESZOPICLONE 1 MG/1
1 TABLET, FILM COATED ORAL NIGHTLY
COMMUNITY
Start: 2024-07-25

## 2024-07-30 NOTE — PROGRESS NOTES
Chief Complaint   Patient presents with    Hypertension     6 month follow up     HPI:This is a 78 year-old male presents for 6 month f/u on chronic jose m issues      HLD - tolerating Crestor 10mg and Fish Oil  HTN - tolerating coreg 25mg BID, Benicar 40mg; BP well-controlled at 112/75  Bladder/prostate cancer ([facility] 7) - s/p radiation; following with urology, Dr. Luciano,  every 6 months.   Considering robotic prostatectomy, but now just doing watchful waiting. PSA has been trending down; following with Dr. Rojas.  Glaucoma - on Combigan and Lumigan; followed by Dr. mEery  Insomnia - trying Lunesta 1mg  Sleep apnea - wearing sleep apnea  Lumbar DDD with left sciatica, cervical radiculopathy -  taking gabapentin 600mg PRN; s/p lumbar surgery; c/o presistent left lower back pain with radiation to left upper thigh which has been constant since October. PT was not helpful for this.      PAST MEDICAL HISTORY:  Prostate cancer - [facility] 7; following with watchful waiting  Bladder carcinoma - followed with Dr. Bowman every 3 months; treated in past with Thiotrpa, BCG  HLD (Dx 2003)  HTN (Dx 2003)  colon polyp  Lumbar DDD  Sleep apnea      Past Surgical History:   Procedure Laterality Date    BACK SURGERY  08/2019    bone spur excision for sciatica tx    CATARACT EXTRACTION W/  INTRAOCULAR LENS IMPLANT Bilateral     COLONOSCOPY  2006  Bill Tata/[facility]    Polyps    COLONOSCOPY  4/5/2012  [facility]    Diverticulosis and spasms.  No polyps.    FLEXIBLE CYSTOSCOPY N/A 9/12/2022    Procedure: CYSTOSCOPY, FLEXIBLE;  Surgeon: Justo Luciano II, MD;  Location: [facility];  Service: Urology;  Laterality: N/A;    LAMINECTOMY USING MINIMALLY INVASIVE TECHNIQUE Left 6/13/2023    Procedure: LAMINECTOMY,MICRODISCECTOMY SPINE, MINIMALLY INVASIVE L2-L3;  Surgeon: Mike Dallas MD;  Location: [facility] OR;  Service: Neurosurgery;  Laterality: Left;    TRANSFORAMINAL EPIDURAL INJECTION OF STEROID Left 2/29/2024    Procedure:  Injection,steroid,epidural,transforaminal approach   L2/3;  Surgeon: Fritz Pal MD;  Location: [facility] OR;  Service: Pain Management;  Laterality: Left;    TRANSFORAMINAL EPIDURAL INJECTION OF STEROID Left 2024    Procedure: Injection,steroid,epidural,transforaminal approach  L2/3;  Surgeon: Fritz Pal MD;  Location: [facility] OR;  Service: Pain Management;  Laterality: Left;    TRANSPERINEAL INSERTION OF PERIPROSTATIC GEL  2023    Procedure: TRANSPERINEAL INSERTION OF PERIPROSTATIC GEL;  Surgeon: Paulo Spaulding MD;  [facility];  Service: Urology;;    TRANSRECTAL BIOPSY OF PROSTATE WITH ULTRASOUND GUIDANCE N/A 2022    Procedure: BIOPSY, PROSTATE, RECTAL APPROACH, WITH US GUIDANCE;  Surgeon: Justo Luciano II, MD;  Location: [facility];  Service: Urology;  Laterality: N/A;    TRANSRECTAL ULTRASOUND EXAMINATION  2023    Procedure: ULTRASOUND, RECTAL APPROACH;  Surgeon: Paulo Spaulding MD;  [facility];  Service: Urology;;    TRANSURETHRAL RESECTION OF BLADDER TUMOR      15 surgeries  (- current)    Urolift implant  2017         FAMILY HISTORY:  Father:  at 50 of CAD  Mother:  at 78 insterstial lung fibrosis, carotid artery disease  Grandpartents unknown    SOCIAL HISTORY:  Tobacco use: quit    Alcohol use: 2 drinks of bourbon daily  Occupation:  for [employer]  Marital status: (wife with schizophrenia)  Children: 2  enjoys wood working, grilling and fishing  exercises daily    REVIEW OF SYSTEMS:  GENERAL: No fever, chills, fatigability or weight changes.  SKIN/BREASTS: No rashes, sores, itching or changes in color or texture of skin.   HEAD: No headaches or recent head trauma.   EYES: Visual acuity fine. Denies blurriness, tearing, itching, photophobia, diplopia, or visual changes.   EARS: Denies ear pain, discharge, tinnitus or vertigo. Denies hearing loss.   NOSE: No loss of smell, epistaxis, postnasal drip,  "discharge, obstruction, or sneezing.  MOUTH & THROAT: No hoarseness, change in voice, swallowing difficulty. No excessive gum bleeding.   HEMATOLOGICAL/NODES: Denies swollen glands. No bleeding or bruising.  CHEST: No TOLEDO, cyanosis, wheezing, cough or sputum production.  CARDIOVASCULAR: Denies chest pain, dyspnea, orthopnea, or palpitations.  GI/ABDOMEN: Appetite fine. No weight loss. Denies nausea, vomiting, diarrhea, constipation, abdominal pain, hematemesis or blood in stool.  URINARY: No dysuria,hematuria, nocturia, incontinence, flank pain, urgency, or urinary difficulty.  PERIPHERAL VASCULAR: No claudication, cold intolerance or cyanosis.  NEUROLOGIC: No history of seizures, paralysis, alteration of gait or coordination.  ENDOCRINE: Sexual maturation. Denies weight change, heat/cold intolerance, hair loss or gain, loss of libido, polyuria, polydipsia, polyphagia, pruritus, unexplained flushing, or excessive sweating.   PSYCH: No crying spells. Denies anxiety symptoms.      PHYSICAL EXAM:  /72   Pulse 70   Resp 18   Ht 5' 11" (1.803 m)   Wt 89.3 kg (196 lb 13.9 oz)   SpO2 97%   BMI 27.46 kg/m²     APPEARANCE: Well nourished, well developed, neatly groomed, in no acute distress.   SKIN: Warm, moist and dry. No lesions or rashes.  EYES: Conjunctivae and lids clear. PERRL. EOMI.   NOSE: Mucosa pink. Nares clear.  MOUTH & THROAT: Oral mucosa pink. No tonsillar enlargement. No pharyngeal erythema or exudate.   NECK: Supple with full range of motion. No masses. No thyromegaly. No JVD or bruits.  LYMPH: No cervical, supraclavicular, axillary or inguinal lymph node enlargement.  RESPIRATORY: Easy and unlabored respirations. Lungs clear to auscultation throughout all lung fields. No wheezes or rales.  CARDIOVASCULAR: Regular rate and rhythm. Normal S1, S2. No rubs, murmurs or gallops.   ABDOMEN: Bowel sounds normal. Nondistended and soft. No hepatosplenomegaly, masses, or tenderness.  EXTREMITIES: No edema " or cyanosis. Pedal pulses 2+ bilaterally. No varicosities   NEUROLOGIC: Cranial Nerves: II-XII grossly intact  Gait & Posture: Normal gait and fine motion.  PSYCH: Awake, alert, oriented to person, place, time, and situation. Pleasant and cooperative mood with intact judgment.    Results for orders placed or performed in visit on 07/08/24   Prostate Specific Antigen, Diagnostic   Result Value Ref Range    PSA Diagnostic 1.7 0.00 - 4.00 ng/mL   Comprehensive Metabolic Panel   Result Value Ref Range    Sodium 137 136 - 145 mmol/L    Potassium 4.1 3.5 - 5.1 mmol/L    Chloride 102 95 - 110 mmol/L    CO2 25 23 - 29 mmol/L    Glucose 114 (H) 70 - 110 mg/dL    BUN 23 8 - 23 mg/dL    Creatinine 1.2 0.5 - 1.4 mg/dL    Calcium 9.6 8.7 - 10.5 mg/dL    Total Protein 6.8 6.0 - 8.4 g/dL    Albumin 4.2 3.5 - 5.2 g/dL    Total Bilirubin 1.2 (H) 0.1 - 1.0 mg/dL    Alkaline Phosphatase 53 (L) 55 - 135 U/L    AST 30 10 - 40 U/L    ALT 29 10 - 44 U/L    eGFR >60.0 >60 mL/min/1.73 m^2    Anion Gap 10 8 - 16 mmol/L   Lipid Panel   Result Value Ref Range    Cholesterol 146 120 - 199 mg/dL    Triglycerides 188 (H) 30 - 150 mg/dL    HDL 53 40 - 75 mg/dL    LDL Cholesterol 55.4 (L) 63.0 - 159.0 mg/dL    HDL/Cholesterol Ratio 36.3 20.0 - 50.0 %    Total Cholesterol/HDL Ratio 2.8 2.0 - 5.0    Non-HDL Cholesterol 93 mg/dL   CBC Auto Differential   Result Value Ref Range    WBC 4.68 3.90 - 12.70 K/uL    RBC 4.23 (L) 4.60 - 6.20 M/uL    Hemoglobin 14.7 14.0 - 18.0 g/dL    Hematocrit 40.3 40.0 - 54.0 %    MCV 95 82 - 98 fL    MCH 34.8 (H) 27.0 - 31.0 pg    MCHC 36.5 (H) 32.0 - 36.0 g/dL    RDW 12.3 11.5 - 14.5 %    Platelets 171 150 - 450 K/uL    MPV 9.9 9.2 - 12.9 fL    Immature Granulocytes 0.2 0.0 - 0.5 %    Gran # (ANC) 2.6 1.8 - 7.7 K/uL    Immature Grans (Abs) 0.01 0.00 - 0.04 K/uL    Lymph # 1.2 1.0 - 4.8 K/uL    Mono # 0.8 0.3 - 1.0 K/uL    Eos # 0.1 0.0 - 0.5 K/uL    Baso # 0.04 0.00 - 0.20 K/uL    nRBC 0 0 /100 WBC    Gran % 54.4 38.0 -  73.0 %    Lymph % 25.4 18.0 - 48.0 %    Mono % 16.7 (H) 4.0 - 15.0 %    Eosinophil % 2.4 0.0 - 8.0 %    Basophil % 0.9 0.0 - 1.9 %    Differential Method Automated        ASSESSMENT/PLAN:  Hyperlipidemia, unspecified hyperlipidemia type  -     Comprehensive Metabolic Panel; Future; Expected date: 01/26/2025  -     Lipid Panel; Future; Expected date: 01/26/2025    Primary hypertension  -     CBC Auto Differential; Future; Expected date: 01/26/2025    Prostate cancer    Insomnia, unspecified type    Malignant neoplasm of urinary bladder, unspecified site          overall doing well  1. HTN (controlled) - continue Coreg 25 mg BID and Benicar 40mg  2. HLD (controlled) - continue Crestor 10mg daily, Fish Oil 1000mg daily  3. bladder carcinoma/prostate cancer - continue present treatment and f/u with Urology  4. Insomnia, sleep apnea - continue lunesta and CPAP    f/u 6 months with CMP, lipid profile, CBC    Visit today included increased complexity associated with the care of the episodic problems listed above addressed and managing the longitudinal care of the patient due to the serious and/or complex managed problem(s) listed above.

## 2024-07-31 ENCOUNTER — PATIENT MESSAGE (OUTPATIENT)
Dept: CARDIOLOGY | Facility: CLINIC | Age: 79
End: 2024-07-31
Payer: MEDICARE

## 2024-08-01 DIAGNOSIS — R93.1 ELEVATED CORONARY ARTERY CALCIUM SCORE: Primary | ICD-10-CM

## 2024-08-05 ENCOUNTER — PATIENT MESSAGE (OUTPATIENT)
Dept: CARDIOLOGY | Facility: HOSPITAL | Age: 79
End: 2024-08-05
Payer: MEDICARE

## 2024-08-07 ENCOUNTER — HOSPITAL ENCOUNTER (OUTPATIENT)
Dept: RADIOLOGY | Facility: HOSPITAL | Age: 79
Discharge: HOME | End: 2024-08-07
Payer: MEDICARE

## 2024-08-07 ENCOUNTER — HOSPITAL ENCOUNTER (OUTPATIENT)
Dept: CARDIOLOGY | Facility: HOSPITAL | Age: 79
Discharge: HOME | End: 2024-08-07
Payer: MEDICARE

## 2024-08-07 VITALS — BODY MASS INDEX: 27.44 KG/M2 | WEIGHT: 196 LBS | HEIGHT: 71 IN

## 2024-08-07 DIAGNOSIS — R93.1 ELEVATED CORONARY ARTERY CALCIUM SCORE: ICD-10-CM

## 2024-08-07 LAB
CV PHARM DOSE: 0.4 MG
CV STRESS BASE HR: 67 BPM
DIASTOLIC BLOOD PRESSURE: 90 MMHG
NUC REST EJECTION FRACTION: 42
OHS CV CPX 1 MINUTE RECOVERY HEART RATE: 84 BPM
OHS CV CPX 85 PERCENT MAX PREDICTED HEART RATE MALE: 119
OHS CV CPX MAX PREDICTED HEART RATE: 140
OHS CV CPX PATIENT IS FEMALE: 0
OHS CV CPX PATIENT IS MALE: 1
OHS CV CPX PEAK DIASTOLIC BLOOD PRESSURE: 90 MMHG
OHS CV CPX PEAK HEAR RATE: 85 BPM
OHS CV CPX PEAK RATE PRESSURE PRODUCT: NORMAL
OHS CV CPX PEAK SYSTOLIC BLOOD PRESSURE: 170 MMHG
OHS CV CPX PERCENT MAX PREDICTED HEART RATE ACHIEVED: 61
OHS CV CPX RATE PRESSURE PRODUCT PRESENTING: NORMAL
OHS CV PHARM TIME: 900 MIN
SYSTOLIC BLOOD PRESSURE: 170 MMHG

## 2024-08-07 PROCEDURE — 93017 CV STRESS TEST TRACING ONLY: CPT | Mod: HCNC,PO

## 2024-08-07 PROCEDURE — 93016 CV STRESS TEST SUPVJ ONLY: CPT | Mod: HCNC,S$GLB,, | Performed by: INTERNAL MEDICINE

## 2024-08-07 PROCEDURE — 78452 HT MUSCLE IMAGE SPECT MULT: CPT | Mod: HCNC,PO

## 2024-08-07 PROCEDURE — 93018 CV STRESS TEST I&R ONLY: CPT | Mod: HCNC,S$GLB,, | Performed by: INTERNAL MEDICINE

## 2024-08-07 PROCEDURE — 78452 HT MUSCLE IMAGE SPECT MULT: CPT | Mod: 26,HCNC,, | Performed by: INTERNAL MEDICINE

## 2024-08-07 PROCEDURE — A9502 TC99M TETROFOSMIN: HCPCS | Mod: HCNC,PO

## 2024-08-07 PROCEDURE — 63600175 PHARM REV CODE 636 W HCPCS: Mod: HCNC,PO

## 2024-08-07 RX ORDER — REGADENOSON 0.08 MG/ML
0.4 INJECTION, SOLUTION INTRAVENOUS ONCE
Status: COMPLETED | OUTPATIENT
Start: 2024-08-07 | End: 2024-08-07

## 2024-08-07 RX ADMIN — TETROFOSMIN 11 MILLICURIE: 1.38 INJECTION, POWDER, LYOPHILIZED, FOR SOLUTION INTRAVENOUS at 08:08

## 2024-08-07 RX ADMIN — TETROFOSMIN 31.7 MILLICURIE: 1.38 INJECTION, POWDER, LYOPHILIZED, FOR SOLUTION INTRAVENOUS at 08:08

## 2024-08-07 RX ADMIN — REGADENOSON 0.4 MG: 0.08 INJECTION, SOLUTION INTRAVENOUS at 09:08

## 2024-08-08 ENCOUNTER — PATIENT MESSAGE (OUTPATIENT)
Dept: CARDIOLOGY | Facility: CLINIC | Age: 79
End: 2024-08-08
Payer: MEDICARE

## 2024-08-23 RX ORDER — CELECOXIB 200 MG/1
CAPSULE ORAL
Qty: 60 CAPSULE | Refills: 0 | Status: SHIPPED | OUTPATIENT
Start: 2024-08-23

## 2024-09-23 RX ORDER — CELECOXIB 200 MG/1
CAPSULE ORAL
Qty: 60 CAPSULE | Refills: 0 | Status: SHIPPED | OUTPATIENT
Start: 2024-09-23

## 2024-10-23 DIAGNOSIS — I10 PRIMARY HYPERTENSION: ICD-10-CM

## 2024-10-24 RX ORDER — CELECOXIB 200 MG/1
CAPSULE ORAL
Qty: 60 CAPSULE | Refills: 0 | Status: SHIPPED | OUTPATIENT
Start: 2024-10-24

## 2024-10-24 RX ORDER — OLMESARTAN MEDOXOMIL 40 MG/1
40 TABLET ORAL NIGHTLY
Qty: 90 TABLET | Refills: 2 | Status: SHIPPED | OUTPATIENT
Start: 2024-10-24

## 2024-10-24 NOTE — TELEPHONE ENCOUNTER
Refill Decision Note   [facility]  is requesting a refill authorization.  Brief Assessment and Rationale for Refill:  Approve     Medication Therapy Plan:        Comments:     Note composed:12:25 AM 10/24/2024

## 2024-10-24 NOTE — TELEPHONE ENCOUNTER
No care due was identified.  [facility] Embedded Care Due Messages. Reference number: [id number].   10/23/2024 9:59:55 PM CDT

## 2024-11-19 DIAGNOSIS — I10 PRIMARY HYPERTENSION: ICD-10-CM

## 2024-11-19 RX ORDER — CARVEDILOL 25 MG/1
25 TABLET ORAL 2 TIMES DAILY WITH MEALS
Qty: 180 TABLET | Refills: 3 | Status: SHIPPED | OUTPATIENT
Start: 2024-11-19

## 2024-12-19 RX ORDER — CELECOXIB 200 MG/1
CAPSULE ORAL
Qty: 60 CAPSULE | Refills: 0 | Status: SHIPPED | OUTPATIENT
Start: 2024-12-19

## 2025-01-09 ENCOUNTER — LAB VISIT (OUTPATIENT)
Dept: LAB | Facility: HOSPITAL | Age: 80
End: 2025-01-09
Attending: RADIOLOGY
Payer: MEDICARE

## 2025-01-09 DIAGNOSIS — C61 PROSTATE CANCER: ICD-10-CM

## 2025-01-09 LAB — COMPLEXED PSA SERPL-MCNC: 1.2 NG/ML (ref 0–4)

## 2025-01-09 PROCEDURE — 36415 COLL VENOUS BLD VENIPUNCTURE: CPT | Mod: HCNC,PN | Performed by: RADIOLOGY

## 2025-01-09 PROCEDURE — 84153 ASSAY OF PSA TOTAL: CPT | Mod: HCNC | Performed by: RADIOLOGY

## 2025-01-13 ENCOUNTER — OFFICE VISIT (OUTPATIENT)
Dept: RADIATION ONCOLOGY | Facility: CLINIC | Age: 80
End: 2025-01-13
Payer: MEDICARE

## 2025-01-13 VITALS
HEART RATE: 65 BPM | TEMPERATURE: 98 F | BODY MASS INDEX: 26.88 KG/M2 | DIASTOLIC BLOOD PRESSURE: 89 MMHG | HEIGHT: 71 IN | RESPIRATION RATE: 18 BRPM | SYSTOLIC BLOOD PRESSURE: 149 MMHG | OXYGEN SATURATION: 95 % | WEIGHT: 192 LBS

## 2025-01-13 DIAGNOSIS — C61 PROSTATE CANCER: Primary | ICD-10-CM

## 2025-01-13 PROCEDURE — 1101F PT FALLS ASSESS-DOCD LE1/YR: CPT | Mod: HCNC,CPTII,S$GLB, | Performed by: RADIOLOGY

## 2025-01-13 PROCEDURE — 99212 OFFICE O/P EST SF 10 MIN: CPT | Mod: HCNC,S$GLB,, | Performed by: RADIOLOGY

## 2025-01-13 PROCEDURE — 3079F DIAST BP 80-89 MM HG: CPT | Mod: HCNC,CPTII,S$GLB, | Performed by: RADIOLOGY

## 2025-01-13 PROCEDURE — 1126F AMNT PAIN NOTED NONE PRSNT: CPT | Mod: HCNC,CPTII,S$GLB, | Performed by: RADIOLOGY

## 2025-01-13 PROCEDURE — 3288F FALL RISK ASSESSMENT DOCD: CPT | Mod: HCNC,CPTII,S$GLB, | Performed by: RADIOLOGY

## 2025-01-13 PROCEDURE — 99999 PR PBB SHADOW E&M-EST. PATIENT-LVL IV: CPT | Mod: PBBFAC,HCNC,, | Performed by: RADIOLOGY

## 2025-01-13 PROCEDURE — 3077F SYST BP >= 140 MM HG: CPT | Mod: HCNC,CPTII,S$GLB, | Performed by: RADIOLOGY

## 2025-01-13 PROCEDURE — 1159F MED LIST DOCD IN RCRD: CPT | Mod: HCNC,CPTII,S$GLB, | Performed by: RADIOLOGY

## 2025-01-13 NOTE — PROGRESS NOTES
[facility] Department of Radiation Oncology  Follow Up Visit Note    Diagnosis:  Lloyd John Jr. is a 79 y.o. 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  He completed a course of radiation therapy on 6/5/23.      Oncologic History:  Oncology History   Prostate cancer   1/14/2013 Initial Diagnosis    Prostate cancer     4/11/2023 Cancer Staged    Staging form: Prostate, AJCC 7th Edition  - Clinical stage from 4/11/2023: Stage IIA (T1c, N0, M0, PSA: 10 to 19, [place] <= 6)     5/23/2023 - 6/5/2023 Radiation Therapy    Treating physician: Chrissie Rojas  Total Dose: 36.25 Gy  Fractions: 5  Treatment Site Ref. ID Energy Dose/Fx (Gy) #Fx Dose Correction (Gy) Total Dose (Gy) Start Date End Date Elapsed Days   SBRT Prostate PTV 6X 7.25 5 / 5 0 36.25 5/23/2023 6/5/2023 13             Interval History  The patient presents today for a regularly scheduled follow up visit.  He was last seen in our clinic on 7/9/24.   Since that time, interval PSA on1/9/25 was 1.2.  Feeling well.  Back significantly improved- takes celebrex nightly. Seeing Ophtho for possible retinal issue- daily flashes.        PSA Diagnostic   Latest Ref Rng 0.00 - 4.00 ng/mL   8/24/2022 11.2 (H)    3/13/2023 16.8 (H)    7/18/2023 6.3 (H)    1/18/2024 3.6    7/8/2024 1.7    1/9/2025 1.2       Legend:  (H) High      HPI: The patient is a 77 year old male with a diagnosis of prostate cancer.  He also has a history of bladder cancer treated with BGC and Thiotepa in 1997.  Also history of Urolift. He was noted to have an elevated PSA of 16.8 on 3/13/23.  Previous PSA history as follows:       Latest Reference Range & Units Most Recent 11/02/15 07:50 08/24/22 14:01 03/13/23 07:23   PSA Diagnostic 0.00 - 4.00 ng/mL 16.8 (H)  3/13/23 07:23   11.2 (H) 16.8 (H)   PSA Total 0.00 - 4.00 ng/mL 7.2 (H)  11/2/15 07:50 7.2 (H)       (H): Data is abnormally high     1/19/22 MRI prostate  noted 2 highly concerning lesions: 1.1cm in the medial and lateral segments of the left PZ base, PI-RADS 4; 5mm lesion lateral segment right PZ apex, PI-RADS 4.     On 9/12/22 he underwent UroNav TRUS-guided biopsy of the prostate, with pathology as follows:     Target 1: Group 1 in 32% of tissue  Target 2: Group 1 in 62% of tissue  Left mid: Group 1, 7% of tissue  Left apex: Group 1, 6% of tissue  Right mid: Group 1, 15% of tissue  Right base: Group 1, 11% of tissue  No PNI, # involved cores not given     Previous biopsies:  Biopsies  5/2016 - GG1 LA 1/2 (total cores 1/15)  10/2017 - GG2 LLM 1/1, GG1 RA 1/1 (total cores 2/12)  7/2019 - GG1 LA 1/1, RA 1/2, LM 1/1, LA 1/1 (total cores 4/14)     Decipher score: 0.14- low genomic risk     Prostate size estimated at 60cc.  He presents today to discuss the potential role of radiation therapy in his cancer care.  IPSS is 2, with no frequency, no urgency, and nocturia x 1.       History of prior irradiation: No  History of prior systemic anti-cancer therapy: No  History of collagen vascular disease: No  Implanted electronic device (pacer/defib/nerve stimulator): No     Presented to ED 6/13/23 c/o back/left leg pain and weakness, fecal incontinence. Outside MRI showed severe canal stenosis at L2-3.     6/13/23 L2-3 urgent/emergent microdiscectomy with Dr. Haines. Doing much better now. Pain relieved, return of continence. Seen earlier to day in Neurosurg clinic for follow up. Plan for PT.    7/2024 Reports back is doing well- follows with pain management.  Recent mechanical fall.     Review of Systems   Review of Systems   Constitutional:  Negative for chills, fever and malaise/fatigue.   Gastrointestinal:  Negative for blood in stool, constipation, diarrhea, nausea and vomiting.   Genitourinary:  Negative for dysuria.   Musculoskeletal:  Positive for back pain (tolerable). Negative for joint pain and myalgias.   Neurological:  Negative for dizziness, sensory change,  "weakness and headaches.       IPSS today is 2 (2 at last visit)    Social History:  Social History     Tobacco Use    Smoking status: Former     Current packs/day: 0.00     Average packs/day: 0.5 packs/day for 3.0 years (1.5 ttl pk-yrs)     Types: Cigarettes     Start date: 1994     Quit date: 1997     Years since quittin.0    Smokeless tobacco: Never   Substance Use Topics    Alcohol use: Yes     Alcohol/week: 1.7 standard drinks of alcohol     Types: 2 drink(s) per week     Comment: weekends    Drug use: No       Family History:  Cancer-related family history is not on file.    Exam:  Vitals:    25 1105   BP: (!) 149/89   Pulse: 65   Resp: 18   Temp: 98.2 °F (36.8 °C)   SpO2: 95%   Weight: 87.1 kg (192 lb 0.3 oz)   Height: 5' 11" (1.803 m)     Constitutional: Pleasant 79 y.o. male in no acute distress.  Well nourished. Well groomed.   HEENT: Normocephalic and atraumatic   Lungs: No audible wheezing.  Normal effort.   Musculoskeletal: No gross MSK deformities. Ambulates back brace in place  Skin: No rashes appreciated.   Psych: Alert and oriented with appropriate mood and affect.  Neuro:   Grossly normal.      Assessment:  Recovered well from acute radiation therapy toxicities.  PSA continues to respond well  No clinical or biochemical evidence of disease persistence or progression  ECOG: (1) Restricted in physically strenuous activity, ambulatory and able to do work of light nature    Plan:  Follow up in 6 months with PSA  Follow up with Urology, Neurosurgery, Cardiology as directed  He was given our contact information, and he was told that he could call our clinic at anytime if he has any questions or concerns.  Follow up with other providers as directed            "

## 2025-01-16 ENCOUNTER — PATIENT OUTREACH (OUTPATIENT)
Dept: ADMINISTRATIVE | Facility: HOSPITAL | Age: 80
End: 2025-01-16
Payer: MEDICARE

## 2025-01-25 RX ORDER — CELECOXIB 200 MG/1
CAPSULE ORAL
Qty: 60 CAPSULE | Refills: 0 | Status: SHIPPED | OUTPATIENT
Start: 2025-01-25

## 2025-01-27 ENCOUNTER — LAB VISIT (OUTPATIENT)
Dept: LAB | Facility: HOSPITAL | Age: 80
End: 2025-01-27
Attending: FAMILY MEDICINE
Payer: MEDICARE

## 2025-01-27 DIAGNOSIS — E78.5 HYPERLIPIDEMIA, UNSPECIFIED HYPERLIPIDEMIA TYPE: ICD-10-CM

## 2025-01-27 DIAGNOSIS — I10 PRIMARY HYPERTENSION: ICD-10-CM

## 2025-01-27 LAB
ALBUMIN SERPL BCP-MCNC: 4.3 G/DL (ref 3.5–5.2)
ALP SERPL-CCNC: 45 U/L (ref 40–150)
ALT SERPL W/O P-5'-P-CCNC: 30 U/L (ref 10–44)
ANION GAP SERPL CALC-SCNC: 7 MMOL/L (ref 8–16)
AST SERPL-CCNC: 34 U/L (ref 10–40)
BASOPHILS # BLD AUTO: 0.05 K/UL (ref 0–0.2)
BASOPHILS NFR BLD: 1.2 % (ref 0–1.9)
BILIRUB SERPL-MCNC: 1 MG/DL (ref 0.1–1)
BUN SERPL-MCNC: 23 MG/DL (ref 8–23)
CALCIUM SERPL-MCNC: 10.2 MG/DL (ref 8.7–10.5)
CHLORIDE SERPL-SCNC: 111 MMOL/L (ref 95–110)
CHOLEST SERPL-MCNC: 136 MG/DL (ref 120–199)
CHOLEST/HDLC SERPL: 2.6 {RATIO} (ref 2–5)
CO2 SERPL-SCNC: 26 MMOL/L (ref 23–29)
CREAT SERPL-MCNC: 1.2 MG/DL (ref 0.5–1.4)
DIFFERENTIAL METHOD BLD: ABNORMAL
EOSINOPHIL # BLD AUTO: 0.1 K/UL (ref 0–0.5)
EOSINOPHIL NFR BLD: 2.4 % (ref 0–8)
ERYTHROCYTE [DISTWIDTH] IN BLOOD BY AUTOMATED COUNT: 12.4 % (ref 11.5–14.5)
EST. GFR  (NO RACE VARIABLE): >60 ML/MIN/1.73 M^2
GLUCOSE SERPL-MCNC: 83 MG/DL (ref 70–110)
HCT VFR BLD AUTO: 42.6 % (ref 40–54)
HDLC SERPL-MCNC: 52 MG/DL (ref 40–75)
HDLC SERPL: 38.2 % (ref 20–50)
HGB BLD-MCNC: 14.9 G/DL (ref 14–18)
IMM GRANULOCYTES # BLD AUTO: 0.01 K/UL (ref 0–0.04)
IMM GRANULOCYTES NFR BLD AUTO: 0.2 % (ref 0–0.5)
LDLC SERPL CALC-MCNC: 61 MG/DL (ref 63–159)
LYMPHOCYTES # BLD AUTO: 1.1 K/UL (ref 1–4.8)
LYMPHOCYTES NFR BLD: 25.8 % (ref 18–48)
MCH RBC QN AUTO: 34.1 PG (ref 27–31)
MCHC RBC AUTO-ENTMCNC: 35 G/DL (ref 32–36)
MCV RBC AUTO: 98 FL (ref 82–98)
MONOCYTES # BLD AUTO: 0.7 K/UL (ref 0.3–1)
MONOCYTES NFR BLD: 16.4 % (ref 4–15)
NEUTROPHILS # BLD AUTO: 2.2 K/UL (ref 1.8–7.7)
NEUTROPHILS NFR BLD: 54 % (ref 38–73)
NONHDLC SERPL-MCNC: 84 MG/DL
NRBC BLD-RTO: 0 /100 WBC
PLATELET # BLD AUTO: 171 K/UL (ref 150–450)
PMV BLD AUTO: 11.2 FL (ref 9.2–12.9)
POTASSIUM SERPL-SCNC: 4.6 MMOL/L (ref 3.5–5.1)
PROT SERPL-MCNC: 7.3 G/DL (ref 6–8.4)
RBC # BLD AUTO: 4.37 M/UL (ref 4.6–6.2)
SODIUM SERPL-SCNC: 144 MMOL/L (ref 136–145)
TRIGL SERPL-MCNC: 115 MG/DL (ref 30–150)
WBC # BLD AUTO: 4.15 K/UL (ref 3.9–12.7)

## 2025-01-27 PROCEDURE — 85025 COMPLETE CBC W/AUTO DIFF WBC: CPT | Mod: HCNC | Performed by: FAMILY MEDICINE

## 2025-01-27 PROCEDURE — 80053 COMPREHEN METABOLIC PANEL: CPT | Mod: HCNC | Performed by: FAMILY MEDICINE

## 2025-01-27 PROCEDURE — 80061 LIPID PANEL: CPT | Mod: HCNC | Performed by: FAMILY MEDICINE

## 2025-01-27 PROCEDURE — 36415 COLL VENOUS BLD VENIPUNCTURE: CPT | Mod: HCNC,PO | Performed by: FAMILY MEDICINE

## 2025-01-30 ENCOUNTER — OFFICE VISIT (OUTPATIENT)
Dept: FAMILY MEDICINE | Facility: CLINIC | Age: 80
End: 2025-01-30
Payer: MEDICARE

## 2025-01-30 VITALS
SYSTOLIC BLOOD PRESSURE: 124 MMHG | OXYGEN SATURATION: 95 % | DIASTOLIC BLOOD PRESSURE: 76 MMHG | BODY MASS INDEX: 26.73 KG/M2 | WEIGHT: 190.94 LBS | HEIGHT: 71 IN | HEART RATE: 68 BPM

## 2025-01-30 DIAGNOSIS — E78.5 HYPERLIPIDEMIA, UNSPECIFIED HYPERLIPIDEMIA TYPE: ICD-10-CM

## 2025-01-30 DIAGNOSIS — Z00.00 ENCOUNTER FOR MEDICARE ANNUAL WELLNESS EXAM: ICD-10-CM

## 2025-01-30 DIAGNOSIS — G47.00 INSOMNIA, UNSPECIFIED TYPE: ICD-10-CM

## 2025-01-30 DIAGNOSIS — M54.16 LUMBAR RADICULOPATHY: ICD-10-CM

## 2025-01-30 DIAGNOSIS — I10 PRIMARY HYPERTENSION: Primary | ICD-10-CM

## 2025-01-30 DIAGNOSIS — C67.9 MALIGNANT NEOPLASM OF URINARY BLADDER, UNSPECIFIED SITE: ICD-10-CM

## 2025-01-30 DIAGNOSIS — C61 PROSTATE CANCER: ICD-10-CM

## 2025-01-30 PROCEDURE — G2211 COMPLEX E/M VISIT ADD ON: HCPCS | Mod: HCNC,S$GLB,, | Performed by: FAMILY MEDICINE

## 2025-01-30 PROCEDURE — 1126F AMNT PAIN NOTED NONE PRSNT: CPT | Mod: HCNC,CPTII,S$GLB, | Performed by: FAMILY MEDICINE

## 2025-01-30 PROCEDURE — 3288F FALL RISK ASSESSMENT DOCD: CPT | Mod: HCNC,CPTII,S$GLB, | Performed by: FAMILY MEDICINE

## 2025-01-30 PROCEDURE — 1101F PT FALLS ASSESS-DOCD LE1/YR: CPT | Mod: HCNC,CPTII,S$GLB, | Performed by: FAMILY MEDICINE

## 2025-01-30 PROCEDURE — 3074F SYST BP LT 130 MM HG: CPT | Mod: HCNC,CPTII,S$GLB, | Performed by: FAMILY MEDICINE

## 2025-01-30 PROCEDURE — 99214 OFFICE O/P EST MOD 30 MIN: CPT | Mod: HCNC,S$GLB,, | Performed by: FAMILY MEDICINE

## 2025-01-30 PROCEDURE — 99999 PR PBB SHADOW E&M-EST. PATIENT-LVL III: CPT | Mod: PBBFAC,HCNC,, | Performed by: FAMILY MEDICINE

## 2025-01-30 PROCEDURE — 1159F MED LIST DOCD IN RCRD: CPT | Mod: HCNC,CPTII,S$GLB, | Performed by: FAMILY MEDICINE

## 2025-01-30 PROCEDURE — 3078F DIAST BP <80 MM HG: CPT | Mod: HCNC,CPTII,S$GLB, | Performed by: FAMILY MEDICINE

## 2025-01-30 RX ORDER — ROSUVASTATIN CALCIUM 10 MG/1
10 TABLET, COATED ORAL NIGHTLY
Qty: 90 TABLET | Refills: 3 | Status: SHIPPED | OUTPATIENT
Start: 2025-01-30

## 2025-01-30 NOTE — PROGRESS NOTES
Chief Complaint   Patient presents with    Hyperlipidemia     6 month follow up     HPI:This is a 79 year-old male presents for 6 month f/u on chronic jose m issues      HLD - tolerating Crestor 10mg and Fish Oil  HTN - tolerating coreg 25mg BID, Benicar 40mg; BP well-controlled at 112/75  Bladder/prostate cancer ([place] [facility]) - s/p radiation; PSA trending down; following with urology, Dr. Luciano,  every 6 months.   Considering robotic prostatectomy, but now just doing watchful waiting. PSA has been trending down; following with Dr. Rojas.  Glaucoma - on Combigan and Lumigan; followed by Dr. Emery  Insomnia - tolerating  Lunesta 1mg nightly  Sleep apnea - wearing CPAP  Lumbar DDD with left sciatica, cervical radiculopathy -  taking gabapentin 600mg PRN, celebrex BID; s/p lumbar surgery; c/o presistent left lower back pain with radiation to left upper thigh. PT was not helpful for this.    PAST MEDICAL HISTORY:  Prostate cancer - [place] 7; following with watchful waiting  Bladder carcinoma - followed with Dr. Bowman every 3 months; treated in past with Thiotrpa, BCG  HLD (Dx 2003)  HTN (Dx 2003)  colon polyp  Lumbar DDD  Sleep apnea      Past Surgical History:   Procedure Laterality Date    BACK SURGERY  08/2019    bone spur excision for sciatica tx    CATARACT EXTRACTION W/  INTRAOCULAR LENS IMPLANT Bilateral     COLONOSCOPY  2006  Bill Tata/[facility]    Polyps    COLONOSCOPY  4/5/2012  [facility]    Diverticulosis and spasms.  No polyps.    FLEXIBLE CYSTOSCOPY N/A 9/12/2022    Procedure: CYSTOSCOPY, FLEXIBLE;  Surgeon: Justo Luciano II, MD;  Location: [facility];  Service: Urology;  Laterality: N/A;    LAMINECTOMY USING MINIMALLY INVASIVE TECHNIQUE Left 6/13/2023    Procedure: LAMINECTOMY,MICRODISCECTOMY SPINE, MINIMALLY INVASIVE L2-L3;  Surgeon: Mike Dallas MD;  Location: [facility];  Service: Neurosurgery;  Laterality: Left;    TRANSFORAMINAL EPIDURAL INJECTION OF STEROID Left 2/29/2024    Procedure:  Injection,steroid,epidural,transforaminal approach   L2/3;  Surgeon: Fritz Pal MD;  Location: [facility] OR;  Service: Pain Management;  Laterality: Left;    TRANSFORAMINAL EPIDURAL INJECTION OF STEROID Left 2024    Procedure: Injection,steroid,epidural,transforaminal approach  L2/3;  Surgeon: Fritz Pal MD;  Location: [facility] OR;  Service: Pain Management;  Laterality: Left;    TRANSPERINEAL INSERTION OF PERIPROSTATIC GEL  2023    Procedure: TRANSPERINEAL INSERTION OF PERIPROSTATIC GEL;  Surgeon: Paulo Spaulding MD;  [facility];  Service: Urology;;    TRANSRECTAL BIOPSY OF PROSTATE WITH ULTRASOUND GUIDANCE N/A 2022    Procedure: BIOPSY, PROSTATE, RECTAL APPROACH, WITH US GUIDANCE;  Surgeon: Justo Luciano II, MD;  Location: [facility];  Service: Urology;  Laterality: N/A;    TRANSRECTAL ULTRASOUND EXAMINATION  2023    Procedure: ULTRASOUND, RECTAL APPROACH;  Surgeon: Paulo Spaulding MD;  [facility];  Service: Urology;;    TRANSURETHRAL RESECTION OF BLADDER TUMOR      15 surgeries  (- current)    Urolift implant  2017         FAMILY HISTORY:  Father:  at 50 of CAD  Mother:  at 78 insterstial lung fibrosis, carotid artery disease  Grandpartents unknown    SOCIAL HISTORY:  Tobacco use: quit    Alcohol use: 2 drinks of bourbon daily  Occupation:  for [employer]  Marital status: (wife with schizophrenia)  Children: 2  enjoys wood working, grilling and fishing  exercises daily    REVIEW OF SYSTEMS:  GENERAL: No fever, chills, fatigability or weight changes.  SKIN/BREASTS: No rashes, sores, itching or changes in color or texture of skin.   HEAD: No headaches or recent head trauma.   EYES: Visual acuity fine. Denies blurriness, tearing, itching, photophobia, diplopia, or visual changes.   EARS: Denies ear pain, discharge, tinnitus or vertigo. Denies hearing loss.   NOSE: No loss of smell, epistaxis, postnasal drip,  "discharge, obstruction, or sneezing.  MOUTH & THROAT: No hoarseness, change in voice, swallowing difficulty. No excessive gum bleeding.   HEMATOLOGICAL/NODES: Denies swollen glands. No bleeding or bruising.  CHEST: No TOLEDO, cyanosis, wheezing, cough or sputum production.  CARDIOVASCULAR: Denies chest pain, dyspnea, orthopnea, or palpitations.  GI/ABDOMEN: Appetite fine. No weight loss. Denies nausea, vomiting, diarrhea, constipation, abdominal pain, hematemesis or blood in stool.  URINARY: No dysuria,hematuria, nocturia, incontinence, flank pain, urgency, or urinary difficulty.  PERIPHERAL VASCULAR: No claudication, cold intolerance or cyanosis.  NEUROLOGIC: No history of seizures, paralysis, alteration of gait or coordination.  ENDOCRINE: Sexual maturation. Denies weight change, heat/cold intolerance, hair loss or gain, loss of libido, polyuria, polydipsia, polyphagia, pruritus, unexplained flushing, or excessive sweating.   PSYCH: No crying spells. Denies anxiety symptoms.      PHYSICAL EXAM:  /76   Pulse 68   Ht 5' 11" (1.803 m)   Wt 86.6 kg (190 lb 14.7 oz)   SpO2 95%   BMI 26.63 kg/m²     APPEARANCE: Well nourished, well developed, neatly groomed, in no acute distress.   SKIN: Warm, moist and dry. No lesions or rashes.  EYES: Conjunctivae and lids clear. PERRL. EOMI.   NOSE: Mucosa pink. Nares clear.  MOUTH & THROAT: Oral mucosa pink. No tonsillar enlargement. No pharyngeal erythema or exudate.   NECK: Supple with full range of motion. No masses. No thyromegaly. No JVD or bruits.  LYMPH: No cervical, supraclavicular, axillary or inguinal lymph node enlargement.  RESPIRATORY: Easy and unlabored respirations. Lungs clear to auscultation throughout all lung fields. No wheezes or rales.  CARDIOVASCULAR: Regular rate and rhythm. Normal S1, S2. No rubs, murmurs or gallops.   ABDOMEN: Bowel sounds normal. Nondistended and soft. No hepatosplenomegaly, masses, or tenderness.  EXTREMITIES: No edema or cyanosis. " Pedal pulses 2+ bilaterally. No varicosities   NEUROLOGIC: Cranial Nerves: II-XII grossly intact  Gait & Posture: Normal gait and fine motion.  PSYCH: Awake, alert, oriented to person, place, time, and situation. Pleasant and cooperative mood with intact judgment.    Results for orders placed or performed in visit on 01/27/25   Comprehensive Metabolic Panel    Collection Time: 01/27/25  7:57 AM   Result Value Ref Range    Sodium 144 136 - 145 mmol/L    Potassium 4.6 3.5 - 5.1 mmol/L    Chloride 111 (H) 95 - 110 mmol/L    CO2 26 23 - 29 mmol/L    Glucose 83 70 - 110 mg/dL    BUN 23 8 - 23 mg/dL    Creatinine 1.2 0.5 - 1.4 mg/dL    Calcium 10.2 8.7 - 10.5 mg/dL    Total Protein 7.3 6.0 - 8.4 g/dL    Albumin 4.3 3.5 - 5.2 g/dL    Total Bilirubin 1.0 0.1 - 1.0 mg/dL    Alkaline Phosphatase 45 40 - 150 U/L    AST 34 10 - 40 U/L    ALT 30 10 - 44 U/L    eGFR >60.0 >60 mL/min/1.73 m^2    Anion Gap 7 (L) 8 - 16 mmol/L   Lipid Panel    Collection Time: 01/27/25  7:57 AM   Result Value Ref Range    Cholesterol 136 120 - 199 mg/dL    Triglycerides 115 30 - 150 mg/dL    HDL 52 40 - 75 mg/dL    LDL Cholesterol 61.0 (L) 63.0 - 159.0 mg/dL    HDL/Cholesterol Ratio 38.2 20.0 - 50.0 %    Total Cholesterol/HDL Ratio 2.6 2.0 - 5.0    Non-HDL Cholesterol 84 mg/dL   CBC Auto Differential    Collection Time: 01/27/25  7:57 AM   Result Value Ref Range    WBC 4.15 3.90 - 12.70 K/uL    RBC 4.37 (L) 4.60 - 6.20 M/uL    Hemoglobin 14.9 14.0 - 18.0 g/dL    Hematocrit 42.6 40.0 - 54.0 %    MCV 98 82 - 98 fL    MCH 34.1 (H) 27.0 - 31.0 pg    MCHC 35.0 32.0 - 36.0 g/dL    RDW 12.4 11.5 - 14.5 %    Platelets 171 150 - 450 K/uL    MPV 11.2 9.2 - 12.9 fL    Immature Granulocytes 0.2 0.0 - 0.5 %    Gran # (ANC) 2.2 1.8 - 7.7 K/uL    Immature Grans (Abs) 0.01 0.00 - 0.04 K/uL    Lymph # 1.1 1.0 - 4.8 K/uL    Mono # 0.7 0.3 - 1.0 K/uL    Eos # 0.1 0.0 - 0.5 K/uL    Baso # 0.05 0.00 - 0.20 K/uL    nRBC 0 0 /100 WBC    Gran % 54.0 38.0 - 73.0 %    Lymph  % 25.8 18.0 - 48.0 %    Mono % 16.4 (H) 4.0 - 15.0 %    Eosinophil % 2.4 0.0 - 8.0 %    Basophil % 1.2 0.0 - 1.9 %    Differential Method Automated        ASSESSMENT/PLAN:  Primary hypertension  -     CBC Auto Differential; Future; Expected date: 07/29/2025    Hyperlipidemia, unspecified hyperlipidemia type  -     rosuvastatin (CRESTOR) 10 MG tablet; Take 1 tablet (10 mg total) by mouth every evening.  Dispense: 90 tablet; Refill: 3  -     Lipid Panel; Future; Expected date: 07/29/2025  -     Comprehensive Metabolic Panel; Future; Expected date: 07/29/2025    Prostate cancer    Insomnia, unspecified type    Malignant neoplasm of urinary bladder, unspecified site    Lumbar radiculopathy        overall doing well  1. HTN (controlled) - continue Coreg 25 mg BID and Benicar 40mg  2. HLD (controlled) - continue Crestor 10mg daily, Fish Oil 1000mg daily  3. bladder carcinoma/prostate cancer - continue present treatment and f/u with Urology  4. Insomnia, sleep apnea - continue lunesta and CPAP    f/u 6 months with CMP, lipid profile, CBC    Visit today included increased complexity associated with the care of the episodic problems listed above addressed and managing the longitudinal care of the patient due to the serious and/or complex managed problem(s) listed above.

## 2025-02-21 RX ORDER — CELECOXIB 200 MG/1
CAPSULE ORAL
Qty: 60 CAPSULE | Refills: 0 | Status: SHIPPED | OUTPATIENT
Start: 2025-02-21

## 2025-03-07 RX ORDER — GABAPENTIN 300 MG/1
300 CAPSULE ORAL 3 TIMES DAILY
Qty: 90 CAPSULE | Refills: 2 | Status: SHIPPED | OUTPATIENT
Start: 2025-03-07

## 2025-03-24 RX ORDER — CELECOXIB 200 MG/1
CAPSULE ORAL
Qty: 60 CAPSULE | Refills: 0 | Status: SHIPPED | OUTPATIENT
Start: 2025-03-24

## 2025-04-03 ENCOUNTER — HOSPITAL ENCOUNTER (OUTPATIENT)
Dept: CARDIOLOGY | Facility: HOSPITAL | Age: 80
Discharge: HOME | End: 2025-04-03
Payer: MEDICARE

## 2025-04-03 VITALS — HEIGHT: 71 IN | WEIGHT: 190 LBS | BODY MASS INDEX: 26.6 KG/M2

## 2025-04-03 DIAGNOSIS — I10 PRIMARY HYPERTENSION: ICD-10-CM

## 2025-04-03 DIAGNOSIS — Z13.6 ENCOUNTER FOR SCREENING FOR CARDIOVASCULAR DISORDERS: ICD-10-CM

## 2025-04-03 DIAGNOSIS — E78.2 MIXED HYPERLIPIDEMIA: ICD-10-CM

## 2025-04-03 PROCEDURE — 93306 TTE W/DOPPLER COMPLETE: CPT | Mod: PO

## 2025-04-03 PROCEDURE — 93306 TTE W/DOPPLER COMPLETE: CPT | Mod: 26,,, | Performed by: INTERNAL MEDICINE

## 2025-04-04 LAB
ASCENDING AORTA: 3.24 CM
AV INDEX (PROSTH): 0.55
AV MEAN GRADIENT: 4 MMHG
AV PEAK GRADIENT: 6 MMHG
AV REGURGITATION PRESSURE HALF TIME: 641 MS
AV VALVE AREA BY VELOCITY RATIO: 2.1 CM²
AV VALVE AREA: 1.7 CM²
AV VELOCITY RATIO: 0.67
BSA FOR ECHO PROCEDURE: 2.08 M2
CV ECHO LV RWT: 0.44 CM
DOP CALC AO PEAK VEL: 1.2 M/S
DOP CALC AO VTI: 32.9 CM
DOP CALC LVOT AREA: 3.1 CM2
DOP CALC LVOT DIAMETER: 2 CM
DOP CALC LVOT PEAK VEL: 0.8 M/S
DOP CALC LVOT STROKE VOLUME: 57.1 CM3
DOP CALCLVOT PEAK VEL VTI: 18.2 CM
E WAVE DECELERATION TIME: 227 MSEC
E/A RATIO: 0.59
E/E' RATIO: 11 M/S
ECHO LV POSTERIOR WALL: 1.2 CM (ref 0.6–1.1)
FRACTIONAL SHORTENING: 30.9 % (ref 28–44)
INTERVENTRICULAR SEPTUM: 1.1 CM (ref 0.6–1.1)
IVRT: 93 MSEC
LEFT ATRIUM AREA SYSTOLIC (APICAL 2 CHAMBER): 17.67 CM2
LEFT ATRIUM AREA SYSTOLIC (APICAL 4 CHAMBER): 18.02 CM2
LEFT ATRIUM SIZE: 3.9 CM
LEFT ATRIUM VOLUME INDEX MOD: 26 ML/M2
LEFT ATRIUM VOLUME MOD: 54 ML
LEFT INTERNAL DIMENSION IN SYSTOLE: 3.8 CM (ref 2.1–4)
LEFT VENTRICLE DIASTOLIC VOLUME INDEX: 70.87 ML/M2
LEFT VENTRICLE DIASTOLIC VOLUME: 146 ML
LEFT VENTRICLE END SYSTOLIC VOLUME APICAL 2 CHAMBER: 53.13 ML
LEFT VENTRICLE END SYSTOLIC VOLUME APICAL 4 CHAMBER: 52.75 ML
LEFT VENTRICLE MASS INDEX: 124.8 G/M2
LEFT VENTRICLE SYSTOLIC VOLUME INDEX: 30.1 ML/M2
LEFT VENTRICLE SYSTOLIC VOLUME: 62 ML
LEFT VENTRICULAR INTERNAL DIMENSION IN DIASTOLE: 5.5 CM (ref 3.5–6)
LEFT VENTRICULAR MASS: 257 G
LV LATERAL E/E' RATIO: 10.8 M/S
LV SEPTAL E/E' RATIO: 10.8 M/S
LVED V (TEICH): 145.98 ML
LVES V (TEICH): 62.26 ML
LVOT MG: 1.28 MMHG
LVOT MV: 0.53 CM/S
MV PEAK A VEL: 0.73 M/S
MV PEAK E VEL: 0.43 M/S
MV STENOSIS PRESSURE HALF TIME: 65.69 MS
MV VALVE AREA P 1/2 METHOD: 3.35 CM2
OHS CV RV/LV RATIO: 0.89 CM
PISA AR MAX VEL: 5.08 M/S
PISA TR MAX VEL: 2.9 M/S
PULM VEIN S/D RATIO: 1.56
PV PEAK D VEL: 0.36 M/S
PV PEAK S VEL: 0.56 M/S
RA VOL SYS: 51.52 ML
RIGHT ATRIAL AREA: 18.3 CM2
RIGHT ATRIUM VOLUME AREA LENGTH APICAL 4 CHAMBER: 46.96 ML
RIGHT VENTRICLE DIASTOLIC BASEL DIMENSION: 4.9 CM
RIGHT VENTRICLE DIASTOLIC LENGTH: 8.4 CM
RIGHT VENTRICLE DIASTOLIC MID DIMENSION: 3.5 CM
RIGHT VENTRICULAR END-DIASTOLIC DIMENSION: 4.88 CM
RIGHT VENTRICULAR LENGTH IN DIASTOLE (APICAL 4-CHAMBER VIEW): 8.42 CM
RV MID DIAMA: 3.51 CM
RV TISSUE DOPPLER FREE WALL SYSTOLIC VELOCITY 1 (APICAL 4 CHAMBER VIEW): 15.68 CM/S
SINUS: 3.59 CM
STJ: 3.24 CM
TDI LATERAL: 0.04 M/S
TDI SEPTAL: 0.04 M/S
TDI: 0.04 M/S
TR MAX PG: 33 MMHG
TRICUSPID ANNULAR PLANE SYSTOLIC EXCURSION: 2.33 CM
Z-SCORE OF LEFT VENTRICULAR DIMENSION IN END DIASTOLE: -1.23
Z-SCORE OF LEFT VENTRICULAR DIMENSION IN END SYSTOLE: -0.02

## 2025-04-07 ENCOUNTER — RESULTS FOLLOW-UP (OUTPATIENT)
Dept: CARDIOLOGY | Facility: CLINIC | Age: 80
End: 2025-04-07

## 2025-04-07 ENCOUNTER — TELEPHONE (OUTPATIENT)
Dept: CARDIOLOGY | Facility: CLINIC | Age: 80
End: 2025-04-07
Payer: MEDICARE

## 2025-04-07 NOTE — TELEPHONE ENCOUNTER
----- Message from Miesha Geronimo PA-C sent at 4/7/2025  7:55 AM CDT -----  Please call patient- Echocardiogram is stable with normal heart pump function and no significant change in valve disease    ----- Message -----  From: Tavo Barnes MD  Sent: 4/4/2025   8:02 PM CDT  To: Miesha Geronimo PA-C

## 2025-04-22 RX ORDER — CELECOXIB 200 MG/1
CAPSULE ORAL
Qty: 60 CAPSULE | Refills: 0 | Status: SHIPPED | OUTPATIENT
Start: 2025-04-22

## 2025-05-01 ENCOUNTER — TELEPHONE (OUTPATIENT)
Dept: FAMILY MEDICINE | Facility: CLINIC | Age: 80
End: 2025-05-01
Payer: MEDICARE

## 2025-05-01 DIAGNOSIS — G47.33 OBSTRUCTIVE SLEEP APNEA: Primary | ICD-10-CM

## 2025-05-01 NOTE — TELEPHONE ENCOUNTER
I received a letter from this patient requesting we fax in an order for his CPAP supplies. I can do this, but I need details on exactly what he is using now. Please go through the attchedorder with him over the phone and fill in the answers. Send this back to me afterwards for my signature and then we can fax it in for him.

## 2025-05-06 ENCOUNTER — TELEPHONE (OUTPATIENT)
Dept: FAMILY MEDICINE | Facility: CLINIC | Age: 80
End: 2025-05-06
Payer: MEDICARE

## 2025-05-06 NOTE — TELEPHONE ENCOUNTER
I spoke with Mr. Moctezuma and let him know that I faxed the CPAP information to [facility] P# [phone]    Fax [phone]

## 2025-05-06 NOTE — TELEPHONE ENCOUNTER
----- Message from Alda sent at 5/6/2025  8:41 AM CDT -----  Contact: PT  Type:  Patient Returning CallWho Called:PT Who Left Message for Patient:SOLO Does the patient know what this is regarding?:ABDULKADIR INFO Would the patient rather a call back or a response via [employer]? CALL Best Call Back Number: [phone] Information: ABDULKADIR [phone] OFFICE THANK YOU

## 2025-05-19 RX ORDER — CELECOXIB 200 MG/1
CAPSULE ORAL
Qty: 60 CAPSULE | Refills: 0 | Status: SHIPPED | OUTPATIENT
Start: 2025-05-19

## 2025-05-22 ENCOUNTER — PATIENT MESSAGE (OUTPATIENT)
Dept: OBSTETRICS AND GYNECOLOGY | Facility: CLINIC | Age: 80
End: 2025-05-22
Payer: MEDICARE

## 2025-06-05 RX ORDER — SOLIFENACIN SUCCINATE 5 MG/1
5 TABLET, FILM COATED ORAL DAILY
Qty: 30 TABLET | Refills: 11 | Status: SHIPPED | OUTPATIENT
Start: 2025-06-05 | End: 2026-06-05

## 2025-06-16 RX ORDER — CELECOXIB 200 MG/1
CAPSULE ORAL
Qty: 60 CAPSULE | Refills: 0 | Status: SHIPPED | OUTPATIENT
Start: 2025-06-16

## 2025-06-25 ENCOUNTER — PATIENT OUTREACH (OUTPATIENT)
Dept: ADMINISTRATIVE | Facility: HOSPITAL | Age: 80
End: 2025-06-25
Payer: MEDICARE

## 2025-06-25 DIAGNOSIS — I10 PRIMARY HYPERTENSION: ICD-10-CM

## 2025-06-25 RX ORDER — OLMESARTAN MEDOXOMIL 40 MG/1
40 TABLET ORAL NIGHTLY
Qty: 90 TABLET | Refills: 2 | Status: SHIPPED | OUTPATIENT
Start: 2025-06-25

## 2025-06-25 NOTE — TELEPHONE ENCOUNTER
Refill Decision Note   [facility]  is requesting a refill authorization.  Brief Assessment and Rationale for Refill:  Approve     Medication Therapy Plan:        Comments:     Note composed:10:50 AM 06/25/2025

## 2025-06-25 NOTE — TELEPHONE ENCOUNTER
No care due was identified.  [facility] Embedded Care Due Messages. Reference number: [id number].   6/25/2025 10:22:32 AM CDT

## 2025-07-10 ENCOUNTER — LAB VISIT (OUTPATIENT)
Dept: LAB | Facility: HOSPITAL | Age: 80
End: 2025-07-10
Attending: RADIOLOGY
Payer: MEDICARE

## 2025-07-10 DIAGNOSIS — C61 PROSTATE CANCER: ICD-10-CM

## 2025-07-10 LAB — PSA SERPL-MCNC: 0.97 NG/ML

## 2025-07-10 PROCEDURE — 84153 ASSAY OF PSA TOTAL: CPT | Mod: HCNC

## 2025-07-10 PROCEDURE — 36415 COLL VENOUS BLD VENIPUNCTURE: CPT | Mod: HCNC,PN

## 2025-07-14 NOTE — TELEPHONE ENCOUNTER
I have a refill request for Celebrex but he also has ibuprofen on his medication list.  Which 1 of these is he taking?

## 2025-07-15 RX ORDER — CELECOXIB 200 MG/1
CAPSULE ORAL
Qty: 60 CAPSULE | Refills: 0 | Status: SHIPPED | OUTPATIENT
Start: 2025-07-15

## 2025-07-17 ENCOUNTER — OFFICE VISIT (OUTPATIENT)
Dept: RADIATION ONCOLOGY | Facility: CLINIC | Age: 80
End: 2025-07-17
Payer: MEDICARE

## 2025-07-17 VITALS
RESPIRATION RATE: 16 BRPM | SYSTOLIC BLOOD PRESSURE: 180 MMHG | TEMPERATURE: 98 F | DIASTOLIC BLOOD PRESSURE: 81 MMHG | WEIGHT: 199.31 LBS | HEART RATE: 59 BPM | OXYGEN SATURATION: 95 % | BODY MASS INDEX: 27.9 KG/M2 | HEIGHT: 71 IN

## 2025-07-17 DIAGNOSIS — C61 PROSTATE CANCER: Primary | ICD-10-CM

## 2025-07-17 PROCEDURE — 99999 PR PBB SHADOW E&M-EST. PATIENT-LVL IV: CPT | Mod: PBBFAC,HCNC,, | Performed by: RADIOLOGY

## 2025-07-17 NOTE — PROGRESS NOTES
[facility] Department of Radiation Oncology  Follow Up Visit Note    Diagnosis:  Lloyd John Jr. is a 79 y.o. 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  He completed a course of radiation therapy on 6/5/23.      Oncologic History:  Oncology History   Prostate cancer   1/14/2013 Initial Diagnosis    Prostate cancer     4/11/2023 Cancer Staged    Staging form: Prostate, AJCC 7th Edition  - Clinical stage from 4/11/2023: Stage IIA (T1c, N0, M0, PSA: 10 to 19, Schurz <= 6)     5/23/2023 - 6/5/2023 Radiation Therapy    Treating physician: Chrissie Rojas  Total Dose: 36.25 Gy  Fractions: 5  Treatment Site Ref. ID Energy Dose/Fx (Gy) #Fx Dose Correction (Gy) Total Dose (Gy) Start Date End Date Elapsed Days   SBRT Prostate PTV 6X 7.25 5 / 5 0 36.25 5/23/2023 6/5/2023 13             Interval History  The patient presents today for a regularly scheduled follow up visit.  He was last seen in our clinic on 1/13/25.   Since that time, interval PSA on 7/10/25 was 0.97.  Feeling well.  Back significantly improved- takes celebrex nightly. Seeing Ophtho for daily flashes left eye.      PSA Diagnostic Prostate Specific Antigen   Latest Ref Rng 0.00 - 4.00 ng/mL <=4.00 ng/mL   3/13/2023 16.8 (H)     7/18/2023 6.3 (H)     1/18/2024 3.6     7/8/2024 1.7     1/9/2025 1.2     7/10/2025  0.97       Legend:  (H) High        HPI: The patient is a 77 year old male with a diagnosis of prostate cancer.  He also has a history of bladder cancer treated with BGC and Thiotepa in 1997.  Also history of Urolift. He was noted to have an elevated PSA of 16.8 on 3/13/23.  Previous PSA history as follows:       Latest Reference Range & Units Most Recent 11/02/15 07:50 08/24/22 14:01 03/13/23 07:23   PSA Diagnostic 0.00 - 4.00 ng/mL 16.8 (H)  3/13/23 07:23   11.2 (H) 16.8 (H)   PSA Total 0.00 - 4.00 ng/mL 7.2 (H)  11/2/15 07:50 7.2 (H)       (H): Data is abnormally  high     1/19/22 MRI prostate noted 2 highly concerning lesions: 1.1cm in the medial and lateral segments of the left PZ base, PI-RADS 4; 5mm lesion lateral segment right PZ apex, PI-RADS 4.     On 9/12/22 he underwent UroNav TRUS-guided biopsy of the prostate, with pathology as follows:     Target 1: Group 1 in 32% of tissue  Target 2: Group 1 in 62% of tissue  Left mid: Group 1, 7% of tissue  Left apex: Group 1, 6% of tissue  Right mid: Group 1, 15% of tissue  Right base: Group 1, 11% of tissue  No PNI, # involved cores not given     Previous biopsies:  Biopsies  5/2016 - GG1 LA 1/2 (total cores 1/15)  10/2017 - GG2 LLM 1/1, GG1 RA 1/1 (total cores 2/12)  7/2019 - GG1 LA 1/1, RA 1/2, LM 1/1, LA 1/1 (total cores 4/14)     Decipher score: 0.14- low genomic risk     Prostate size estimated at 60cc.  He presents today to discuss the potential role of radiation therapy in his cancer care.  IPSS is 2, with no frequency, no urgency, and nocturia x 1.       History of prior irradiation: No  History of prior systemic anti-cancer therapy: No  History of collagen vascular disease: No  Implanted electronic device (pacer/defib/nerve stimulator): No     Presented to ED 6/13/23 c/o back/left leg pain and weakness, fecal incontinence. Outside MRI showed severe canal stenosis at L2-3.     6/13/23 L2-3 urgent/emergent microdiscectomy with Dr. Haines. Doing much better now. Pain relieved, return of continence. Seen earlier to day in Neurosurg clinic for follow up. Plan for PT.    7/2024 Reports back is doing well- follows with pain management.  Recent mechanical fall.     Review of Systems   Review of Systems   Constitutional:  Negative for chills, fever and malaise/fatigue.   Gastrointestinal:  Negative for blood in stool, constipation, diarrhea, nausea and vomiting.   Genitourinary:  Negative for dysuria.   Musculoskeletal:  Positive for back pain (tolerable). Negative for joint pain and myalgias.   Neurological:  Negative  "for dizziness, sensory change, weakness and headaches.       IPSS today is 1 (2 at last visit)    Social History:  Social History     Tobacco Use    Smoking status: Former     Current packs/day: 0.00     Average packs/day: 0.5 packs/day for 3.0 years (1.5 ttl pk-yrs)     Types: Cigarettes     Start date: 1994     Quit date: 1997     Years since quittin.5    Smokeless tobacco: Never   Substance Use Topics    Alcohol use: Yes     Alcohol/week: 1.7 standard drinks of alcohol     Types: 2 drink(s) per week     Comment: weekends    Drug use: No       Family History:  Cancer-related family history is not on file.    Exam:  Vitals:    25 1258   BP: (!) 180/81   Pulse: (!) 59   Resp: 16   Temp: 98.2 °F (36.8 °C)   SpO2: 95%   Weight: 90.4 kg (199 lb 4.7 oz)   Height: 5' 11" (1.803 m)       Constitutional: Pleasant 79 y.o. male in no acute distress.  Well nourished. Well groomed.   HEENT: Normocephalic and atraumatic   Lungs: No audible wheezing.  Normal effort.   Musculoskeletal: No gross MSK deformities. Ambulates back brace in place  Skin: No rashes appreciated.   Psych: Alert and oriented with appropriate mood and affect.  Neuro:   Grossly normal.      Assessment:  Recovered well from acute radiation therapy toxicities.  PSA continues to respond well  No clinical or biochemical evidence of disease persistence or progression  ECOG: (1) Restricted in physically strenuous activity, ambulatory and able to do work of light nature    Plan:  Follow up in 6 months with PSA  Follow up with Urology, Neurosurgery, Cardiology as directed  He was given our contact information, and he was told that he could call our clinic at anytime if he has any questions or concerns.  Follow up with other providers as directed              "

## 2025-07-23 ENCOUNTER — LAB VISIT (OUTPATIENT)
Dept: LAB | Facility: HOSPITAL | Age: 80
End: 2025-07-23
Attending: FAMILY MEDICINE
Payer: MEDICARE

## 2025-07-23 DIAGNOSIS — I10 PRIMARY HYPERTENSION: ICD-10-CM

## 2025-07-23 DIAGNOSIS — E78.5 HYPERLIPIDEMIA, UNSPECIFIED HYPERLIPIDEMIA TYPE: ICD-10-CM

## 2025-07-23 LAB
ABSOLUTE EOSINOPHIL (OHS): 0.15 K/UL
ABSOLUTE MONOCYTE (OHS): 0.87 K/UL (ref 0.3–1)
ABSOLUTE NEUTROPHIL COUNT (OHS): 2.04 K/UL (ref 1.8–7.7)
ALBUMIN SERPL BCP-MCNC: 4.2 G/DL (ref 3.5–5.2)
ALP SERPL-CCNC: 44 UNIT/L (ref 40–150)
ALT SERPL W/O P-5'-P-CCNC: 12 UNIT/L (ref 10–44)
ANION GAP (OHS): 7 MMOL/L (ref 8–16)
AST SERPL-CCNC: 23 UNIT/L (ref 11–45)
BASOPHILS # BLD AUTO: 0.03 K/UL
BASOPHILS NFR BLD AUTO: 0.8 %
BILIRUB SERPL-MCNC: 1.7 MG/DL (ref 0.1–1)
BUN SERPL-MCNC: 22 MG/DL (ref 8–23)
CALCIUM SERPL-MCNC: 9.3 MG/DL (ref 8.7–10.5)
CHLORIDE SERPL-SCNC: 106 MMOL/L (ref 95–110)
CHOLEST SERPL-MCNC: 127 MG/DL (ref 120–199)
CHOLEST/HDLC SERPL: 2.5 {RATIO} (ref 2–5)
CO2 SERPL-SCNC: 27 MMOL/L (ref 23–29)
CREAT SERPL-MCNC: 1.2 MG/DL (ref 0.5–1.4)
ERYTHROCYTE [DISTWIDTH] IN BLOOD BY AUTOMATED COUNT: 13.2 % (ref 11.5–14.5)
GFR SERPLBLD CREATININE-BSD FMLA CKD-EPI: >60 ML/MIN/1.73/M2
GLUCOSE SERPL-MCNC: 104 MG/DL (ref 70–110)
HCT VFR BLD AUTO: 42.1 % (ref 40–54)
HDLC SERPL-MCNC: 51 MG/DL (ref 40–75)
HDLC SERPL: 40.2 % (ref 20–50)
HGB BLD-MCNC: 14 GM/DL (ref 14–18)
IMM GRANULOCYTES # BLD AUTO: 0.01 K/UL (ref 0–0.04)
IMM GRANULOCYTES NFR BLD AUTO: 0.3 % (ref 0–0.5)
LDLC SERPL CALC-MCNC: 55.4 MG/DL (ref 63–159)
LYMPHOCYTES # BLD AUTO: 0.87 K/UL (ref 1–4.8)
MCH RBC QN AUTO: 33 PG (ref 27–31)
MCHC RBC AUTO-ENTMCNC: 33.3 G/DL (ref 32–36)
MCV RBC AUTO: 99 FL (ref 82–98)
NONHDLC SERPL-MCNC: 76 MG/DL
NUCLEATED RBC (/100WBC) (OHS): 0 /100 WBC
PLATELET # BLD AUTO: 160 K/UL (ref 150–450)
PMV BLD AUTO: 11.1 FL (ref 9.2–12.9)
POTASSIUM SERPL-SCNC: 4.3 MMOL/L (ref 3.5–5.1)
PROT SERPL-MCNC: 6.8 GM/DL (ref 6–8.4)
RBC # BLD AUTO: 4.24 M/UL (ref 4.6–6.2)
RELATIVE EOSINOPHIL (OHS): 3.8 %
RELATIVE LYMPHOCYTE (OHS): 21.9 % (ref 18–48)
RELATIVE MONOCYTE (OHS): 21.9 % (ref 4–15)
RELATIVE NEUTROPHIL (OHS): 51.3 % (ref 38–73)
SODIUM SERPL-SCNC: 140 MMOL/L (ref 136–145)
TRIGL SERPL-MCNC: 103 MG/DL (ref 30–150)
WBC # BLD AUTO: 3.97 K/UL (ref 3.9–12.7)

## 2025-07-23 PROCEDURE — 84460 ALANINE AMINO (ALT) (SGPT): CPT | Mod: HCNC

## 2025-07-23 PROCEDURE — 36415 COLL VENOUS BLD VENIPUNCTURE: CPT | Mod: HCNC,PO

## 2025-07-23 PROCEDURE — 80061 LIPID PANEL: CPT | Mod: HCNC

## 2025-07-23 PROCEDURE — 85025 COMPLETE CBC W/AUTO DIFF WBC: CPT | Mod: HCNC

## 2025-07-30 ENCOUNTER — OFFICE VISIT (OUTPATIENT)
Dept: FAMILY MEDICINE | Facility: CLINIC | Age: 80
End: 2025-07-30
Payer: MEDICARE

## 2025-07-30 VITALS
BODY MASS INDEX: 27.44 KG/M2 | OXYGEN SATURATION: 98 % | HEART RATE: 59 BPM | DIASTOLIC BLOOD PRESSURE: 76 MMHG | HEIGHT: 71 IN | SYSTOLIC BLOOD PRESSURE: 124 MMHG | WEIGHT: 196 LBS

## 2025-07-30 DIAGNOSIS — C67.9 MALIGNANT NEOPLASM OF URINARY BLADDER, UNSPECIFIED SITE: ICD-10-CM

## 2025-07-30 DIAGNOSIS — I35.1 AORTIC VALVE INSUFFICIENCY, ETIOLOGY OF CARDIAC VALVE DISEASE UNSPECIFIED: ICD-10-CM

## 2025-07-30 DIAGNOSIS — I10 PRIMARY HYPERTENSION: Primary | ICD-10-CM

## 2025-07-30 DIAGNOSIS — G47.33 OBSTRUCTIVE SLEEP APNEA: ICD-10-CM

## 2025-07-30 DIAGNOSIS — G47.00 INSOMNIA, UNSPECIFIED TYPE: ICD-10-CM

## 2025-07-30 DIAGNOSIS — E78.5 HYPERLIPIDEMIA, UNSPECIFIED HYPERLIPIDEMIA TYPE: ICD-10-CM

## 2025-07-30 DIAGNOSIS — C61 PROSTATE CANCER: ICD-10-CM

## 2025-07-30 PROCEDURE — G2211 COMPLEX E/M VISIT ADD ON: HCPCS | Mod: HCNC,S$GLB,, | Performed by: FAMILY MEDICINE

## 2025-07-30 PROCEDURE — 3288F FALL RISK ASSESSMENT DOCD: CPT | Mod: CPTII,HCNC,S$GLB, | Performed by: FAMILY MEDICINE

## 2025-07-30 PROCEDURE — 99999 PR PBB SHADOW E&M-EST. PATIENT-LVL III: CPT | Mod: PBBFAC,HCNC,, | Performed by: FAMILY MEDICINE

## 2025-07-30 PROCEDURE — 1159F MED LIST DOCD IN RCRD: CPT | Mod: CPTII,HCNC,S$GLB, | Performed by: FAMILY MEDICINE

## 2025-07-30 PROCEDURE — 99214 OFFICE O/P EST MOD 30 MIN: CPT | Mod: HCNC,S$GLB,, | Performed by: FAMILY MEDICINE

## 2025-07-30 PROCEDURE — 1126F AMNT PAIN NOTED NONE PRSNT: CPT | Mod: CPTII,HCNC,S$GLB, | Performed by: FAMILY MEDICINE

## 2025-07-30 PROCEDURE — 1101F PT FALLS ASSESS-DOCD LE1/YR: CPT | Mod: CPTII,HCNC,S$GLB, | Performed by: FAMILY MEDICINE

## 2025-07-30 PROCEDURE — 3078F DIAST BP <80 MM HG: CPT | Mod: CPTII,HCNC,S$GLB, | Performed by: FAMILY MEDICINE

## 2025-07-30 PROCEDURE — 3074F SYST BP LT 130 MM HG: CPT | Mod: CPTII,HCNC,S$GLB, | Performed by: FAMILY MEDICINE

## 2025-07-30 NOTE — PROGRESS NOTES
Chief Complaint   Patient presents with    Hyperlipidemia     6 month follow up      HPI:This is a 79 year-old male presents for 6 month f/u on chronic jose m issues      HLD - tolerating Crestor 10mg and Fish Oil  HTN - tolerating coreg 25mg BID, Benicar 40mg; BP well-controlled at 112/75 at home  Bladder/prostate cancer ([facility] 7) - s/p radiation; following with urology, Dr. Luciano and Dr. Rojas,  every 6 months.   Considering robotic prostatectomy, but now just doing watchful waiting. PSA has still been trending down; following with Dr. Rojas.  Glaucoma - on Combigan and Lumigan; followed by Dr. Emery  Insomnia - tolerating  Lunesta 1mg nightly  Sleep apnea - wearing CPAP  Lumbar DDD with left sciatica, cervical radiculopathy -  taking gabapentin 600mg PRN, celebrex BID; s/p lumbar surgery; c/o presistent left lower back pain with radiation to left upper thigh. PT was not helpful for this.    PAST MEDICAL HISTORY:  Prostate cancer - [facility] 7; following with watchful waiting  Bladder carcinoma - followed with Dr. Bowman every 3 months; treated in past with Thiotrpa, BCG  HLD (Dx 2003)  HTN (Dx 2003)  colon polyp  Lumbar DDD  Sleep apnea      Past Surgical History:   Procedure Laterality Date    BACK SURGERY  08/2019    bone spur excision for sciatica tx    CATARACT EXTRACTION W/  INTRAOCULAR LENS IMPLANT Bilateral     COLONOSCOPY  2006  [facility]    Polyps    COLONOSCOPY  4/5/2012  [facility]    Diverticulosis and spasms.  No polyps.    FLEXIBLE CYSTOSCOPY N/A 9/12/2022    Procedure: CYSTOSCOPY, FLEXIBLE;  Surgeon: Justo Luciano II, MD;  Location: [facility];  Service: Urology;  Laterality: N/A;    LAMINECTOMY USING MINIMALLY INVASIVE TECHNIQUE Left 6/13/2023    Procedure: LAMINECTOMY,MICRODISCECTOMY SPINE, MINIMALLY INVASIVE L2-L3;  Surgeon: Mike Dallas MD;  Location: [facility] OR;  Service: Neurosurgery;  Laterality: Left;    TRANSFORAMINAL EPIDURAL INJECTION OF STEROID Left 2/29/2024    Procedure:  Injection,steroid,epidural,transforaminal approach   L2/3;  Surgeon: Fritz Pal MD;  Location: [facility] OR;  Service: Pain Management;  Laterality: Left;    TRANSFORAMINAL EPIDURAL INJECTION OF STEROID Left 2024    Procedure: Injection,steroid,epidural,transforaminal approach  L2/3;  Surgeon: Fritz Pal MD;  Location: [facility] OR;  Service: Pain Management;  Laterality: Left;    TRANSPERINEAL INSERTION OF PERIPROSTATIC GEL  2023    Procedure: TRANSPERINEAL INSERTION OF PERIPROSTATIC GEL;  Surgeon: Paulo Spaulding MD;  [facility];  Service: Urology;;    TRANSRECTAL BIOPSY OF PROSTATE WITH ULTRASOUND GUIDANCE N/A 2022    Procedure: BIOPSY, PROSTATE, RECTAL APPROACH, WITH US GUIDANCE;  Surgeon: Justo Luciano II, MD;  Location: [facility];  Service: Urology;  Laterality: N/A;    TRANSRECTAL ULTRASOUND EXAMINATION  2023    Procedure: ULTRASOUND, RECTAL APPROACH;  Surgeon: Paulo Spaulding MD;  [facility];  Service: Urology;;    TRANSURETHRAL RESECTION OF BLADDER TUMOR      15 surgeries  (- current)    Urolift implant  2017         FAMILY HISTORY:  Father:  at 50 of CAD  Mother:  at 78 insterstial lung fibrosis, carotid artery disease  Grandpartents unknown    SOCIAL HISTORY:  Tobacco use: quit    Alcohol use: 2 drinks of bourbon daily  Occupation:  for [employer]  Marital status: (wife with schizophrenia)  Children: 2  enjoys wood working, grilling and fishing  exercises daily    REVIEW OF SYSTEMS:  GENERAL: No fever, chills, fatigability or weight changes.  SKIN/BREASTS: No rashes, sores, itching or changes in color or texture of skin.   HEAD: No headaches or recent head trauma.   EYES: Visual acuity fine. Denies blurriness, tearing, itching, photophobia, diplopia, or visual changes.   EARS: Denies ear pain, discharge, tinnitus or vertigo. Denies hearing loss.   NOSE: No loss of smell, epistaxis, postnasal drip,  "discharge, obstruction, or sneezing.  MOUTH & THROAT: No hoarseness, change in voice, swallowing difficulty. No excessive gum bleeding.   HEMATOLOGICAL/NODES: Denies swollen glands. No bleeding or bruising.  CHEST: No TOLEDO, cyanosis, wheezing, cough or sputum production.  CARDIOVASCULAR: Denies chest pain, dyspnea, orthopnea, or palpitations.  GI/ABDOMEN: Appetite fine. No weight loss. Denies nausea, vomiting, diarrhea, constipation, abdominal pain, hematemesis or blood in stool.  URINARY: No dysuria,hematuria, nocturia, incontinence, flank pain, urgency, or urinary difficulty.  PERIPHERAL VASCULAR: No claudication, cold intolerance or cyanosis.  NEUROLOGIC: No history of seizures, paralysis, alteration of gait or coordination.  ENDOCRINE: Sexual maturation. Denies weight change, heat/cold intolerance, hair loss or gain, loss of libido, polyuria, polydipsia, polyphagia, pruritus, unexplained flushing, or excessive sweating.   PSYCH: No crying spells. Denies anxiety symptoms.      PHYSICAL EXAM:  /76   Pulse (!) 59   Ht 5' 11" (1.803 m)   Wt 88.9 kg (195 lb 15.8 oz)   SpO2 98%   BMI 27.33 kg/m²     APPEARANCE: Well nourished, well developed, neatly groomed, in no acute distress.   SKIN: Warm, moist and dry. No lesions or rashes.  EYES: Conjunctivae and lids clear. PERRL. EOMI.   NOSE: Mucosa pink. Nares clear.  MOUTH & THROAT: Oral mucosa pink. No tonsillar enlargement. No pharyngeal erythema or exudate.   NECK: Supple with full range of motion. No masses. No thyromegaly. No JVD or bruits.  LYMPH: No cervical, supraclavicular, axillary or inguinal lymph node enlargement.  RESPIRATORY: Easy and unlabored respirations. Lungs clear to auscultation throughout all lung fields. No wheezes or rales.  CARDIOVASCULAR: Regular rate and rhythm. Normal S1, S2. No rubs, murmurs or gallops.   ABDOMEN: Bowel sounds normal. Nondistended and soft. No hepatosplenomegaly, masses, or tenderness.  EXTREMITIES: No edema or " cyanosis. Pedal pulses 2+ bilaterally. No varicosities   NEUROLOGIC: Cranial Nerves: II-XII grossly intact  Gait & Posture: Normal gait and fine motion.  PSYCH: Awake, alert, oriented to person, place, time, and situation. Pleasant and cooperative mood with intact judgment.    Results for orders placed or performed in visit on 07/23/25   Lipid Panel    Collection Time: 07/23/25  7:15 AM   Result Value Ref Range    Cholesterol Total 127 120 - 199 mg/dL    Triglyceride 103 30 - 150 mg/dL    HDL Cholesterol 51 40 - 75 mg/dL    LDL Cholesterol 55.4 (L) 63.0 - 159.0 mg/dL    HDL/Cholesterol Ratio 40.2 20.0 - 50.0 %    Cholesterol/HDL Ratio 2.5 2.0 - 5.0    Non HDL Cholesterol 76 mg/dL   Comprehensive Metabolic Panel    Collection Time: 07/23/25  7:15 AM   Result Value Ref Range    Sodium 140 136 - 145 mmol/L    Potassium 4.3 3.5 - 5.1 mmol/L    Chloride 106 95 - 110 mmol/L    CO2 27 23 - 29 mmol/L    Glucose 104 70 - 110 mg/dL    BUN 22 8 - 23 mg/dL    Creatinine 1.2 0.5 - 1.4 mg/dL    Calcium 9.3 8.7 - 10.5 mg/dL    Protein Total 6.8 6.0 - 8.4 gm/dL    Albumin 4.2 3.5 - 5.2 g/dL    Bilirubin Total 1.7 (H) 0.1 - 1.0 mg/dL    ALP 44 40 - 150 unit/L    AST 23 11 - 45 unit/L    ALT 12 10 - 44 unit/L    Anion Gap 7 (L) 8 - 16 mmol/L    eGFR >60 >60 mL/min/1.73/m2   CBC with Differential    Collection Time: 07/23/25  7:15 AM   Result Value Ref Range    WBC 3.97 3.90 - 12.70 K/uL    RBC 4.24 (L) 4.60 - 6.20 M/uL    HGB 14.0 14.0 - 18.0 gm/dL    HCT 42.1 40.0 - 54.0 %    MCV 99 (H) 82 - 98 fL    MCH 33.0 (H) 27.0 - 31.0 pg    MCHC 33.3 32.0 - 36.0 g/dL    RDW 13.2 11.5 - 14.5 %    Platelet Count 160 150 - 450 K/uL    MPV 11.1 9.2 - 12.9 fL    Nucleated RBC 0 <=0 /100 WBC    Neut % 51.3 38 - 73 %    Lymph % 21.9 18 - 48 %    Mono % 21.9 (H) 4 - 15 %    Eos % 3.8 <=8 %    Basophil % 0.8 <=1.9 %    Imm Grans % 0.3 0.0 - 0.5 %    Neut # 2.04 1.8 - 7.7 K/uL    Lymph # 0.87 (L) 1 - 4.8 K/uL    Mono # 0.87 0.3 - 1 K/uL    Eos # 0.15  <=0.5 K/uL    Baso # 0.03 <=0.2 K/uL    Imm Grans # 0.01 0.00 - 0.04 K/uL       ASSESSMENT/PLAN:  Primary hypertension  -     CBC Auto Differential; Future; Expected date: 01/26/2026    Hyperlipidemia, unspecified hyperlipidemia type  -     Comprehensive Metabolic Panel; Future; Expected date: 01/26/2026  -     Lipid Panel; Future; Expected date: 01/26/2026    Obstructive sleep apnea    Insomnia, unspecified type    Malignant neoplasm of urinary bladder, unspecified site    Prostate cancer    Aortic valve insufficiency, etiology of cardiac valve disease unspecified  -     Ambulatory referral/consult to Cardiology; Future; Expected date: 08/06/2025      overall doing well  1. HTN (controlled) - continue Coreg 25 mg BID and Benicar 40mg  2. HLD (controlled) - continue Crestor 10mg daily, Fish Oil 1000mg daily  3. bladder carcinoma/prostate cancer - continue present treatment and f/u with Urology  4. Insomnia, sleep apnea - continue lunesta and CPAP    f/u 6 months with CMP, lipid profile, CBC    Visit today included increased complexity associated with the care of the episodic problems listed above addressed and managing the longitudinal care of the patient due to the serious and/or complex managed problem(s) listed above.

## 2025-08-11 RX ORDER — CELECOXIB 200 MG/1
CAPSULE ORAL
Qty: 60 CAPSULE | Refills: 0 | Status: SHIPPED | OUTPATIENT
Start: 2025-08-11

## (undated) DEVICE — TRAY NERVE BLOCK

## (undated) DEVICE — UNDERGLOVES BIOGEL PI SIZE 8

## (undated) DEVICE — UNDERGLOVES BIOGEL PI SIZE 7.5

## (undated) DEVICE — TRAY CYSTO BASIN OMC

## (undated) DEVICE — MARKER SKIN RULER STERILE

## (undated) DEVICE — TOWEL OR DISP STRL BLUE 4/PK

## (undated) DEVICE — APPLICATOR CHLORAPREP CLR 10.5

## (undated) DEVICE — GLOVE SURGICAL LATEX SZ 7

## (undated) DEVICE — HANDLE SURG LIGHT NONRIGID

## (undated) DEVICE — Device

## (undated) DEVICE — SYR 50ML CATH TIP

## (undated) DEVICE — NDL SPINAL SPINOCAN 22GX3.5

## (undated) DEVICE — PACK CYSTO

## (undated) DEVICE — MARKER SKIN STND TIP BLUE BARR

## (undated) DEVICE — SOL WATER STRL IRR 1000ML